# Patient Record
Sex: MALE | Race: WHITE | HISPANIC OR LATINO | Employment: FULL TIME
[De-identification: names, ages, dates, MRNs, and addresses within clinical notes are randomized per-mention and may not be internally consistent; named-entity substitution may affect disease eponyms.]

---

## 2024-09-11 ENCOUNTER — TRANSCRIBE ORDERS (OUTPATIENT)
Dept: CALL CENTER | Age: 39
End: 2024-09-11

## 2024-09-11 DIAGNOSIS — K70.9 ALCOHOLIC LIVER DISEASE (H): Primary | ICD-10-CM

## 2024-09-17 ENCOUNTER — TELEPHONE (OUTPATIENT)
Dept: GASTROENTEROLOGY | Facility: CLINIC | Age: 39
End: 2024-09-17
Payer: COMMERCIAL

## 2024-09-17 NOTE — TELEPHONE ENCOUNTER
"[facility] Call Center    Phone Message    May a detailed message be left on voicemail: No    Reason for Call: Other: Patient is currently scheduled on 11/27, as visit type New Liver . This is outside the expected timeline for this referral. Patient has been added to the waitlist.      Referral had a timeline listed of \"Urgent: 3-5 Days\" but no triage notes mention timeline ?     Action Taken: Message routed to:  Other: GI REFERRAL TRIAGE POOL     Travel Screening: Not Applicable   "

## 2024-09-20 ENCOUNTER — HOSPITAL ENCOUNTER (INPATIENT)
Facility: CLINIC | Age: 39
LOS: 2 days | Discharge: HOME | DRG: 433 | End: 2024-09-22
Attending: EMERGENCY MEDICINE | Admitting: STUDENT IN AN ORGANIZED HEALTH CARE EDUCATION/TRAINING PROGRAM
Payer: COMMERCIAL

## 2024-09-20 ENCOUNTER — APPOINTMENT (OUTPATIENT)
Dept: GENERAL RADIOLOGY | Facility: CLINIC | Age: 39
DRG: 433 | End: 2024-09-20
Attending: PHYSICIAN ASSISTANT
Payer: COMMERCIAL

## 2024-09-20 DIAGNOSIS — E80.6 BILIRUBINEMIA: ICD-10-CM

## 2024-09-20 DIAGNOSIS — K70.30 ALCOHOLIC CIRRHOSIS OF LIVER WITHOUT ASCITES (H): ICD-10-CM

## 2024-09-20 DIAGNOSIS — K70.30 ALCOHOLIC CIRRHOSIS OF LIVER WITHOUT ASCITES (H): Primary | ICD-10-CM

## 2024-09-20 LAB
ABO/RH(D): NORMAL
ALBUMIN SERPL BCG-MCNC: 2.9 G/DL (ref 3.5–5.2)
ALBUMIN UR-MCNC: NEGATIVE MG/DL
ALP SERPL-CCNC: 217 U/L (ref 40–150)
ALT SERPL W P-5'-P-CCNC: 68 U/L (ref 0–70)
AMMONIA PLAS-SCNC: 76 UMOL/L (ref 16–60)
ANION GAP SERPL CALCULATED.3IONS-SCNC: 11 MMOL/L (ref 7–15)
ANTIBODY SCREEN: NEGATIVE
APPEARANCE UR: CLEAR
APTT PPP: 38 SECONDS (ref 22–38)
AST SERPL W P-5'-P-CCNC: 126 U/L (ref 0–45)
ATRIAL RATE - MUSE: 84 BPM
BACTERIA #/AREA URNS HPF: ABNORMAL /HPF
BASOPHILS # BLD AUTO: 0.1 10E3/UL (ref 0–0.2)
BASOPHILS NFR BLD AUTO: 1 %
BILIRUB SERPL-MCNC: 30.6 MG/DL
BILIRUB UR QL STRIP: ABNORMAL
BUN SERPL-MCNC: 13 MG/DL (ref 6–20)
CALCIUM SERPL-MCNC: 8.3 MG/DL (ref 8.8–10.4)
CHLORIDE SERPL-SCNC: 101 MMOL/L (ref 98–107)
COLOR UR AUTO: ABNORMAL
CREAT SERPL-MCNC: 0.56 MG/DL (ref 0.67–1.17)
DIASTOLIC BLOOD PRESSURE - MUSE: NORMAL MMHG
EGFRCR SERPLBLD CKD-EPI 2021: >90 ML/MIN/1.73M2
EOSINOPHIL # BLD AUTO: 0 10E3/UL (ref 0–0.7)
EOSINOPHIL NFR BLD AUTO: 0 %
ERYTHROCYTE [DISTWIDTH] IN BLOOD BY AUTOMATED COUNT: 19.8 % (ref 10–15)
FLUAV RNA SPEC QL NAA+PROBE: NEGATIVE
FLUBV RNA RESP QL NAA+PROBE: NEGATIVE
GLUCOSE SERPL-MCNC: 110 MG/DL (ref 70–99)
GLUCOSE UR STRIP-MCNC: NEGATIVE MG/DL
HCO3 SERPL-SCNC: 24 MMOL/L (ref 22–29)
HCT VFR BLD AUTO: 40.2 % (ref 40–53)
HGB BLD-MCNC: 14.5 G/DL (ref 13.3–17.7)
HGB UR QL STRIP: NEGATIVE
HYALINE CASTS: 1 /LPF
IMM GRANULOCYTES # BLD: 0.5 10E3/UL
IMM GRANULOCYTES NFR BLD: 3 %
INR PPP: 2.43 (ref 0.85–1.15)
INTERPRETATION ECG - MUSE: NORMAL
KETONES UR STRIP-MCNC: NEGATIVE MG/DL
LACTATE SERPL-SCNC: 1.4 MMOL/L (ref 0.7–2)
LEUKOCYTE ESTERASE UR QL STRIP: NEGATIVE
LIPASE SERPL-CCNC: 58 U/L (ref 13–60)
LYMPHOCYTES # BLD AUTO: 0.9 10E3/UL (ref 0.8–5.3)
LYMPHOCYTES NFR BLD AUTO: 6 %
MCH RBC QN AUTO: 36.4 PG (ref 26.5–33)
MCHC RBC AUTO-ENTMCNC: 36.1 G/DL (ref 31.5–36.5)
MCV RBC AUTO: 101 FL (ref 78–100)
MONOCYTES # BLD AUTO: 1.2 10E3/UL (ref 0–1.3)
MONOCYTES NFR BLD AUTO: 7 %
MUCOUS THREADS #/AREA URNS LPF: PRESENT /LPF
NEUTROPHILS # BLD AUTO: 13.9 10E3/UL (ref 1.6–8.3)
NEUTROPHILS NFR BLD AUTO: 84 %
NITRATE UR QL: NEGATIVE
NRBC # BLD AUTO: 0 10E3/UL
NRBC BLD AUTO-RTO: 0 /100
NT-PROBNP SERPL-MCNC: 264 PG/ML (ref 0–450)
P AXIS - MUSE: 41 DEGREES
PH UR STRIP: 6.5 [PH] (ref 5–7)
PLATELET # BLD AUTO: 196 10E3/UL (ref 150–450)
POTASSIUM SERPL-SCNC: 4.2 MMOL/L (ref 3.4–5.3)
PR INTERVAL - MUSE: 142 MS
PROT SERPL-MCNC: 5.5 G/DL (ref 6.4–8.3)
QRS DURATION - MUSE: 84 MS
QT - MUSE: 384 MS
QTC - MUSE: 453 MS
R AXIS - MUSE: -7 DEGREES
RBC # BLD AUTO: 3.98 10E6/UL (ref 4.4–5.9)
RBC URINE: <1 /HPF
RSV RNA SPEC NAA+PROBE: NEGATIVE
SARS-COV-2 RNA RESP QL NAA+PROBE: NEGATIVE
SODIUM SERPL-SCNC: 136 MMOL/L (ref 135–145)
SP GR UR STRIP: 1.01 (ref 1–1.03)
SPECIMEN EXPIRATION DATE: NORMAL
SQUAMOUS EPITHELIAL: <1 /HPF
SYSTOLIC BLOOD PRESSURE - MUSE: NORMAL MMHG
T AXIS - MUSE: 4 DEGREES
TROPONIN T SERPL HS-MCNC: 8 NG/L
UROBILINOGEN UR STRIP-MCNC: NORMAL MG/DL
VENTRICULAR RATE- MUSE: 84 BPM
WBC # BLD AUTO: 16.6 10E3/UL (ref 4–11)
WBC URINE: 1 /HPF

## 2024-09-20 PROCEDURE — 80053 COMPREHEN METABOLIC PANEL: CPT | Performed by: PHYSICIAN ASSISTANT

## 2024-09-20 PROCEDURE — 85025 COMPLETE CBC W/AUTO DIFF WBC: CPT | Performed by: PHYSICIAN ASSISTANT

## 2024-09-20 PROCEDURE — 99255 IP/OBS CONSLTJ NEW/EST HI 80: CPT | Mod: GC | Performed by: INTERNAL MEDICINE

## 2024-09-20 PROCEDURE — 83605 ASSAY OF LACTIC ACID: CPT | Performed by: PHYSICIAN ASSISTANT

## 2024-09-20 PROCEDURE — 84484 ASSAY OF TROPONIN QUANT: CPT | Performed by: PHYSICIAN ASSISTANT

## 2024-09-20 PROCEDURE — 82140 ASSAY OF AMMONIA: CPT | Performed by: PHYSICIAN ASSISTANT

## 2024-09-20 PROCEDURE — 83690 ASSAY OF LIPASE: CPT | Performed by: PHYSICIAN ASSISTANT

## 2024-09-20 PROCEDURE — 99285 EMERGENCY DEPT VISIT HI MDM: CPT | Mod: 25 | Performed by: EMERGENCY MEDICINE

## 2024-09-20 PROCEDURE — 120N000002 HC R&B MED SURG/OB UMMC

## 2024-09-20 PROCEDURE — 93005 ELECTROCARDIOGRAM TRACING: CPT | Performed by: EMERGENCY MEDICINE

## 2024-09-20 PROCEDURE — 36415 COLL VENOUS BLD VENIPUNCTURE: CPT | Performed by: PHYSICIAN ASSISTANT

## 2024-09-20 PROCEDURE — 83735 ASSAY OF MAGNESIUM: CPT | Performed by: STUDENT IN AN ORGANIZED HEALTH CARE EDUCATION/TRAINING PROGRAM

## 2024-09-20 PROCEDURE — 81001 URINALYSIS AUTO W/SCOPE: CPT | Performed by: PHYSICIAN ASSISTANT

## 2024-09-20 PROCEDURE — 87637 SARSCOV2&INF A&B&RSV AMP PRB: CPT | Performed by: PHYSICIAN ASSISTANT

## 2024-09-20 PROCEDURE — 71046 X-RAY EXAM CHEST 2 VIEWS: CPT

## 2024-09-20 PROCEDURE — 85730 THROMBOPLASTIN TIME PARTIAL: CPT | Performed by: PHYSICIAN ASSISTANT

## 2024-09-20 PROCEDURE — 85610 PROTHROMBIN TIME: CPT | Performed by: PHYSICIAN ASSISTANT

## 2024-09-20 PROCEDURE — 82248 BILIRUBIN DIRECT: CPT | Performed by: STUDENT IN AN ORGANIZED HEALTH CARE EDUCATION/TRAINING PROGRAM

## 2024-09-20 PROCEDURE — 99285 EMERGENCY DEPT VISIT HI MDM: CPT | Mod: FS | Performed by: EMERGENCY MEDICINE

## 2024-09-20 PROCEDURE — 99223 1ST HOSP IP/OBS HIGH 75: CPT | Performed by: STUDENT IN AN ORGANIZED HEALTH CARE EDUCATION/TRAINING PROGRAM

## 2024-09-20 PROCEDURE — 93010 ELECTROCARDIOGRAM REPORT: CPT | Performed by: PHYSICIAN ASSISTANT

## 2024-09-20 PROCEDURE — 86900 BLOOD TYPING SEROLOGIC ABO: CPT | Performed by: PHYSICIAN ASSISTANT

## 2024-09-20 PROCEDURE — 84100 ASSAY OF PHOSPHORUS: CPT | Performed by: STUDENT IN AN ORGANIZED HEALTH CARE EDUCATION/TRAINING PROGRAM

## 2024-09-20 PROCEDURE — 87040 BLOOD CULTURE FOR BACTERIA: CPT | Performed by: PHYSICIAN ASSISTANT

## 2024-09-20 PROCEDURE — 83880 ASSAY OF NATRIURETIC PEPTIDE: CPT | Performed by: PHYSICIAN ASSISTANT

## 2024-09-20 RX ORDER — POLYETHYLENE GLYCOL 3350 17 G/17G
17 POWDER, FOR SOLUTION ORAL 2 TIMES DAILY PRN
Status: DISCONTINUED | OUTPATIENT
Start: 2024-09-20 | End: 2024-09-22 | Stop reason: HOSPADM

## 2024-09-20 RX ORDER — CEFTRIAXONE 1 G/1
1 INJECTION, POWDER, FOR SOLUTION INTRAMUSCULAR; INTRAVENOUS ONCE
Status: COMPLETED | OUTPATIENT
Start: 2024-09-20 | End: 2024-09-21

## 2024-09-20 RX ORDER — CALCIUM CARBONATE 500 MG/1
1000 TABLET, CHEWABLE ORAL 4 TIMES DAILY PRN
Status: DISCONTINUED | OUTPATIENT
Start: 2024-09-20 | End: 2024-09-22 | Stop reason: HOSPADM

## 2024-09-20 RX ORDER — AMOXICILLIN 250 MG
2 CAPSULE ORAL 2 TIMES DAILY PRN
Status: DISCONTINUED | OUTPATIENT
Start: 2024-09-20 | End: 2024-09-22 | Stop reason: HOSPADM

## 2024-09-20 RX ORDER — AMOXICILLIN 250 MG
1 CAPSULE ORAL 2 TIMES DAILY PRN
Status: DISCONTINUED | OUTPATIENT
Start: 2024-09-20 | End: 2024-09-22 | Stop reason: HOSPADM

## 2024-09-20 RX ORDER — LIDOCAINE 40 MG/G
CREAM TOPICAL
Status: DISCONTINUED | OUTPATIENT
Start: 2024-09-20 | End: 2024-09-22 | Stop reason: HOSPADM

## 2024-09-20 RX ORDER — ONDANSETRON 4 MG/1
4 TABLET, ORALLY DISINTEGRATING ORAL EVERY 6 HOURS PRN
Status: DISCONTINUED | OUTPATIENT
Start: 2024-09-20 | End: 2024-09-22 | Stop reason: HOSPADM

## 2024-09-20 RX ORDER — ACETAMINOPHEN 650 MG/1
650 SUPPOSITORY RECTAL EVERY 4 HOURS PRN
Status: DISCONTINUED | OUTPATIENT
Start: 2024-09-20 | End: 2024-09-22 | Stop reason: HOSPADM

## 2024-09-20 RX ORDER — ONDANSETRON 2 MG/ML
4 INJECTION INTRAMUSCULAR; INTRAVENOUS EVERY 6 HOURS PRN
Status: DISCONTINUED | OUTPATIENT
Start: 2024-09-20 | End: 2024-09-22 | Stop reason: HOSPADM

## 2024-09-20 RX ORDER — ENOXAPARIN SODIUM 100 MG/ML
40 INJECTION SUBCUTANEOUS EVERY 12 HOURS
Status: DISCONTINUED | OUTPATIENT
Start: 2024-09-21 | End: 2024-09-21

## 2024-09-20 RX ORDER — ACETAMINOPHEN 325 MG/1
650 TABLET ORAL EVERY 4 HOURS PRN
Status: DISCONTINUED | OUTPATIENT
Start: 2024-09-20 | End: 2024-09-22 | Stop reason: HOSPADM

## 2024-09-20 ASSESSMENT — ACTIVITIES OF DAILY LIVING (ADL)
ADLS_ACUITY_SCORE: 35

## 2024-09-20 ASSESSMENT — COLUMBIA-SUICIDE SEVERITY RATING SCALE - C-SSRS
1. IN THE PAST MONTH, HAVE YOU WISHED YOU WERE DEAD OR WISHED YOU COULD GO TO SLEEP AND NOT WAKE UP?: NO
2. HAVE YOU ACTUALLY HAD ANY THOUGHTS OF KILLING YOURSELF IN THE PAST MONTH?: NO
6. HAVE YOU EVER DONE ANYTHING, STARTED TO DO ANYTHING, OR PREPARED TO DO ANYTHING TO END YOUR LIFE?: NO

## 2024-09-20 NOTE — ED TRIAGE NOTES
"  L lower leg has pitting edema. PMD sent pt to ED \" my liver is failing fast, my labs are getting worse\" + SOB when walking up/down stairs   Triage Assessment (Adult)       Row Name 09/20/24 9308          Triage Assessment    Airway WDL WDL        Respiratory WDL    Respiratory WDL expansion/retractions     Expansion/Accessory Muscles/Retractions no use of accessory muscles;no retractions;expansion symmetric        Skin Circulation/Temperature WDL    Skin Circulation/Temperature WDL X        Cardiac WDL    Cardiac WDL WDL        Peripheral/Neurovascular WDL    Peripheral Neurovascular WDL WDL        Cognitive/Neuro/Behavioral WDL    Cognitive/Neuro/Behavioral WDL WDL                     "

## 2024-09-21 ENCOUNTER — APPOINTMENT (OUTPATIENT)
Dept: ULTRASOUND IMAGING | Facility: CLINIC | Age: 39
DRG: 433 | End: 2024-09-21
Attending: STUDENT IN AN ORGANIZED HEALTH CARE EDUCATION/TRAINING PROGRAM
Payer: COMMERCIAL

## 2024-09-21 LAB
ANION GAP SERPL CALCULATED.3IONS-SCNC: 8 MMOL/L (ref 7–15)
BILIRUB DIRECT SERPL-MCNC: 18.54 MG/DL (ref 0–0.3)
BILIRUB SERPL-MCNC: 29.8 MG/DL
BUN SERPL-MCNC: 13.8 MG/DL (ref 6–20)
C DIFF TOX B STL QL: NEGATIVE
CALCIUM SERPL-MCNC: 8.1 MG/DL (ref 8.8–10.4)
CHLORIDE SERPL-SCNC: 104 MMOL/L (ref 98–107)
CREAT SERPL-MCNC: 0.66 MG/DL (ref 0.67–1.17)
EGFRCR SERPLBLD CKD-EPI 2021: >90 ML/MIN/1.73M2
ERYTHROCYTE [DISTWIDTH] IN BLOOD BY AUTOMATED COUNT: 19.9 % (ref 10–15)
GLUCOSE SERPL-MCNC: 77 MG/DL (ref 70–99)
HCO3 SERPL-SCNC: 24 MMOL/L (ref 22–29)
HCT VFR BLD AUTO: 39.4 % (ref 40–53)
HGB BLD-MCNC: 14.1 G/DL (ref 13.3–17.7)
HOLD SPECIMEN: NORMAL
MAGNESIUM SERPL-MCNC: 2 MG/DL (ref 1.7–2.3)
MAGNESIUM SERPL-MCNC: 2.1 MG/DL (ref 1.7–2.3)
MCH RBC QN AUTO: 36.1 PG (ref 26.5–33)
MCHC RBC AUTO-ENTMCNC: 35.8 G/DL (ref 31.5–36.5)
MCV RBC AUTO: 101 FL (ref 78–100)
PHOSPHATE SERPL-MCNC: 2.8 MG/DL (ref 2.5–4.5)
PLATELET # BLD AUTO: 189 10E3/UL (ref 150–450)
POTASSIUM SERPL-SCNC: 3.9 MMOL/L (ref 3.4–5.3)
RBC # BLD AUTO: 3.91 10E6/UL (ref 4.4–5.9)
SODIUM SERPL-SCNC: 136 MMOL/L (ref 135–145)
WBC # BLD AUTO: 17.4 10E3/UL (ref 4–11)

## 2024-09-21 PROCEDURE — 250N000011 HC RX IP 250 OP 636: Performed by: PHYSICIAN ASSISTANT

## 2024-09-21 PROCEDURE — 999N000147 HC STATISTIC PT IP EVAL DEFER: Performed by: PHYSICAL THERAPIST

## 2024-09-21 PROCEDURE — 99233 SBSQ HOSP IP/OBS HIGH 50: CPT | Performed by: INTERNAL MEDICINE

## 2024-09-21 PROCEDURE — 76700 US EXAM ABDOM COMPLETE: CPT

## 2024-09-21 PROCEDURE — 258N000003 HC RX IP 258 OP 636: Performed by: STUDENT IN AN ORGANIZED HEALTH CARE EDUCATION/TRAINING PROGRAM

## 2024-09-21 PROCEDURE — 80048 BASIC METABOLIC PNL TOTAL CA: CPT | Performed by: STUDENT IN AN ORGANIZED HEALTH CARE EDUCATION/TRAINING PROGRAM

## 2024-09-21 PROCEDURE — 87493 C DIFF AMPLIFIED PROBE: CPT | Performed by: STUDENT IN AN ORGANIZED HEALTH CARE EDUCATION/TRAINING PROGRAM

## 2024-09-21 PROCEDURE — 76700 US EXAM ABDOM COMPLETE: CPT | Mod: 26 | Performed by: RADIOLOGY

## 2024-09-21 PROCEDURE — 36415 COLL VENOUS BLD VENIPUNCTURE: CPT | Performed by: STUDENT IN AN ORGANIZED HEALTH CARE EDUCATION/TRAINING PROGRAM

## 2024-09-21 PROCEDURE — 85027 COMPLETE CBC AUTOMATED: CPT | Performed by: STUDENT IN AN ORGANIZED HEALTH CARE EDUCATION/TRAINING PROGRAM

## 2024-09-21 PROCEDURE — 999N000111 HC STATISTIC OT IP EVAL DEFER: Performed by: OCCUPATIONAL THERAPIST

## 2024-09-21 PROCEDURE — 120N000002 HC R&B MED SURG/OB UMMC

## 2024-09-21 PROCEDURE — 250N000011 HC RX IP 250 OP 636: Performed by: STUDENT IN AN ORGANIZED HEALTH CARE EDUCATION/TRAINING PROGRAM

## 2024-09-21 PROCEDURE — 36415 COLL VENOUS BLD VENIPUNCTURE: CPT | Performed by: INTERNAL MEDICINE

## 2024-09-21 PROCEDURE — 83735 ASSAY OF MAGNESIUM: CPT | Performed by: INTERNAL MEDICINE

## 2024-09-21 PROCEDURE — 250N000013 HC RX MED GY IP 250 OP 250 PS 637: Performed by: STUDENT IN AN ORGANIZED HEALTH CARE EDUCATION/TRAINING PROGRAM

## 2024-09-21 RX ORDER — HALOPERIDOL 5 MG/ML
2.5-5 INJECTION INTRAMUSCULAR EVERY 6 HOURS PRN
Status: DISCONTINUED | OUTPATIENT
Start: 2024-09-21 | End: 2024-09-22 | Stop reason: HOSPADM

## 2024-09-21 RX ORDER — OLANZAPINE 5 MG/1
5-10 TABLET, ORALLY DISINTEGRATING ORAL EVERY 6 HOURS PRN
Status: DISCONTINUED | OUTPATIENT
Start: 2024-09-21 | End: 2024-09-22 | Stop reason: HOSPADM

## 2024-09-21 RX ORDER — FUROSEMIDE 10 MG/ML
20 INJECTION INTRAMUSCULAR; INTRAVENOUS ONCE
Status: COMPLETED | OUTPATIENT
Start: 2024-09-21 | End: 2024-09-21

## 2024-09-21 RX ORDER — PREDNISOLONE 5 MG/1
5 TABLET ORAL SEE ADMIN INSTRUCTIONS
Status: ON HOLD | COMMUNITY

## 2024-09-21 RX ORDER — FAMOTIDINE 20 MG/1
20 TABLET ORAL DAILY
Status: ON HOLD | COMMUNITY

## 2024-09-21 RX ORDER — CLONIDINE HYDROCHLORIDE 0.1 MG/1
0.1 TABLET ORAL EVERY 8 HOURS
Status: DISCONTINUED | OUTPATIENT
Start: 2024-09-21 | End: 2024-09-22 | Stop reason: HOSPADM

## 2024-09-21 RX ORDER — LORAZEPAM 1 MG/1
1-2 TABLET ORAL EVERY 30 MIN PRN
Status: DISCONTINUED | OUTPATIENT
Start: 2024-09-21 | End: 2024-09-22 | Stop reason: HOSPADM

## 2024-09-21 RX ORDER — FLUMAZENIL 0.1 MG/ML
0.2 INJECTION, SOLUTION INTRAVENOUS
Status: DISCONTINUED | OUTPATIENT
Start: 2024-09-21 | End: 2024-09-22 | Stop reason: HOSPADM

## 2024-09-21 RX ORDER — SODIUM CHLORIDE 9 MG/ML
INJECTION, SOLUTION INTRAVENOUS
Status: COMPLETED
Start: 2024-09-21 | End: 2024-09-21

## 2024-09-21 RX ORDER — FOLIC ACID 1 MG/1
1 TABLET ORAL DAILY
Status: DISCONTINUED | OUTPATIENT
Start: 2024-09-21 | End: 2024-09-22 | Stop reason: HOSPADM

## 2024-09-21 RX ORDER — MULTIPLE VITAMINS W/ MINERALS TAB 9MG-400MCG
1 TAB ORAL DAILY
Status: DISCONTINUED | OUTPATIENT
Start: 2024-09-21 | End: 2024-09-22 | Stop reason: HOSPADM

## 2024-09-21 RX ORDER — LORAZEPAM 2 MG/ML
1-2 INJECTION INTRAMUSCULAR EVERY 30 MIN PRN
Status: DISCONTINUED | OUTPATIENT
Start: 2024-09-21 | End: 2024-09-22 | Stop reason: HOSPADM

## 2024-09-21 RX ORDER — SIMETHICONE 125 MG
125 TABLET,CHEWABLE ORAL 2 TIMES DAILY
Status: ON HOLD | COMMUNITY

## 2024-09-21 RX ADMIN — CLONIDINE HYDROCHLORIDE 0.1 MG: 0.1 TABLET ORAL at 06:43

## 2024-09-21 RX ADMIN — FUROSEMIDE 20 MG: 10 INJECTION, SOLUTION INTRAMUSCULAR; INTRAVENOUS at 06:45

## 2024-09-21 RX ADMIN — THIAMINE HCL TAB 100 MG 100 MG: 100 TAB at 09:03

## 2024-09-21 RX ADMIN — FOLIC ACID 1 MG: 1 TABLET ORAL at 09:03

## 2024-09-21 RX ADMIN — Medication 1 TABLET: at 09:03

## 2024-09-21 RX ADMIN — ACETAMINOPHEN 650 MG: 325 TABLET ORAL at 11:42

## 2024-09-21 RX ADMIN — CEFTRIAXONE SODIUM 1 G: 1 INJECTION, POWDER, FOR SOLUTION INTRAMUSCULAR; INTRAVENOUS at 00:13

## 2024-09-21 RX ADMIN — CLONIDINE HYDROCHLORIDE 0.1 MG: 0.1 TABLET ORAL at 14:16

## 2024-09-21 RX ADMIN — SODIUM CHLORIDE 1000 ML: 9 INJECTION, SOLUTION INTRAVENOUS at 00:17

## 2024-09-21 RX ADMIN — CLONIDINE HYDROCHLORIDE 0.1 MG: 0.1 TABLET ORAL at 21:18

## 2024-09-21 ASSESSMENT — ACTIVITIES OF DAILY LIVING (ADL)
ADLS_ACUITY_SCORE: 35
ADLS_ACUITY_SCORE: 18
ADLS_ACUITY_SCORE: 35
ADLS_ACUITY_SCORE: 18
ADLS_ACUITY_SCORE: 35
ADLS_ACUITY_SCORE: 18
ADLS_ACUITY_SCORE: 18
ADLS_ACUITY_SCORE: 35
ADLS_ACUITY_SCORE: 18
ADLS_ACUITY_SCORE: 18
ADLS_ACUITY_SCORE: 35
ADLS_ACUITY_SCORE: 18
ADLS_ACUITY_SCORE: 35
ADLS_ACUITY_SCORE: 18
ADLS_ACUITY_SCORE: 18

## 2024-09-21 NOTE — PHARMACY-ADMISSION MEDICATION HISTORY
Pharmacist Admission Medication History    Admission medication history is complete. The information provided in this note is only as accurate as the sources available at the time of the update.    Information Source(s): Patient via phone    Pertinent Information:   Patient is friendly and knowledgeable regarding their PTA medication regimen; compliance of PTA medication is endorsed during the interview for mediation history.      Changes made to PTA medication list:  Added: all 3 medications  Deleted: None  Changed: None    Allergies reviewed with patient and updates made in EHR: yes    Medication History Completed By: Cj Wright [facility] 9/21/2024 6:41 PM    PTA Med List   Medication Sig Last Dose    famotidine (PEPCID) 20 MG tablet Take 20 mg by mouth daily.     prednisoLONE 5 MG tablet Take 5 mg by mouth See Admin Instructions. Last dose of 8 tablets on 9/20   TAKE 8 TABLETS BY MOUTH ONCE DAILY FOR 28 DAYS, THEN 6 TABLETS DAILY FOR 7 DAYS, THEN 4 TABLETS DAILY FOR 7 DAYS, THEN 2 TABLETS DAILY FOR 7     simethicone (MYLICON) 125 MG chewable tablet Take 125 mg by mouth 2 times daily.

## 2024-09-21 NOTE — PLAN OF CARE
"Goal Outcome Evaluation.vs  /50   Pulse 85   Temp 97.9  F (36.6  C) (Oral)   Resp 16   Ht 1.651 m (5' 5\")   Wt 55.3 kg (122 lb)   SpO2 98%   BMI 20.30 kg/m            Plan of Care Reviewed With: patient    Overall Patient Progress: no changeOverall Patient Progress: no change  Pt alert and oriented x4, able to make needs known, pt is pleasant and cooperative. On room air, CIWA score 2, skin jaundiced, sclera yellow, med for headache x1, [facility] was able to reach staff to help pt with this. Stool formed, dent to lab, 1 more stool spec needed, appetite good, BLE edema noted, enc pt to elevate feet. Voiding qs. No magnesium replacement needed today,  Continue plan of care disposition TBD         "

## 2024-09-21 NOTE — PROGRESS NOTES
[facility]    Medicine Progress Note - Hospitalist Service, GOLD TEAM 16    Date of Admission:  9/20/2024    Assessment & Plan      Stewart Hunt is a 38 year old male admitted on 9/20/2024. He presents to the ER with a recent diagnosis of cirrhosis. Patient underwent CT abd pelvis, MRI abd pelvis in early September which confirmed diagnosis of cirrhosis. Patient presenting to the ER with progressively worsening anasarca, hyperbilirubinemia.    # Decompensated alcoholic cirrhosis  # Hyperbilirubinemia most likely due to Alcoholic cirrhosis  # Jaundice  - MELD-Na: 29. MELD3.0: 32. 63.5% estimated 90 day survival  - Last MRI Abdpomen/pelvis (9/15)  > Mild nodularity of the liver representing cirrhosis.   > Small nodular areas of fatty infiltration in the liver.   > No suspicious enhancing liver lesions.   - Ascites: Recent CT abdomen pelvis showing mild abdominal ascites.  Abdominal exam today showing generalized abdominal distention with pitting edema up to the navel, however showed no fluid wave.  - Varices: No history of varices or hemorrhoids on last EGD and colonoscopy.  - Will obtain abdomen ultrasound to evaluate volume of ascites.  If ascites is noted on ultrasound,  - Daily daily MELD labs  - Will obtain total, direct bilirubin (hyperbilirubinemia likely due to alcoholic cirrhoris, however, will need to rule out hemolysis as etiology for his hyperbilirubinemia  - Patient was given a dose of ceftriaxone in the ER. Given that he is having no abd pain, no ttp, no peritoneal signs and remains afebrile, will hold off on continuing ceftriaxone for empirical sbp treatment until Abd US confirms ascites    # Hepatic encephalopathy  Patient endorsing difficulty sleeping. and mild symptoms of forgetfulness and daytime drowsiness which may be reflective of hepatic encephalopathy.  Ammonia 76.  - Goal to have 2-3 stools per day.  At this time is having 6-7 watery stools  per day.  - Once acute diarrhea improves, would consider starting lactulose to meet daily goal stools.    # Anasarca (BLE pittting edema, pitting edema at abd below level of navel)  # Hypoalbuminemia  Cause of anasarca is likely due to hypoalbuminemia from liver disease. UA done while in the ER which showed no protein. No risk factors for protein losing enteropathy. It is also possible that malnutrition is confounding  factor   - High protein diet, Low Na diet  - Begin lasix 20mg IVP BID (If patient noted to have ascites on abd US above, will switch to Lasix/spironolactone). For now will continue to diurese with Lasix alone.  - Daily standing weights  - Strict intake/output  - TTE to rule out heart failure    # Acute diarrhea  - Diarrhea ongoing for the last week while on prednisone. Patient has high risk for Cdiff. In addition, patient also noted to have blood in the stool.  - GI PCR ordered, pending  - C. diff ordered, pending    # Hypertension  Etiology: Likely is primary hypertension. Per chart review, appears that patient has ranged between 150/70s- 160/80s. Patient has not been on antihypertensives at home prior to admission  - Lasix 20mg IVP BID (can be transitioned to Lasix po BID at discharge)  - After patient noted to be tolerating lasix well, will add on spironolactone 50 mg daily-- eventually to titrate up to Spironolactone 100 mg.    # Leukocytosis  - Patient noted to have leukocytosis with neutrophil predominance. Possible etiology of his leukocytosis includes steroid induced, bacterial gastroenteritis  - Trend daily  - Monitor fever curve  - Given 1 dose of ceftriaxone at the ER. Will hold off on empirical sbp treatment until confirmed to have ascites.    # Alcohol use disorder  - Last alchol drink was 2 weeks ago. Patient has a long history of alcohol use for the last several years. Last 2 years has been drinking 2 pints of bodka daily for at least 2 years.  - Since patient's ;last drink was 2 weeks  ago, unlikely to need benzos for withdrawal. Will keep on [facility] protocol for now  - Chemical dependency consult, appreciate recs  - Thiamine, folate B12, Multivits  - Ativan as part of ciwa (would not anticipate that this would be needed)          Diet: Combination Diet Regular Diet; 2 gm NA Diet; Low Saturated Fat Na <2400mg Diet, No Caffeine Diet    DVT Prophylaxis: Pneumatic Compression Devices  Gomes Catheter: Not present  Lines: None     Cardiac Monitoring: None  Code Status: Full Code      Clinically Significant Risk Factors Present on Admission              # Hypoalbuminemia: Lowest albumin = 2.9 g/dL at 9/20/2024  6:47 PM, will monitor as appropriate  # Coagulation Defect: INR = 2.43 (Ref range: 0.85 - 1.15) and/or PTT = 38 Seconds (Ref range: 22 - 38 Seconds), will monitor for bleeding                       Disposition Plan     Medically Ready for Discharge: Anticipated in 2-4 Days             Momo Navarrete DO, PRERNA  Hospitalist Service, GOLD TEAM [address] [place] [address]  Securely message with [employer] (more info)  Text page via [facility] Paging/Directory   See signed in provider for up to date coverage information  ______________________________________________________________________    Interval History   Patient is in strong spirits today.  Patient reports urinating frequently.  Patient reports frequent bowel movements.  Patient's mentation appears improved.    Physical Exam   Vital Signs: Temp: 97.9  F (36.6  C) Temp src: Oral BP: 110/50 Pulse: 85   Resp: 16 SpO2: 98 % O2 Device: None (Room air)    Weight: 122 lbs 0 oz    GENERAL: Alert and oriented x 3; no acute distress; well-nourished.  HEENT: Normocephalic; atraumatic; PERRLA; MMM.  CV: RRR; normal S1, S2; no rubs, murmurs, or gallops.  RESP: Lung fields clear to aucultation B/L; no wheezing or crepitations.  GI: Abdomen is soft, nontender, nondistended; no organomegaly; normal bowel sounds.  : Deferred genital  examination.   MSK: No clubbing, cyanosis, or edema.  DERM: Skin is intact; no rash, lesions, or skin breakdown.  NEURO: No focal deficits appreciated; strength & sensorium are grossly intact.  PSYCH: No active hallucinations; affect, insight appear within normal limits.    Medical Decision Making       55 MINUTES SPENT BY ME on the date of service doing chart review, history, exam, documentation & further activities per the note.      Data     I have personally reviewed the following data over the past 24 hrs:    17.4 (H)  \   14.1   / 189     136 104 13.8 /  77   3.9 24 0.66 (L) \     ALT: 68 AST: 126 (H) AP: 217 (H) TBILI: 30.6 (HH); 29.8 (HH)   ALB: 2.9 (L) TOT PROTEIN: 5.5 (L) LIPASE: 58     Trop: 8 BNP: 264     Procal: N/A CRP: N/A Lactic Acid: 1.4       INR:  2.43 (H) PTT:  38   D-dimer:  N/A Fibrinogen:  N/A       Imaging results reviewed over the past 24 hrs:   Recent Results (from the past 24 hour(s))   Chest XR,  PA & LAT    Narrative    EXAM: XR CHEST 2 VIEWS  LOCATION: [facility]  DATE: 9/20/2024    INDICATION: Dyspnea on exertion  COMPARISON: None.      Impression    IMPRESSION: Normal size of cardiomediastinal silhouette. Low lung volumes with elevation of the right hemidiaphragm and associated compressive atelectasis. No definite consolidation, pleural effusion or pneumothorax. No acute bony abnormality.   US Abdomen Complete    Narrative    EXAMINATION: US ABDOMEN COMPLETE  9/21/2024 8:31 AM      CLINICAL HISTORY: eval for ascitic fluid volume (for patracentesis),  eval for biliary obstruction    COMPARISON: None        FINDINGS:  Liver demonstrates increased echogenicity and coarsened echotexture  and measures 20.6 cm in length. There is no intrahepatic or  extrahepatic biliary ductal dilatation. The common bile duct measures  4 mm. The main portal vein is patent with flow towards the liver and  measures 1.0 cm in diameter.    The gallbladder wall  measures 4 mm in thickness. No stones or  pericholecystic fluid. There is a pedunculated 4 mm gallbladder polyp.  The spleen measures maximally 12.6 cm and is normal in appearance. The  visualized portions of the pancreas are normal in echogenicity.    The visualized upper abdominal aorta and inferior vena cava are  normal.      The kidneys are normal in position and echogenicity. The right kidney  measures 11.4 cm and the left kidney measures 14.2 cm. There is no  significant urinary tract dilation. The urinary bladder is moderately  distended and normal in morphology. The bladder wall is normal.    Trace right upper quadrant free fluid.      Impression    IMPRESSION:     1. Hepatosplenomegaly and hepatic cirrhosis  2. Trace right upper quadrant free fluid.  3. No evidence of biliary obstruction.

## 2024-09-21 NOTE — H&P
[facility]    History and Physical - Hospitalist Service, GOLD TEAM        Date of Admission:  9/20/2024    Assessment & Plan      Stewart Hunt is a 38 year old male admitted on 9/20/2024. He presents to the ER with a recent diagnosis of cirrhosis. Patient underwent CT abd pelvis, MRI abd pelvis in early September which confirmed diagnosis of cirrhosis. Patient presenting to the ER with progressively worsening anasarca, hyperbilirubinemia.    #Decompensated alcoholic cirrhosis  #Hyperbilirubinemia most likely due to Alcoholic cirrhosis  #Jaundice  --- MELD-Na: 29. MELD3.0: 32. 63.5% estimated 90 day survival  Last MRI Abdpomen/pelvis (9/15)  1. Mild nodularity of the liver representing cirrhosis.   2. Small nodular areas of fatty infiltration in the liver.   3. No suspicious enhancing liver lesions.   --- Ascites: Recent CT abdomen pelvis showing mild abdominal ascites.  Abdominal exam today showing generalized abdominal distention with pitting edema up to the navel, however showed no fluid wave.  --- varices: No history of varices or hemorrhoids on last EGD and colonoscopy.  --- Will obtain abdomen ultrasound to evaluate volume of ascites.  If ascites is noted on ultrasound,  --- Daily daily MELD labs  --- Will obtain total, direct bilirubin (hyperbilirubinemia likely due to alcoholic cirrhoris, however, will need to rule out hemolysis as etiology for his hyperbilirubinemia  --- Patient was given a dose of ceftriaxone in the ER. Given that he is having no abd pain, no ttp, no peritoneal signs and remains afebrile, will hold off on continuing ceftriaxone for empirical sbp treatment until Abd US confirms ascites    #Hepatic encephalopathy  Patient endorsing difficulty sleeping. and mild symptoms of forgetfulness and daytime drowsiness which may be reflective of hepatic encephalopathy.  Ammonia 76.  --- Goal to have 2-3 stools per day.  At this time is having 6-7  watery stools per day.  --- Once acute diarrhea improves, would consider starting lactulose to meet daily goal stools.    #Anasarca (BLE pittting edema, pitting edema at abd below level of navel)  #Hypoalbuminemia  Cause of anasarca is likely due to hypoalbuminemia from liver disease. UA done while in the ER which showed no protein. No risk factors for protein losing enteropathy. It is also possible that malnutrition is confounding  factor   --- High protein diet, Low Na diet  --- begin lasix 20mg IVP BID (If patient noted to have ascites on abd US above, will switch to Lasix/spironolactone). For now will continue to diurese with Lasix alone.  --- daily standing weights  --- strict intake/output  --- TTE to rule out heart failure    #Acute Diarrhea:  --- Diarrhea ongoing for the last week while on prednisone. Patient has high risk for Cdiff. In addition, patient also noted to have blood in the stool.  --- GI PCR ordered, pending  --- Cdiff ordered, pending    #Hypertension  Etiology: Likely is primary hypertension. Per chart review, appears that patient has ranged between 150/70s- 160/80s. Patient has not been on antihypertensives at home prior to admission  ---  Lasix 20mg IVP BID (can be transitioned to Lasix po BID at discharge)  --- After patient noted to be tolerating lasix well, will add on spironolactone 50 mg daily-- eventually to titrate up to Spironolactone 100 mg.    #Leukocytosis:  --- Patient noted to have leukocytosis with neutrophil predominance. Possible etiology of his leukocytosis includes steroid induced, bacterial gastroenteritis  --- trend daily  --- monitor fever curve  --- given 1 dose of ceftriaxone at the ER. Will hold off on empirical sbp treatment until confirmed to have ascites.    #Alcohol Use Disorder  --- Last alchol drink was 2 weeks ago. Patient has a long history of alcohol use for the last several years. Last 2 years has been drinking 2 pints of bodka daily for at least 2  years.  --- Since patient's ;last drink was 2 weeks ago, unlikely to need benzos for withdrawal. Will keep on CIWA protocol for now  --- Chemical dependency consult, appreciate recs  --- Thiamine, folate B12, Multivits  --- Ativan as part of ciwa (would not anticipate that this would be needed)         Diet: Combination Diet Regular Diet; 2 gm NA Diet; Low Saturated Fat Na <2400mg Diet, No Caffeine DietLow sodium. High protein  DVT Prophylaxis: Pneumatic Compression Devices. Patient having blood in the stool, so will hold off on anticoag  Gomes Catheter: Not present  Lines: None     Cardiac Monitoring: None  Code Status: Full Codefull code    Clinically Significant Risk Factors Present on Admission              # Hypoalbuminemia: Lowest albumin = 2.9 g/dL at 9/20/2024  6:47 PM, will monitor as appropriate    # Coagulation Defect: INR = 2.43 (Ref range: 0.85 - 1.15) and/or PTT = 38 Seconds (Ref range: 22 - 38 Seconds), will monitor for bleeding                       Disposition Plan     Medically Ready for Discharge: Anticipated in 5+ Days           DEE DUFF MD  Hospitalist Service, [facility]  Securely message with [email or web address] (more info)  Text page via [facility] Paging/Directory   See signed in provider for up to date coverage information    ______________________________________________________________________    Chief Complaint   Generalized swelling    History is obtained from the patient    History of Present Illness   Stewart Hunt is a 38-year-old male with past medical history of alcohol use disorder, recently diagnosed with alcoholic hepatitis and cirrhosis.  Patient states his illness and began to manifest 6 weeks ago when he noticed that he was having shortness of breath while walking.  Stated that he was having difficulty with walking due to dyspnea on exertion after walking for more than 2 blocks.  Also had productive cough when he laid  flat.  During that time, patient also noticed swelling in bilateral lower extremities along with pitting edema, and pitting edema involving the abdomen and pelvis.  Patient initially presented to his PCP at that time.  Initial bedtime showed elevated alkaline phosphatase at 260, , ALT 61, bilirubin 7.5, and as a result was referred to GI/hepatology.  Patient went workup for her hepatitis including alpha-fetoprotein, ceruloplasmin, AMA muscle antibodies, mitochondrial antibodies, hepatitis B and C all of which were negative.  Markers of time were slightly increased with CRP of 8.647.  Lipase was also elevated at 350.  During that visit, patient had no signs or symptoms of acute pancreatitis.  proBNP was mildly elevated at 157.  At the end of August, he underwent CT abdomen pelvis which showed evidence of liver cirrhosis, steatosis, regenerative nodules and findings consistent with portal hypertension.  MRI confirmed the findings of cirrhosis.  Following up with [facility], patient underwent EGD and colonoscopy which showed no significant findings (no polyps/masses/ulcerations/erosions/varices).  Patient was started on steroids with prednisone taper about 1 week ago by his GI doctor however was then told to stop taking it when his bilirubin levels were elevated from previous level of 7 to 29.  Worsening liver function, patient was recommended to come into the ER.    Recent Workup-Liver:    CT Abd pelvis w/ contrast (9/7/2024)  1. Similar-appearing hepatomegaly with heterogeneous hepatic enhancement likely  representing a combination of hepatic steatosis, fibrosis, and regenerative  nodules seen predominantly in the left hepatic lobe. Recommend further  assessment with outpatient abdominal MRI with liver protocol.   2. Sequela of portal hypertension to include extensive portosystemic collateral  vessels and trace abdominopelvic ascites.   3. No evidence of bowel obstruction. No obvious bowel  inflammation; however,  mesenteric edema and trace ascites limits evaluation.    Last MRI Abdpomen/pelvis (9/15)  1. Mild nodularity of the liver representing cirrhosis.   2. Small nodular areas of fatty infiltration in the liver.   3. No suspicious enhancing liver lesions.     Per discussion with patient, patient confirms that he has been having 6 weeks of lower extremity swelling, dyspnea on exertion, coughing when he laid flat.  He also has been endorsing diarrhea for the last couple weeks while he has been on prednisone.  States he has 6-7 watery stools a night.  Patient does not intermittent episodes of of small blood on the toilet paper after having a bowel movement.  Denies any black tarry stools.  Denies any melena no pain with bowel meds.  Patient otherwise denies any significant abdominal pain, nausea, vomiting.  He does endorse intermittent chest pain.  The chest pain is sharp in nature substernal region.  Not associated eating or exertion.  Not sure what triggers it.  Not associated with cough, shortness of breath, lightheadedness, dizziness.  In addition to above, patient also endorses jaundice for the last 6 to 7 weeks.  States it has been about the same over the last 6 to 7 weeks.  In addition, he also endorses difficulty with falling asleep and sleeping through the night.  States he wakes up multiple times through the night.  Denies any tremors, difficulty walking, difficulty coordination.  No other focal sensory deficits.    ER course:  Arrival to the, patient was noted to be hypertensive at 145/72, with normal heart rate at 80, normothermic at 98.1 Fahrenheit, normal respiratory rate, satting well on room air. CXR showed Normal size of cardiomediastinal silhouette. Low lung volumes with elevation of the right hemidiaphragm and associated compressive atelectasis. No definite consolidation, pleural effusion or pneumothorax. No acute bony abnormality.  Initial lab showing elevated ammonia at 76,  bilirubin of 29.8 (previous bilirubin was 7), AST up from 126 (last AST 1 month ago was 248), ALT 68 (last ALT was 61), alk phos of 217, negative proBNP, normal lactic acid, negative troponins, normal lipase.  Complete blood count showing elevated white blood cell count at 16.6, with neutrophil predominance.  EKG was also done in ER, which showed changes.  UA showed no infections.  Blood cultures were obtained in the ER.  GI was consulted while patient was in the ER, and evaluated the patient.  Patient admitted to [facility] for further workup.  Per GIs recommendations, patient will be safe admission at [facility] for now.  If clinically worsens, would need to have urgent transfer the spine.    ER workup:  - Ammonia:76  - elevated INR: 2.43  - alk phos: 217. Bilirubin 30.6. ast: 126. Alt: 68  - probnp negative, lactic acid wnl, tropns negative. Lipase normal  - wbc: 16.6. elevated an      Past Medical History    History reviewed. No pertinent past medical history.    Past Surgical History   History reviewed. No pertinent surgical history.    Prior to Admission Medications   None        Review of Systems    The 10 point Review of Systems is negative other than noted in the HPI or here.    Social History   I have reviewed this patient's social history and updated it with pertinent information if needed.  Social History     Substance Use Topics    Alcohol use: Not Currently     Comment: 2 pints of vodka per day for 2 years. Last drink early sept 2024    Drug use: Never         Family History   I have reviewed this patient's family history and updated it with pertinent information if needed.  Family History   Problem Relation Age of Onset    Colon Cancer Mother     Diabetes Father     Diabetes Maternal Grandmother     Pancreatic Cancer Sister     Colon Cancer Cousin          Allergies   No Known Allergies     Physical Exam   Vital Signs: Temp: 98.1  F (36.7  C) Temp src: Oral BP: 133/68 Pulse: 76   Resp: 16 SpO2: 99 % O2  Device: None (Room air)    Weight: 122 lbs 0 oz    General Appearance: Awake, alert, interactive. Oriented x3  Respiratory: CTAB. No wheezing. No rales. No rhonchi  Cardiovascular: Normal S1, S2. No murmurs. No JVD  GI: Abd distended. Pitting edema involving subcutaneous tissue underneath navel. Abd soft non tender. No rebound. No fluid wave.  Skin: jaundice involving skin, oral mucosa, scleral icterus  Mskl: No joint swelling, redness, pain  Neuro: No asterixis, no resting tremor, mild dysdiadokinesia. Normal finger to nose testing. Normal heel to shin test. Sensory and motor intact in all extremities. CN2-12.    Medical Decision Making       80 MINUTES SPENT BY ME on the date of service doing chart review, history, exam, documentation & further activities per the note.      Data     I have personally reviewed the following data over the past 24 hrs:    16.6 (H)  \   14.5   / 196     136 101 13.0 /  110 (H)   4.2 24 0.56 (L) \     ALT: 68 AST: 126 (H) AP: 217 (H) TBILI: 30.6 (HH); 29.8 (HH)   ALB: 2.9 (L) TOT PROTEIN: 5.5 (L) LIPASE: 58     Trop: 8 BNP: 264     Procal: N/A CRP: N/A Lactic Acid: 1.4       INR:  2.43 (H) PTT:  38   D-dimer:  N/A Fibrinogen:  N/A       Imaging results reviewed over the past 24 hrs:   Recent Results (from the past 24 hour(s))   Chest XR,  PA & LAT    Narrative    EXAM: XR CHEST 2 VIEWS  LOCATION: [facility]  DATE: 9/20/2024    INDICATION: Dyspnea on exertion  COMPARISON: None.      Impression    IMPRESSION: Normal size of cardiomediastinal silhouette. Low lung volumes with elevation of the right hemidiaphragm and associated compressive atelectasis. No definite consolidation, pleural effusion or pneumothorax. No acute bony abnormality.

## 2024-09-21 NOTE — PLAN OF CARE
Physical Therapy: Orders received. Chart reviewed and discussed with care team.? Physical Therapy not indicated due to pt is independent with all mobility.  Per nursing pt has been able to ambulate independently in room.  Pt reported not having any PT needs at this time.  Pt reported he has been able to ambulate without difficulty and does not have any concerns about mobilizing.  Pt does have LE edema that goes into his abdomen.  At this time his edema is not impacting his mobility. Pt reported having compression stockings that he uses at home. Pt does not want any compression at this time due to risk of pushing more fluid into his thighs and abdomen.  At this time the patient edema will be managed conservatively.  Pt educated if his edema starts impacting his mobility or has any skin changes we can address it while he is inpatient? Defer discharge recommendations to medicin.? Will complete orders.

## 2024-09-21 NOTE — PLAN OF CARE
Occupational Therapy: Orders received. Chart reviewed and discussed with care team.? Occupational Therapy not indicated due to patient moving well and no concerns with ADL noted at this time.? Defer discharge recommendations to medical team.? Will complete orders.

## 2024-09-21 NOTE — CONSULTS
[facility] Gastroenterology Consultation    Stewart Hunt MRN# [id number]   Age: 38 year old YOB: 1985     Date of Admission:  9/20/2024    Reason for consult: Acute liver injury                           Assessment and Plan:   Assessment:   38 year old male with PMHx significant for alcohol related liver disease who was referred to the ED due to abnormal LFTs. Patient reports being diagnosed with this condition about 6-7 weeks ago when he presented to PCP with URT symptoms and was found to have abnormal LFTs for which he was referred to GI/Hepatology. This was around early August where he was found to have elevated LFTs with AST:ALT in a typical 2:1 ratio and bilirubin of 7. He also underwent a CT scan of the abdomen which showed evidence of liver cirrhosis, steatosis, regenerative nodules and findings consistent with portal hypertension. Subsequent MRI confirmed the findings of liver cirrhosis. Patient continues to follow up with GI and had an EGD and colonoscopy which were unremarkable. His follow up labs during that time showed continued elevations of bilirubin and INR. He reports by started on a prednisone taper about one week ago by his GI doctor but was told to stop it today when his repeat labs showed bilirubin of 29 and instructed to go to the Ed due to concerns for acute liver failure.     In the ED patient vitals were stable. Aox3. He does have abdominal distension with striae. Spider nevi in the chest. No asterixis. 2+ B/L LE edema. He has an elevated total bili of 30.6, INR 2.43, , ALT 69. WBC 16 K, Hemoglobin and platelets WNL. Albumin 2.9.     MELD 3.0: 31 at 9/21/2024  6:15 AM  MELD-Na: 29 at 9/21/2024  6:15 AM  Calculated from:  Serum Creatinine: 0.66 mg/dL (Using min of 1 mg/dL) at 9/21/2024  6:15 AM  Serum Sodium: 136 mmol/L at 9/21/2024  6:15 AM  Total Bilirubin: 30.6 mg/dL at 9/20/2024  6:47 PM  Serum Albumin: 2.9 g/dL at 9/20/2024  6:47 PM  INR(ratio):  2.43 at 9/20/2024  6:47 PM  Age at listing (hypothetical): 38 years  Sex: Male at 9/21/2024  6:15 AM      #Decompensated alcohol related liver cirrhosis   Ascites: Abdominal distension on exam. Recommend U/S abdomen.   Varices: Recent EGD with no varices.   Hepatic Encephalopathy: Probably has mild hepatic encephalopathy with symptoms of forgetfulness and daytime sleepiness.     #Hyperbilirubinemia  Likely related to liver disease but would rule out hemolysis     #Leukocytosis   Patient afebrile. U/S to rule out ascites/SBP. Of note, patient has been on Prednisone.           Plan:   - U/S abdomen to assess for ascites. If present, recommend paracentesis to r/o SBP as well as calculate SAAG.   - Ensure 2-3 Bms per day  - Obtain direct bilirubin levels to r/o hemolysis  - Trend daily MELD labs     Patient seen and examined by myself. Patient discussed with GI staff, Dr. Dodd.     Yina Richter MD   PGY4 - GI Fellow             Chief Complaint:     38 year old male with PMHx significant for alcohol related liver disease who was referred to the ED due to abnormal LFTs. Patient reports being diagnosed with this condition about 6-7 weeks ago when he presented to PCP with URT symptoms and was found to have abnormal LFTs for which he was referred to GI/Hepatology. This was around early August where he was found to have elevated LFTs with AST:ALT in a typical 2:1 ratio and bilirubin of 7. He also underwent a CT scan of the abdomen which showed evidence of liver cirrhosis, steatosis, regenerative nodules and findings consistent with portal hypertension. Subsequent MRI confirmed the findings of liver cirrhosis. Patient continues to follow up with GI and had an EGD and colonoscopy which were unremarkable. His follow up labs during that time showed continued elevations of bilirubin and INR. He reports by started on a prednisone taper about one week ago by his GI doctor but was told to stop it today when his repeat labs showed  "bilirubin of 29 and instructed to go to the Ed due to concerns for acute liver failure.     Patient was seen and examined at bedside. He was HD stable and saturating well on RA. He was Aox3. Patient reports nausea/vomiting now resolved since starting prednisone and his apatite is better. He denies abdominal pain but admits to abdominal distension with new striae and spider nevi in the chest. His significant other noticed that the patient has been more forgetful, \"slow\" and increased daytime sleepiness.     Patient mentions he has been drinking 2 pints of alcohol daily for 2 years. He reports his last drink was one week ago.              Past Medical History:     Past Medical History:   Diagnosis Date    Acute alcoholic hepatitis (H28) 09/2024    Alcohol use disorder, severe, in early remission (H)     Alcoholic cirrhosis (H)     Morbid obesity (H)            Past Surgical History:     Past Surgical History:   Procedure Laterality Date    GASTRECTOMY LAPAROSCOPIC SLEEVE             Social History:   I have reviewed this patient's social history          Family History:   I have reviewed this patient's family history and updated it with pertinent information if needed.  Family History   Problem Relation Age of Onset    Colon Cancer Mother     Diabetes Father     Pancreatic Cancer Sister     Diabetes Maternal Grandmother     Colon Cancer Cousin     Liver Disease No family hx of           Medications:     Current Facility-Administered Medications   Medication Dose Route Frequency Provider Last Rate Last Admin    acetaminophen (TYLENOL) tablet 650 mg  650 mg Oral Q4H PRN Leroy Campa MD   650 mg at 09/21/24 1142    Or    acetaminophen (TYLENOL) Suppository 650 mg  650 mg Rectal Q4H PRN Leroy Campa MD        calcium carbonate (TUMS) chewable tablet 1,000 mg  1,000 mg Oral 4x Daily PRN Leroy Campa MD        cloNIDine (CATAPRES) tablet 0.1 mg  0.1 mg Oral Q8H Leroy Campa MD   0.1 " mg at 09/21/24 1416    flumazenil (ROMAZICON) injection 0.2 mg  0.2 mg Intravenous q1 min prn Leroy Campa MD        folic acid (FOLVITE) tablet 1 mg  1 mg Oral Daily Leroy Campa MD   1 mg at 09/21/24 0903    OLANZapine zydis (zyPREXA) ODT tab 5-10 mg  5-10 mg Oral Q6H PRN Leroy Campa MD        Or    haloperidol lactate (HALDOL) injection 2.5-5 mg  2.5-5 mg Intravenous Q6H PRN Leroy Campa MD        lidocaine (LMX4) cream   Topical Q1H PRN Leroy Campa MD        lidocaine 1 % 0.1-1 mL  0.1-1 mL Other Q1H PRN Leroy Campa MD        LORazepam (ATIVAN) tablet 1-2 mg  1-2 mg Oral Q30 Min PRN Leroy Campa MD        Or    LORazepam (ATIVAN) injection 1-2 mg  1-2 mg Intravenous Q30 Min PRN Leroy Campa MD        melatonin tablet 5 mg  5 mg Oral QPM PRN Leroy Campa MD        multivitamin w/minerals (THERA-VIT-M) tablet 1 tablet  1 tablet Oral Daily Leroy Campa MD   1 tablet at 09/21/24 0903    ondansetron (ZOFRAN ODT) ODT tab 4 mg  4 mg Oral Q6H PRN Leroy Campa MD        Or    ondansetron (ZOFRAN) injection 4 mg  4 mg Intravenous Q6H PRN Leroy Campa MD        polyethylene glycol (MIRALAX) Packet 17 g  17 g Oral BID PRN Leroy Campa MD        senna-docusate (SENOKOT-S/PERICOLACE) 8.6-50 MG per tablet 1 tablet  1 tablet Oral BID PRN Leroy Campa MD        Or    senna-docusate (SENOKOT-S/PERICOLACE) 8.6-50 MG per tablet 2 tablet  2 tablet Oral BID PRN Leroy Campa MD        sodium chloride (PF) 0.9% PF flush 3 mL  3 mL Intracatheter Q8H Leroy Campa MD   3 mL at 09/21/24 0904    sodium chloride (PF) 0.9% PF flush 3 mL  3 mL Intracatheter q1 min prn Leroy Campa MD        thiamine (B-1) tablet 100 mg  100 mg Oral Daily Leroy Campa MD   100 mg at 09/21/24 0903             Review of Systems:   The Review of Systems is negative other than noted in  the [facility] [place]          Physical Exam:   Vitals were reviewed  Constitutional:   No distress. Has jaundice      Lungs:   No increased work of breathing, good air exchange, clear to auscultation bilaterally, no crackles or wheezing     Cardiovascular:   RRR     Abdomen:   Abdominal distension. No tenderness. Abdominal striae.      Neurologic:   Aox3. No obvious FND.      Skin:   Jaudince. B/L LE edema             Data:     Lab Results   Component Value Date    WBC 17.4 (H) 09/21/2024    HGB 14.1 09/21/2024    HCT 39.4 (L) 09/21/2024     09/21/2024     09/21/2024    POTASSIUM 3.9 09/21/2024    CHLORIDE 104 09/21/2024    CO2 24 09/21/2024    BUN 13.8 09/21/2024    CR 0.66 (L) 09/21/2024    GLC 77 09/21/2024    NTBNPI 264 09/20/2024     (H) 09/20/2024    ALT 68 09/20/2024    ALKPHOS 217 (H) 09/20/2024    BILITOTAL 30.6 (HH) 09/20/2024    BILITOTAL 29.8 (HH) 09/20/2024    IMANI 76 (H) 09/20/2024    INR 2.43 (H) 09/20/2024       All imaging studies reviewed by me

## 2024-09-21 NOTE — PLAN OF CARE
"Goal Outcome Evaluation:  Blood pressure 133/68, pulse 76, temperature 98.1  F (36.7  C), temperature source Oral, resp. rate 16, height 1.651 m (5' 5\"), weight 55.3 kg (122 lb), SpO2 99%.  8MS Admission Note    Reason for admission: Bilirubinemia  Primary team notified of pt arrival.  Admitted from:  ED  Via: wheel chair  Accompanied by: transportation  Belongings: Placed in closet; valuables sent home with family (wife)  Admission Required Doc Completed: Yes  Teaching: Orientation to unit and call light- call light within reach, use of console, meal times, when to call for the RN, and enforced importance of safety.  IV Access: PIV   Telemetry: No  Ht./Wt.: Completed  Code Status verified on armband: Yes  2 RN Skin Assessment Completed with: Kelby KEBEDE, RN  Suction/Ambu bag/Flowmeter at bedside: Yes    Pt status:  End of shift Summary: See flowsheet for VS and detail assessments.    Changes this Shift:     Pulmonary: RA LS clear bilateral. No SOB noted.    Output: Bowel and bladder continent. Pt has diarrhea     Activity: Ind in room and to the bathroom    Skin: Jaundice, edema +3 LLE and +2 RLE    Pain: denied    Neuro/CMS: A&OX4 No numbness and tingling    Dressing: no dressing    IV/Drains: PIV patent and WNL         Temp:  [98.1  F (36.7  C)] 98.1  F (36.7  C)  Pulse:  [76-80] 76  Resp:  [16] 16  BP: (115-145)/(62-77) 133/68  SpO2:  [97 %-99 %] 99 %       Plan of Care Reviewed With: patient                 "

## 2024-09-21 NOTE — ED PROVIDER NOTES
"ED Provider Note  [facility]      History     Chief Complaint   Patient presents with    Leg Swelling     L lower leg has pitting edema. PMD sent pt to ED  \" my liver is failing fast, my labs are getting worse\" + SOB when walking up/down stairs    Shortness of Breath     HPI  Stewart Hunt is a 38 year old male with complex past medical history occluding known history of alcoholic cirrhosis present with concerns for increasing jaundice, generalized weakness chills and dyspnea on exertion.  He is here with his significant other.  He notes over the last 2 weeks he has gradually developed more pronounced symptoms listed above.  He denies any measured fevers at home he denies anyThe current chest pain states had some intermittent chest pain over the last few weeks.  He has also had some productive cough as well over the last 6 weeks.  No hemoptysis.  He denies any abdominal pain states he has had some intermittent vomiting and prior blood per rectum.  No urinary symptoms.  He is 2 weeks sober.  He does not have a hepatologist.  He recently was on prednisone which she recently was discontinued from from his primary care team.  He is also taking PPI as directed.    Past Medical History  No past medical history on file.  No past surgical history on file.  No current outpatient medications on file.    No Known Allergies  Family History  No family history on file.  Social History       A medically appropriate review of systems was performed with pertinent positives and negatives noted in the HPI, and all other systems negative.    Physical Exam   BP: (!) 145/77  Pulse: 80  Temp: 98.1  F (36.7  C)  Resp: 16  Height: 165.1 cm (5' 5\")  Weight: 123.4 kg (272 lb)  SpO2: 99 %  Physical Exam      GENERAL APPEARANCE: The patient is well developed, well appearing, and in no acute distress.  HEAD:  Normocephalic and atraumatic.   EENT: Voice normal.  Scleral icterus present.  NECK: Trachea is midline.No " lymphadenopathy or tenderness.  LUNGS: Breath sounds are equal and clear bilaterally. No wheezes, rhonchi, or rales.  HEART: Regular rate and normal rhythm.    ABDOMEN: Soft, flat, and benign. No mass, tenderness, guarding, or rebound.  EXTREMITIES: Patient has 2+ pitting edema involving both his lower extremities to the proximal thighs.  There is pitting edema as well noted in the lower abdomen.  NEUROLOGIC: No focal sensory or motor deficits are noted.  PSYCHIATRIC: The patient is awake, alert.  Appropriate mood and affect.  SKIN: Warm, dry, and well perfused. Good turgor.  Patient notably jaundiced.      ED Course, Procedures, & Data     ED Course as of 09/20/24 2252   Fri Sep 20, 2024   2144 GI paged     EKG 9/20/2024:  Sinus rhythm, ventricular rate 84 QTc 453.  Interpreted by myself, the rhythm is regular.  There is a P wave of every QRS complex.  I do not see any visible ST elevation or depression to stress ischemia.  No prior EKGs for comparison.     Results for orders placed or performed during the hospital encounter of 09/20/24   Chest XR,  PA & LAT     Status: None    Narrative    EXAM: XR CHEST 2 VIEWS  LOCATION: [facility]  DATE: 9/20/2024    INDICATION: Dyspnea on exertion  COMPARISON: None.      Impression    IMPRESSION: Normal size of cardiomediastinal silhouette. Low lung volumes with elevation of the right hemidiaphragm and associated compressive atelectasis. No definite consolidation, pleural effusion or pneumothorax. No acute bony abnormality.   Comprehensive metabolic panel     Status: Abnormal   Result Value Ref Range    Sodium 136 135 - 145 mmol/L    Potassium 4.2 3.4 - 5.3 mmol/L    Carbon Dioxide (CO2) 24 22 - 29 mmol/L    Anion Gap 11 7 - 15 mmol/L    Urea Nitrogen 13.0 6.0 - 20.0 mg/dL    Creatinine 0.56 (L) 0.67 - 1.17 mg/dL    GFR Estimate >90 >60 mL/min/1.73m2    Calcium 8.3 (L) 8.8 - 10.4 mg/dL    Chloride 101 98 - 107 mmol/L    Glucose 110  (H) 70 - 99 mg/dL    Alkaline Phosphatase 217 (H) 40 - 150 U/L     (H) 0 - 45 U/L    ALT 68 0 - 70 U/L    Protein Total 5.5 (L) 6.4 - 8.3 g/dL    Albumin 2.9 (L) 3.5 - 5.2 g/dL    Bilirubin Total 30.6 (HH) <=1.2 mg/dL   Lipase     Status: Normal   Result Value Ref Range    Lipase 58 13 - 60 U/L   INR     Status: Abnormal   Result Value Ref Range    INR 2.43 (H) 0.85 - 1.15   Partial thromboplastin time     Status: Normal   Result Value Ref Range    aPTT 38 22 - 38 Seconds   Troponin T, High Sensitivity     Status: Normal   Result Value Ref Range    Troponin T, High Sensitivity 8 <=22 ng/L   Nt probnp inpatient     Status: Normal   Result Value Ref Range    N terminal Pro BNP Inpatient 264 0 - 450 pg/mL   Ammonia     Status: Abnormal   Result Value Ref Range    Ammonia 76 (H) 16 - 60 umol/L   CBC with platelets and differential     Status: Abnormal   Result Value Ref Range    WBC Count 16.6 (H) 4.0 - 11.0 10e3/uL    RBC Count 3.98 (L) 4.40 - 5.90 10e6/uL    Hemoglobin 14.5 13.3 - 17.7 g/dL    Hematocrit 40.2 40.0 - 53.0 %     (H) 78 - 100 fL    MCH 36.4 (H) 26.5 - 33.0 pg    MCHC 36.1 31.5 - 36.5 g/dL    RDW 19.8 (H) 10.0 - 15.0 %    Platelet Count 196 150 - 450 10e3/uL    % Neutrophils 84 %    % Lymphocytes 6 %    % Monocytes 7 %    % Eosinophils 0 %    % Basophils 1 %    % Immature Granulocytes 3 %    NRBCs per 100 WBC 0 <1 /100    Absolute Neutrophils 13.9 (H) 1.6 - 8.3 10e3/uL    Absolute Lymphocytes 0.9 0.8 - 5.3 10e3/uL    Absolute Monocytes 1.2 0.0 - 1.3 10e3/uL    Absolute Eosinophils 0.0 0.0 - 0.7 10e3/uL    Absolute Basophils 0.1 0.0 - 0.2 10e3/uL    Absolute Immature Granulocytes 0.5 (H) <=0.4 10e3/uL    Absolute NRBCs 0.0 10e3/uL   Lactic acid whole blood     Status: Normal   Result Value Ref Range    Lactic Acid 1.4 0.7 - 2.0 mmol/L   UA with Microscopic reflex to Culture     Status: Abnormal    Specimen: Urine, NOS   Result Value Ref Range    Color Urine Dark Yellow (A) Colorless, Straw,  Light Yellow, Yellow    Appearance Urine Clear Clear    Glucose Urine Negative Negative mg/dL    Bilirubin Urine Large (A) Negative    Ketones Urine Negative Negative mg/dL    Specific Gravity Urine 1.015 1.003 - 1.035    Blood Urine Negative Negative    pH Urine 6.5 5.0 - 7.0    Protein Albumin Urine Negative Negative mg/dL    Urobilinogen Urine Normal Normal, 2.0 mg/dL    Nitrite Urine Negative Negative    Leukocyte Esterase Urine Negative Negative    Bacteria Urine Few (A) None Seen /HPF    Mucus Urine Present (A) None Seen /LPF    RBC Urine <1 <=2 /HPF    WBC Urine 1 <=5 /HPF    Squamous Epithelials Urine <1 <=1 /HPF    Hyaline Casts Urine 1 <=2 /LPF    Narrative    Urine Culture not indicated   Symptomatic Influenza A/B, RSV, & SARS-CoV2 PCR (COVID-19) Nose     Status: Normal    Specimen: Nose; Swab   Result Value Ref Range    Influenza A PCR Negative Negative    Influenza B PCR Negative Negative    RSV PCR Negative Negative    SARS CoV2 PCR Negative Negative    Narrative    Testing was performed using the Xpert Xpress CoV2/Flu/RSV Assay on the Cepheid GeneXpert Instrument. This test should be ordered for the detection of SARS-CoV-2, influenza, and RSV viruses in individuals who meet clinical and/or epidemiological criteria. Test performance is unknown in asymptomatic patients. This test is for in vitro diagnostic use under the FDA EUA for laboratories certified under CLIA to perform high or moderate complexity testing. This test has not been FDA cleared or approved. A negative result does not rule out the presence of PCR inhibitors in the specimen or target RNA in concentration below the limit of detection for the assay. If only one viral target is positive but coinfection with multiple targets is suspected, the sample should be re-tested with another FDA cleared, approved, or authorized test, if coinfection would change clinical management. This test was validated by the [facility]. These  laboratories are certified under the Clinical Laboratory Improvement Amendments of 1988 (CLIA-88) as qualified to perform high complexity laboratory testing.   Extra Tube     Status: None ()    Narrative    The following orders were created for panel order Extra Tube.  Procedure                               Abnormality         Status                     ---------                               -----------         ------                     Extra Purple Top Tube[[id number]]                                                         Please view results for these tests on the individual orders.   EKG 12-lead, tracing only     Status: None   Result Value Ref Range    Systolic Blood Pressure  mmHg    Diastolic Blood Pressure  mmHg    Ventricular Rate 84 BPM    Atrial Rate 84 BPM    VA Interval 142 ms    QRS Duration 84 ms     ms    QTc 453 ms    P Axis 41 degrees    R AXIS -7 degrees    T Axis 4 degrees    Interpretation ECG       Sinus rhythm  Minimal voltage criteria for LVH, may be normal variant  Borderline ECG  Unconfirmed report - interpretation of this ECG is computer generated - see medical record for final interpretation  Confirmed by - EMERGENCY ROOM, PHYSICIAN ([id number]),  MARIJA DENIS ([id number]) on 9/20/2024 9:35:41 PM     Adult Type and Screen     Status: None   Result Value Ref Range    ABO/RH(D) O POS     Antibody Screen Negative Negative    SPECIMEN EXPIRATION DATE [id number]    CBC with platelets differential     Status: Abnormal    Narrative    The following orders were created for panel order CBC with platelets differential.  Procedure                               Abnormality         Status                     ---------                               -----------         ------                     CBC with platelets and d...[[id number]]  Abnormal            Final result                 Please view results for these tests on the individual orders.   ABO/Rh type and screen     Status: None     Narrative    The following orders were created for panel order ABO/Rh type and screen.  Procedure                               Abnormality         Status                     ---------                               -----------         ------                     Adult Type and Screen[[id number]]                            Final result                 Please view results for these tests on the individual orders.     Medications   cefTRIAXone (ROCEPHIN) 1 g vial to attach to  mL bag for ADULTS or NS 50 mL bag for PEDS (has no administration in time range)     Labs Ordered and Resulted from Time of ED Arrival to Time of ED Departure   COMPREHENSIVE METABOLIC PANEL - Abnormal       Result Value    Sodium 136      Potassium 4.2      Carbon Dioxide (CO2) 24      Anion Gap 11      Urea Nitrogen 13.0      Creatinine 0.56 (*)     GFR Estimate >90      Calcium 8.3 (*)     Chloride 101      Glucose 110 (*)     Alkaline Phosphatase 217 (*)      (*)     ALT 68      Protein Total 5.5 (*)     Albumin 2.9 (*)     Bilirubin Total 30.6 (*)    INR - Abnormal    INR 2.43 (*)    AMMONIA - Abnormal    Ammonia 76 (*)    CBC WITH PLATELETS AND DIFFERENTIAL - Abnormal    WBC Count 16.6 (*)     RBC Count 3.98 (*)     Hemoglobin 14.5      Hematocrit 40.2       (*)     MCH 36.4 (*)     MCHC 36.1      RDW 19.8 (*)     Platelet Count 196      % Neutrophils 84      % Lymphocytes 6      % Monocytes 7      % Eosinophils 0      % Basophils 1      % Immature Granulocytes 3      NRBCs per 100 WBC 0      Absolute Neutrophils 13.9 (*)     Absolute Lymphocytes 0.9      Absolute Monocytes 1.2      Absolute Eosinophils 0.0      Absolute Basophils 0.1      Absolute Immature Granulocytes 0.5 (*)     Absolute NRBCs 0.0     ROUTINE UA WITH MICROSCOPIC REFLEX TO CULTURE - Abnormal    Color Urine Dark Yellow (*)     Appearance Urine Clear      Glucose Urine Negative      Bilirubin Urine Large (*)     Ketones Urine Negative      Specific Gravity  Urine 1.015      Blood Urine Negative      pH Urine 6.5      Protein Albumin Urine Negative      Urobilinogen Urine Normal      Nitrite Urine Negative      Leukocyte Esterase Urine Negative      Bacteria Urine Few (*)     Mucus Urine Present (*)     RBC Urine <1      WBC Urine 1      Squamous Epithelials Urine <1      Hyaline Casts Urine 1     LIPASE - Normal    Lipase 58     PARTIAL THROMBOPLASTIN TIME - Normal    aPTT 38     TROPONIN T, HIGH SENSITIVITY - Normal    Troponin T, High Sensitivity 8     NT PROBNP INPATIENT - Normal    N terminal Pro BNP Inpatient 264     LACTIC ACID WHOLE BLOOD - Normal    Lactic Acid 1.4     INFLUENZA A/B, RSV, & SARS-COV2 PCR - Normal    Influenza A PCR Negative      Influenza B PCR Negative      RSV PCR Negative      SARS CoV2 PCR Negative     TYPE AND SCREEN, ADULT    ABO/RH(D) O POS      Antibody Screen Negative      SPECIMEN EXPIRATION DATE [id number]     BLOOD CULTURE   ABO/RH TYPE AND SCREEN     Chest XR,  PA & LAT   Final Result   IMPRESSION: Normal size of cardiomediastinal silhouette. Low lung volumes with elevation of the right hemidiaphragm and associated compressive atelectasis. No definite consolidation, pleural effusion or pneumothorax. No acute bony abnormality.             Critical care was not performed.     Medical Decision Making  The patient's presentation was of high complexity (a chronic illness severe exacerbation, progression, or side effect of treatment).    The patient's evaluation involved:  ordering and/or review of 3+ test(s) in this encounter (see separate area of note for details)  discussion of management or test interpretation with another health professional (see separate area of note for details)    The patient's management necessitated moderate risk (limitations due to social determinants of health (substance use)) and high risk (a decision regarding hospitalization).    Assessment & Plan    This is a 30-year-old male with history of alcoholic  cirrhosis presenting with concerns for increasing jaundice scleral icterus generalized weakness dyspnea on exertion over the last several weeks.  Presentation to the department initial blood pressure 145/77.  He is without hypoxia or tachycardia.  On exam he has scleral icterus and jaundice present.  He has pitting lower extremity edema noted to both lower extremities.  He is not altered to suggest obvious encephalopathy.  Broad workup initially including screening EKG chest x-ray, troponin BNP developed possibility of heart failure or ACS.  Initiated liver testing including CMP type and screen and coags.  UA also initiated with recent leukocytosis from outside labs.    Initial EKG without signs for ischemia or other arrhythmias.  CBC with leukocytosis 16.6, hemoglobin 14.5, chemistry remarkable for elevated bilirubin 30.6, elevated  alkaline phosphatase 217.  Creatinine reand remainder of electrolytes reassuring.  BNP troponin lipase normal suggest against CHF ACS and pancreatitis respectively.  UA negative.  Respiratory swab was initiated and is negative.  Blood culture initiated pending.  Chest x-ray without consolidation.  Case discussed with GI, [facility] patient send awaiting callback on final recommendations.  They do recommend patient be transferred to the [facility] once bed is available further management.    Case was discussed with the hospital medicine team does recommend empiric ceftriaxone for SBP, this was initiated.  Patient admitted to medicine service for further evaluation and management of progressing cirrhosis.    Patient seen and discussed with attending physician , who agrees with my plan of care.      I have reviewed the nursing notes. I have reviewed the findings, diagnosis, plan and need for follow up with the patient.    New Prescriptions    No medications on file       Final diagnoses:   Alcoholic cirrhosis of liver without ascites (H)   Bilirubinemia       Lupe Cali  [facility] EMERGENCY DEPARTMENT  9/20/2024     Lupe Cali PA-C  09/20/24 [id number]

## 2024-09-21 NOTE — PROGRESS NOTES
CLINICAL NUTRITION SERVICES - ASSESSMENT NOTE     Nutrition Prescription    RECOMMENDATIONS FOR MDs/PROVIDERS TO ORDER:  None at this time    Malnutrition Status:    Patient does not meet two of the established criteria necessary for diagnosing malnutrition    Recommendations already ordered by Registered Dietitian (RD):  -Cirrhosis Nutrition Therapy handout and education    Future/Additional Recommendations:  RD to sign off at this time, but will remain available by consult if new nutrition problem arises.       REASON FOR ASSESSMENT  Stewart Hunt is a/an 38 year old male assessed by the dietitian for Provider Order - malnutrition, optimize cirrhosis diet during hospitalization and plan for home     CLINICAL HISTORY  PMH significant for AUD, liver cirrhosis and portal HTN. Admitted from ED 9/20/24 due to concern for progressively worsening anasarca and hyperbilirubinemia.     NUTRITION HISTORY  RD visited with Stewart at bedside who denies previous nutrition education for management of cirrhosis. Writer provided pt with handout on Cirrhosis Nutrition Therapy and estimated kcal and protein needs. Writer provided recommended modifications including increased protein needs, frequent meals, and adequate hydration. Pt reports hydrating well at home including water, Gatorade and electrolyte packets. Writer encouraged pt to limit intake of Gatorade and electrolytes d/t high sodium content and prioritize intake of plain water instead. Pt expressed understanding of recommendations and has no requests or concerns at this time.    GI: diarrhea improving per pt    CURRENT NUTRITION ORDERS  Diet: 2 g Sodium and Low Saturated Fat/2400 mg Sodium, No Caffeine  Supplement: none  Allergies: NKFA  Intake/Tolerance: ate 100% breakfast this morning    LABS   09/20/24 18:47 09/21/24 06:15   Creatinine 0.56 (L) 0.66 (L)   Albumin 2.9 (L)    Alkaline Phosphatase 217 (H)     (H)    Ammonia 76 (H)    Bilirubin Total 29.8  "(HH)  30.6 (HH)      MEDICATIONS  Folic acid, MVI-M, thiamine    ANTHROPOMETRICS  Height: 165.1 cm (5' 5\")  Most Recent Weight: 123.4 kg (272 lb)  IBW: 61.8 kg   BMI: Obesity Grade III BMI >40  Weight History:   Wt Readings from Last 15 Encounters:   09/20/24 123.4 kg (272 lb)      09/16/24 122.7 kg (270 lb 8 oz)      09/07/24 124.4 kg (274 lb 3.2 oz)      08/21/24 126.2 kg (278 lb 2 oz)      2.2% weight loss in 1 month, limited wt hx available    Dosing Weight: 77 kg (adjusted)    ASSESSED NUTRITION NEEDS  Estimated Energy Needs: 1925 - 2310 kcals/day (25 - 30 kcals/kg)  Justification: Maintenance  Estimated Protein Needs: 92 - 115 grams protein/day (1.2 - 1.5 grams of pro/kg)  Justification: Increased needs  Estimated Fluid Needs: 1 mL/kcal  Justification: Maintenance or Per Provider    PHYSICAL FINDINGS  See malnutrition section below.  Nabil: 23    MALNUTRITION  % Intake: No decreased intake noted  % Weight Loss: Weight loss does not meet criteria  Subcutaneous Fat Loss: None observed  Muscle Loss: None observed  Fluid Accumulation/Edema: Moderate  Malnutrition Diagnosis: Patient does not meet two of the established criteria necessary for diagnosing malnutrition    NUTRITION DIAGNOSIS  No nutrition diagnosis at this time     INTERVENTIONS  Implementation  Nutrition Education: RD role in care   Nutrition education for nutrition relationship to health/disease     Goals  Patient to consume % of nutritionally adequate meal trays TID, or the equivalent with supplements/snacks.     Monitoring/Evaluation  RD to sign off at this time, but will remain available by consult if new nutrition problem arises.      Anu Byrd, MPH, RDN, LD  6 & 8 Med/Surg RD  Mon-Fri Vocera contact: By name, or \"6 [OR] 8 Med Surg Clinical Dietitian\"  Weekend RD Vocera: \"Weekend Holiday Clinical Dietitian\"   "

## 2024-09-22 ENCOUNTER — APPOINTMENT (OUTPATIENT)
Dept: CARDIOLOGY | Facility: CLINIC | Age: 39
DRG: 433 | End: 2024-09-22
Attending: STUDENT IN AN ORGANIZED HEALTH CARE EDUCATION/TRAINING PROGRAM
Payer: COMMERCIAL

## 2024-09-22 VITALS
OXYGEN SATURATION: 96 % | RESPIRATION RATE: 20 BRPM | DIASTOLIC BLOOD PRESSURE: 50 MMHG | WEIGHT: 123.8 LBS | HEART RATE: 89 BPM | SYSTOLIC BLOOD PRESSURE: 106 MMHG | TEMPERATURE: 98.7 F | BODY MASS INDEX: 20.62 KG/M2 | HEIGHT: 65 IN

## 2024-09-22 LAB
ALBUMIN SERPL BCG-MCNC: 2.5 G/DL (ref 3.5–5.2)
ALP SERPL-CCNC: 186 U/L (ref 40–150)
ALT SERPL W P-5'-P-CCNC: 82 U/L (ref 0–70)
ANION GAP SERPL CALCULATED.3IONS-SCNC: 13 MMOL/L (ref 7–15)
AST SERPL W P-5'-P-CCNC: 226 U/L (ref 0–45)
BILIRUB DIRECT SERPL-MCNC: 18.04 MG/DL (ref 0–0.3)
BILIRUB SERPL-MCNC: 26.6 MG/DL
BILIRUB SERPL-MCNC: 27.5 MG/DL
BUN SERPL-MCNC: 15.8 MG/DL (ref 6–20)
CALCIUM SERPL-MCNC: 7.9 MG/DL (ref 8.8–10.4)
CHLORIDE SERPL-SCNC: 96 MMOL/L (ref 98–107)
CREAT SERPL-MCNC: 0.99 MG/DL (ref 0.67–1.17)
EGFRCR SERPLBLD CKD-EPI 2021: >90 ML/MIN/1.73M2
GLUCOSE SERPL-MCNC: 65 MG/DL (ref 70–99)
HCO3 SERPL-SCNC: 22 MMOL/L (ref 22–29)
HOLD SPECIMEN: NORMAL
INR PPP: 2.52 (ref 0.85–1.15)
LVEF ECHO: NORMAL
MAGNESIUM SERPL-MCNC: 1.9 MG/DL (ref 1.7–2.3)
POTASSIUM SERPL-SCNC: 3.7 MMOL/L (ref 3.4–5.3)
POTASSIUM SERPL-SCNC: 3.7 MMOL/L (ref 3.4–5.3)
PROT SERPL-MCNC: 5 G/DL (ref 6.4–8.3)
SODIUM SERPL-SCNC: 131 MMOL/L (ref 135–145)
SODIUM SERPL-SCNC: 131 MMOL/L (ref 135–145)

## 2024-09-22 PROCEDURE — 250N000013 HC RX MED GY IP 250 OP 250 PS 637: Performed by: STUDENT IN AN ORGANIZED HEALTH CARE EDUCATION/TRAINING PROGRAM

## 2024-09-22 PROCEDURE — 36415 COLL VENOUS BLD VENIPUNCTURE: CPT | Performed by: INTERNAL MEDICINE

## 2024-09-22 PROCEDURE — 250N000013 HC RX MED GY IP 250 OP 250 PS 637: Performed by: INTERNAL MEDICINE

## 2024-09-22 PROCEDURE — C8929 TTE W OR WO FOL WCON,DOPPLER: HCPCS

## 2024-09-22 PROCEDURE — 99239 HOSP IP/OBS DSCHRG MGMT >30: CPT | Performed by: INTERNAL MEDICINE

## 2024-09-22 PROCEDURE — 84295 ASSAY OF SERUM SODIUM: CPT | Performed by: INTERNAL MEDICINE

## 2024-09-22 PROCEDURE — 255N000002 HC RX 255 OP 636: Performed by: INTERNAL MEDICINE

## 2024-09-22 PROCEDURE — 82247 BILIRUBIN TOTAL: CPT | Performed by: INTERNAL MEDICINE

## 2024-09-22 PROCEDURE — 93306 TTE W/DOPPLER COMPLETE: CPT | Mod: 26 | Performed by: INTERNAL MEDICINE

## 2024-09-22 PROCEDURE — 85610 PROTHROMBIN TIME: CPT | Performed by: INTERNAL MEDICINE

## 2024-09-22 PROCEDURE — 83735 ASSAY OF MAGNESIUM: CPT | Performed by: INTERNAL MEDICINE

## 2024-09-22 PROCEDURE — 999N000208 ECHOCARDIOGRAM COMPLETE

## 2024-09-22 PROCEDURE — 84132 ASSAY OF SERUM POTASSIUM: CPT | Performed by: INTERNAL MEDICINE

## 2024-09-22 PROCEDURE — 82040 ASSAY OF SERUM ALBUMIN: CPT | Performed by: INTERNAL MEDICINE

## 2024-09-22 RX ORDER — LANOLIN ALCOHOL/MO/W.PET/CERES
100 CREAM (GRAM) TOPICAL DAILY
Qty: 30 TABLET | Refills: 0 | Status: ON HOLD | OUTPATIENT
Start: 2024-09-23 | End: 2024-10-23

## 2024-09-22 RX ORDER — MULTIPLE VITAMINS W/ MINERALS TAB 9MG-400MCG
1 TAB ORAL DAILY
Qty: 30 TABLET | Refills: 0 | Status: ON HOLD | OUTPATIENT
Start: 2024-09-23 | End: 2024-10-23

## 2024-09-22 RX ORDER — FUROSEMIDE 20 MG
20 TABLET ORAL DAILY
Status: DISCONTINUED | OUTPATIENT
Start: 2024-09-22 | End: 2024-09-22 | Stop reason: HOSPADM

## 2024-09-22 RX ORDER — SPIRONOLACTONE 25 MG/1
50 TABLET ORAL DAILY
Status: DISCONTINUED | OUTPATIENT
Start: 2024-09-22 | End: 2024-09-22 | Stop reason: HOSPADM

## 2024-09-22 RX ORDER — SPIRONOLACTONE 50 MG/1
50 TABLET, FILM COATED ORAL DAILY
Qty: 30 TABLET | Refills: 0 | Status: ON HOLD | OUTPATIENT
Start: 2024-09-23 | End: 2024-10-23

## 2024-09-22 RX ORDER — FUROSEMIDE 20 MG
20 TABLET ORAL DAILY
Qty: 30 TABLET | Refills: 0 | Status: ON HOLD | OUTPATIENT
Start: 2024-09-23 | End: 2024-10-23

## 2024-09-22 RX ORDER — FOLIC ACID 1 MG/1
1 TABLET ORAL DAILY
Qty: 30 TABLET | Refills: 0 | Status: ON HOLD | OUTPATIENT
Start: 2024-09-23 | End: 2024-10-23

## 2024-09-22 RX ADMIN — CLONIDINE HYDROCHLORIDE 0.1 MG: 0.1 TABLET ORAL at 13:31

## 2024-09-22 RX ADMIN — SPIRONOLACTONE 50 MG: 25 TABLET ORAL at 09:52

## 2024-09-22 RX ADMIN — FOLIC ACID 1 MG: 1 TABLET ORAL at 08:32

## 2024-09-22 RX ADMIN — THIAMINE HCL TAB 100 MG 100 MG: 100 TAB at 08:32

## 2024-09-22 RX ADMIN — Medication 1 TABLET: at 08:32

## 2024-09-22 RX ADMIN — HUMAN ALBUMIN MICROSPHERES AND PERFLUTREN 6 ML: 10; .22 INJECTION, SOLUTION INTRAVENOUS at 11:55

## 2024-09-22 RX ADMIN — FUROSEMIDE 20 MG: 20 TABLET ORAL at 09:52

## 2024-09-22 ASSESSMENT — ACTIVITIES OF DAILY LIVING (ADL)
ADLS_ACUITY_SCORE: 18

## 2024-09-22 NOTE — PLAN OF CARE
"Goal Outcome Evaluation:  /50   Pulse 89   Temp 98.7  F (37.1  C) (Oral)   Resp 20   Ht 1.651 m (5' 5\")   Wt 56.2 kg (123 lb 12.8 oz)   SpO2 96%   BMI 20.60 kg/m        Plan of Care Reviewed With: patient, spouse, child    Overall Patient Progress: improvingOverall Patient Progress: improving    Outcome Evaluation: pt a&o x4, able to make needs known, on room air, for discharge today   Pt. discharged at 1530 to home, and left with personal belongings. Pt. received complete discharge paperwork. Pt. was given times of last dose for all discharge medications in writing on discharge medication sheets. Discharge teaching included steroid and vitamin medication, pain management, activity restrictions,and signs and symptoms of infection. Pt. to follow up with PCP in 7 days. Pt. had no further questions at the time of discharge and no unmet needs were identified.        "

## 2024-09-22 NOTE — DISCHARGE SUMMARY
[facility]  Hospitalist Discharge Summary      Date of Admission:  9/20/2024  Date of Discharge:  9/22/2024  Discharging Provider: Momo Navarrete DO  Discharge Service: Hospitalist Service, GOLD TEAM 16    Discharge Diagnoses   Decompensated alcoholic cirrhosis  Hyperbilirubinemia most likely due to alcoholic cirrhosis  Jaundice  Hepatic encephalopathy  Anasarca  Hypoalbuminemia  Acute diarrhea  Hypertension  Leukocytosis  Alcohol use disorder    Clinically Significant Risk Factors          Follow-ups Needed After Discharge   Follow-up Appointments     Adult [facility] Follow-up and recommended labs and tests      Please follow-up with primary care provider at patient's earliest   convenience.  Please follow-up with hepatology soon as possible.    Appointments on [place] and/or [facility] (with [facility] or [facility]   provider or service). Call [phone] if you haven't heard regarding   these appointments within 7 days of discharge.            Unresulted Labs Ordered in the Past 30 Days of this Admission       Date and Time Order Name Status Description    9/20/2024  7:08 PM Blood Culture Arm, Left Preliminary           Discharge Disposition   Discharged to home  Condition at discharge: Stable    Hospital Course   Stewart Hunt is a 38 year old male admitted on 9/20/2024. He presents to the ER with a recent diagnosis of cirrhosis. Patient underwent CT abd pelvis, MRI abd pelvis in early September which confirmed diagnosis of cirrhosis. Patient presenting to the ER with progressively worsening anasarca, hyperbilirubinemia.    # Decompensated alcoholic cirrhosis  # Hyperbilirubinemia most likely due to Alcoholic cirrhosis  # Jaundice  - MELD-Na: 29. MELD3.0: 32. 63.5% estimated 90 day survival  - Last MRI Abdpomen/pelvis (9/15)  > Mild nodularity of the liver representing cirrhosis.   > Small nodular areas of fatty infiltration in the liver.   > No suspicious enhancing  liver lesions.   - Ascites: Recent CT abdomen pelvis showing mild abdominal ascites.  Abdominal exam today showing generalized abdominal distention with pitting edema up to the navel, however showed no fluid wave.  - Varices: No history of varices or hemorrhoids on last EGD and colonoscopy.  - Will obtain abdomen ultrasound to evaluate volume of ascites.  If ascites is noted on ultrasound,  - Daily daily MELD labs  - Will obtain total, direct bilirubin (hyperbilirubinemia likely due to alcoholic cirrhoris, however, will need to rule out hemolysis as etiology for his hyperbilirubinemia  - Patient was given a dose of ceftriaxone in the ER. Given that he is having no abd pain, no ttp, no peritoneal signs and remains afebrile, will hold off on continuing ceftriaxone for empirical sbp treatment until Abd US confirms ascites    # Hepatic encephalopathy  Patient endorsing difficulty sleeping. and mild symptoms of forgetfulness and daytime drowsiness which may be reflective of hepatic encephalopathy.  Ammonia 76.  - Goal to have 2-3 stools per day.  At this time is having 6-7 watery stools per day.  - Once acute diarrhea improves, would consider starting lactulose to meet daily goal stools.    # Anasarca (BLE pittting edema, pitting edema at abd below level of navel)  # Hypoalbuminemia  Cause of anasarca is likely due to hypoalbuminemia from liver disease. UA done while in the ER which showed no protein. No risk factors for protein losing enteropathy. It is also possible that malnutrition is confounding  factor   - High protein diet, Low Na diet  - Begin lasix 20mg IVP BID (If patient noted to have ascites on abd US above, will switch to Lasix/spironolactone). For now will continue to diurese with Lasix alone.  - Daily standing weights  - Strict intake/output  - TTE to rule out heart failure    # Acute diarrhea  - Diarrhea ongoing for the last week while on prednisone. Patient has high risk for Cdiff. In addition, patient  also noted to have blood in the stool.  - GI PCR ordered, pending  - C. diff ordered, pending    # Hypertension  Etiology: Likely is primary hypertension. Per chart review, appears that patient has ranged between 150/70s- 160/80s. Patient has not been on antihypertensives at home prior to admission  - Lasix 20mg IVP BID (can be transitioned to Lasix po BID at discharge)  - After patient noted to be tolerating lasix well, will add on spironolactone 50 mg daily-- eventually to titrate up to Spironolactone 100 mg.    # Leukocytosis  - Patient noted to have leukocytosis with neutrophil predominance. Possible etiology of his leukocytosis includes steroid induced, bacterial gastroenteritis  - Trend daily  - Monitor fever curve  - Given 1 dose of ceftriaxone at the ER. Will hold off on empirical sbp treatment until confirmed to have ascites.    # Alcohol use disorder  - Last alchol drink was 2 weeks ago. Patient has a long history of alcohol use for the last several years. Last 2 years has been drinking 2 pints of bodka daily for at least 2 years.  - Since patient's ;last drink was 2 weeks ago, unlikely to need benzos for withdrawal. Will keep on CIWA protocol for now  - Chemical dependency consult, appreciate recs  - Thiamine, folate B12, Multivits  - Ativan as part of ciwa (would not anticipate that this would be needed)    Consultations This Hospital Stay   GI HEPATOLOGY ADULT IP CONSULT  GI HEPATOLOGY ADULT IP CONSULT  NUTRITION SERVICES ADULT IP CONSULT  PHYSICAL THERAPY ADULT IP CONSULT  OCCUPATIONAL THERAPY ADULT IP CONSULT    Code Status   Full Code    Time Spent on this Encounter   I, Momo Navarrete DO, personally saw the patient today and spent greater than 30 minutes discharging this patient.       Momo Navarrete DO, S  [facility] [address]  [address] [place] [id number]  Phone: [phone]  Fax: [phone]  ______________________________________________________________________       Primary Care  Physician   Jay Mcmillan    Discharge Orders      Reason for your hospital stay    Patient was admitted to the hospital for alcohol related cirrhosis.     Activity    Your activity upon discharge: activity as tolerated     Adult [facility] Follow-up and recommended labs and tests    Please follow-up with primary care provider at patient's earliest convenience.  Please follow-up with hepatology soon as possible.    Appointments on [place] and/or [facility] (with [facility] or [facility] provider or service). Call [phone] if you haven't heard regarding these appointments within 7 days of discharge.     Diet    Follow this diet upon discharge: Current Diet:Orders Placed This Encounter      Combination Diet Regular Diet; 2 gm NA Diet; Low Saturated Fat Na <2400mg Diet, No Caffeine Diet       Significant Results and Procedures   Results for orders placed or performed during the hospital encounter of 09/20/24   Chest XR,  PA & LAT    Narrative    EXAM: XR CHEST 2 VIEWS  LOCATION: [facility]  DATE: 9/20/2024    INDICATION: Dyspnea on exertion  COMPARISON: None.      Impression    IMPRESSION: Normal size of cardiomediastinal silhouette. Low lung volumes with elevation of the right hemidiaphragm and associated compressive atelectasis. No definite consolidation, pleural effusion or pneumothorax. No acute bony abnormality.   US Abdomen Complete    Narrative    EXAMINATION: US ABDOMEN COMPLETE  9/21/2024 8:31 AM      CLINICAL HISTORY: eval for ascitic fluid volume (for paracentesis),  eval for biliary obstruction    COMPARISON: None        FINDINGS:  Liver demonstrates increased echogenicity and coarsened echotexture  and measures 20.6 cm in length. There is no intrahepatic or  extrahepatic biliary ductal dilatation. The common bile duct measures  4 mm. The main portal vein is patent with flow towards the liver and  measures 1.0 cm in diameter.    The gallbladder wall measures 4 mm  in thickness. No stones or  pericholecystic fluid. There is a pedunculated 4 mm gallbladder polyp.  The spleen measures maximally 12.6 cm and is normal in appearance. The  visualized portions of the pancreas are normal in echogenicity.    The visualized upper abdominal aorta and inferior vena cava are  normal.      The kidneys are normal in position and echogenicity. The right kidney  measures 11.4 cm and the left kidney measures 14.2 cm. There is no  significant urinary tract dilation. The urinary bladder is moderately  distended and normal in morphology. The bladder wall is normal.    Trace right upper quadrant free fluid.      Impression    IMPRESSION:     1. Hepatosplenomegaly and hepatic cirrhosis  2. Trace right upper quadrant free fluid.  3. No evidence of biliary obstruction.    I have personally reviewed the examination and initial interpretation  and I agree with the findings.    BISHNU COLLINS MD         SYSTEM ID:  [id number]   Echo Complete     Value    LVEF  55-60%    Narrative    [id number]  [id number]  [id number]  [id number]^OMEGA^DEE^S     [facility]  Echocardiography Laboratory  [address] [place] [id number]     Name: KARINA FARIAS  MRN: [id number]  : 1985  Study Date: 2024 10:06 AM  Age: 38 yrs  Gender: Male  Patient Location: [facility]  Reason For Study: Chest Pain  Ordering Physician: DEE DUFF  Performed By: Sherrell Amezquita RDCS     BSA: 1.6 m2  Height: 65 in  Weight: 123 lb  ______________________________________________________________________________  Procedure  Complete Portable Echo Adult. Contrast Optison. Optison (NDC #5657-4733-85)  given intravenously. Patient was given 6 ml mixture of 3 ml Optison and 6 ml  saline. 3 ml wasted.  ______________________________________________________________________________  Interpretation Summary  Global and regional left ventricular function is normal with an EF of 55-60%.  Right  ventricular function, chamber size, wall motion, and thickness are  normal.  Pulmonary artery systolic pressure is normal.  No significant valvular abnormalities present.  No pericardial effusion is present.  There is no prior study for direct comparison.  ______________________________________________________________________________  Left Ventricle  Global and regional left ventricular function is normal with an EF of 55-60%.  Left ventricular wall thickness is normal. Left ventricular size is normal.  Left ventricular diastolic function is normal. No regional wall motion  abnormalities are seen.     Right Ventricle  Right ventricular function, chamber size, wall motion, and thickness are  normal.     Atria  Both atria appear normal.     Mitral Valve  The mitral valve is normal.     Aortic Valve  The valve leaflets are not well visualized. On Doppler interrogation, there is  no significant stenosis or regurgitation.     Tricuspid Valve  The tricuspid valve is normal. Trace tricuspid insufficiency is present. The  right ventricular systolic pressure is approximated at 26.4 mmHg plus the  right atrial pressure. Pulmonary artery systolic pressure is normal.     Pulmonic Valve  The valve leaflets are not well visualized. On Doppler interrogation, there is  no significant stenosis or regurgitation.     Vessels  The aorta root is normal. The thoracic aorta is normal. The pulmonary artery  cannot be assessed. The inferior vena cava cannot be assessed.     Pericardium  No pericardial effusion is present.     Miscellaneous  No significant valvular abnormalities present.     Compared to Previous Study  There is no prior study for direct comparison.  ______________________________________________________________________________  MMode/2D Measurements & Calculations  IVSd: 0.90 cm  LVIDd: 4.3 cm  LVIDs: 3.0 cm  LVPWd: 0.88 cm  FS: 29.3 %  LV mass(C)d: 121.0 grams  LV mass(C)dI: 75.2 grams/m2  Ao root diam: 2.7 cm  asc Aorta  Diam: 2.7 cm  LVOT diam: 2.1 cm  LVOT area: 3.5 cm2  Ao root diam index Ht(cm/m): 1.6  Ao root diam index BSA (cm/m2): 1.7  Asc Ao diam index BSA (cm/m2): 1.7  Asc Ao diam index Ht(cm/m): 1.6     LA Volume (BP): 52.3 ml  LA Volume Index (BP): 32.5 ml/m2  RWT: 0.41     Doppler Measurements & Calculations  MV E max hugo: 119.0 cm/sec  MV A max hugo: 96.2 cm/sec  MV E/A: 1.2  MV dec time: 0.18 sec  TR max hugo: 257.0 cm/sec  TR max P.4 mmHg  E/E' av.9  Lateral E/e': 8.6     Medial E/e': 15.2  RV S Hugo: 16.1 cm/sec     ______________________________________________________________________________  Report approved by: Carlos Gonzalez 2024 12:17 PM             Discharge Medications   Current Discharge Medication List        START taking these medications    Details   folic acid (FOLVITE) 1 MG tablet Take 1 tablet (1 mg) by mouth daily.  Qty: 30 tablet, Refills: 0    Associated Diagnoses: Alcoholic cirrhosis of liver without ascites; Alcoholic cirrhosis of liver without ascites      furosemide (LASIX) 20 MG tablet Take 1 tablet (20 mg) by mouth daily.  Qty: 30 tablet, Refills: 0    Associated Diagnoses: Alcoholic cirrhosis of liver without ascites; Alcoholic cirrhosis of liver without ascites      lactulose (CEPHULAC) 20 GM packet Take 1 packet (20 g) by mouth 3 times daily.  Qty: 90 each, Refills: 0    Associated Diagnoses: Alcoholic cirrhosis of liver without ascites; Alcoholic cirrhosis of liver without ascites      multivitamin w/minerals (THERA-VIT-M) tablet Take 1 tablet by mouth daily.  Qty: 30 tablet, Refills: 0    Associated Diagnoses: Alcoholic cirrhosis of liver without ascites; Alcoholic cirrhosis of liver without ascites      spironolactone (ALDACTONE) 50 MG tablet Take 1 tablet (50 mg) by mouth daily.  Qty: 30 tablet, Refills: 0    Associated Diagnoses: Alcoholic cirrhosis of liver without ascites; Alcoholic cirrhosis of liver without ascites      thiamine (B-1) 100 MG tablet Take 1 tablet (100  mg) by mouth daily.  Qty: 30 tablet, Refills: 0    Associated Diagnoses: Alcoholic cirrhosis of liver without ascites; Alcoholic cirrhosis of liver without ascites           CONTINUE these medications which have NOT CHANGED    Details   famotidine (PEPCID) 20 MG tablet Take 20 mg by mouth daily.      prednisoLONE 5 MG tablet Take 5 mg by mouth See Admin Instructions. Last dose of 8 tablets on 9/20   TAKE 8 TABLETS BY MOUTH ONCE DAILY FOR 28 DAYS, THEN 6 TABLETS DAILY FOR 7 DAYS, THEN 4 TABLETS DAILY FOR 7 DAYS, THEN 2 TABLETS DAILY FOR 7      simethicone (MYLICON) 125 MG chewable tablet Take 125 mg by mouth 2 times daily.           Allergies   No Known Allergies

## 2024-09-22 NOTE — PLAN OF CARE
Problem: Adult Inpatient Plan of Care  Goal: Plan of Care Review  Description: The Plan of Care Review/Shift note should be completed every shift.  The Outcome Evaluation is a brief statement about your assessment that the patient is improving, declining, or no change.  This information will be displayed automatically on your shift  note.  Outcome: Progressing  Flowsheets (Taken 9/21/2024 2322)  Plan of Care Reviewed With: patient     Problem: Infection  Goal: Absence of Infection Signs and Symptoms  Outcome: Progressing  Intervention: Prevent or Manage Infection  Recent Flowsheet Documentation  Taken 9/21/2024 2111 by Jason Aguilera RN  Isolation Precautions: contact precautions initiated     Problem: Fall Injury Risk  Goal: Absence of Fall and Fall-Related Injury  Outcome: Progressing  Intervention: Identify and Manage Contributors  Recent Flowsheet Documentation  Taken 9/21/2024 2111 by Jason Aguilera RN  Medication Review/Management: medications reviewed  Intervention: Promote Injury-Free Environment  Recent Flowsheet Documentation  Taken 9/21/2024 2111 by Jason Aguilera RN  Safety Promotion/Fall Prevention: activity supervised     Problem: Alcohol Withdrawal  Goal: Alcohol Withdrawal Symptom Control  Outcome: Progressing   Goal Outcome Evaluation:      Plan of Care Reviewed With: patient  Pt AO x4, on RA an satting adequately. jimmie Ball to LE, R PIV patent and SL. No new events this shift. Continue to monitor patient and follow th POC.

## 2024-09-22 NOTE — PLAN OF CARE
[id number]    Goal Outcome Evaluation:      Plan of Care Reviewed With: patient    Overall Patient Progress: no changeOverall Patient Progress: no change    Pt is alert & oriented, able to make needs known  Continent of bowel & bladder, last BM 9/21/24  Low Sat Fat diet  Independent in the room  VSS, on RA. Denies pain or shortness of breath  Left PIV Saline locked  Call light within reach. Continue with POC

## 2024-09-23 ENCOUNTER — PATIENT OUTREACH (OUTPATIENT)
Dept: CARE COORDINATION | Facility: CLINIC | Age: 39
End: 2024-09-23
Payer: COMMERCIAL

## 2024-09-23 ENCOUNTER — TELEPHONE (OUTPATIENT)
Dept: GASTROENTEROLOGY | Facility: CLINIC | Age: 39
End: 2024-09-23
Payer: COMMERCIAL

## 2024-09-23 DIAGNOSIS — K70.30 ALCOHOLIC CIRRHOSIS OF LIVER WITHOUT ASCITES (H): Primary | ICD-10-CM

## 2024-09-23 NOTE — PROGRESS NOTES
Clinic Care Coordination Contact  Transitions of Care Outreach  No chief complaint on file.      Most Recent Admission Date: 9/20/2024   Most Recent Admission Diagnosis: Bilirubinemia - E80.6  Alcoholic cirrhosis of liver without ascites - K70.30     Most Recent Discharge Date: 9/22/2024   Most Recent Discharge Diagnosis: Alcoholic cirrhosis of liver without ascites - K70.30  Bilirubinemia - E80.6  Alcoholic cirrhosis of liver without ascites - K70.30     Transitions of Care Assessment    Discharge Assessment  How are you doing now that you are home?: Patient shares that he is doing well. Will talke with PCP tomorrow and has all other follow up appt's scheduled. No other needs/concerns at this time  How are your symptoms? (Red Flag symptoms escalate to triage hotline per guidelines): Improved  Do you know how to contact your clinic care team if you have future questions or changes to your health status? : Yes  Does the patient have their discharge instructions? : Yes  Does the patient have questions regarding their discharge instructions? : No  Were you started on any new medications or were there changes to any of your previous medications? : Yes  Does the patient have all of their medications?: Yes  Do you have questions regarding any of your medications? : No  Do you have all of your needed medical supplies or equipment (DME)?  (i.e. oxygen tank, CPAP, cane, etc.): Yes    Post-op ([facility] CTA Only)  If the patient had a surgery or procedure, do they have any questions for a nurse?: No           Follow up Plan     Discharge Follow-Up  Discharge follow up appointment scheduled in alignment with recommended follow up timeframe or Transitions of Risk Category? (Low = within 30 days; Moderate= within 14 days; High= within 7 days): Yes  Discharge Follow Up Appointment Date: 09/24/24  Discharge Follow Up Appointment Scheduled with?: Primary Care Provider    Future Appointments   Date Time Provider Department Center    10/21/2024  9:30 AM Dragan Muhammad MD [facility]       Outpatient Plan as outlined on AVS reviewed with patient.    For any urgent concerns, please contact our 24 hour nurse triage line: [phone] ([phone])       INDIANA Madsen

## 2024-09-24 DIAGNOSIS — R19.7 DIARRHEA: ICD-10-CM

## 2024-09-24 DIAGNOSIS — K70.30 ALCOHOLIC CIRRHOSIS OF LIVER WITHOUT ASCITES (H): Primary | ICD-10-CM

## 2024-09-24 NOTE — CONFIDENTIAL NOTE
DIAGNOSIS:  Hfu Alcoholic cirrhosis of liver without ascites    Appt Date:  10.21.2024     NOTES STATUS DETAILS   OFFICE NOTE from referring provider Internal 09.20.2024 Leroy Campa MD    DISCHARGE SUMMARY from hospital Internal    Care Everywhere 09.20.2024 Leroy Campa MD     09.03.2024  Karlie Thomas S, APRN, CNP      08.21.2024 Jay Mcmillan MD  [facility]    MEDICATION LIST Internal / CE    IMAGING     ULTRASOUND LIVER Internal 09.21.2024 US Abd Complete    CT OF ABDOMEN Care Everywhere  08.27.2024 CT Abd Pelvis   MRI OF LIVER Care Everywhere  09.13.2024 MR Abd    LABS     BASIC METABOLIC PANEL Internal 09.21.2024   COMPLETE METABOLIC PANEL Internal 09.21.20224   COMPLETE BLOOD COUNT (CBC) Internal 09.21.2024   INTERNATIONAL NORMALIZED RATIO (INR) Internal 09.22.2024    HEPATITIS C ANTIBODY Care Everywhere 08.21.2024   HEPATITIS B SURFACE ANTIGEN Care Everywhere 08.21.2024   HEPATITIS B SURFACE ANTIBODY Care Everywhere 08.21.2024   ....  Action 09.24.2024 RM   Action Taken Called [employer] for images called [phone] images received and uploaded to PACS.

## 2024-09-25 LAB — BACTERIA BLD CULT: NO GROWTH

## 2024-10-02 PROCEDURE — 99285 EMERGENCY DEPT VISIT HI MDM: CPT | Performed by: EMERGENCY MEDICINE

## 2024-10-02 PROCEDURE — 96374 THER/PROPH/DIAG INJ IV PUSH: CPT | Performed by: EMERGENCY MEDICINE

## 2024-10-02 PROCEDURE — 99285 EMERGENCY DEPT VISIT HI MDM: CPT | Mod: 25 | Performed by: EMERGENCY MEDICINE

## 2024-10-03 ENCOUNTER — HOSPITAL ENCOUNTER (INPATIENT)
Facility: CLINIC | Age: 39
LOS: 11 days | Discharge: HOME | DRG: 432 | End: 2024-10-18
Attending: EMERGENCY MEDICINE | Admitting: INTERNAL MEDICINE
Payer: COMMERCIAL

## 2024-10-03 ENCOUNTER — APPOINTMENT (OUTPATIENT)
Dept: GENERAL RADIOLOGY | Facility: CLINIC | Age: 39
DRG: 432 | End: 2024-10-03
Attending: EMERGENCY MEDICINE
Payer: COMMERCIAL

## 2024-10-03 DIAGNOSIS — K70.30 ALCOHOLIC CIRRHOSIS OF LIVER WITHOUT ASCITES (H): ICD-10-CM

## 2024-10-03 DIAGNOSIS — R53.83 OTHER FATIGUE: ICD-10-CM

## 2024-10-03 DIAGNOSIS — M79.89 LEG SWELLING: ICD-10-CM

## 2024-10-03 DIAGNOSIS — R78.81 BACTEREMIA: Primary | ICD-10-CM

## 2024-10-03 LAB
ALBUMIN SERPL BCG-MCNC: 2.7 G/DL (ref 3.5–5.2)
ALBUMIN UR-MCNC: NEGATIVE MG/DL
ALP SERPL-CCNC: 232 U/L (ref 40–150)
ALT SERPL W P-5'-P-CCNC: 161 U/L (ref 0–70)
AMMONIA PLAS-SCNC: 37 UMOL/L (ref 16–60)
ANION GAP SERPL CALCULATED.3IONS-SCNC: 8 MMOL/L (ref 7–15)
APPEARANCE UR: ABNORMAL
AST SERPL W P-5'-P-CCNC: 215 U/L (ref 0–45)
BACTERIA #/AREA URNS HPF: ABNORMAL /HPF
BASOPHILS # BLD AUTO: 0 10E3/UL (ref 0–0.2)
BASOPHILS NFR BLD AUTO: 0 %
BILIRUB SERPL-MCNC: 27.5 MG/DL
BILIRUB UR QL STRIP: ABNORMAL
BUN SERPL-MCNC: 18.6 MG/DL (ref 6–20)
CALCIUM SERPL-MCNC: 8.1 MG/DL (ref 8.8–10.4)
CAOX CRY #/AREA URNS HPF: ABNORMAL /HPF
CHLORIDE SERPL-SCNC: 99 MMOL/L (ref 98–107)
COLOR UR AUTO: ABNORMAL
CREAT SERPL-MCNC: 0.94 MG/DL (ref 0.67–1.17)
EGFRCR SERPLBLD CKD-EPI 2021: >90 ML/MIN/1.73M2
EOSINOPHIL # BLD AUTO: 0.2 10E3/UL (ref 0–0.7)
EOSINOPHIL NFR BLD AUTO: 1 %
ERYTHROCYTE [DISTWIDTH] IN BLOOD BY AUTOMATED COUNT: 18.6 % (ref 10–15)
FIBRINOGEN PPP-MCNC: 354 MG/DL (ref 170–510)
FLUAV RNA SPEC QL NAA+PROBE: NEGATIVE
FLUBV RNA RESP QL NAA+PROBE: NEGATIVE
GLUCOSE SERPL-MCNC: 80 MG/DL (ref 70–99)
GLUCOSE UR STRIP-MCNC: NEGATIVE MG/DL
HCO3 SERPL-SCNC: 24 MMOL/L (ref 22–29)
HCT VFR BLD AUTO: 40.1 % (ref 40–53)
HGB BLD-MCNC: 13.9 G/DL (ref 13.3–17.7)
HGB UR QL STRIP: NEGATIVE
HYALINE CASTS: 1 /LPF
IMM GRANULOCYTES # BLD: 0.3 10E3/UL
IMM GRANULOCYTES NFR BLD: 2 %
INR PPP: 2.79 (ref 0.85–1.15)
KETONES UR STRIP-MCNC: NEGATIVE MG/DL
LACTATE SERPL-SCNC: 1.5 MMOL/L (ref 0.7–2)
LEUKOCYTE ESTERASE UR QL STRIP: NEGATIVE
LIPASE SERPL-CCNC: 54 U/L (ref 13–60)
LYMPHOCYTES # BLD AUTO: 2.1 10E3/UL (ref 0.8–5.3)
LYMPHOCYTES NFR BLD AUTO: 14 %
MCH RBC QN AUTO: 36 PG (ref 26.5–33)
MCHC RBC AUTO-ENTMCNC: 34.7 G/DL (ref 31.5–36.5)
MCV RBC AUTO: 104 FL (ref 78–100)
MONOCYTES # BLD AUTO: 0.9 10E3/UL (ref 0–1.3)
MONOCYTES NFR BLD AUTO: 6 %
MUCOUS THREADS #/AREA URNS LPF: PRESENT /LPF
NEUTROPHILS # BLD AUTO: 11.9 10E3/UL (ref 1.6–8.3)
NEUTROPHILS NFR BLD AUTO: 77 %
NITRATE UR QL: NEGATIVE
NRBC # BLD AUTO: 0 10E3/UL
NRBC BLD AUTO-RTO: 0 /100
PH UR STRIP: 6.5 [PH] (ref 5–7)
PLATELET # BLD AUTO: 78 10E3/UL (ref 150–450)
POTASSIUM SERPL-SCNC: 4 MMOL/L (ref 3.4–5.3)
PROCALCITONIN SERPL IA-MCNC: 1.23 NG/ML
PROT SERPL-MCNC: ABNORMAL G/DL
RBC # BLD AUTO: 3.86 10E6/UL (ref 4.4–5.9)
RBC URINE: <1 /HPF
RSV RNA SPEC NAA+PROBE: NEGATIVE
SARS-COV-2 RNA RESP QL NAA+PROBE: NEGATIVE
SODIUM SERPL-SCNC: 131 MMOL/L (ref 135–145)
SP GR UR STRIP: 1.02 (ref 1–1.03)
SQUAMOUS EPITHELIAL: <1 /HPF
UROBILINOGEN UR STRIP-MCNC: NORMAL MG/DL
WBC # BLD AUTO: 15.4 10E3/UL (ref 4–11)
WBC URINE: 1 /HPF

## 2024-10-03 PROCEDURE — 85025 COMPLETE CBC W/AUTO DIFF WBC: CPT | Performed by: EMERGENCY MEDICINE

## 2024-10-03 PROCEDURE — 250N000013 HC RX MED GY IP 250 OP 250 PS 637

## 2024-10-03 PROCEDURE — 85610 PROTHROMBIN TIME: CPT | Performed by: EMERGENCY MEDICINE

## 2024-10-03 PROCEDURE — 87637 SARSCOV2&INF A&B&RSV AMP PRB: CPT

## 2024-10-03 PROCEDURE — 71046 X-RAY EXAM CHEST 2 VIEWS: CPT | Mod: 26 | Performed by: RADIOLOGY

## 2024-10-03 PROCEDURE — 80048 BASIC METABOLIC PNL TOTAL CA: CPT | Performed by: EMERGENCY MEDICINE

## 2024-10-03 PROCEDURE — 87040 BLOOD CULTURE FOR BACTERIA: CPT | Performed by: EMERGENCY MEDICINE

## 2024-10-03 PROCEDURE — 36415 COLL VENOUS BLD VENIPUNCTURE: CPT | Performed by: EMERGENCY MEDICINE

## 2024-10-03 PROCEDURE — 83690 ASSAY OF LIPASE: CPT | Performed by: EMERGENCY MEDICINE

## 2024-10-03 PROCEDURE — 82140 ASSAY OF AMMONIA: CPT | Performed by: EMERGENCY MEDICINE

## 2024-10-03 PROCEDURE — G0378 HOSPITAL OBSERVATION PER HR: HCPCS

## 2024-10-03 PROCEDURE — 71046 X-RAY EXAM CHEST 2 VIEWS: CPT

## 2024-10-03 PROCEDURE — 99223 1ST HOSP IP/OBS HIGH 75: CPT | Performed by: INTERNAL MEDICINE

## 2024-10-03 PROCEDURE — 36415 COLL VENOUS BLD VENIPUNCTURE: CPT | Performed by: STUDENT IN AN ORGANIZED HEALTH CARE EDUCATION/TRAINING PROGRAM

## 2024-10-03 PROCEDURE — 250N000013 HC RX MED GY IP 250 OP 250 PS 637: Performed by: INTERNAL MEDICINE

## 2024-10-03 PROCEDURE — 84145 PROCALCITONIN (PCT): CPT | Performed by: EMERGENCY MEDICINE

## 2024-10-03 PROCEDURE — 81001 URINALYSIS AUTO W/SCOPE: CPT | Performed by: EMERGENCY MEDICINE

## 2024-10-03 PROCEDURE — 83605 ASSAY OF LACTIC ACID: CPT | Performed by: EMERGENCY MEDICINE

## 2024-10-03 PROCEDURE — 85384 FIBRINOGEN ACTIVITY: CPT | Performed by: STUDENT IN AN ORGANIZED HEALTH CARE EDUCATION/TRAINING PROGRAM

## 2024-10-03 PROCEDURE — 250N000011 HC RX IP 250 OP 636: Performed by: EMERGENCY MEDICINE

## 2024-10-03 PROCEDURE — 99255 IP/OBS CONSLTJ NEW/EST HI 80: CPT | Performed by: NURSE PRACTITIONER

## 2024-10-03 RX ORDER — SPIRONOLACTONE 50 MG/1
50 TABLET, FILM COATED ORAL DAILY
Status: DISCONTINUED | OUTPATIENT
Start: 2024-10-03 | End: 2024-10-03

## 2024-10-03 RX ORDER — FOLIC ACID 1 MG/1
1 TABLET ORAL DAILY
Status: DISCONTINUED | OUTPATIENT
Start: 2024-10-03 | End: 2024-10-18 | Stop reason: HOSPADM

## 2024-10-03 RX ORDER — ONDANSETRON 2 MG/ML
4 INJECTION INTRAMUSCULAR; INTRAVENOUS EVERY 6 HOURS PRN
Status: DISCONTINUED | OUTPATIENT
Start: 2024-10-03 | End: 2024-10-18 | Stop reason: HOSPADM

## 2024-10-03 RX ORDER — ALBUTEROL SULFATE 90 UG/1
2-3 INHALANT RESPIRATORY (INHALATION) EVERY 6 HOURS PRN
COMMUNITY

## 2024-10-03 RX ORDER — SIMETHICONE 80 MG
80 TABLET,CHEWABLE ORAL 2 TIMES DAILY
Status: DISCONTINUED | OUTPATIENT
Start: 2024-10-03 | End: 2024-10-18 | Stop reason: HOSPADM

## 2024-10-03 RX ORDER — FUROSEMIDE 20 MG/1
20 TABLET ORAL DAILY
Status: DISCONTINUED | OUTPATIENT
Start: 2024-10-03 | End: 2024-10-03

## 2024-10-03 RX ORDER — PREDNISOLONE 5 MG/1
10 TABLET ORAL DAILY
Status: DISCONTINUED | OUTPATIENT
Start: 2024-10-03 | End: 2024-10-03

## 2024-10-03 RX ORDER — AMOXICILLIN 250 MG
2 CAPSULE ORAL 2 TIMES DAILY PRN
Status: DISCONTINUED | OUTPATIENT
Start: 2024-10-03 | End: 2024-10-18 | Stop reason: HOSPADM

## 2024-10-03 RX ORDER — ACETAMINOPHEN 325 MG/1
650 TABLET ORAL
Status: COMPLETED | OUTPATIENT
Start: 2024-10-03 | End: 2024-10-07

## 2024-10-03 RX ORDER — FUROSEMIDE 20 MG/1
20 TABLET ORAL DAILY
Status: DISCONTINUED | OUTPATIENT
Start: 2024-10-04 | End: 2024-10-03

## 2024-10-03 RX ORDER — ONDANSETRON 4 MG/1
4 TABLET, ORALLY DISINTEGRATING ORAL EVERY 6 HOURS PRN
Status: DISCONTINUED | OUTPATIENT
Start: 2024-10-03 | End: 2024-10-18 | Stop reason: HOSPADM

## 2024-10-03 RX ORDER — SPIRONOLACTONE 100 MG/1
100 TABLET, FILM COATED ORAL DAILY
Status: DISCONTINUED | OUTPATIENT
Start: 2024-10-04 | End: 2024-10-11

## 2024-10-03 RX ORDER — FUROSEMIDE 10 MG/ML
40 INJECTION INTRAMUSCULAR; INTRAVENOUS ONCE
Status: COMPLETED | OUTPATIENT
Start: 2024-10-03 | End: 2024-10-03

## 2024-10-03 RX ORDER — MULTIPLE VITAMINS W/ MINERALS TAB 9MG-400MCG
1 TAB ORAL DAILY
Status: DISCONTINUED | OUTPATIENT
Start: 2024-10-03 | End: 2024-10-18 | Stop reason: HOSPADM

## 2024-10-03 RX ORDER — FUROSEMIDE 40 MG/1
40 TABLET ORAL DAILY
Status: DISCONTINUED | OUTPATIENT
Start: 2024-10-03 | End: 2024-10-11

## 2024-10-03 RX ORDER — AMOXICILLIN 250 MG
1 CAPSULE ORAL 2 TIMES DAILY PRN
Status: DISCONTINUED | OUTPATIENT
Start: 2024-10-03 | End: 2024-10-18 | Stop reason: HOSPADM

## 2024-10-03 RX ORDER — FUROSEMIDE 40 MG/1
40 TABLET ORAL DAILY
Status: DISCONTINUED | OUTPATIENT
Start: 2024-10-04 | End: 2024-10-03

## 2024-10-03 RX ORDER — FAMOTIDINE 20 MG/1
20 TABLET, FILM COATED ORAL DAILY
Status: DISCONTINUED | OUTPATIENT
Start: 2024-10-03 | End: 2024-10-06

## 2024-10-03 RX ADMIN — LACTULOSE 20 G: 20 POWDER, FOR SOLUTION ORAL at 14:35

## 2024-10-03 RX ADMIN — SIMETHICONE 80 MG: 80 TABLET, CHEWABLE ORAL at 21:51

## 2024-10-03 RX ADMIN — FAMOTIDINE 20 MG: 20 TABLET ORAL at 09:39

## 2024-10-03 RX ADMIN — SPIRONOLACTONE 50 MG: 50 TABLET ORAL at 09:40

## 2024-10-03 RX ADMIN — THIAMINE HCL TAB 100 MG 100 MG: 100 TAB at 09:39

## 2024-10-03 RX ADMIN — FUROSEMIDE 40 MG: 10 INJECTION, SOLUTION INTRAVENOUS at 01:14

## 2024-10-03 RX ADMIN — PREDNISOLONE 10 MG: 5 TABLET ORAL at 09:41

## 2024-10-03 RX ADMIN — Medication 1 LOZENGE: at 18:02

## 2024-10-03 RX ADMIN — FOLIC ACID 1 MG: 1 TABLET ORAL at 09:39

## 2024-10-03 RX ADMIN — LACTULOSE 20 G: 20 POWDER, FOR SOLUTION ORAL at 23:08

## 2024-10-03 RX ADMIN — LACTULOSE 20 G: 20 POWDER, FOR SOLUTION ORAL at 09:41

## 2024-10-03 RX ADMIN — Medication 1 TABLET: at 09:39

## 2024-10-03 RX ADMIN — SIMETHICONE 80 MG: 80 TABLET, CHEWABLE ORAL at 09:40

## 2024-10-03 RX ADMIN — FUROSEMIDE 40 MG: 40 TABLET ORAL at 14:34

## 2024-10-03 ASSESSMENT — ACTIVITIES OF DAILY LIVING (ADL)
ADLS_ACUITY_SCORE: 37
ADLS_ACUITY_SCORE: 37
ADLS_ACUITY_SCORE: 23
ADLS_ACUITY_SCORE: 23
ADLS_ACUITY_SCORE: 37
ADLS_ACUITY_SCORE: 23
ADLS_ACUITY_SCORE: 37
ADLS_ACUITY_SCORE: 35
ADLS_ACUITY_SCORE: 37
ADLS_ACUITY_SCORE: 37
ADLS_ACUITY_SCORE: 23
ADLS_ACUITY_SCORE: 37
ADLS_ACUITY_SCORE: 33
ADLS_ACUITY_SCORE: 35
ADLS_ACUITY_SCORE: 37
ADLS_ACUITY_SCORE: 23
ADLS_ACUITY_SCORE: 23
ADLS_ACUITY_SCORE: 37

## 2024-10-03 ASSESSMENT — COLUMBIA-SUICIDE SEVERITY RATING SCALE - C-SSRS
1. IN THE PAST MONTH, HAVE YOU WISHED YOU WERE DEAD OR WISHED YOU COULD GO TO SLEEP AND NOT WAKE UP?: NO
6. HAVE YOU EVER DONE ANYTHING, STARTED TO DO ANYTHING, OR PREPARED TO DO ANYTHING TO END YOUR LIFE?: NO
2. HAVE YOU ACTUALLY HAD ANY THOUGHTS OF KILLING YOURSELF IN THE PAST MONTH?: NO

## 2024-10-03 NOTE — ED PROVIDER NOTES
[place] EMERGENCY DEPARTMENT ([facility])    10/03/24       History     Chief Complaint   Patient presents with    Leg Swelling     HPI  Stewart Hunt is a 39 year old male who with PMH of decompensated alcoholic cirrhosis, hyperbilirubinemia most likely due to alcoholic cirrhosis, hepatic encephalopathy, anasarca, hypoalbuminemia, hypertension, leukocytosis and alcohol use disorder. Patient presented to the ED due to leg swelling.  He states that the swelling has been worsening since he was discharged from the hospital.  He was discharged on Lasix and spironolactone.  The extremity swelling has made it difficult for him to ambulate at home.  Has been feeling generally weak, fatigued, and has felt at risk for falls.  Family also states that he has been intermittently confused as well and has had difficulty caring for himself.  He states that he has been sober from alcohol for the past several weeks (will be a month on 10/7/2024) and has been going to AA meetings.  He is unsure of his follow-up plan regarding when he is supposed to see hepatology, liver transplant, GI, etc.  Chest pain or shortness of breath.           Past Medical History  Past Medical History:   Diagnosis Date    Acute alcoholic hepatitis (H) 09/2024    Alcohol use disorder, severe, in early remission (H)     Alcoholic cirrhosis (H)     Morbid obesity (H)      Past Surgical History:   Procedure Laterality Date    GASTRECTOMY LAPAROSCOPIC SLEEVE       famotidine (PEPCID) 20 MG tablet  folic acid (FOLVITE) 1 MG tablet  furosemide (LASIX) 20 MG tablet  lactulose (CEPHULAC) 20 GM packet  multivitamin w/minerals (THERA-VIT-M) tablet  prednisoLONE 5 MG tablet  simethicone (MYLICON) 125 MG chewable tablet  spironolactone (ALDACTONE) 50 MG tablet  thiamine (B-1) 100 MG tablet      No Known Allergies  Family History  Family History   Problem Relation Age of Onset    Colon Cancer Mother     Diabetes Father     Pancreatic Cancer Sister      "Diabetes Maternal Grandmother     Colon Cancer Cousin     Liver Disease No family hx of      Social History   Social History     Substance Use Topics    Alcohol use: Not Currently     Comment: 2 pints of vodka per day for 2 years. Last drink early sept 2024    Drug use: Never      Past medical history, past surgical history, medications, allergies, family history, and social history were reviewed with the patient. No additional pertinent items.   A complete review of systems was performed with pertinent positives and negatives noted in the HPI, and all other systems negative.    Physical Exam   BP: 133/74  Pulse: 80  Temp: 98.4  F (36.9  C)  Resp: 17  Height: 167.6 cm (5' 6\")  Weight: 114.3 kg (252 lb)  SpO2: 99 %  Physical Exam  Vitals and nursing note reviewed.   Constitutional:       General: He is in acute distress.      Appearance: Normal appearance. He is ill-appearing.   HENT:      Head: Normocephalic.      Nose: Nose normal.   Eyes:      Pupils: Pupils are equal, round, and reactive to light.   Cardiovascular:      Rate and Rhythm: Normal rate and regular rhythm.   Pulmonary:      Effort: Pulmonary effort is normal.   Abdominal:      General: There is no distension.   Musculoskeletal:         General: No deformity. Normal range of motion.      Cervical back: Normal range of motion.      Right lower leg: Edema present.      Left lower leg: Edema present.   Skin:     General: Skin is warm.      Coloration: Skin is jaundiced.   Neurological:      Mental Status: He is alert and oriented to person, place, and time.   Psychiatric:         Mood and Affect: Mood normal.           ED Course, Procedures, & Data         Results for orders placed or performed during the hospital encounter of 10/03/24   XR Chest 2 Views     Status: None    Narrative    EXAM: XR CHEST 2 VIEWS  LOCATION: [facility]  DATE: 10/3/2024    INDICATION: infectious w u  COMPARISON: 9/20/2024.      " Impression    IMPRESSION: Low lung volumes and elevation of right hemidiaphragm. Mild right basilar atelectasis. Lungs otherwise clear. Normal heart size and pulmonary vascularity. No pleural fluid or pneumothorax.   Lactic acid whole blood with 1x repeat in 2 hr when >2     Status: Normal   Result Value Ref Range    Lactic Acid, Initial 1.5 0.7 - 2.0 mmol/L   Procalcitonin     Status: Abnormal   Result Value Ref Range    Procalcitonin 1.23 (H) <0.50 ng/mL   Ammonia     Status: Normal   Result Value Ref Range    Ammonia 37 16 - 60 umol/L   INR     Status: Abnormal   Result Value Ref Range    INR 2.79 (H) 0.85 - 1.15   UA with Microscopic reflex to Culture     Status: Abnormal    Specimen: Urine, Clean Catch   Result Value Ref Range    Color Urine Dark Yellow (A) Colorless, Straw, Light Yellow, Yellow    Appearance Urine Slightly Cloudy (A) Clear    Glucose Urine Negative Negative mg/dL    Bilirubin Urine Large (A) Negative    Ketones Urine Negative Negative mg/dL    Specific Gravity Urine 1.016 1.003 - 1.035    Blood Urine Negative Negative    pH Urine 6.5 5.0 - 7.0    Protein Albumin Urine Negative Negative mg/dL    Urobilinogen Urine Normal Normal, 2.0 mg/dL    Nitrite Urine Negative Negative    Leukocyte Esterase Urine Negative Negative    Bacteria Urine Few (A) None Seen /HPF    Mucus Urine Present (A) None Seen /LPF    Calcium Oxalate Crystals Urine Few (A) None Seen /HPF    RBC Urine <1 <=2 /HPF    WBC Urine 1 <=5 /HPF    Squamous Epithelials Urine <1 <=1 /HPF    Hyaline Casts Urine 1 <=2 /LPF    Narrative    Urine Culture not indicated   CBC with platelets and differential     Status: Abnormal   Result Value Ref Range    WBC Count 15.4 (H) 4.0 - 11.0 10e3/uL    RBC Count 3.86 (L) 4.40 - 5.90 10e6/uL    Hemoglobin 13.9 13.3 - 17.7 g/dL    Hematocrit 40.1 40.0 - 53.0 %     (H) 78 - 100 fL    MCH 36.0 (H) 26.5 - 33.0 pg    MCHC 34.7 31.5 - 36.5 g/dL    RDW 18.6 (H) 10.0 - 15.0 %    Platelet Count 78 (L)  150 - 450 10e3/uL    % Neutrophils 77 %    % Lymphocytes 14 %    % Monocytes 6 %    % Eosinophils 1 %    % Basophils 0 %    % Immature Granulocytes 2 %    NRBCs per 100 WBC 0 <1 /100    Absolute Neutrophils 11.9 (H) 1.6 - 8.3 10e3/uL    Absolute Lymphocytes 2.1 0.8 - 5.3 10e3/uL    Absolute Monocytes 0.9 0.0 - 1.3 10e3/uL    Absolute Eosinophils 0.2 0.0 - 0.7 10e3/uL    Absolute Basophils 0.0 0.0 - 0.2 10e3/uL    Absolute Immature Granulocytes 0.3 <=0.4 10e3/uL    Absolute NRBCs 0.0 10e3/uL   Lipase     Status: Normal   Result Value Ref Range    Lipase 54 13 - 60 U/L   CBC with platelets differential     Status: Abnormal    Narrative    The following orders were created for panel order CBC with platelets differential.  Procedure                               Abnormality         Status                     ---------                               -----------         ------                     CBC with platelets and d...[[id number]]  Abnormal            Final result                 Please view results for these tests on the individual orders.     Medications   furosemide (LASIX) injection 40 mg (40 mg Intravenous $Given 10/3/24 0114)     Labs Ordered and Resulted from Time of ED Arrival to Time of ED Departure   PROCALCITONIN - Abnormal       Result Value    Procalcitonin 1.23 (*)    INR - Abnormal    INR 2.79 (*)    ROUTINE UA WITH MICROSCOPIC REFLEX TO CULTURE - Abnormal    Color Urine Dark Yellow (*)     Appearance Urine Slightly Cloudy (*)     Glucose Urine Negative      Bilirubin Urine Large (*)     Ketones Urine Negative      Specific Gravity Urine 1.016      Blood Urine Negative      pH Urine 6.5      Protein Albumin Urine Negative      Urobilinogen Urine Normal      Nitrite Urine Negative      Leukocyte Esterase Urine Negative      Bacteria Urine Few (*)     Mucus Urine Present (*)     Calcium Oxalate Crystals Urine Few (*)     RBC Urine <1      WBC Urine 1      Squamous Epithelials Urine <1      Hyaline Casts  Urine 1     CBC WITH PLATELETS AND DIFFERENTIAL - Abnormal    WBC Count 15.4 (*)     RBC Count 3.86 (*)     Hemoglobin 13.9      Hematocrit 40.1       (*)     MCH 36.0 (*)     MCHC 34.7      RDW 18.6 (*)     Platelet Count 78 (*)     % Neutrophils 77      % Lymphocytes 14      % Monocytes 6      % Eosinophils 1      % Basophils 0      % Immature Granulocytes 2      NRBCs per 100 WBC 0      Absolute Neutrophils 11.9 (*)     Absolute Lymphocytes 2.1      Absolute Monocytes 0.9      Absolute Eosinophils 0.2      Absolute Basophils 0.0      Absolute Immature Granulocytes 0.3      Absolute NRBCs 0.0     LACTIC ACID WHOLE BLOOD WITH 1X REPEAT IN 2 HR WHEN >2 - Normal    Lactic Acid, Initial 1.5     AMMONIA - Normal    Ammonia 37     LIPASE - Normal    Lipase 54     COMPREHENSIVE METABOLIC PANEL   NT PROBNP INPATIENT   BLOOD CULTURE     XR Chest 2 Views   Final Result   IMPRESSION: Low lung volumes and elevation of right hemidiaphragm. Mild right basilar atelectasis. Lungs otherwise clear. Normal heart size and pulmonary vascularity. No pleural fluid or pneumothorax.             Critical care was not performed.     Medical Decision Making  The patient's presentation was of high complexity (a chronic illness severe exacerbation, progression, or side effect of treatment).    The patient's evaluation involved:  review of 1 test result(s) ordered prior to this encounter (see separate area of note for details)  ordering and/or review of 3+ test(s) in this encounter (see separate area of note for details)    The patient's management necessitated high risk (a decision regarding hospitalization).    Assessment & Plan    Patient presents to the ED for evaluation of worsening lower extremity edema since discharge from hospital.  Recently diagnosed with decompensated alcoholic cirrhosis has been sober for the past month.  Exploring liver transplant options.  Discharge from hospital, has been having difficulty ambulating due to  the lower extremity edema.  Has been generally weak and has had falls.  Patient is family concern for safety at home.  Also notes that has been intermittently confused.    On arrival to the ED, patient is somnolent but arousable.  He is oriented x 3 and has a nonfocal neuroexam.  No suspicion for ischemic/hemorrhagic CVA.  No significant ascites/weight on exam.  He does have severe edema/anasarca extending up into his lower abdomen.  He is significantly jaundiced.    Reviewed his metabolic panel in care everywhere from 10/1/2024 which was consistent with his baseline from 9/27/2024.  Metabolic panel from today is still pending due to technical difficulties with the lab.  CBC shows a white blood cell count of 15.4, of note patient is on prednisone.  Platelets are down at 78.  Lactic is normal at 1.5.  No lipase elevation to suggest pancreatitis.  Procalcitonin is elevated at 1.23.  Etiology is unclear.  Did expand infectious workup to include chest x-ray which is unremarkable.  Blood cultures pending.  Urinalysis without evidence of infection.    Was given 40 mg of IV Lasix.  Due to his significant anasarca and difficulty completing ADLs at home, plan for admission to the hospital for diuresis, PT OT, and hepatology/liver transplant consult.  Signout given to hospitalist.        I have reviewed the nursing notes. I have reviewed the findings, diagnosis, plan and need for follow up with the patient.    New Prescriptions    No medications on file       Final diagnoses:   Leg swelling   Other fatigue       Forest Leung DO  [facility] EMERGENCY DEPARTMENT  10/2/2024     Forest Leung DO  10/03/24 0721

## 2024-10-03 NOTE — LETTER
Transition Communication Hand-off for Care Transitions to Next Level of Care Provider    Name: Stewart Hunt  : 1985  MRN #: [id number]  Primary Care Provider: Jay Mcmillan     Primary Clinic: [address] [address]     Reason for Hospitalization:  Leg swelling [M79.89]  Other fatigue [R53.83]  Admit Date/Time: 10/3/2024 12:08 AM  Discharge Date:  10/18/2024  Payor Source: Payor: MEDICA / Plan: MEDICA ELECT / Product Type: Indemnity /            Reason for Communication Hand-off Referral: Other continuity of care    Discharge Plan: home with home infusion services, IV antibiotics       Concern for non-adherence with plan of care:  No  Discharge Needs Assessment:  Needs      Flowsheet Row Most Recent Value   Equipment Currently Used at Home none       Follow-up specialty is recommended: Yes    Follow-up plan:    Future Appointments   Date Time Provider Department Center   10/21/2024  9:30 AM Dragan Muhammad MD [facility]       Any outstanding tests or procedures:        Referrals       Future Labs/Procedures    Lymphedema Therapy  Referral     Process Instructions:    For physician consults, use the Vascular Medicine Referral [9050.206].      Comments:    Please be aware that coverage of these services is subject to the terms and limitations of your health insurance plan.  Call member services at your health plan with any benefit or coverage questions.  [facility] will call you to coordinate your care as prescribed by your provider. If you don't hear from a representative within 2 business days, please call [phone].    Home Infusion Referral     OPAT enrollment and ID Clinic Referral     Comments:    You are being prescribed a strong antibiotic treatment for an infection. This treatment requires close monitoring, and you have been recommended to follow up with a specialist in the Infectious Diseases Clinic within 2 weeks of your hospital  discharge.   Our team of Infectious Diseases specialists looks forward to seeing you in clinic. A representative will call you within 3 business days to help schedule your appointment. If you do not receive a call to schedule, or if you have any questions about or problems with your clinical care, please contact us by phone at [phone].                Key Recommendations:  Patient discharging on course of IV antibiotics.  [facility] providing IV antibiotic, infusion supplies and home RN services.  Please refer to attached AVS for additional information.     Cheryle Diaz, RN    AVS/Discharge Summary is the source of truth; this is a helpful guide for improved communication of patient story

## 2024-10-03 NOTE — PHARMACY-ADMISSION MEDICATION HISTORY
Pharmacist Admission Medication History    Admission medication history is complete. The information provided in this note is only as accurate as the sources available at the time of the update.    Information Source(s): Patient and CareEverywhere/SureScripts via in-person    Pertinent Information: Patient reports he has 2 days left of prednisolone taper - 10 mg daily for 10/3 and 10/4.    Changes made to PTA medication list:  Added: albuterol  Deleted: None  Changed: None    Allergies reviewed with patient and updates made in EHR: yes    Medication History Completed By: Lupe Schwab RPH 10/3/2024 9:03 AM    PTA Med List   Medication Sig Note Last Dose    famotidine (PEPCID) 20 MG tablet Take 20 mg by mouth daily.  10/2/2024 at am    folic acid (FOLVITE) 1 MG tablet Take 1 tablet (1 mg) by mouth daily.  10/2/2024 at am    furosemide (LASIX) 20 MG tablet Take 1 tablet (20 mg) by mouth daily.  10/2/2024 at am    lactulose (CEPHULAC) 20 GM packet Take 1 packet (20 g) by mouth 3 times daily.  10/2/2024 at 1800    multivitamin w/minerals (THERA-VIT-M) tablet Take 1 tablet by mouth daily.  10/2/2024 at am    prednisoLONE 5 MG tablet Take 5 mg by mouth See Admin Instructions. Last dose of 8 tablets on 9/20   TAKE 8 TABLETS BY MOUTH ONCE DAILY FOR 28 DAYS, THEN 6 TABLETS DAILY FOR 7 DAYS, THEN 4 TABLETS DAILY FOR 7 DAYS, THEN 2 TABLETS DAILY FOR 7 10/3/2024: Has 2 days left - 10 mg daily for 10/3 and 10/4 10/2/2024 at am    simethicone (MYLICON) 125 MG chewable tablet Take 125 mg by mouth 2 times daily.  10/2/2024    spironolactone (ALDACTONE) 50 MG tablet Take 1 tablet (50 mg) by mouth daily.  10/2/2024 at am    thiamine (B-1) 100 MG tablet Take 1 tablet (100 mg) by mouth daily.  10/2/2024 at am

## 2024-10-03 NOTE — ED TRIAGE NOTES
Pt came in the ED for evaluation of bilateral swelling in both feet/legs. Swelling is worse today per report from pt. Pt reports that the swelling is causing him some pain. Pt has a hx of liver cirrhosis d/t alcohol.

## 2024-10-03 NOTE — PLAN OF CARE
"PRIMARY DIAGNOSIS: \"GENERIC\" NURSING  OUTPATIENT/OBSERVATION GOALS TO BE MET BEFORE DISCHARGE:  ADLs back to baseline: Yes    Activity and level of assistance: Ambulating independently.    Pain status: Pain free.    Return to near baseline physical activity: No     -diagnostic tests and consults completed and resulted. No  -vital signs normal or at patient baseline. Yes  -tolerating oral intake to maintain hydration. Yes  Edema lower extremities, receiving lasix    Please review provider order for any additional goals.   Nurse to notify provider when observation goals have been met and patient is ready for discharge.Goal Outcome Evaluation:                        "

## 2024-10-03 NOTE — PROGRESS NOTES
"PRIMARY DIAGNOSIS: \"GENERIC\" NURSING  OUTPATIENT/OBSERVATION GOALS TO BE MET BEFORE DISCHARGE:  ADLs back to baseline: Yes    Activity and level of assistance: Ambulating independently.    Pain status: Pain free.    Return to near baseline physical activity: No     Discharge Planner Nurse   Safe discharge environment identified: No  Barriers to discharge: Yes       Entered by: Madai Matta RN 10/03/2024 5:33 PM     -diagnostic tests and consults completed and resulted: No  -vital signs normal or at patient baseline: Yes  -tolerating oral intake to maintain hydration: Yes  BLE edema, receiving lasix and spironolactone  Please review provider order for any additional goals.   Nurse to notify provider when observation goals have been met and patient is ready for discharge.    Assumed care of patient: [id number]    NEURO: WDL  RESPIRATORY: WDL   CARDIAC: WDL  GI/: WDL  DIET: 2G Na diet  PAIN/NAUSEA: Denies   INCISION/DRAINS: N/A  IV ACCESS: L PIV SL  ACTIVITY: Independent  LAB: Flu/Covid labs--negative  PLAN: Morning labs after increased diuretics dose    Madai Matta RN on 10/3/2024 at 5:38 PM    "

## 2024-10-03 NOTE — PROGRESS NOTES
"D: Leg swelling  Other fatigue    I: Monitored vitals and assessed pt status.   Changed: Unchanged  Running: None  PRN:None  Neuro: A&Ox4.   Cardiac: SR, Afebrile, VSS.   Respiratory: Lungs sound are clear, dyspnea on exertion present, denies cough, and Sat>95% on RA  GI/: Active bowel sound, passing flatus, had medium BM this shift, and Voiding spontaneously.   Diet/appetite: Tolerating regular diet. Denies nausea.  Activity: Up independently in the room    Pain: Denies   Skin: No new deficits noted.  Lines: Piv x 1, SL\"D  DNo intake/output data recorded.    Temp:  [98.4  F (36.9  C)] 98.4  F (36.9  C)  Pulse:  [79-80] 79  Resp:  [17-18] 18  BP: (116-133)/(61-74) 116/61  SpO2:  [99 %-100 %] 100 %      P: Continue to monitor Pt status and report changes to treatment team.        "

## 2024-10-03 NOTE — PROGRESS NOTES
Brief Medicine Note    Reviewed H&P, discussed with hepatology, saw pt. Pt feeling well, already feeling improvement in edema, no localizing infectious symptoms aside from a sore throat that has gone on for 1-2 months. Bedside POCUS without ascites so SBP highly unlikely. Pt without asterixis on exam. Discussed plan w/ patient for increasing diuretics, monitoring lytes and scr and potential discharge home tomorrow and he is agreeable. He is working on his intake with [facility], has been sober since 9/7.     - Increased lasix to 40 mg daily and spironolactone to 100 mg daily   - Stopped prednisone   - Blood cx with NGTD   - Fluvid  - Added prns for sore throat   - Low threshold to start abx with any fever, new sxs   - Added lytes to check with tomorrow morning labs after increasing diuretics      Dominique Vera PA-C

## 2024-10-03 NOTE — CONSULTS
Hepatology Consultation    Stewart Hunt   MRN# [id number]     Age: 39 year old YOB: 1985       Referring provider: Deb Erickson  Attending Hepatologist: Dr. Bella Jean   Consult requested for: alcohol liver disease       Assessment and Recommendation:   Assessment:   Mr. Hunt is a 39-year-old with a history of alcohol use disorder causing alcohol associated cirrhosis complicated by nonresponder to steroids, hx sleeve gastrectomy, anasarca and HE who was admitted with lower extremity edema.       Recommendations:   # Anasarca  - has abdominal and lower extremity edema  - advised using compression stockings  - he has been adhering to low sodium diet  - increase lasix to 40 mg daily and spironolactone 100 mg daily. Creatinine wnl.     # Alcohol related hepatitis  # Alcohol use disorder  # Alcohol associated cirrhosis  - MELD 32, currently on steroid taper. Recommend stopping as nonresponder  - only trace ascites around liver.   - US 9/21 without evidence of HCC, hepatomegaly.   - No EV on recent EGD  - Has been taking protein supplements at home, continue BID.   - slight downtrending t. Bili. since 9/20 (30->27)  - advised engaging with treatment program.     # Hepatic encephalopathy  - titrate lactulose to achieve 3-5 BM's daily. Has been taking 3x daily.   - may need rifaximin in the future, controlled at this time.   - advised to try melatonin for night/day reversal, may improve with stopping prednisone. No benadryl due to risk of confusion.     # Gallbladder polyp  - continue to monitor a 4 mm gallbladder polyp seen on US.     Plan of care discussed with Dr. Bella Jean. The above note reflects our joint plan.     Follow up plan: Dr. Dodd 10/21    Thank you for the opportunity to be involved in Stewart Hunt care. Please call with any questions or concerns.     Julia Maloney, APRN, CNP  Inpatient Hepatology [facility]               History of Present Illness:   Stewart Hunt  is a 39 year old male with a history of alcohol use disorder and alcohol associated cirrhosis, last use ~September 13, 2024. His liver disease has been complicated by nonresponder to steroids, history of sleeve gastrectomy, anasarca who was admitted with lower extremity edema. He notes having night/day reversal limiting his attention during the day. His lower extremity edema has increased and had an episode of vomiting. He denies hematemesis.     He has been having an increase in stool output, predominately watery diarrhea after taking lactulose three times daily. He denies overt change in mentation other than continued sleepiness. He has had blood on the tissue, no melena or hematochezia. He has been trying to elevated his legs when able. He has compression stockings at home and this moved fluid into his lower abdominal tissue. He has not had a paracentesis.     He has had a low appetite but able to take in protein supplement. Other foods are primarily fruit.     He reports not reaching out to a treatment program in the past since he understood that the MELD score meant he only had 90 days to live rather than a mortality risk score so he wanted to spend time with his family.              Past Medical History:     Past Medical History:   Diagnosis Date    Acute alcoholic hepatitis (H) 09/2024    Alcohol use disorder, severe, in early remission (H)     Alcoholic cirrhosis (H)     Morbid obesity (H)               Past Surgical History:     Past Surgical History:   Procedure Laterality Date    GASTRECTOMY LAPAROSCOPIC SLEEVE                Social History:     Social History     Tobacco Use    Smoking status: Not on file    Smokeless tobacco: Not on file   Substance Use Topics    Alcohol use: Not Currently     Comment: 2 pints of vodka per day for 2 years. Last drink early sept 2024             Family History:   The   I have reviewed this patient's family history and updated it with pertinent information if  needed.  Family History   Problem Relation Age of Onset    Colon Cancer Mother     Diabetes Father     Pancreatic Cancer Sister     Diabetes Maternal Grandmother     Colon Cancer Cousin     Liver Disease No family hx of                   Immunizations:     There is no immunization history on file for this patient.         Allergies:   No Known Allergies          Medications:     Current Facility-Administered Medications   Medication Dose Route Frequency Provider Last Rate Last Admin    famotidine (PEPCID) tablet 20 mg  20 mg Oral Daily Deb Erickson MD        folic acid (FOLVITE) tablet 1 mg  1 mg Oral Daily Deb Erickson MD        [START ON 10/4/2024] furosemide (LASIX) tablet 20 mg  20 mg Oral Daily Deb Erickson MD        lactulose (CEPHULAC) Packet 20 g  20 g Oral TID Deb Erickson MD        multivitamin w/minerals (THERA-VIT-M) tablet 1 tablet  1 tablet Oral Daily Deb Erickson MD        ondansetron (ZOFRAN ODT) ODT tab 4 mg  4 mg Oral Q6H PRN Deb Erickson MD        Or    ondansetron (ZOFRAN) injection 4 mg  4 mg Intravenous Q6H PRN Deb Erickson MD        prednisoLONE tablet 10 mg  10 mg Oral Daily Deb Erickson MD        senna-docusate (SENOKOT-S/PERICOLACE) 8.6-50 MG per tablet 1 tablet  1 tablet Oral BID PRN Deb Erickson MD        Or    senna-docusate (SENOKOT-S/PERICOLACE) 8.6-50 MG per tablet 2 tablet  2 tablet Oral BID PRN Deb Erickson MD        simethicone (MYLICON) chewable tablet 80 mg  80 mg Oral BID Deb Erickson MD        spironolactone (ALDACTONE) tablet 50 mg  50 mg Oral Daily Deb Erickson MD        thiamine (B-1) tablet 100 mg  100 mg Oral Daily Deb Erickson MD         Current Outpatient Medications   Medication Sig Dispense Refill    famotidine (PEPCID) 20 MG tablet Take 20 mg by mouth daily.      folic acid (FOLVITE) 1 MG tablet Take 1 tablet (1  "mg) by mouth daily. 30 tablet 0    furosemide (LASIX) 20 MG tablet Take 1 tablet (20 mg) by mouth daily. 30 tablet 0    lactulose (CEPHULAC) 20 GM packet Take 1 packet (20 g) by mouth 3 times daily. 90 each 0    multivitamin w/minerals (THERA-VIT-M) tablet Take 1 tablet by mouth daily. 30 tablet 0    prednisoLONE 5 MG tablet Take 5 mg by mouth See Admin Instructions. Last dose of 8 tablets on 9/20   TAKE 8 TABLETS BY MOUTH ONCE DAILY FOR 28 DAYS, THEN 6 TABLETS DAILY FOR 7 DAYS, THEN 4 TABLETS DAILY FOR 7 DAYS, THEN 2 TABLETS DAILY FOR 7      simethicone (MYLICON) 125 MG chewable tablet Take 125 mg by mouth 2 times daily.      spironolactone (ALDACTONE) 50 MG tablet Take 1 tablet (50 mg) by mouth daily. 30 tablet 0    thiamine (B-1) 100 MG tablet Take 1 tablet (100 mg) by mouth daily. 30 tablet 0              Review of Systems:    ROS: 10 point ROS neg other than the symptoms noted above in the HPI.          Physical Exam:   Blood pressure 133/62, pulse 86, temperature 97.7  F (36.5  C), temperature source Oral, resp. rate 18, height 1.676 m (5' 6\"), weight 114.3 kg (252 lb), SpO2 100%. Body mass index is 40.67 kg/m .    General: In no acute distress, no facial muscle wasting  Neuro: AOx3, No asterixis  HEENT: PERRL  moderate  scleral icterus, Nooral lesions  CV:  Skin warm and dry  Lungs:  Respirations even and nonlabored on room air  Abd: obese, nontender, mildly distended  Extrem:  +2 lower  peripehral edema  Skin:  moderate jaundice  Psych: pleasant         Data:     Lab Results   Component Value Date    WBC 15.4 10/03/2024     Lab Results   Component Value Date    RBC 3.86 10/03/2024     Lab Results   Component Value Date    HGB 13.9 10/03/2024     Lab Results   Component Value Date    HCT 40.1 10/03/2024     No components found for: \"MCT\"  Lab Results   Component Value Date     10/03/2024     Lab Results   Component Value Date    MCH 36.0 10/03/2024     Lab Results   Component Value Date    MCHC 34.7 " 10/03/2024     Lab Results   Component Value Date    RDW 18.6 10/03/2024     Lab Results   Component Value Date    PLT 78 10/03/2024       Last Basic Metabolic Panel:  Lab Results   Component Value Date     10/03/2024      Lab Results   Component Value Date    POTASSIUM 4.0 10/03/2024     Lab Results   Component Value Date    CHLORIDE 99 10/03/2024    CHLORIDE 102 09/27/2024     Lab Results   Component Value Date    LELAND 8.1 10/03/2024     Lab Results   Component Value Date    CO2 24 10/03/2024     Lab Results   Component Value Date    BUN 18.6 10/03/2024     Lab Results   Component Value Date    CR 0.94 10/03/2024     Lab Results   Component Value Date    GLC 80 10/03/2024       Liver Function Studies -   Recent Labs   Lab Test 10/03/24  0450 09/22/24  0643   PROTTOTAL  --  5.0*   ALBUMIN 2.7* 2.5*   BILITOTAL 27.5* 26.6*  27.5*   ALKPHOS 232* 186*   * 226*   * 82*       Lab Results   Component Value Date    INR 2.79 10/03/2024       MELD 3.0: 32 at 10/3/2024  4:50 AM  MELD-Na: 32 at 10/3/2024  4:50 AM  Calculated from:  Serum Creatinine: 0.94 mg/dL (Using min of 1 mg/dL) at 10/3/2024  4:50 AM  Serum Sodium: 131 mmol/L at 10/3/2024  4:50 AM  Total Bilirubin: 27.5 mg/dL at 10/3/2024  4:50 AM  Serum Albumin: 2.7 g/dL at 10/3/2024  4:50 AM  INR(ratio): 2.79 at 10/3/2024 12:58 AM  Age at listing (hypothetical): 39 years  Sex: Male at 10/3/2024  4:50 AM             Previous Endoscopy:     No results found for this or any previous visit.  No results found for this or any previous visit.  No results found for this or any previous visit.      IMAGING:      Results for orders placed during the hospital encounter of 09/20/24    US Abdomen Complete    Narrative  EXAMINATION: US ABDOMEN COMPLETE  9/21/2024 8:31 AM    CLINICAL HISTORY: eval for ascitic fluid volume (for paracentesis),  eval for biliary obstruction    COMPARISON: None    FINDINGS:  Liver demonstrates increased echogenicity and coarsened  echotexture  and measures 20.6 cm in length. There is no intrahepatic or  extrahepatic biliary ductal dilatation. The common bile duct measures  4 mm. The main portal vein is patent with flow towards the liver and  measures 1.0 cm in diameter.    The gallbladder wall measures 4 mm in thickness. No stones or  pericholecystic fluid. There is a pedunculated 4 mm gallbladder polyp.  The spleen measures maximally 12.6 cm and is normal in appearance. The  visualized portions of the pancreas are normal in echogenicity.    The visualized upper abdominal aorta and inferior vena cava are  normal.    The kidneys are normal in position and echogenicity. The right kidney  measures 11.4 cm and the left kidney measures 14.2 cm. There is no  significant urinary tract dilation. The urinary bladder is moderately  distended and normal in morphology. The bladder wall is normal.    Trace right upper quadrant free fluid.    Impression  IMPRESSION:    1. Hepatosplenomegaly and hepatic cirrhosis  2. Trace right upper quadrant free fluid.  3. No evidence of biliary obstruction.    I have personally reviewed the examination and initial interpretation  and I agree with the findings.    BISHNU COLLINS MD      SYSTEM ID:  [id number]    XR Chest 2 Views    Result Date: 10/3/2024  EXAM: XR CHEST 2 VIEWS LOCATION: [facility] DATE: 10/3/2024 INDICATION: infectious w u COMPARISON: 9/20/2024.     IMPRESSION: Low lung volumes and elevation of right hemidiaphragm. Mild right basilar atelectasis. Lungs otherwise clear. Normal heart size and pulmonary vascularity. No pleural fluid or pneumothorax.

## 2024-10-03 NOTE — ED TRIAGE NOTES
Triage Assessment (Adult)       Row Name 10/03/24 0005          Triage Assessment    Airway WDL WDL        Respiratory WDL    Respiratory WDL WDL        Skin Circulation/Temperature WDL    Skin Circulation/Temperature WDL WDL        Cardiac WDL    Cardiac WDL WDL        Peripheral/Neurovascular WDL    Peripheral Neurovascular WDL WDL        Cognitive/Neuro/Behavioral WDL    Cognitive/Neuro/Behavioral WDL WDL

## 2024-10-03 NOTE — H&P
[facility]    History and Physical - Hospitalist Service       Date of Admission:  10/3/2024    Assessment & Plan      Stewart Hunt is a 39 year old male admitted on 10/3/2024. He has hx of alcohol cirrhosis and admitted 9/20-22 for worsening anasarca and hyperbilirubinemia. Since his discharge, he has been compliant with all of the medications, but has ongoing LE swelling and increased fatigue presented to ED for work up. Overall, ED work up are unchanged except CMP is still pending, but they have a lot of questions and concerns and admitted to observation.    Lower extremity edema  Anasarca  He has been on lasix 20mg daily and aldactone 50mg daily since last admission 10 days ago, and has been having ++ urination without improvement in edema. He has multiple BM from lactulose (9x a day). Albumin 2-3. most likely due to third spacing.  - continue lasix 20 and aldactone 50mg for now especially Cre still pending: could consider increasing diuretics, however, he has multiple BM and poor po intake concerning for intravascular volume depletion    Decreased po intake  Vomiting  Frequent BM >5 in the setting of lactulose use  Nothing tastes good, and had one non bloody emesis yesterday. Having about 9 BM a day. States his appetite was better when he was on higher dose prednisolone (currently on 1-mg).  - monitor po intake  - prn zofran  - hold lactulose if having more than 4 BM  - check cortisol level  - continue pepcid    Fatigue  Hx of hepatic encephalopathy  No asterexis on exam, NH3 30s. Fell asleep at desk while sitting and working on computer. Not currently worried about HE, however, possible dehydration/intravascular volume depletion contributing to fatigue.  - continue lactulose TID but hold for BM>4    Decompensated alcoholic cirrhosis  CMP is still pending. Most recent MELD 33 on 9/27. He and his wife have multiple questions as they feel high 3 month mortality  "rate but 'needs to wait for 90 days' seemed contradicting and confusing to them. Currently on prednisolone taper, and has 2 more days of 10mg before stopping  - Hepatology consulted  - complete prednisolone taper (2 more days of 10mg ordered)    Hx of alcohol use disorder, sober since 9/8/2024  Attending AA and has been sober for about 30 days. Was recommended inpatient rehab, but he feels that since he has such high MELD, he does not want to be away from his family for 30 days.  - continue to attend AA  - thiamine and folic acid    Leukocytosis  No dysuria, no fever or cough. Has throat pain but viral panels repetiteively negative. Possibly due to prednisolone that he is taking. CXR and UA unremarkable.  - consider paracentesis  - hold off initiating empiric antibiotic    Depressed mood/Grief  Both the patient and wife started tearing up talking about poor prognosis and not knowing what to expect including what they can do while 'waiting 90 days'  - consider health psychology consult        Diet:  Na 2g  DVT Prophylaxis: Pneumatic Compression Devices  Gomes Catheter: Not present  Lines: None     Cardiac Monitoring: None  Code Status:  full    Clinically Significant Risk Factors Present on Admission               # Coagulation Defect: INR = 2.79 (Ref range: 0.85 - 1.15) and/or PTT = 38 Seconds (Ref range: 22 - 38 Seconds), will monitor for bleeding  # Thrombocytopenia: Lowest platelets = 78 in last 2 days, will monitor for bleeding           # Severe Obesity: Estimated body mass index is 40.67 kg/m  as calculated from the following:    Height as of this encounter: 1.676 m (5' 6\").    Weight as of this encounter: 114.3 kg (252 lb).              Disposition Plan     Medically Ready for Discharge: Anticipated Tomorrow           Deb Erickson MD  Hospitalist Service  [facility]  Securely message with [employer] (more info)  Text page via [facility] Paging/Directory "     ______________________________________________________________________    Chief Complaint   Leg swelling    History is obtained from the patient and his wife    History of Present Illness   Stewart XAVIER Hunt is a 39 year old male who was diagnosed witih ETOH cirrhosis. He was admitted 9/20-22 and diuretics were initiated. He has been compliant with medications and other instructions, attending AA meetings and working. However, he has been feeling worse: leg swelling is not improving, he has intermittent abdominal pain, frequent diarrhea with incontinence, ++ urination. He has not been having good appetite, and yesterday vomitted once. Non bloody. He feels he is getting weaker and more fatigued. He was working on a computer and then noted that he had fallen asleep.   He denied new SOB, dysuria, cough, fever/chills.  Since they are worried with multiple questions, presented to ED to get checked out.      Past Medical History    Past Medical History:   Diagnosis Date    Acute alcoholic hepatitis (H) 09/2024    Alcohol use disorder, severe, in early remission (H)     Alcoholic cirrhosis (H)     Morbid obesity (H)        Past Surgical History   Past Surgical History:   Procedure Laterality Date    GASTRECTOMY LAPAROSCOPIC SLEEVE         Prior to Admission Medications   Prior to Admission Medications   Prescriptions Last Dose Informant Patient Reported? Taking?   famotidine (PEPCID) 20 MG tablet   Yes No   Sig: Take 20 mg by mouth daily.   folic acid (FOLVITE) 1 MG tablet   No No   Sig: Take 1 tablet (1 mg) by mouth daily.   furosemide (LASIX) 20 MG tablet   No No   Sig: Take 1 tablet (20 mg) by mouth daily.   lactulose (CEPHULAC) 20 GM packet   No No   Sig: Take 1 packet (20 g) by mouth 3 times daily.   multivitamin w/minerals (THERA-VIT-M) tablet   No No   Sig: Take 1 tablet by mouth daily.   prednisoLONE 5 MG tablet   Yes No   Sig: Take 5 mg by mouth See Admin Instructions. Last dose of 8 tablets on 9/20   TAKE 8  TABLETS BY MOUTH ONCE DAILY FOR 28 DAYS, THEN 6 TABLETS DAILY FOR 7 DAYS, THEN 4 TABLETS DAILY FOR 7 DAYS, THEN 2 TABLETS DAILY FOR 7   simethicone (MYLICON) 125 MG chewable tablet   Yes No   Sig: Take 125 mg by mouth 2 times daily.   spironolactone (ALDACTONE) 50 MG tablet   No No   Sig: Take 1 tablet (50 mg) by mouth daily.   thiamine (B-1) 100 MG tablet   No No   Sig: Take 1 tablet (100 mg) by mouth daily.      Facility-Administered Medications: None           Physical Exam   Vital Signs: Temp: 98.4  F (36.9  C) Temp src: Oral BP: 116/61 Pulse: 79   Resp: 18 SpO2: 100 % O2 Device: None (Room air)    Weight: 252 lbs 0 oz    General Appearance: Severely jaundiced, awake, alert and oriented but forgetful  Respiratory: unlabored, clear bilaterally  Cardiovascular: RRR  GI: no significant fluid wave, soft non tender  Skin: jaundice, 3+ pitting edema in LEs  Other: No asterexis     Medical Decision Making       75 MINUTES SPENT BY ME on the date of service doing chart review, history, exam, documentation & further activities per the note.      Data     I have personally reviewed the following data over the past 24 hrs:    15.4 (H)  \   13.9   / 78 (L)     N/A N/A N/A /  N/A   N/A N/A N/A \     ALT: N/A AST: N/A AP: N/A TBILI: N/A   ALB: N/A TOT PROTEIN: N/A LIPASE: 54     Procal: 1.23 (H) CRP: N/A Lactic Acid: 1.5       INR:  2.79 (H) PTT:  N/A   D-dimer:  N/A Fibrinogen:  N/A       Imaging results reviewed over the past 24 hrs:   Recent Results (from the past 24 hour(s))   XR Chest 2 Views    Narrative    EXAM: XR CHEST 2 VIEWS  LOCATION: [facility]  DATE: 10/3/2024    INDICATION: infectious w u  COMPARISON: 9/20/2024.      Impression    IMPRESSION: Low lung volumes and elevation of right hemidiaphragm. Mild right basilar atelectasis. Lungs otherwise clear. Normal heart size and pulmonary vascularity. No pleural fluid or pneumothorax.

## 2024-10-04 LAB
ALBUMIN SERPL BCG-MCNC: 2.6 G/DL (ref 3.5–5.2)
ALP SERPL-CCNC: 224 U/L (ref 40–150)
ALT SERPL W P-5'-P-CCNC: 144 U/L (ref 0–70)
ANION GAP SERPL CALCULATED.3IONS-SCNC: 8 MMOL/L (ref 7–15)
AST SERPL W P-5'-P-CCNC: 176 U/L (ref 0–45)
BILIRUB SERPL-MCNC: 25.2 MG/DL
BUN SERPL-MCNC: 16.9 MG/DL (ref 6–20)
CALCIUM SERPL-MCNC: 8 MG/DL (ref 8.8–10.4)
CHLORIDE SERPL-SCNC: 100 MMOL/L (ref 98–107)
CREAT SERPL-MCNC: 0.78 MG/DL (ref 0.67–1.17)
EGFRCR SERPLBLD CKD-EPI 2021: >90 ML/MIN/1.73M2
ERYTHROCYTE [DISTWIDTH] IN BLOOD BY AUTOMATED COUNT: 18.4 % (ref 10–15)
GLUCOSE SERPL-MCNC: 70 MG/DL (ref 70–99)
HCO3 SERPL-SCNC: 23 MMOL/L (ref 22–29)
HCT VFR BLD AUTO: 37.5 % (ref 40–53)
HGB BLD-MCNC: 13.2 G/DL (ref 13.3–17.7)
INR PPP: 3.23 (ref 0.85–1.15)
MAGNESIUM SERPL-MCNC: 1.7 MG/DL (ref 1.7–2.3)
MCH RBC QN AUTO: 35.4 PG (ref 26.5–33)
MCHC RBC AUTO-ENTMCNC: 35.2 G/DL (ref 31.5–36.5)
MCV RBC AUTO: 101 FL (ref 78–100)
PHOSPHATE SERPL-MCNC: 3.1 MG/DL (ref 2.5–4.5)
PLATELET # BLD AUTO: 72 10E3/UL (ref 150–450)
POTASSIUM SERPL-SCNC: 3.7 MMOL/L (ref 3.4–5.3)
PROCALCITONIN SERPL IA-MCNC: 1.42 NG/ML
PROT SERPL-MCNC: ABNORMAL G/DL
RBC # BLD AUTO: 3.73 10E6/UL (ref 4.4–5.9)
SODIUM SERPL-SCNC: 131 MMOL/L (ref 135–145)
WBC # BLD AUTO: 15.6 10E3/UL (ref 4–11)

## 2024-10-04 PROCEDURE — 84075 ASSAY ALKALINE PHOSPHATASE: CPT | Performed by: INTERNAL MEDICINE

## 2024-10-04 PROCEDURE — 80069 RENAL FUNCTION PANEL: CPT

## 2024-10-04 PROCEDURE — G0378 HOSPITAL OBSERVATION PER HR: HCPCS

## 2024-10-04 PROCEDURE — 84145 PROCALCITONIN (PCT): CPT

## 2024-10-04 PROCEDURE — 85027 COMPLETE CBC AUTOMATED: CPT | Performed by: INTERNAL MEDICINE

## 2024-10-04 PROCEDURE — 99232 SBSQ HOSP IP/OBS MODERATE 35: CPT

## 2024-10-04 PROCEDURE — 99233 SBSQ HOSP IP/OBS HIGH 50: CPT | Performed by: NURSE PRACTITIONER

## 2024-10-04 PROCEDURE — 99207 PR APP CREDIT; MD BILLING SHARED VISIT: CPT | Performed by: STUDENT IN AN ORGANIZED HEALTH CARE EDUCATION/TRAINING PROGRAM

## 2024-10-04 PROCEDURE — 85610 PROTHROMBIN TIME: CPT | Performed by: INTERNAL MEDICINE

## 2024-10-04 PROCEDURE — 36415 COLL VENOUS BLD VENIPUNCTURE: CPT | Performed by: INTERNAL MEDICINE

## 2024-10-04 PROCEDURE — 250N000013 HC RX MED GY IP 250 OP 250 PS 637: Performed by: INTERNAL MEDICINE

## 2024-10-04 PROCEDURE — 83735 ASSAY OF MAGNESIUM: CPT

## 2024-10-04 PROCEDURE — 250N000013 HC RX MED GY IP 250 OP 250 PS 637

## 2024-10-04 RX ORDER — ALBUTEROL SULFATE 90 UG/1
2-3 INHALANT RESPIRATORY (INHALATION) EVERY 6 HOURS PRN
Status: DISCONTINUED | OUTPATIENT
Start: 2024-10-04 | End: 2024-10-18 | Stop reason: HOSPADM

## 2024-10-04 RX ADMIN — PHENOL 1 ML: 1.5 LIQUID ORAL at 16:39

## 2024-10-04 RX ADMIN — FUROSEMIDE 40 MG: 40 TABLET ORAL at 08:46

## 2024-10-04 RX ADMIN — SIMETHICONE 80 MG: 80 TABLET, CHEWABLE ORAL at 08:47

## 2024-10-04 RX ADMIN — FAMOTIDINE 20 MG: 20 TABLET ORAL at 09:14

## 2024-10-04 RX ADMIN — LACTULOSE 20 G: 20 POWDER, FOR SOLUTION ORAL at 10:45

## 2024-10-04 RX ADMIN — Medication 1 TABLET: at 08:47

## 2024-10-04 RX ADMIN — FOLIC ACID 1 MG: 1 TABLET ORAL at 08:47

## 2024-10-04 RX ADMIN — THIAMINE HCL TAB 100 MG 100 MG: 100 TAB at 08:46

## 2024-10-04 RX ADMIN — SIMETHICONE 80 MG: 80 TABLET, CHEWABLE ORAL at 20:30

## 2024-10-04 RX ADMIN — SPIRONOLACTONE 100 MG: 25 TABLET, FILM COATED ORAL at 08:46

## 2024-10-04 ASSESSMENT — ACTIVITIES OF DAILY LIVING (ADL)
ADLS_ACUITY_SCORE: 23

## 2024-10-04 NOTE — PLAN OF CARE
"Status 4084-5611:    /56 (BP Location: Left arm)   Pulse 87   Temp 98.3  F (36.8  C) (Oral)   Resp 16   Ht 1.676 m (5' 6\")   Wt 116.3 kg (256 lb 6.3 oz)   SpO2 98%   BMI 41.38 kg/m      Neuro: A&O x4.  Respiratory: Lung sounds clear throughout. Sats >90% on RA.  Cardiac: SR on tele. HR 80's.  GI/: Voiding spontaneously. Frequent small, loose stools. On scheduled lactulose.  Skin: Jaundiced. No new deficits.  VS: VSS, afebrile.  LDA: PIV x1, SL.  Pain: Denies.  Mobility: Up ad oscar.    Plan: Continue to monitor I&O. Medically ready for discharge tomorrow pending AM labs. Continue with plan of care, report changes to Med Obs.  "

## 2024-10-04 NOTE — PROGRESS NOTES
"   PRIMARY DIAGNOSIS: \"GENERIC\" NURSING  OUTPATIENT/OBSERVATION GOALS TO BE MET BEFORE DISCHARGE:  ADLs back to baseline: Yes     Activity and level of assistance: Ambulating independently.     Pain status: Pain free.     Return to near baseline physical activity: Yes             Discharge Planner Nurse  Safe discharge environment identified: Yes  Barriers to discharge: Yes: I&O tracking, AM labs       Entered by: Elizabeth River RN        -diagnostic tests and consults completed and resulted: In progress  -vital signs normal or at patient baseline: Met  -tolerating oral intake to maintain hydration: Met     Nurse to notify provider when observation goals have been met and patient is ready for discharge.         "

## 2024-10-04 NOTE — PROGRESS NOTES
[facility]    Medicine Progress Note - Hospitalist Service    Date of Admission:  10/3/2024    Assessment & Plan     Stewart Hunt is a 39 year old male admitted on 10/3/2024. He has hx of alcohol cirrhosis and admitted 9/20-22 for worsening anasarca and hyperbilirubinemia. Since his discharge, he has been compliant with all of the medications, but has ongoing LE swelling and increased fatigue presented to ED for work up. Overall, ED work up are unchanged except CMP is still pending, but they have a lot of questions and concerns and admitted to observation.     Lower extremity edema  Anasarca  Abdominal edema  Home reg: lasix 20mg daily and aldactone 50mg daily since last admission 10 days ago, and has been having ++ urination without improvement in edema. On lactulose and had been having multiple bowel movements daily along with poor appetite. Albumin 2-3. most likely due to third spacing. Cr wnl 0.94-->0.78.   - increased lasix to 40 mg daily and spironolactone to 100 mg daily 10/3  - compression stockings   - low sodium diet  - strict I+Os, daily weights   - BMP AM    Decompensated alcoholic cirrhosis  Alcohol related hepatitis   CMP is still pending. Most recent MELD 33 on 9/27. He and his wife have multiple questions as they feel high 3 month mortality rate but 'needs to wait for 90 days' seemed contradicting and confusing to them. Prednisolone taper discontinued. US 9/21 without evidence of HCC, hepatomegaly. No EV on recent EGD. Slight downtrending t. Bili.   - Hepatology following, appreciate recs  - continue bid protein supplements     Fatigue, improving   Insomnia, improving   Hx of hepatic encephalopathy  No asterexis on exam, NH3 30s. Fell asleep at desk while sitting and working on computer. Not currently worried about HE, however, possible dehydration/intravascular volume depletion contributing to fatigue.  - continue lactulose TID, titrate to achieve  3-5 bowel movements daily  - may need rifaximin in the future (per GI)  - consider adding melatonin for insomnia     Leukocytosis  Elevated procalcitonin   Sore throat  No dysuria, no fever or cough. Has throat pain but viral panels repetiteively negative. Possibly due to prednisolone that he was taking. CXR and UA unremarkable. PCL increased 1.23-->1.42. WBC 15.4-->15.6, also elevated 9/20-9/21. Has been on steroids, discontinued yesterday. States he woke up with chills and needed extra blanket. Afebrile.   - hold off initiating empiric antibiotic  - lozenges and chloraseptic spray prn  - trend cbc   - Continue to monitor      Decreased po intake  Vomiting  Frequent BM >5 in the setting of lactulose use  Nothing tastes good, and had one non bloody emesis yesterday. Having about 9 BM a day. States his appetite was better when he was on higher dose prednisolone (currently on 1-mg).  - monitor po intake  - prn zofran  - titrate lactulose as above   - continue pepcid    Gallbladder polyp  - continue to monitor 4 mm polyp seen on US      Hx of alcohol use disorder, sober since 9/8/2024  Attending AA and has been sober for about 30 days. Was recommended inpatient rehab, but he feels that since he has such high MELD, he does not want to be away from his family for 30 days.  - continue to attend AA  - thiamine and folic acid     Depressed mood/Grief  Both the patient and wife started tearing up talking about poor prognosis and not knowing what to expect including what they can do while 'waiting 90 days'  - consider health psychology consult       Observation Goals: -diagnostic tests and consults completed and resulted, -vital signs normal or at patient baseline, -tolerating oral intake to maintain hydration, Nurse to notify provider when observation goals have been met and patient is ready for discharge.  Diet: 2 Gram Sodium Diet    DVT Prophylaxis: Ambulate every shift  Gomes Catheter: Not present  Lines: None     Cardiac  "Monitoring: None  Code Status: Full Code      Clinically Significant Risk Factors Present on Admission         # Hyponatremia: Lowest Na = 131 mmol/L in last 2 days, will monitor as appropriate      # Hypoalbuminemia: Lowest albumin = 2.6 g/dL at 10/4/2024  5:32 AM, will monitor as appropriate  # Coagulation Defect: INR = 3.23 (Ref range: 0.85 - 1.15) and/or PTT = 38 Seconds (Ref range: 22 - 38 Seconds), will monitor for bleeding  # Thrombocytopenia: Lowest platelets = 72 in last 2 days, will monitor for bleeding           # Severe Obesity: Estimated body mass index is 40.99 kg/m  as calculated from the following:    Height as of this encounter: 1.676 m (5' 6\").    Weight as of this encounter: 115.2 kg (253 lb 15.5 oz).              Disposition Plan     Medically Ready for Discharge: Anticipated Tomorrow           The patient's care was discussed with the Attending Physician, Dr. Butler .    GRISELDA Saleh [facility]  Hospitalist Service  [facility]  Securely message with Bab[employer] (more info)  Text page via [facility] Paging/Directory   ______________________________________________________________________    Interval History     Pt states he slept well last night. Woke up with energy, got up to the bathroom, had breakfast and felt depleted so took a nap. He woke up with chills and needed extra blankets, no fever. Continues to have a sore throat, denies other ill symptoms. He feels the swelling in his abdomen has gone down but it feels \"lumpy\" Significant edema in b/l LE from the calf down to the feet. Discussed strict monitoring of intake and output and provided patient with a urinal. He is having a bowel movement every time he goes to the bathroom. He agrees to not take the lactulose this afternoon and monitor. Discussed with GI, likely he will discharge on increased doses of diuretics. Will monitor I and Os closely and check labs in the morning     Physical Exam   Vital " Signs: Temp: 97.9  F (36.6  C) Temp src: Oral BP: 127/60 Pulse: 100   Resp: 16 SpO2: 100 % O2 Device: None (Room air)    Weight: 253 lbs 15.52 oz    General Appearance: Comfortable, nontoxic appearing male seen laying in bed.  Eyes: PERRLA.  No conjunctival icterus.  HEENT: Atraumatic.  Respiratory: Breathing comfortably on room air.  Lung sounds clear to auscultation throughout.  No wheezes, rhonchi, rales.  Cardiovascular: RRR.  No murmurs, rubs, gallops.  GI: Bowel sounds present throughout.  Abdomen soft, nontender. No evidence of ascites at this time.   Lymph/Hematologic: No bruising on exposed skin.  Skin: No lesions or rashes noted on exposed skin. + 3 pitting edema ankles and feet bilateral  Musculoskeletal: Moving all extremities spontaneously.  Neurologic: Cranial nerves II through XII grossly intact.  Psychiatric: Mood appropriate.    Medical Decision Making       45 MINUTES SPENT BY ME on the date of service doing chart review, history, exam, documentation & further activities per the note.      Data     I have personally reviewed the following data over the past 24 hrs:    15.6 (H)  \   13.2 (L)   / 72 (L)     131 (L) 100 16.9 /  70   3.7 23 0.78 \     ALT: 144 (H) AST: 176 (H) AP: 224 (H) TBILI: 25.2 (HH)   ALB: 2.6 (L) TOT PROTEIN: N/A LIPASE: N/A     Procal: 1.42 (H) CRP: N/A Lactic Acid: N/A       INR:  3.23 (H) PTT:  N/A   D-dimer:  N/A Fibrinogen:  354       Imaging results reviewed over the past 24 hrs:   Recent Results (from the past 24 hour(s))   POC US Guide for Paracentesis    Impression    US Indication: abdominal distension    Limited abdominal ultrasound was performed to evaluate for ascites and need for paracentesis.     Views/Acquisition: hepatorenal/RUQ, right lower quadrant, parasplenic/LUQ, and left lower quadrant    Findings/Interpretation: No significant ascites    Denys Carreon MD

## 2024-10-04 NOTE — PLAN OF CARE
Goal Outcome Evaluation:     Plan of Care Reviewed With: patient    Overall Patient Progress: improvingOverall Patient Progress: improving     PRIMARY DIAGNOSIS: LIVER CIRRHOSIS  OUTPATIENT/OBSERVATION GOALS TO BE MET BEFORE DISCHARGE:  1. Pain Status: Improved with use of alternative comfort measures i.e.: positioning    2. Return to near baseline physical activity: No    3. Cleared for discharge by consultants (if involved): No  -diagnostic tests and consults completed and resulted::MET  -vital signs normal or at patient baseline :MET  -tolerating oral intake to maintain hydration :MET    Discharge Planner Nurse   Safe discharge environment identified: No  Barriers to discharge: Yes       Entered by: Denis Echeverria RN 10/04/2024 5:06 AM  Vitals are Temp: 98  F (36.7  C) Temp src: Oral BP: 120/59 Pulse: 75   Resp: 14 SpO2: 100 %.  Patient is Alert and Oriented x4. Pt independent with no assistive devices .  Pt is a 2 gram sodium diet. complain of 3/10 pain.  Non-pharmacological interventions offered are patient was repositioned.  Patient is Saline locked.  Please review provider order for any additional goals.   Nurse to notify provider when observation goals have been met and patient is ready for discharge.

## 2024-10-04 NOTE — PROGRESS NOTES
"PRIMARY DIAGNOSIS: \"GENERIC\" NURSING  OUTPATIENT/OBSERVATION GOALS TO BE MET BEFORE DISCHARGE:  ADLs back to baseline: Yes     Activity and level of assistance: Ambulating independently.     Pain status: Pain free.     Return to near baseline physical activity: Yes             Discharge Planner Nurse  Safe discharge environment identified: Yes  Barriers to discharge: Yes: I&O tracking, AM labs       Entered by: Elizabeth River RN        -diagnostic tests and consults completed and resulted: In progress  -vital signs normal or at patient baseline: Met  -tolerating oral intake to maintain hydration: Met     Nurse to notify provider when observation goals have been met and patient is ready for discharge.   "

## 2024-10-04 NOTE — PROGRESS NOTES
HEPATOLOGY PROGRESS NOTE  Patient:  Stewart Hunt, Date of birth 1985  Date of Visit:  10/04/2024  Referring Provider No ref. provider found         IMPRESSION:  Stewart Hunt is a 39 year old male with a history of alcohol use disorder causing alcohol associated cirrhosis complicated by nonresponder to steroids, hx sleeve gastrectomy, anasarca and HE who was admitted with lower extremity edema.       RECOMMENDATIONS:    Updates for 10/04/2024 :  - continue current diuretics    # Alcohol related hepatitis  # Alcohol use disorder  # Alcohol associated cirrhosis  - MELD 33, he was a nonresponder to steroids  - trace ascites seen.   - US without evidence of HCC, + hepatomegaly.   - No EV on recent EGD  - t. Bili continues to downtrend.   - he is reaching out to treatment program     # Anasarca  - increased lasix to 40 mg daily and spironolactone 100 mg daily. Only trace ascites seen on recent US. Creatinine wnl.   - has been on low sodium diet.   - compression stockings and elevate legs as able.     # Hepatic encephalopathy  - remains on lactulose and should titrate to achieve 3-5 BM's per day.   - not currently on rifaximin, mentation clear  - has night/day reversal, may improve with stopping steroids and adjusting lactulose so he is not up at night having BM's.     # Gallbadder polyp  - 4 mm gb polyp seen on US. Will continue to monitor.     Patient was discussed with attending physician, Dr. Bella Jean.  The above note reflects our joint plan.     Next follow up appointment: Dr. Dodd 10/21/24    Thank you for the opportunity to be involved with  Stewart Hunt care. Please call with any questions or concerns.    Julia Maloney, GRISELDA, CNP  Inpatient Hepatology [facility]          Subjective    States he slept well last night and had some energy this morning with episodes of fatigue. Now with plans to connect with [facility] program this afternoon.         Objective    VS: /62 (BP Location: Left arm)    "Pulse 87   Temp 98.1  F (36.7  C) (Oral)   Resp 18   Ht 1.676 m (5' 6\")   Wt 116.3 kg (256 lb 6.3 oz)   SpO2 98%   BMI 41.38 kg/m     Date 10/04/24 0700 - 10/05/24 0659   Shift 6424-9875 1957-0025 [id number] 24 Hour Total   INTAKE   P.O. 480   480   Shift Total(mL/kg) 480(4.13)   480(4.13)   OUTPUT   Urine 850   850   Shift Total(mL/kg) 850(7.31)   850(7.31)   Weight (kg) 116.3 116.3 116.3 116.3      General: In no acute distress, no facial muscle wasting  Neuro: AOx3, No asterixis  HEENT:   mild  scleral icterus, Nooral lesions  CV:  Skin warm and dry  Lungs:  Respirations even and nonlabored on room air  Abd:  Nondistended, nontender   Extrem:  +1 lower  peripheral edema   Skin:  mild  jaundice  Psych: pleasant    MEDICATIONS:  Current Facility-Administered Medications   Medication Dose Route Frequency Provider Last Rate Last Admin    acetaminophen (TYLENOL) tablet 650 mg  650 mg Oral Once PRN Rowena Espinoza PA-C        albuterol (PROVENTIL HFA/VENTOLIN HFA) inhaler  2-3 puff Inhalation Q6H PRN Jennifer Cartagena APRN CNP        benzocaine-menthol (CHLORASEPTIC MAX) 15-10 MG lozenge 1 lozenge  1 lozenge Buccal Q1H PRN Dominique Vera PA-C   1 lozenge at 10/03/24 1802    famotidine (PEPCID) tablet 20 mg  20 mg Oral Daily Deb Erickson MD   20 mg at 10/04/24 0914    folic acid (FOLVITE) tablet 1 mg  1 mg Oral Daily Deb Erickson MD   1 mg at 10/04/24 0847    furosemide (LASIX) tablet 40 mg  40 mg Oral Daily Dominique Vera PA-C   40 mg at 10/04/24 0846    lactulose (CEPHULAC) Packet 20 g  20 g Oral TID Deb Erickson MD   20 g at 10/04/24 1045    multivitamin w/minerals (THERA-VIT-M) tablet 1 tablet  1 tablet Oral Daily Deb Erickson MD   1 tablet at 10/04/24 0847    ondansetron (ZOFRAN ODT) ODT tab 4 mg  4 mg Oral Q6H PRN Deb Erickson MD        Or    ondansetron (ZOFRAN) injection 4 mg  4 mg Intravenous Q6H PRN Deb Erickson MD        phenol " (CHLORASEPTIC) 1.4 % spray 1 mL  1 spray Mouth/Throat Q1H PRN Dominique Vera PA-C        senna-docusate (SENOKOT-S/PERICOLACE) 8.6-50 MG per tablet 1 tablet  1 tablet Oral BID PRN Deb Erickson MD        Or    senna-docusate (SENOKOT-S/PERICOLACE) 8.6-50 MG per tablet 2 tablet  2 tablet Oral BID PRN Deb Erickson MD        simethicone (MYLICON) chewable tablet 80 mg  80 mg Oral BID Deb Erickson MD   80 mg at 10/04/24 0847    spironolactone (ALDACTONE) tablet 100 mg  100 mg Oral Daily Dominique Vera PA-C   100 mg at 10/04/24 0846    thiamine (B-1) tablet 100 mg  100 mg Oral Daily Deb Erickson MD   100 mg at 10/04/24 0846       REVIEW OF LABORATORY, PATHOLOGY AND IMAGING RESULTS:  BMP  Recent Labs   Lab 10/04/24  0532 10/03/24  0450   * 131*   POTASSIUM 3.7 4.0   CHLORIDE 100 99   LELAND 8.0* 8.1*   CO2 23 24   BUN 16.9 18.6   CR 0.78 0.94   GLC 70 80     CBC  Recent Labs   Lab 10/04/24  0532 10/03/24  0058   WBC 15.6* 15.4*   RBC 3.73* 3.86*   HGB 13.2* 13.9   HCT 37.5* 40.1   * 104*   MCH 35.4* 36.0*   MCHC 35.2 34.7   RDW 18.4* 18.6*   PLT 72* 78*     INR  Recent Labs   Lab 10/04/24  0532 10/03/24  0058   INR 3.23* 2.79*     LFTs  Recent Labs   Lab 10/04/24  0532 10/03/24  0450   ALKPHOS 224* 232*   * 215*   * 161*   BILITOTAL 25.2* 27.5*   ALBUMIN 2.6* 2.7*        MELD 3.0: 33 at 10/4/2024  5:32 AM  MELD-Na: 34 at 10/4/2024  5:32 AM  Calculated from:  Serum Creatinine: 0.78 mg/dL (Using min of 1 mg/dL) at 10/4/2024  5:32 AM  Serum Sodium: 131 mmol/L at 10/4/2024  5:32 AM  Total Bilirubin: 25.2 mg/dL at 10/4/2024  5:32 AM  Serum Albumin: 2.6 g/dL at 10/4/2024  5:32 AM  INR(ratio): 3.23 at 10/4/2024  5:32 AM  Age at listing (hypothetical): 39 years  Sex: Male at 10/4/2024  5:32 AM        Imaging Results:  None current

## 2024-10-04 NOTE — PROGRESS NOTES
"PRIMARY DIAGNOSIS: \"GENERIC\" NURSING  OUTPATIENT/OBSERVATION GOALS TO BE MET BEFORE DISCHARGE:  ADLs back to baseline: Yes    Activity and level of assistance: Ambulating independently.    Pain status: Pain free.    Return to near baseline physical activity: Yes     Discharge Planner Nurse   Safe discharge environment identified: Yes  Barriers to discharge: Yes: I&O tracking, AM labs       Entered by: Elizabeth River RN 10/04/2024 3:26 PM     -diagnostic tests and consults completed and resulted: In progress  -vital signs normal or at patient baseline: Met  -tolerating oral intake to maintain hydration: Met    Nurse to notify provider when observation goals have been met and patient is ready for discharge.   "

## 2024-10-04 NOTE — PLAN OF CARE
"Goal Outcome Evaluation:    Plan of Care Reviewed With: patient    Overall Patient Progress: improvingOverall Patient Progress: improving     PRIMARY DIAGNOSIS: LEGS SWELLING   OUTPATIENT/OBSERVATION GOALS TO BE MET BEFORE DISCHARGE:  ADLs back to baseline: No    Activity and level of assistance: Ambulating independently.    Pain status: Pain free.    Return to near baseline physical activity: No     Discharge Planner Nurse   Safe discharge environment identified: No  Barriers to discharge: Yes       Entered by: Denis Echeverria RN 10/04/2024 12:38 AM     Please   Problem: Adult Inpatient Plan of Care  Goal: Plan of Care Review  Description: The Plan of Care Review/Shift note should be completed every shift.  The Outcome Evaluation is a brief statement about your assessment that the patient is improving, declining, or no change.  This information will be displayed automatically on your shift  note.  Flowsheets (Taken 10/4/2024 0037)  Plan of Care Reviewed With: patient  Overall Patient Progress: improving  Goal: Patient-Specific Goal (Individualized)  Description: You can add care plan individualizations to a care plan. Examples of Individualization might be:  \"Parent requests to be called daily at 9am for status\", \"I have a hard time hearing out of my right ear\", or \"Do not touch me to wake me up as it startles  me\".  Outcome: Progressing  Goal: Absence of Hospital-Acquired Illness or Injury  Intervention: Identify and Manage Fall Risk  Recent Flowsheet Documentation  Taken 10/3/2024 2340 by Denis Echeverria RN  Safety Promotion/Fall Prevention:   clutter free environment maintained   nonskid shoes/slippers when out of bed   patient and family education  Intervention: Prevent Skin Injury  Recent Flowsheet Documentation  Taken 10/3/2024 2340 by Denis Echeverria RN  Body Position: position changed independently  Intervention: Prevent Infection  Recent Flowsheet Documentation  Taken 10/3/2024 2340 " by Denis Echeverria RN  Infection Prevention: single patient room provided   review provider order for any additional goals.   Nurse to notify provider when observation goals have been met and patient is ready for discharge.

## 2024-10-05 LAB
ALBUMIN SERPL BCG-MCNC: 2.7 G/DL (ref 3.5–5.2)
ALP SERPL-CCNC: 249 U/L (ref 40–150)
ALT SERPL W P-5'-P-CCNC: 161 U/L (ref 0–70)
ANION GAP SERPL CALCULATED.3IONS-SCNC: 9 MMOL/L (ref 7–15)
AST SERPL W P-5'-P-CCNC: 233 U/L (ref 0–45)
BILIRUB SERPL-MCNC: 27 MG/DL
BUN SERPL-MCNC: 17.7 MG/DL (ref 6–20)
CALCIUM SERPL-MCNC: 7.8 MG/DL (ref 8.8–10.4)
CHLORIDE SERPL-SCNC: 96 MMOL/L (ref 98–107)
CREAT SERPL-MCNC: 0.89 MG/DL (ref 0.67–1.17)
EGFRCR SERPLBLD CKD-EPI 2021: >90 ML/MIN/1.73M2
ERYTHROCYTE [DISTWIDTH] IN BLOOD BY AUTOMATED COUNT: 18.2 % (ref 10–15)
GLUCOSE SERPL-MCNC: 78 MG/DL (ref 70–99)
HCO3 SERPL-SCNC: 20 MMOL/L (ref 22–29)
HCT VFR BLD AUTO: 37.5 % (ref 40–53)
HGB BLD-MCNC: 13.4 G/DL (ref 13.3–17.7)
INR PPP: 2.93 (ref 0.85–1.15)
MCH RBC QN AUTO: 35.5 PG (ref 26.5–33)
MCHC RBC AUTO-ENTMCNC: 35.7 G/DL (ref 31.5–36.5)
MCV RBC AUTO: 100 FL (ref 78–100)
PLATELET # BLD AUTO: 74 10E3/UL (ref 150–450)
POTASSIUM SERPL-SCNC: 4.1 MMOL/L (ref 3.4–5.3)
PROCALCITONIN SERPL IA-MCNC: 1.77 NG/ML
PROT SERPL-MCNC: ABNORMAL G/DL
RBC # BLD AUTO: 3.77 10E6/UL (ref 4.4–5.9)
SODIUM SERPL-SCNC: 125 MMOL/L (ref 135–145)
WBC # BLD AUTO: 14.1 10E3/UL (ref 4–11)

## 2024-10-05 PROCEDURE — 80048 BASIC METABOLIC PNL TOTAL CA: CPT

## 2024-10-05 PROCEDURE — 85610 PROTHROMBIN TIME: CPT

## 2024-10-05 PROCEDURE — 99232 SBSQ HOSP IP/OBS MODERATE 35: CPT

## 2024-10-05 PROCEDURE — 99207 PR APP CREDIT; MD BILLING SHARED VISIT: CPT | Performed by: STUDENT IN AN ORGANIZED HEALTH CARE EDUCATION/TRAINING PROGRAM

## 2024-10-05 PROCEDURE — G0378 HOSPITAL OBSERVATION PER HR: HCPCS

## 2024-10-05 PROCEDURE — 250N000013 HC RX MED GY IP 250 OP 250 PS 637: Performed by: INTERNAL MEDICINE

## 2024-10-05 PROCEDURE — 250N000013 HC RX MED GY IP 250 OP 250 PS 637

## 2024-10-05 PROCEDURE — 84145 PROCALCITONIN (PCT): CPT

## 2024-10-05 PROCEDURE — 36415 COLL VENOUS BLD VENIPUNCTURE: CPT

## 2024-10-05 PROCEDURE — 85027 COMPLETE CBC AUTOMATED: CPT

## 2024-10-05 PROCEDURE — 120N000002 HC R&B MED SURG/OB UMMC

## 2024-10-05 RX ADMIN — Medication 1 TABLET: at 09:31

## 2024-10-05 RX ADMIN — SIMETHICONE 80 MG: 80 TABLET, CHEWABLE ORAL at 09:31

## 2024-10-05 RX ADMIN — Medication 1 LOZENGE: at 10:06

## 2024-10-05 RX ADMIN — SPIRONOLACTONE 100 MG: 25 TABLET, FILM COATED ORAL at 09:31

## 2024-10-05 RX ADMIN — PHENOL 1 ML: 1.5 LIQUID ORAL at 20:37

## 2024-10-05 RX ADMIN — LACTULOSE 20 G: 20 POWDER, FOR SOLUTION ORAL at 16:34

## 2024-10-05 RX ADMIN — PHENOL 1 ML: 1.5 LIQUID ORAL at 09:00

## 2024-10-05 RX ADMIN — THIAMINE HCL TAB 100 MG 100 MG: 100 TAB at 09:31

## 2024-10-05 RX ADMIN — FOLIC ACID 1 MG: 1 TABLET ORAL at 09:31

## 2024-10-05 RX ADMIN — PHENOL 1 ML: 1.5 LIQUID ORAL at 11:30

## 2024-10-05 RX ADMIN — FAMOTIDINE 20 MG: 20 TABLET ORAL at 09:31

## 2024-10-05 RX ADMIN — SIMETHICONE 80 MG: 80 TABLET, CHEWABLE ORAL at 20:34

## 2024-10-05 ASSESSMENT — ACTIVITIES OF DAILY LIVING (ADL)
ADLS_ACUITY_SCORE: 23

## 2024-10-05 NOTE — PLAN OF CARE
"PRIMARY DIAGNOSIS: GENERALIZED WEAKNESS    OUTPATIENT/OBSERVATION GOALS TO BE MET BEFORE DISCHARGE  1. Orthostatic performed: N/A    2. Tolerating PO medications: Yes    3. Return to near baseline physical activity: Yes    4. Cleared for discharge by consultants (if involved): No    Discharge Planner Nurse   Safe discharge environment identified: No  Barriers to discharge: No       Entered by: Miriam Rosario RN 10/05/2024 12:15 PM     Please review provider order for any additional goals.   Nurse to notify provider when observation goals have been met and patient is ready for discharge.   Goal Outcome Evaluation:  -diagnostic tests and consults completed and resulted - In process  -vital signs normal or at patient baseline - Met  /58 (BP Location: Right arm)   Pulse 93   Temp 99.1  F (37.3  C) (Oral)   Resp 16   Ht 1.676 m (5' 6\")   Wt 115.8 kg (255 lb 4.8 oz)   SpO2 100%   BMI 41.21 kg/m    -tolerating oral intake to maintain hydration - In process, encouraged to increase po fluids d/t dark cherry urine output.  Nurse to notify provider when observation goals have been met and patient is ready for discharge   "

## 2024-10-05 NOTE — PLAN OF CARE
"Goal Outcome Evaluation:  Plan of Care Reviewed With: patient  Overall Patient Progress: no change  Outcome Evaluation: Minimal improvement in BLE Edema, pt still feeling fatigued.    Shift: 0700 - 1930    Reason for admission: Bilateral Lower Extremity Edema, fatigue  Vitals: Afebrile, VSS, on RA  /58 (BP Location: Right arm)   Pulse 93   Temp 99.1  F (37.3  C) (Oral)   Resp 16   Ht 1.676 m (5' 6\")   Wt 115.8 kg (255 lb 4.8 oz)   SpO2 100%   BMI 41.21 kg/m    Activity: up ad oscar, low energy  Pain: denies, gas pain only - Simethicone given  Neuro: A&O x 4  Cardiac: HR RRR, no murmur noted  Respiratory: LS Clear, infrequent non-productive cough  GI/: Abdomen soft, non-tender, distended with gas pains, BM today x 2 (loose/soft, then hard/loose/watery)  Diet: 2 gram NA diet, advanced to Regular d/t labs  Lines: PIV Left AC - Drsg changed, - SL (flushed, cap changed)  Wounds/Incisions: na  Labs/imaging/Consults: Low Na+ 125, Cl 96; Elevated  (up from 224),  (up from 144); Critically elevated Bilirubin 27.0 (up from 25.2).   Discharge Plan: Admit to Inpatient for further monitoring. Patient has plans to go to [facility] on Monday 10/7/24.    Veronica Rosario RN, BSN, PHN  "

## 2024-10-05 NOTE — PLAN OF CARE
"PRIMARY DIAGNOSIS: GENERALIZED WEAKNESS    OUTPATIENT/OBSERVATION GOALS TO BE MET BEFORE DISCHARGE  1. Orthostatic performed: N/A    2. Tolerating PO medications: Yes    3. Return to near baseline physical activity: Yes    4. Cleared for discharge by consultants (if involved): No    Discharge Planner Nurse   Safe discharge environment identified: No  Barriers to discharge: No       Entered by: Miriam Rosario RN 10/05/2024 12:13 PM     Please review provider order for any additional goals.   Nurse to notify provider when observation goals have been met and patient is ready for discharge.   Goal Outcome Evaluation:  -diagnostic tests and consults completed and resulted - In process  -vital signs normal or at patient baseline - Met  /58 (BP Location: Right arm)   Pulse 93   Temp 99.1  F (37.3  C) (Oral)   Resp 16   Ht 1.676 m (5' 6\")   Wt 115.8 kg (255 lb 4.8 oz)   SpO2 100%   BMI 41.21 kg/m    -tolerating oral intake to maintain hydration - In process, encouraged to increase po fluids d/t dark cherry urine output.  Nurse to notify provider when observation goals have been met and patient is ready for discharge   "

## 2024-10-05 NOTE — PLAN OF CARE
PRIMARY DIAGNOSIS: GENERALIZED WEAKNESS    OUTPATIENT/OBSERVATION GOALS TO BE MET BEFORE DISCHARGE  1. Orthostatic performed: No    2. Tolerating PO medications: Yes    3. Return to near baseline physical activity: Yes    4. Cleared for discharge by consultants (if involved): No    Discharge Planner Nurse   Safe discharge environment identified: No  Barriers to discharge: No       Entered by: Delmy Hines RN 10/04/2024 10:44 PM     Please review provider order for any additional goals.   Nurse to notify provider when observation goals have been met and patient is ready for discharge.         -diagnostic tests and consults completed and resulted: In Progress  -vital signs normal or at patient baseline: Met   -tolerating oral intake to maintain hydration: Met

## 2024-10-05 NOTE — PLAN OF CARE
Goal Outcome Evaluation:  PRIMARY DIAGNOSIS: GENERALIZED WEAKNESS     OUTPATIENT/OBSERVATION GOALS TO BE MET BEFORE DISCHARGE  1. Orthostatic performed: No     2. Tolerating PO medications: Yes     3. Return to near baseline physical activity: Yes     4. Cleared for discharge by consultants (if involved): No        Discharge Planner Nurse  Safe discharge environment identified: No  Barriers to discharge: No       Entered by: Delmy Hines RN 10/04/2024 10:44 PM        Please review provider order for any additional goals.   Nurse to notify provider when observation goals have been met and patient is ready for discharge.            -diagnostic tests and consults completed and resulted: In Progress  -vital signs normal or at patient baseline: Met   -tolerating oral intake to maintain hydration: Met

## 2024-10-05 NOTE — PROGRESS NOTES
[facility]    Medicine Progress Note - Hospitalist Service    Date of Admission:  10/3/2024    Assessment & Plan     Stewart Hunt is a 39 year old male admitted on 10/3/2024. He has hx of alcohol cirrhosis and admitted 9/20-22 for worsening anasarca and hyperbilirubinemia. Since his discharge, he has been compliant with all of the medications, but has ongoing LE swelling and increased fatigue presented to ED for work up. Overall, ED work up are unchanged except CMP is still pending, but they have a lot of questions and concerns and admitted to observation.     Lower extremity edema  Anasarca  Abdominal edema  Home reg: lasix 20mg daily and aldactone 50mg daily since last admission 10 days ago, and has been having ++ urination without improvement in edema. On lactulose and had been having multiple bowel movements daily along with poor appetite. Albumin 2-3. most likely due to third spacing. Cr wnl 0.94-->0.78-->0.89. Sodium decreased from 131 to 125 10/5 with increased diuretic dosing. Discussed with GI and are reviewing diuretic orders.   - increased lasix to 40 mg daily and spironolactone to 100 mg daily 10/3- lasix on hold starting 10/5 for low sodium, hold both 10/6 until bmp result reviewed  - compression stockings   - low sodium diet  - strict I+Os, daily weights   - BMP daily     Hyponatremia  Diuretics increased on admission as above for significant increase in LE edema. Na 131-->131-->125.   - Furosemide on hold for today  - bmp daily  - consider nephrology consult      Decompensated alcoholic cirrhosis  Alcohol related hepatitis   Follows with hepatology. Prednisolone taper discontinued. US 9/21 without evidence of HCC, hepatomegaly. No EV on recent EGD. T. Bili initially downtrending, slight increase 10/5. Updated hepatology.   - continue bid protein supplements   - continue diuresis as above    MELD 3.0: 33 at 10/5/2024  6:17 AM  MELD-Na: 35 at 10/5/2024   6:17 AM  Calculated from:  Serum Creatinine: 0.89 mg/dL (Using min of 1 mg/dL) at 10/5/2024  6:17 AM  Serum Sodium: 125 mmol/L at 10/5/2024  6:17 AM  Total Bilirubin: 27.0 mg/dL at 10/5/2024  6:17 AM  Serum Albumin: 2.7 g/dL at 10/5/2024  6:17 AM  INR(ratio): 2.93 at 10/5/2024  6:17 AM  Age at listing (hypothetical): 39 years  Sex: Male at 10/5/2024  6:17 AM    Fatigue, improving   Insomnia, improving   Hx of hepatic encephalopathy  No asterexis on exam, NH3 30s. Fell asleep at desk while sitting and working on computer. Not currently worried about HE, however, possible dehydration/intravascular volume depletion contributing to fatigue.  - continue lactulose TID, titrate to achieve 3-5 bowel movements daily  - may need rifaximin in the future (per GI)  - consider adding melatonin for insomnia      Leukocytosis  Elevated procalcitonin   Sore throat  No dysuria, no fever or cough. Has throat pain but viral panels repetiteively negative. Possibly due to prednisolone that he was taking. CXR and UA unremarkable. PCL increased 1.23-->1.42. WBC 15.4-->15.6, also elevated 9/20-9/21. Has been on steroids, discontinued yesterday. States he woke up with chills and needed extra blanket. Afebrile.   - hold off initiating empiric antibiotic  - lozenges and chloraseptic spray prn  - trend cbc   - Continue to monitor      Decreased po intake  Vomiting  Frequent BM >5 in the setting of lactulose use  Nothing tastes good, and had one non bloody emesis yesterday. Having about 9 BM a day. States his appetite was better when he was on higher dose prednisolone (currently on 1-mg).  - monitor po intake  - prn zofran  - titrate lactulose as above   - continue pepcid     Gallbladder polyp  - continue to monitor 4 mm polyp seen on US      Hx of alcohol use disorder, sober since 9/8/2024  Attending AA and has been sober for about 30 days. Was recommended inpatient rehab, but he feels that since he has such high MELD, he does not want to be  "away from his family for 30 days.  - continue to attend AA  - thiamine and folic acid     Depressed mood/Grief  Both the patient and wife started tearing up talking about poor prognosis and not knowing what to expect including what they can do while 'waiting 90 days'  - consider health psychology consult       Observation Goals: -diagnostic tests and consults completed and resulted, -vital signs normal or at patient baseline, -tolerating oral intake to maintain hydration, Nurse to notify provider when observation goals have been met and patient is ready for discharge.  Diet: 2 Gram Sodium Diet    DVT Prophylaxis: Ambulate every shift  Gomes Catheter: Not present  Lines: None     Cardiac Monitoring: None  Code Status: Full Code      Clinically Significant Risk Factors Present on Admission         # Hyponatremia: Lowest Na = 125 mmol/L in last 2 days, will monitor as appropriate      # Hypoalbuminemia: Lowest albumin = 2.6 g/dL at 10/4/2024  5:32 AM, will monitor as appropriate  # Coagulation Defect: INR = 2.93 (Ref range: 0.85 - 1.15) and/or PTT = 38 Seconds (Ref range: 22 - 38 Seconds), will monitor for bleeding  # Thrombocytopenia: Lowest platelets = 72 in last 2 days, will monitor for bleeding           # Severe Obesity: Estimated body mass index is 41.21 kg/m  as calculated from the following:    Height as of this encounter: 1.676 m (5' 6\").    Weight as of this encounter: 115.8 kg (255 lb 4.8 oz).              Disposition Plan     Medically Ready for Discharge: Anticipated in 2-4 Days           The patient's care was discussed with the Attending Physician, Dr. Carreon and GI Team.    GRISELDA Saleh [facility]  Hospitalist Service  [facility]  Securely message with [employer] (more info)  Text page via [facility] Paging/Directory   ______________________________________________________________________    Interval History     Discussed plan with patient and spouse Paula. They " both agree on plan to keep patient to monitor drop in sodium. He felt like his legs were less swollen when he woke up and was frustrated that after walking the swelling increased. We discussed dependant edema. He has been tracking his I+Os, this morning he woke up with urgency and forgot to grab the urinal. Wife tearful about overall grim outlook for patient disease process. They are hoping to discuss with GI soon, understands it may not happen this weekend.     Physical Exam   Vital Signs: Temp: 98.5  F (36.9  C) Temp src: Oral BP: 121/61 Pulse: 99   Resp: 16 SpO2: 99 % O2 Device: None (Room air)    Weight: 255 lbs 4.8 oz    General Appearance: Comfortable, nontoxic appearing male seen laying in bed.  Eyes: PERRLA.  No conjunctival icterus. Jaundiced sclera  HEENT: Atraumatic.  Respiratory: Breathing comfortably on room air.  Lung sounds clear to auscultation throughout.  No wheezes, rhonchi, rales.  Cardiovascular: RRR.  No murmurs, rubs, gallops.  GI: Bowel sounds present throughout.  Abdomen soft, nontender. No evidence of ascites at this time.   Lymph/Hematologic: No bruising on exposed skin. +3 pitting LE edema   Skin: No lesions or rashes noted on exposed skin. Jaundice   Musculoskeletal: Moving all extremities spontaneously.  Neurologic: Cranial nerves II through XII grossly intact.  Psychiatric: Mood appropriate.    Medical Decision Making       50 MINUTES SPENT BY ME on the date of service doing chart review, history, exam, documentation & further activities per the note.      Data     I have personally reviewed the following data over the past 24 hrs:    14.1 (H)  \   13.4   / 74 (L)     125 (L) 96 (L) 17.7 /  78   4.1 20 (L) 0.89 \     ALT: 161 (H) AST: 233 (H) AP: 249 (H) TBILI: 27.0 (HH)   ALB: 2.7 (L) TOT PROTEIN: N/A LIPASE: N/A     INR:  2.93 (H) PTT:  N/A   D-dimer:  N/A Fibrinogen:  N/A       Imaging results reviewed over the past 24 hrs:   No results found for this or any previous visit (from the  past 24 hour(s)).

## 2024-10-06 ENCOUNTER — HEALTH MAINTENANCE LETTER (OUTPATIENT)
Age: 39
End: 2024-10-06

## 2024-10-06 LAB
ALBUMIN SERPL BCG-MCNC: 2.5 G/DL (ref 3.5–5.2)
ALBUMIN UR-MCNC: 10 MG/DL
ALP SERPL-CCNC: 230 U/L (ref 40–150)
ALT SERPL W P-5'-P-CCNC: 143 U/L (ref 0–70)
ANION GAP SERPL CALCULATED.3IONS-SCNC: 10 MMOL/L (ref 7–15)
ANION GAP SERPL CALCULATED.3IONS-SCNC: 11 MMOL/L (ref 7–15)
APPEARANCE UR: ABNORMAL
AST SERPL W P-5'-P-CCNC: 203 U/L (ref 0–45)
BILIRUB SERPL-MCNC: 26.4 MG/DL
BILIRUB UR QL STRIP: ABNORMAL
BUN SERPL-MCNC: 15.4 MG/DL (ref 6–20)
BUN SERPL-MCNC: 15.7 MG/DL (ref 6–20)
CALCIUM SERPL-MCNC: 7.6 MG/DL (ref 8.8–10.4)
CALCIUM SERPL-MCNC: 7.7 MG/DL (ref 8.8–10.4)
CHLORIDE SERPL-SCNC: 94 MMOL/L (ref 98–107)
CHLORIDE SERPL-SCNC: 96 MMOL/L (ref 98–107)
COLOR UR AUTO: ABNORMAL
CREAT SERPL-MCNC: 0.84 MG/DL (ref 0.67–1.17)
CREAT SERPL-MCNC: 0.92 MG/DL (ref 0.67–1.17)
CYSTATIN C (ROCHE): 1.8 MG/L (ref 0.6–1)
EGFRCR SERPLBLD CKD-EPI 2021: >90 ML/MIN/1.73M2
EGFRCR SERPLBLD CKD-EPI 2021: >90 ML/MIN/1.73M2
ERYTHROCYTE [DISTWIDTH] IN BLOOD BY AUTOMATED COUNT: 17.9 % (ref 10–15)
GFR/BSA.PRED SERPLBLD CYS-BASED-ARV: 39 ML/MIN/1.73M2
GLUCOSE SERPL-MCNC: 108 MG/DL (ref 70–99)
GLUCOSE SERPL-MCNC: 79 MG/DL (ref 70–99)
GLUCOSE UR STRIP-MCNC: NEGATIVE MG/DL
HCO3 SERPL-SCNC: 19 MMOL/L (ref 22–29)
HCO3 SERPL-SCNC: 20 MMOL/L (ref 22–29)
HCT VFR BLD AUTO: 37.9 % (ref 40–53)
HGB BLD-MCNC: 13.7 G/DL (ref 13.3–17.7)
HGB UR QL STRIP: NEGATIVE
INR PPP: 3.08 (ref 0.85–1.15)
KETONES UR STRIP-MCNC: NEGATIVE MG/DL
LACTATE SERPL-SCNC: 1.4 MMOL/L (ref 0.7–2)
LACTATE SERPL-SCNC: 1.5 MMOL/L (ref 0.7–2)
LEUKOCYTE ESTERASE UR QL STRIP: NEGATIVE
MCH RBC QN AUTO: 36.1 PG (ref 26.5–33)
MCHC RBC AUTO-ENTMCNC: 36.1 G/DL (ref 31.5–36.5)
MCV RBC AUTO: 100 FL (ref 78–100)
MUCOUS THREADS #/AREA URNS LPF: PRESENT /LPF
NITRATE UR QL: NEGATIVE
OSMOLALITY SERPL: 264 MMOL/KG (ref 275–295)
OSMOLALITY UR: 305 MMOL/KG (ref 100–1200)
PH UR STRIP: 6.5 [PH] (ref 5–7)
PLATELET # BLD AUTO: 76 10E3/UL (ref 150–450)
POTASSIUM SERPL-SCNC: 3.9 MMOL/L (ref 3.4–5.3)
POTASSIUM SERPL-SCNC: 4.5 MMOL/L (ref 3.4–5.3)
PROT SERPL-MCNC: ABNORMAL G/DL
RBC # BLD AUTO: 3.8 10E6/UL (ref 4.4–5.9)
RBC URINE: 1 /HPF
SODIUM SERPL-SCNC: 125 MMOL/L (ref 135–145)
SODIUM SERPL-SCNC: 125 MMOL/L (ref 135–145)
SODIUM UR-SCNC: <20 MMOL/L
SP GR UR STRIP: 1.01 (ref 1–1.03)
UROBILINOGEN UR STRIP-MCNC: NORMAL MG/DL
WBC # BLD AUTO: 12.9 10E3/UL (ref 4–11)
WBC URINE: 3 /HPF

## 2024-10-06 PROCEDURE — 81001 URINALYSIS AUTO W/SCOPE: CPT

## 2024-10-06 PROCEDURE — 83935 ASSAY OF URINE OSMOLALITY: CPT

## 2024-10-06 PROCEDURE — 99207 PR APP CREDIT; MD BILLING SHARED VISIT: CPT | Performed by: STUDENT IN AN ORGANIZED HEALTH CARE EDUCATION/TRAINING PROGRAM

## 2024-10-06 PROCEDURE — 80048 BASIC METABOLIC PNL TOTAL CA: CPT | Performed by: STUDENT IN AN ORGANIZED HEALTH CARE EDUCATION/TRAINING PROGRAM

## 2024-10-06 PROCEDURE — P9047 ALBUMIN (HUMAN), 25%, 50ML: HCPCS | Mod: JZ

## 2024-10-06 PROCEDURE — 250N000013 HC RX MED GY IP 250 OP 250 PS 637

## 2024-10-06 PROCEDURE — 36415 COLL VENOUS BLD VENIPUNCTURE: CPT | Performed by: NURSE PRACTITIONER

## 2024-10-06 PROCEDURE — 36415 COLL VENOUS BLD VENIPUNCTURE: CPT

## 2024-10-06 PROCEDURE — 83605 ASSAY OF LACTIC ACID: CPT | Performed by: STUDENT IN AN ORGANIZED HEALTH CARE EDUCATION/TRAINING PROGRAM

## 2024-10-06 PROCEDURE — 36415 COLL VENOUS BLD VENIPUNCTURE: CPT | Performed by: STUDENT IN AN ORGANIZED HEALTH CARE EDUCATION/TRAINING PROGRAM

## 2024-10-06 PROCEDURE — 82610 CYSTATIN C: CPT | Performed by: STUDENT IN AN ORGANIZED HEALTH CARE EDUCATION/TRAINING PROGRAM

## 2024-10-06 PROCEDURE — 250N000013 HC RX MED GY IP 250 OP 250 PS 637: Performed by: INTERNAL MEDICINE

## 2024-10-06 PROCEDURE — 250N000011 HC RX IP 250 OP 636: Mod: JZ

## 2024-10-06 PROCEDURE — 83605 ASSAY OF LACTIC ACID: CPT | Performed by: NURSE PRACTITIONER

## 2024-10-06 PROCEDURE — 84300 ASSAY OF URINE SODIUM: CPT

## 2024-10-06 PROCEDURE — G0378 HOSPITAL OBSERVATION PER HR: HCPCS

## 2024-10-06 PROCEDURE — 120N000002 HC R&B MED SURG/OB UMMC

## 2024-10-06 PROCEDURE — 85014 HEMATOCRIT: CPT

## 2024-10-06 PROCEDURE — 85610 PROTHROMBIN TIME: CPT

## 2024-10-06 PROCEDURE — 80048 BASIC METABOLIC PNL TOTAL CA: CPT

## 2024-10-06 PROCEDURE — 83930 ASSAY OF BLOOD OSMOLALITY: CPT

## 2024-10-06 PROCEDURE — 99223 1ST HOSP IP/OBS HIGH 75: CPT | Mod: GC | Performed by: INTERNAL MEDICINE

## 2024-10-06 PROCEDURE — 99233 SBSQ HOSP IP/OBS HIGH 50: CPT

## 2024-10-06 RX ORDER — PANTOPRAZOLE SODIUM 40 MG/1
40 TABLET, DELAYED RELEASE ORAL
Status: DISCONTINUED | OUTPATIENT
Start: 2024-10-06 | End: 2024-10-08

## 2024-10-06 RX ORDER — MAGNESIUM HYDROXIDE/ALUMINUM HYDROXICE/SIMETHICONE 120; 1200; 1200 MG/30ML; MG/30ML; MG/30ML
30 SUSPENSION ORAL ONCE
Status: COMPLETED | OUTPATIENT
Start: 2024-10-06 | End: 2024-10-06

## 2024-10-06 RX ORDER — ALBUMIN (HUMAN) 12.5 G/50ML
100 SOLUTION INTRAVENOUS DAILY
Status: COMPLETED | OUTPATIENT
Start: 2024-10-06 | End: 2024-10-08

## 2024-10-06 RX ADMIN — ALUMINUM HYDROXIDE, MAGNESIUM HYDROXIDE, DIMETHICONE 30 ML: 200; 200; 20 LIQUID ORAL at 12:55

## 2024-10-06 RX ADMIN — LACTULOSE 20 G: 20 POWDER, FOR SOLUTION ORAL at 15:07

## 2024-10-06 RX ADMIN — ALBUMIN HUMAN 100 G: 0.25 SOLUTION INTRAVENOUS at 12:47

## 2024-10-06 RX ADMIN — Medication 1 TABLET: at 09:03

## 2024-10-06 RX ADMIN — SIMETHICONE 80 MG: 80 TABLET, CHEWABLE ORAL at 20:15

## 2024-10-06 RX ADMIN — LACTULOSE 20 G: 20 POWDER, FOR SOLUTION ORAL at 09:02

## 2024-10-06 RX ADMIN — SIMETHICONE 80 MG: 80 TABLET, CHEWABLE ORAL at 09:02

## 2024-10-06 RX ADMIN — FAMOTIDINE 20 MG: 20 TABLET ORAL at 09:02

## 2024-10-06 RX ADMIN — THIAMINE HCL TAB 100 MG 100 MG: 100 TAB at 09:02

## 2024-10-06 RX ADMIN — PANTOPRAZOLE SODIUM 40 MG: 40 TABLET, DELAYED RELEASE ORAL at 12:55

## 2024-10-06 RX ADMIN — FOLIC ACID 1 MG: 1 TABLET ORAL at 09:03

## 2024-10-06 ASSESSMENT — ACTIVITIES OF DAILY LIVING (ADL)
ADLS_ACUITY_SCORE: 23

## 2024-10-06 NOTE — PLAN OF CARE
Goal Outcome Evaluation:                          Shift:0700-1930            Pain: Denies pain at this time  Neuro: A x O 4.  Cardiac: WDL. Denies chest pain.   Respiratory: WDL.O2 sat > 95% on RA.Denies SOB.   Diet:  Denies N/V. 2 gram sodium diet.   /GI: Voids spontaneously. I & O's recorded.Pt had BMx3 this shift.   LDA's: LPIV SL  Skin: +2  BLE edema .  Activity: up ad oscar  Tests/Procedures: N/A  Pertinent Labs/Lab Collection: UA collected. Albumin infused. And tolerated

## 2024-10-06 NOTE — PROGRESS NOTES
[facility]    Medicine Progress Note - Hospitalist Service    Date of Admission:  10/3/2024    Assessment & Plan     Stewart Hunt is a 39 year old male admitted on 10/3/2024. He has hx of alcohol cirrhosis and admitted 9/20-22 for worsening anasarca and hyperbilirubinemia.      Lower extremity edema  Anasarca  Abdominal edema  Home reg: lasix 20mg daily and aldactone 50mg daily since last admission 10 days ago, and has been having ++ urination without improvement in edema. On lactulose and had been having multiple bowel movements daily along with poor appetite. Albumin 2-3. most likely due to third spacing. Cr wnl 0.94-->0.78-->0.89. Sodium decreased from 131 to 125 10/5 with increased diuretic dosing. Discussed with GI and nephrology.   - increased lasix to 40 mg daily and spironolactone to 100 mg daily 10/3- lasix on hold starting 10/5 for low sodium, hold both 10/6 until nephrology recs  - low sodium diet  - strict I+Os, daily weights   - BMP daily      Hyponatremia 2/2 etoh cirrhosis  Diuretics increased on admission as above for significant increase in LE edema. Na 131-->131-->125-->125. Cystatin C 1.8, GFR calculated with Cystain C 39. Blood osmolality 264. Sodium urine <20, urine osmolality 305.  - Furosemide and spironolactone on hold for now  - bmp daily  - start albumin challenge, ordered 100 g daily for 3 days, check for hepatorenal syndrome   - fluid restriction 1 liter  - consult placed to nephrology     Decompensated alcoholic cirrhosis  Alcohol related hepatitis   Follows with hepatology. Prednisolone taper discontinued. US 9/21 without evidence of HCC, hepatomegaly. No EV on recent EGD. T. Bili initially downtrending, slight increase 10/5. Updated hepatology.   - continue bid protein supplements   - continue diuresis as above   MELD 3.0: 33 at 10/6/2024  6:09 AM  MELD-Na: 35 at 10/6/2024  6:09 AM  Calculated from:  Serum Creatinine: 0.92 mg/dL (Using  "min of 1 mg/dL) at 10/6/2024  6:09 AM  Serum Sodium: 125 mmol/L at 10/6/2024  6:09 AM  Total Bilirubin: 26.4 mg/dL at 10/6/2024  6:09 AM  Serum Albumin: 2.5 g/dL at 10/6/2024  6:09 AM  INR(ratio): 3.08 at 10/6/2024  6:09 AM  Age at listing (hypothetical): 39 years  Sex: Male at 10/6/2024  6:09 AM    GERD  L sided chest pain  ED visit with same complaints on 7/26/24. ACS and PE workup normal. Started on pepcid and simethicone. Has c/o L sided chest pain for the last 2 mornings. Pain is reproducible with palpation. Has also had a sore throat for \"months\".   - Discontinue pepcid  - Start protonix daily  - continue simethicone  - maalox x 1 dose now     Fatigue, improving   Insomnia, improving   Hx of hepatic encephalopathy  No asterexis on exam, NH3 30s. Fell asleep at desk while sitting and working on computer. Not currently worried about HE, however, possible dehydration/intravascular volume depletion contributing to fatigue.  - continue lactulose TID, titrate to achieve 3-5 bowel movements daily  - may need rifaximin in the future (per GI)  - consider adding melatonin for insomnia      Leukocytosis, improving   Elevated procalcitonin   Sore throat, ongoing   No dysuria, no fever or cough. Has throat pain but viral panels repetiteively negative. Possibly due to prednisolone that he was taking. CXR and UA unremarkable. Remains afebrile. WBC trending down now that he is off steroids.   - hold off initiating empiric antibiotic  - lozenges and chloraseptic spray prn  - trend cbc   - Continue to monitor      Decreased po intake, improving   Vomiting, resolved   Frequent BM >5 in the setting of lactulose use  Nothing tastes good, and had one non bloody emesis yesterday. Having about 9 BM a day. States his appetite was better when he was on higher dose prednisolone (currently on 1-mg).  - monitor po intake  - prn zofran  - titrate lactulose as above      Gallbladder polyp  - continue to monitor 4 mm polyp seen on US      Hx " "of alcohol use disorder, sober since 9/8/2024  Attending AA and has been sober since 9/8/24. Was recommended inpatient rehab, but he feels that since he has such high MELD, he does not want to be away from his family for 30 days.  - continue to attend AA  - thiamine and folic acid     Depressed mood/Grief  Both the patient and wife started tearing up talking about poor prognosis and not knowing what to expect including what they can do while 'waiting 90 days'  - consider health psychology consult          Diet: 2 Gram Sodium Diet    DVT Prophylaxis: Ambulate every shift  Gomes Catheter: Not present  Lines: None     Cardiac Monitoring: None  Code Status: Full Code      Clinically Significant Risk Factors Present on Admission         # Hyponatremia: Lowest Na = 125 mmol/L in last 2 days, will monitor as appropriate      # Hypoalbuminemia: Lowest albumin = 2.5 g/dL at 10/6/2024  6:09 AM, will monitor as appropriate  # Coagulation Defect: INR = 3.08 (Ref range: 0.85 - 1.15) and/or PTT = 38 Seconds (Ref range: 22 - 38 Seconds), will monitor for bleeding  # Thrombocytopenia: Lowest platelets = 74 in last 2 days, will monitor for bleeding           # Severe Obesity: Estimated body mass index is 41.3 kg/m  as calculated from the following:    Height as of this encounter: 1.676 m (5' 6\").    Weight as of this encounter: 116.1 kg (255 lb 14.4 oz).              Disposition Plan     Medically Ready for Discharge: Anticipated in 2-4 Days           The patient's care was discussed with the Attending Physician, Dr. Carreon and Nephrology and Hepatology Consultant(s).    GRISELDA Saleh [facility]  Hospitalist Service  [facility]  Securely message with [employer] (more info)  Text page via [facility] Paging/Directory   ______________________________________________________________________    Interval History     Pt agrees with plan for nephrology consult for assistance with sodium since no " improvement. Slept well last night. Woke up again with L sided chest pain. Adjusted GERD medication treatment. Continues to have a good appetite despite being off steroids.     Physical Exam   Vital Signs: Temp: 98.5  F (36.9  C) Temp src: Oral BP: 118/64 Pulse: 99   Resp: 16 SpO2: 99 % O2 Device: None (Room air)    Weight: 255 lbs 14.4 oz    General Appearance: Comfortable, nontoxic appearing male seen sitting in chair .  Eyes: PERRLA.  No conjunctival icterus. Yellow sclera   HEENT: Atraumatic.  Respiratory: Breathing comfortably on room air.  Lung sounds clear to auscultation throughout.  No wheezes, rhonchi, rales.  Cardiovascular: RRR.  No murmurs, rubs, gallops.  GI: Bowel sounds present throughout.  Abdomen soft, nontender. No evidence of ascites at this time.   Lymph/Hematologic: No bruising on exposed skin. + 3 pitting edema b/l LE   Skin: No lesions or rashes noted on exposed skin. Jaundice   Musculoskeletal: Moving all extremities spontaneously.  Neurologic: Cranial nerves II through XII grossly intact.  Psychiatric: Mood appropriate.    Medical Decision Making       50 MINUTES SPENT BY ME on the date of service doing chart review, history, exam, documentation & further activities per the note.      Data     I have personally reviewed the following data over the past 24 hrs:    12.9 (H)  \   13.7   / 76 (L)     125 (L) 94 (L) 15.7 /  79   4.5 20 (L) 0.92 \     ALT: 143 (H) AST: 203 (H) AP: 230 (H) TBILI: 26.4 (HH)   ALB: 2.5 (L) TOT PROTEIN: N/A LIPASE: N/A     Procal: N/A CRP: N/A Lactic Acid: 1.5       INR:  3.08 (H) PTT:  N/A   D-dimer:  N/A Fibrinogen:  N/A       Imaging results reviewed over the past 24 hrs:   No results found for this or any previous visit (from the past 24 hour(s)).

## 2024-10-06 NOTE — PLAN OF CARE
"Goal Outcome Evaluation:/61 (BP Location: Right arm)   Pulse 95   Temp 98.6  F (37  C) (Oral)   Resp 16   Ht 1.676 m (5' 6\")   Wt 116.1 kg (255 lb 14.4 oz)   SpO2 99%   BMI 41.30 kg/m           Shift:[id number]         Pain: Denies pain at this time  Neuro: A x O 4.  Cardiac: WDL. Denies chest pain.   Respiratory: WDL.O2 sat > 95% on RA.Denies SOB.   Diet:  Denies N/V. 2 gram sodium diet.   /GI: Voids spontaneously. I & O's recorded.Pt had BMx1 this shift.   LDA's: LPIV SL  Skin: +2  BLE edema .  Activity: up ad oscar  Tests/Procedures: N/A  Pertinent Labs/Lab Collection: Triggered sepsis. Lactic acid 1.5.                     "

## 2024-10-06 NOTE — PLAN OF CARE
"Shift: 3213-1777  VS: Blood pressure 129/65, pulse 96, temperature 100.1  F (37.8  C), temperature source Oral, resp. rate 16, height 1.676 m (5' 6\"), weight 115.8 kg (255 lb 4.8 oz), SpO2 100%.  Pain: Denies pain at this time  Neuro: A x O 4. PERRLA. Calls appropriately and makes needs known  Cardiac: WDL. No cardiac related chest pain   Respiratory: WDL. Denies SOB. O2 sat > 94% on RA  GI/Diet/Appetite:  Denies N/V. 2 gram sodium diet. BM once on shift.   : voids spontaneously. Dark cherry color  LDA's: LPIV SL  Skin: edema +2  BLE. Jaundice generalized   Activity: up ad oscar  Tests/Procedures: n/A  Pertinent Labs/Lab Collection: lab draws     Plan: Goal Outcome Evaluation: No significant changes this shift, continue POC.                         "

## 2024-10-06 NOTE — PLAN OF CARE
Goal Outcome Evaluation:         Family at bedside. Provider in the room with the POC. Will continue to follow the POC.

## 2024-10-06 NOTE — CONSULTS
Nephrology Initial Consult  October 6, 2024      Stewart Hunt MRN:[id number] YOB: 1985  Date of Admission:10/3/2024  Primary care provider: Jay Mcmillan  Requesting physician: Denys Carreon MD    ASSESSMENT AND RECOMMENDATIONS:   Stewart Hunt is a 39 year old male admitted on 10/3/2024. He has hx of alcohol cirrhosis and admitted 9/20-22 for worsening anasarca and hyperbilirubinemia. Since his discharge, he has been compliant with all of the medications, but has ongoing LE swelling and increased fatigue presented to ED for work up. Overall, ED work up are unchanged except CMP is still pending, but they have a lot of questions and concerns and admitted to observation.     We are consulted for hypoNa     #hypoNa 2/2 etoh cirrhosis   #ANNA mostly pre renal with possible HRS physiology (cystain c 1.8, GFR 39%)  #Decompensated alcoholic cirrhosis   Baseline Na is 136. Was on home lasix 20 and spironolactone 50 was noticing worsening edema  When he was admitted here lasix was doubled to 40 and next day Na dropped to 125. Margaret<20 and Uosm 305  Etiology is likely 2/2 poor effective arterial flow causing increased ADH release in cirrhosis. UO also dropped today with the beginning of hypoNa   He also had high stool output at home (4-5 times loose stool at home on lactulose)   -would give 100g albumin qday for 3 days   -hold lasix and spironolactone for now   -control ADH triggers of pain, stess and nausea   -fluid restriction up to 1 liter/day      Recommendations were communicated to primary team via NOTE    Seen and discussed with Dr. Yamileth Hernandez MD  Division of Renal Disease and Hypertension  [facility]  myairmail  Vocera Web Console        REASON FOR CONSULT: HypoNa    HISTORY OF PRESENT ILLNESS:  Admitting provider and nursing notes reviewed  Stewart Hunt is a 39 year old male who was diagnosed witih ETOH cirrhosis. He was admitted 9/20-22 and diuretics were initiated. He  has been compliant with medications and other instructions, attending AA meetings and working. However, he has been feeling worse: leg swelling is not improving, he has intermittent abdominal pain, frequent diarrhea with incontinence, ++ urination. He has not been having good appetite, and yesterday vomitted once. Non bloody. He feels he is getting weaker and more fatigued. He was working on a computer and then noted that he had fallen asleep.   He denied new SOB, dysuria, cough, fever/chills.  Since they are worried with multiple questions, presented to ED to get checked out.       PAST MEDICAL HISTORY:  Reviewed with patient on 10/06/2024     Past Medical History:   Diagnosis Date    Acute alcoholic hepatitis (H) 09/2024    Alcohol use disorder, severe, in early remission (H)     Alcoholic cirrhosis (H)     Morbid obesity (H)        Past Surgical History:   Procedure Laterality Date    GASTRECTOMY LAPAROSCOPIC SLEEVE          MEDICATIONS:  PTA Meds  Prior to Admission medications    Medication Sig Last Dose Taking? Auth Provider Long Term End Date   albuterol (PROAIR HFA/PROVENTIL HFA/VENTOLIN HFA) 108 (90 Base) MCG/ACT inhaler Inhale 2-3 puffs into the lungs every 6 hours as needed for shortness of breath, wheezing or cough. More than a month Yes Unknown, Entered By History Yes    famotidine (PEPCID) 20 MG tablet Take 20 mg by mouth daily. 10/2/2024 at am Yes Unknown, Entered By History     folic acid (FOLVITE) 1 MG tablet Take 1 tablet (1 mg) by mouth daily. 10/2/2024 at am Yes Momo Navarrete DO  10/23/24   furosemide (LASIX) 20 MG tablet Take 1 tablet (20 mg) by mouth daily. 10/2/2024 at am Yes Momo Navarrete, DO Yes 10/23/24   lactulose (CEPHULAC) 20 GM packet Take 1 packet (20 g) by mouth 3 times daily. 10/2/2024 at 1800 Yes Momo Navarrete DO     multivitamin w/minerals (THERA-VIT-M) tablet Take 1 tablet by mouth daily. 10/2/2024 at am Yes Momo Navarrete DO  10/23/24   prednisoLONE 5 MG tablet Take 5 mg by mouth See  Admin Instructions. Last dose of 8 tablets on 9/20   TAKE 8 TABLETS BY MOUTH ONCE DAILY FOR 28 DAYS, THEN 6 TABLETS DAILY FOR 7 DAYS, THEN 4 TABLETS DAILY FOR 7 DAYS, THEN 2 TABLETS DAILY FOR 7 10/2/2024 at am Yes Unknown, Entered By History     simethicone (MYLICON) 125 MG chewable tablet Take 125 mg by mouth 2 times daily. 10/2/2024 Yes Unknown, Entered By History     spironolactone (ALDACTONE) 50 MG tablet Take 1 tablet (50 mg) by mouth daily. 10/2/2024 at am Yes Momo Navarrete,  Yes 10/23/24   thiamine (B-1) 100 MG tablet Take 1 tablet (100 mg) by mouth daily. 10/2/2024 at am Yes Momo Navarrete DO  10/23/24      Current Meds  Current Facility-Administered Medications   Medication Dose Route Frequency Provider Last Rate Last Admin    albumin human 25 % injection 100 g  100 g Intravenous Daily Jennifer Cartagena APRN CNP   100 g at 10/06/24 1247    folic acid (FOLVITE) tablet 1 mg  1 mg Oral Daily Deb Erickson MD   1 mg at 10/06/24 0903    [Held by provider] furosemide (LASIX) tablet 40 mg  40 mg Oral Daily Dominique Vera PA-C   40 mg at 10/04/24 0846    lactulose (CEPHULAC) Packet 20 g  20 g Oral TID Deb Erickson MD   20 g at 10/06/24 0902    multivitamin w/minerals (THERA-VIT-M) tablet 1 tablet  1 tablet Oral Daily Deb Erickson MD   1 tablet at 10/06/24 0903    pantoprazole (PROTONIX) EC tablet 40 mg  40 mg Oral QAM AC Jennifer Cartagena APRN CNP   40 mg at 10/06/24 1255    simethicone (MYLICON) chewable tablet 80 mg  80 mg Oral BID Deb Erickson MD   80 mg at 10/06/24 0902    [Held by provider] spironolactone (ALDACTONE) tablet 100 mg  100 mg Oral Daily Dominique Vera PA-C   100 mg at 10/05/24 0931    thiamine (B-1) tablet 100 mg  100 mg Oral Daily Deb Erickson MD   100 mg at 10/06/24 0902     Infusion Meds  Current Facility-Administered Medications   Medication Dose Route Frequency Provider Last Rate Last Admin       ALLERGIES:    No Known Allergies    REVIEW  OF SYSTEMS:  A comprehensive of systems was negative except as noted above.    SOCIAL HISTORY:   Social History     Socioeconomic History    Marital status: Single     Spouse name: Not on file    Number of children: Not on file    Years of education: Not on file    Highest education level: Not on file   Occupational History    Not on file   Tobacco Use    Smoking status: Not on file    Smokeless tobacco: Not on file   Substance and Sexual Activity    Alcohol use: Not Currently     Comment: 2 pints of vodka per day for 2 years. Last drink early sept 2024    Drug use: Never    Sexual activity: Not on file   Other Topics Concern    Not on file   Social History Narrative    Not on file     Social Determinants of Health     Financial Resource Strain: Low Risk  (10/3/2024)    Financial Resource Strain     Within the past 12 months, have you or your family members you live with been unable to get utilities (heat, electricity) when it was really needed?: No   Food Insecurity: Low Risk  (10/3/2024)    Food Insecurity     Within the past 12 months, did you worry that your food would run out before you got money to buy more?: No     Within the past 12 months, did the food you bought just not last and you didn t have money to get more?: No   Transportation Needs: Low Risk  (10/3/2024)    Transportation Needs     Within the past 12 months, has lack of transportation kept you from medical appointments, getting your medicines, non-medical meetings or appointments, work, or from getting things that you need?: No   Physical Activity: Not on file   Stress: Not on file   Social Connections: Not on file   Interpersonal Safety: Low Risk  (10/5/2024)    Interpersonal Safety     Do you feel physically and emotionally safe where you currently live?: Yes     Within the past 12 months, have you been hit, slapped, kicked or otherwise physically hurt by someone?: No     Within the past 12 months, have you been humiliated or emotionally abused  "in other ways by your partner or ex-partner?: No   Recent Concern: Interpersonal Safety - High Risk (2024)    Interpersonal Safety     Do you feel physically and emotionally safe where you currently live?: No     Within the past 12 months, have you been hit, slapped, kicked or otherwise physically hurt by someone?: No     Within the past 12 months, have you been humiliated or emotionally abused in other ways by your partner or ex-partner?: No   Housing Stability: Low Risk  (10/3/2024)    Housing Stability     Do you have housing? : Yes     Are you worried about losing your housing?: No     Reviewed with patient    accompanies Stewart Hunt in hospital room    FAMILY MEDICAL HISTORY:   Family History   Problem Relation Age of Onset    Colon Cancer Mother     Diabetes Father     Pancreatic Cancer Sister     Diabetes Maternal Grandmother     Colon Cancer Cousin     Liver Disease No family hx of      Reviewed with patient     PHYSICAL EXAM:   Temp  Av.8  F (37.1  C)  Min: 97.7  F (36.5  C)  Max: 101.8  F (38.8  C)      Pulse  Av.6  Min: 75  Max: 100 Resp  Av.7  Min: 14  Max: 22  SpO2  Av %  Min: 97 %  Max: 100 %       /52 (BP Location: Right arm)   Pulse 90   Temp 99.2  F (37.3  C) (Oral)   Resp 18   Ht 1.676 m (5' 6\")   Wt 116.1 kg (255 lb 14.4 oz)   SpO2 99%   BMI 41.30 kg/m        Admit Weight: 114.3 kg (252 lb)     GENERAL APPEARANCE: no distress,  awake  EYES:  scleral icterus, pupils equal  Endo: no goiter, no moon facies  Lymphatics: no cervical or supraclavicular LAD  Pulmonary: lungs clear to auscultation with equal breath sounds bilaterally, no clubbing  CV: regular rhythm, normal rate, no rub   - JVD none   - Edema plus 2   GI: soft, nontender, normal bowel sounds  MS: no evidence of inflammation in joints, no muscle tenderness  : no johnson  SKIN: no rash, warm, dry, no cyanosis  NEURO: face symmetric, no asterixis     LABS:   CMP  Recent Labs   Lab 10/06/24  1336 " 10/06/24  0609 10/05/24  0617 10/04/24  0532 10/03/24  0450   * 125* 125* 131* 131*   POTASSIUM 3.9 4.5 4.1 3.7 4.0   CHLORIDE 96* 94* 96* 100 99   CO2 19* 20* 20* 23 24   ANIONGAP 10 11 9 8 8   * 79 78 70 80   BUN 15.4 15.7 17.7 16.9 18.6   CR 0.84 0.92 0.89 0.78 0.94   GFRESTIMATED >90 >90 >90 >90 >90   LELAND 7.6* 7.7* 7.8* 8.0* 8.1*   MAG  --   --   --  1.7  --    PHOS  --   --   --  3.1  --    ALBUMIN  --  2.5* 2.7* 2.6* 2.7*   BILITOTAL  --  26.4* 27.0* 25.2* 27.5*   ALKPHOS  --  230* 249* 224* 232*   AST  --  203* 233* 176* 215*   ALT  --  143* 161* 144* 161*     CBC  Recent Labs   Lab 10/06/24  0609 10/05/24  0617 10/04/24  0532 10/03/24  0058   HGB 13.7 13.4 13.2* 13.9   WBC 12.9* 14.1* 15.6* 15.4*   RBC 3.80* 3.77* 3.73* 3.86*   HCT 37.9* 37.5* 37.5* 40.1    100 101* 104*   MCH 36.1* 35.5* 35.4* 36.0*   MCHC 36.1 35.7 35.2 34.7   RDW 17.9* 18.2* 18.4* 18.6*   PLT 76* 74* 72* 78*     INR  Recent Labs   Lab 10/06/24  0609 10/05/24  0617 10/04/24  0532 10/03/24  0058   INR 3.08* 2.93* 3.23* 2.79*     ABGNo lab results found in last 7 days.   URINE STUDIES  Recent Labs   Lab Test 10/06/24  0911 10/03/24  0103 09/20/24  2058   COLOR Dark Yellow* Dark Yellow* Dark Yellow*   APPEARANCE Slightly Cloudy* Slightly Cloudy* Clear   URINEGLC Negative Negative Negative   URINEBILI Large* Large* Large*   URINEKETONE Negative Negative Negative   SG 1.011 1.016 1.015   UBLD Negative Negative Negative   URINEPH 6.5 6.5 6.5   PROTEIN 10* Negative Negative   NITRITE Negative Negative Negative   LEUKEST Negative Negative Negative   RBCU 1 <1 <1   WBCU 3 1 1     No lab results found.  PTH  No lab results found.  IRON STUDIES  No lab results found.    IMAGING:  All imaging studies reviewed by me.     Bam Hernandez MD

## 2024-10-07 ENCOUNTER — APPOINTMENT (OUTPATIENT)
Dept: GENERAL RADIOLOGY | Facility: CLINIC | Age: 39
DRG: 432 | End: 2024-10-07
Payer: COMMERCIAL

## 2024-10-07 LAB
ALBUMIN SERPL BCG-MCNC: 3 G/DL (ref 3.5–5.2)
ALP SERPL-CCNC: 172 U/L (ref 40–150)
ALT SERPL W P-5'-P-CCNC: 97 U/L (ref 0–70)
ANION GAP SERPL CALCULATED.3IONS-SCNC: 10 MMOL/L (ref 7–15)
AST SERPL W P-5'-P-CCNC: 134 U/L (ref 0–45)
BILIRUB SERPL-MCNC: 26.7 MG/DL
BUN SERPL-MCNC: 13.5 MG/DL (ref 6–20)
C PNEUM DNA SPEC QL NAA+PROBE: NOT DETECTED
CALCIUM SERPL-MCNC: 7.8 MG/DL (ref 8.8–10.4)
CHLORIDE SERPL-SCNC: 96 MMOL/L (ref 98–107)
CREAT SERPL-MCNC: 0.85 MG/DL (ref 0.67–1.17)
EGFRCR SERPLBLD CKD-EPI 2021: >90 ML/MIN/1.73M2
ERYTHROCYTE [DISTWIDTH] IN BLOOD BY AUTOMATED COUNT: 18 % (ref 10–15)
FLUAV H1 2009 PAND RNA SPEC QL NAA+PROBE: NOT DETECTED
FLUAV H1 RNA SPEC QL NAA+PROBE: NOT DETECTED
FLUAV H3 RNA SPEC QL NAA+PROBE: NOT DETECTED
FLUAV RNA SPEC QL NAA+PROBE: NEGATIVE
FLUAV RNA SPEC QL NAA+PROBE: NOT DETECTED
FLUBV RNA RESP QL NAA+PROBE: NEGATIVE
FLUBV RNA SPEC QL NAA+PROBE: NOT DETECTED
GLUCOSE SERPL-MCNC: 80 MG/DL (ref 70–99)
HADV DNA SPEC QL NAA+PROBE: NOT DETECTED
HCO3 SERPL-SCNC: 20 MMOL/L (ref 22–29)
HCOV PNL SPEC NAA+PROBE: NOT DETECTED
HCT VFR BLD AUTO: 34 % (ref 40–53)
HGB BLD-MCNC: 11.8 G/DL (ref 13.3–17.7)
HMPV RNA SPEC QL NAA+PROBE: NOT DETECTED
HPIV1 RNA SPEC QL NAA+PROBE: NOT DETECTED
HPIV2 RNA SPEC QL NAA+PROBE: NOT DETECTED
HPIV3 RNA SPEC QL NAA+PROBE: NOT DETECTED
HPIV4 RNA SPEC QL NAA+PROBE: NOT DETECTED
INR PPP: 3.77 (ref 0.85–1.15)
M PNEUMO DNA SPEC QL NAA+PROBE: NOT DETECTED
MCH RBC QN AUTO: 35.3 PG (ref 26.5–33)
MCHC RBC AUTO-ENTMCNC: 34.7 G/DL (ref 31.5–36.5)
MCV RBC AUTO: 102 FL (ref 78–100)
PLATELET # BLD AUTO: 66 10E3/UL (ref 150–450)
POTASSIUM SERPL-SCNC: 4 MMOL/L (ref 3.4–5.3)
PROT SERPL-MCNC: ABNORMAL G/DL
RBC # BLD AUTO: 3.34 10E6/UL (ref 4.4–5.9)
RSV RNA SPEC NAA+PROBE: NEGATIVE
RSV RNA SPEC QL NAA+PROBE: NOT DETECTED
RSV RNA SPEC QL NAA+PROBE: NOT DETECTED
RV+EV RNA SPEC QL NAA+PROBE: NOT DETECTED
SARS-COV-2 RNA RESP QL NAA+PROBE: NEGATIVE
SODIUM SERPL-SCNC: 126 MMOL/L (ref 135–145)
WBC # BLD AUTO: 10 10E3/UL (ref 4–11)

## 2024-10-07 PROCEDURE — 99233 SBSQ HOSP IP/OBS HIGH 50: CPT

## 2024-10-07 PROCEDURE — 250N000013 HC RX MED GY IP 250 OP 250 PS 637: Performed by: INTERNAL MEDICINE

## 2024-10-07 PROCEDURE — 99207 PR APP CREDIT; MD BILLING SHARED VISIT: CPT | Performed by: STUDENT IN AN ORGANIZED HEALTH CARE EDUCATION/TRAINING PROGRAM

## 2024-10-07 PROCEDURE — 120N000002 HC R&B MED SURG/OB UMMC

## 2024-10-07 PROCEDURE — 250N000013 HC RX MED GY IP 250 OP 250 PS 637

## 2024-10-07 PROCEDURE — 85610 PROTHROMBIN TIME: CPT

## 2024-10-07 PROCEDURE — 250N000013 HC RX MED GY IP 250 OP 250 PS 637: Performed by: PHYSICIAN ASSISTANT

## 2024-10-07 PROCEDURE — 85027 COMPLETE CBC AUTOMATED: CPT

## 2024-10-07 PROCEDURE — G0378 HOSPITAL OBSERVATION PER HR: HCPCS

## 2024-10-07 PROCEDURE — 250N000011 HC RX IP 250 OP 636: Mod: JZ

## 2024-10-07 PROCEDURE — 36415 COLL VENOUS BLD VENIPUNCTURE: CPT

## 2024-10-07 PROCEDURE — P9047 ALBUMIN (HUMAN), 25%, 50ML: HCPCS | Mod: JZ

## 2024-10-07 PROCEDURE — 71045 X-RAY EXAM CHEST 1 VIEW: CPT

## 2024-10-07 PROCEDURE — 76705 ECHO EXAM OF ABDOMEN: CPT

## 2024-10-07 PROCEDURE — 99233 SBSQ HOSP IP/OBS HIGH 50: CPT | Mod: GC | Performed by: INTERNAL MEDICINE

## 2024-10-07 PROCEDURE — 80048 BASIC METABOLIC PNL TOTAL CA: CPT

## 2024-10-07 PROCEDURE — 87486 CHLMYD PNEUM DNA AMP PROBE: CPT

## 2024-10-07 PROCEDURE — 87637 SARSCOV2&INF A&B&RSV AMP PRB: CPT

## 2024-10-07 PROCEDURE — 71045 X-RAY EXAM CHEST 1 VIEW: CPT | Mod: 26 | Performed by: RADIOLOGY

## 2024-10-07 RX ORDER — GUAIFENESIN 600 MG/1
600 TABLET, EXTENDED RELEASE ORAL 2 TIMES DAILY PRN
Status: DISCONTINUED | OUTPATIENT
Start: 2024-10-07 | End: 2024-10-09

## 2024-10-07 RX ADMIN — FOLIC ACID 1 MG: 1 TABLET ORAL at 11:53

## 2024-10-07 RX ADMIN — ACETAMINOPHEN 650 MG: 325 TABLET, FILM COATED ORAL at 22:49

## 2024-10-07 RX ADMIN — PANTOPRAZOLE SODIUM 40 MG: 40 TABLET, DELAYED RELEASE ORAL at 11:53

## 2024-10-07 RX ADMIN — ALBUMIN HUMAN 100 G: 0.25 SOLUTION INTRAVENOUS at 11:55

## 2024-10-07 RX ADMIN — Medication 1 TABLET: at 11:52

## 2024-10-07 RX ADMIN — SIMETHICONE 80 MG: 80 TABLET, CHEWABLE ORAL at 11:52

## 2024-10-07 RX ADMIN — SIMETHICONE 80 MG: 80 TABLET, CHEWABLE ORAL at 19:39

## 2024-10-07 RX ADMIN — THIAMINE HCL TAB 100 MG 100 MG: 100 TAB at 11:52

## 2024-10-07 ASSESSMENT — ACTIVITIES OF DAILY LIVING (ADL)
ADLS_ACUITY_SCORE: 23
ADLS_ACUITY_SCORE: 22
ADLS_ACUITY_SCORE: 23
ADLS_ACUITY_SCORE: 22
ADLS_ACUITY_SCORE: 23
ADLS_ACUITY_SCORE: 22
ADLS_ACUITY_SCORE: 23
ADLS_ACUITY_SCORE: 22
ADLS_ACUITY_SCORE: 23
ADLS_ACUITY_SCORE: 22
ADLS_ACUITY_SCORE: 23

## 2024-10-07 NOTE — PLAN OF CARE
"0920-0071    Reason for admission: EDEMA OF LEG   Vitals: Blood pressure 121/56, pulse 85, temperature 98.9  F (37.2  C), temperature source Oral, resp. rate 17, height 1.676 m (5' 6\"), weight 115.3 kg (254 lb 4.8 oz), SpO2 98%.   Activity: Ambulates independently to the rest room and around the room.  Pain: Denied being in any pain.  Neuro:Alert and oriented x 4  Cardiac: Within defined limit. No abnormal VSS noted  Respiratory: No shortness of breath or trouble breathing. Oxygen between high 90's to 100% on room air.  GI/: Nichelle/dark colored urine. Passing watery stool. Mentioned stool was a little formed at 4 p.m.  Lines: L PIV Saline locked after Albumin administration.  Labs/imaging/Consults: In-process  Plan of care: Fluid restriction (1000 Litre), On a calorie count. Continue with current plan of care.      "

## 2024-10-07 NOTE — CONSULTS
[facility] Consult Note  ([place]) OBS  Referral Source/Reason for Visit: Routine Consult per patient request  Summary and Recommendations -    Met with Stewart in OBS. Stewart is bravely facing the weight of advanced disease of his liver and how that affects him and his family.  Stewart's goal is to live as long as he can with the hope that he can make sure his family is taken care of when he dies.  Stewart's shivam and prayer are resources that give him strength.    PLAN: I oriented Stewart to [facility] and chaplains remain available upon request.    Natalie Cross  Chaplain Resident      [facility] is available 24/7 for emergent requests and consults, either by paging the on-call  or by entering an ASAP/STAT consult in [employer], which will also page the on-call .    Assessment    Saw pt Stewart Hunt in OBS     Patient/Family Understanding of Illness and Goals of Care   Stewart understands that he has advanced disease of his liver.  His goal is to live as long as possible and to make sure his spouse and children are taken care of.    Distress and Loss  Stewart is feeling the weight of worrying about his family's needs if he dies.  Stewart feels responsible for the damage to his liver.  He says the possibility of getting a liver transplant, and it going well, are 50/50. If he does live long enough to get a transplant, the costs of that surgery are extremely high and he doesn't know how he could pay for it.    Strengths, Coping, and Resources   Stewart uses humor and jokes to lighten the heaviness of all he is dealing with right now.  Stewart expressed a lot of love and care for his spouse and kids who are so important to him.    Meaning, Beliefs, and Spirituality  Stewart grew up Gnosticist and identifies as Yazdanism.   He believes that God is with him and that he can pray to him wherever and whenever he needs.  I offered to pray with Stewart and he welcomed that.

## 2024-10-07 NOTE — PLAN OF CARE
"Shift: 1900-0730      VS: /56 (BP Location: Right arm)   Pulse 99   Temp 98.8  F (37.1  C) (Oral)   Resp 16   Ht 1.676 m (5' 6\")   Wt 115.3 kg (254 lb 4.8 oz)   SpO2 98% RA   BMI 41.05 kg/m    Pain: Pt states 2/10 pain in BLE. Declined interventions  Neuro:WDL  Cardiac:WDL  Respiratory:WDL. On RA  Diet/Appetite 2g sodium diet. 1000ml fluid restriction  GI/: Voiding spontaneously. Frequent loose stools during shift   LDA's: L PIV saline locked  Skin:No new skin changes noted   Activity: Up ad oscar  Test/Procedures:AM labs. Pt triggered sepsis Lactic acid 1.4  "

## 2024-10-07 NOTE — PROGRESS NOTES
"CLINICAL NUTRITION SERVICES - ASSESSMENT NOTE     Nutrition Prescription    RECOMMENDATIONS FOR MDs/PROVIDERS TO ORDER:  If pt is unable to consistently consume ~65% estimated nutrition needs via PO (1250 kcal/day & 60 g pro/day), consider FT placement & initiation of nutrition support     Malnutrition Status:    Moderate malnutrition in the context of chronic illness/disease    Recommendations already ordered by Registered Dietitian (RD):  Ensure Max BID   Robert counts per provider (10/8 - 10/11)     Future/Additional Recommendations:  Monitor nutrition-related findings and follow pt per protocol  If patient requires FT placement for enteral nutrition support, see recs below:  -Send a nutrition consult for \"Registered Dietitian to Order TF per Medical Nutrition Therapy Guidelines\" if desire RD to order TFs.    Dosing Weight: 77 kg   Goal Regimen: TwoCal HN @ 45 mL/hr (1080 mL/day) to provide 2160 kcals, 91 g PRO, 756 mL H2O, 237 g CHO and 5 g Fiber daily.   Guidelines for open EN support system with cartons due to liters unavailable:  -Instructions for RN:   Please abide by 8-hour hang-time for food safety. Pour 360 mL of formula into TF pump bag per 8-hour batch. If less than full cartons used for batch pour, discard excess formula unless otherwise directed by RD.  -Number of cartons for NSA to send:  Please send 6 cartons daily - this accounts for discarded formula per batch.   Flushes: 30 mL Q4hrs for tube patency   Titration:  - Initiate at 10 mL/hr and advance by 10 mL Q8hrs as tolerated   - Do not advance TF rate unless Mg++ >1.5, K+ is >3, and phos >1.9  Additional Vitamin/minerals:  - Order multivitamin/minerals to help ensure micronutrient needs being met with suspected hypermetabolic demands and potential interruptions to TF infusions.      REASON FOR ASSESSMENT  Stewart Hunt is a/an 39 year old male assessed by the dietitian for Positive Admission Nutrition Risk Screen  - unsure wt loss    Patient " admitted for lower extremity edema, decreased PO intake, vomiting.     MEDICAL HISTORY  PMH of decompensated alcoholic cirrhosis (sober since 9/8/24), lower extremity edema, anasarca, hepatic encephalopathy, depression.       NUTRITION HISTORY  Decreased PO intake, vomiting, 'nothing tastes good'  Has had cirrhosis diet ed w/ [facility] WB RD 9/21    Met with pt this afternoon (pt's wife on the phone), about 4 months ago intake started to decline - pt was only able to take bites of food, frequent emesis after intake, early satiety. Intakes improved for a while after the start of steroid medications ~4 weeks ago, but again now is having early satiety, eating small amounts. Is drinking 1 Ensure/day PTA. Typical intake would be breakfast of cheerios, cottage cheese, fruit; lunch of ensure + fruit; dinner meat/rich/veg - eating ~50% of what he would have prior to appetite loss/early satiety. Snacks may be apple sauce or SF popsicles.     Food allergies: None noted     CURRENT NUTRITION ORDERS  Diet:  2 g Sodium and 1000 mL Fluid Restriction  Intake/Tolerance: 100% majority of documented meals, one documented meal at 50%   Ordering 3 meals/day, as ordered provides avg of 1688 kcal/day and 68 g pro/day     GI  PTA ~9 BM/day w/ lactulose use   PTA emesis w/ PO intake, no issues with emesis since admission.     MEDICATIONS  Folic acid (1 mg/d), lactulose TID, MVI-M, protonix, simethicone, thiamine 100 mg/day. Aldactone & lasix - now both being held.     LABS  Sodium 126 (L) & osmolality 264 (L), fluid & sodium restrictions in place   Electrolytes  Potassium (mmol/L)   Date Value   10/07/2024 4.0   10/06/2024 3.9   10/06/2024 4.5     Phosphorus (mg/dL)   Date Value   10/04/2024 3.1   09/20/2024 2.8    Blood Glucose  Glucose (mg/dL)   Date Value   10/07/2024 80   10/06/2024 108 (H)   10/06/2024 79   10/05/2024 78   10/04/2024 70    Inflammatory Markers  WBC Count (10e3/uL)   Date Value   10/07/2024 10.0   10/06/2024 12.9 (H)  "  10/05/2024 14.1 (H)     Albumin (g/dL)   Date Value   10/07/2024 3.0 (L)   10/06/2024 2.5 (L)   10/05/2024 2.7 (L)      Magnesium (mg/dL)   Date Value   10/04/2024 1.7   09/22/2024 1.9   09/21/2024 2.0     Sodium (mmol/L)   Date Value   10/07/2024 126 (L)   10/06/2024 125 (L)   10/06/2024 125 (L)    Renal  Urea Nitrogen (mg/dL)   Date Value   10/07/2024 13.5   10/06/2024 15.4   10/06/2024 15.7     Creatinine (mg/dL)   Date Value   10/07/2024 0.85   10/06/2024 0.84   10/06/2024 0.92     Additional  Ketones Urine (mg/dL)   Date Value   10/06/2024 Negative     Platelet Count (10e3/uL)   Date Value   10/07/2024 66 (L)     aPTT (Seconds)   Date Value   09/20/2024 38     INR (no units)   Date Value   10/07/2024 3.77 (H)     INR (External) (INR)   Date Value   09/27/2024 3.1 (H)        RENAL  ANNA; GFR w/ Cystatin C 39, possible hepatorenal syndrome      RESPIRATORY  Room air     ANTHROPOMETRICS  Height: 167.6 cm (5' 6\")  Most Recent Weight: 115.3 kg (254 lb 4.8 oz)    IBW: 64.5 kg   179%IBW   Body mass index is 41.05 kg/m . BMI Category: Obesity Grade III BMI >40    Weight History:   -7.3% x1 month, -9.2% x3 months,  -12.1% x 7 months   Wt Readings from Last 15 Encounters:   10/07/24 115.3 kg (254 lb 4.8 oz)   09/22/24 56.2 kg (123 lb 12.8 oz)     10/06/24 0150 116.1 kg (255 lb 14.4 oz)   10/05/24 0700 115.8 kg (255 lb 4.8 oz)   10/04/24 1100 116.3 kg (256 lb 6.3 oz)   10/03/24 1740 115.2 kg (253 lb 15.5 oz)   10/03/24 0003 114.3 kg (252 lb)     09/22/24 : 56.2 kg (123 lb 12.8 oz) - Echocardiography Laboratory     CE:   118.7 kg (261 lb 11.2 oz) 09/27/2024   122.7 kg (270 lb 8 oz) 09/16/2024   124.7 kg (275 lb) 09/10/2024   124.4 kg (274 lb 3.2 oz) 09/07/2024   127 kg (280 lb) 07/09/2024   289 lb 6 oz lbs 02/29/2024   289 lb 6 oz lbs 02/29/2024     ASSESSED NUTRITION NEEDS  Dosing Weight: 77 kg (Adjusted BW) based on lowest doc wt this adm (114.3 kg on 10/3) and IBW of 64.5 kg  Estimated Energy Needs: 1925 - 2310 " kcals/day (25 - 30 kcals/kg)  Justification: Maintenance  Estimated Protein Needs: 92 - 116 grams protein/day (1.2 - 1.5 grams of pro/kg)  Justification: Increased needs w/ decompensated cirrhosis   Estimated Fluid Needs: 1000 mL/day (Fluid restriction)   Justification: Fluid Restriction    PHYSICAL FINDINGS  See malnutrition section below.  Jaundice      SKIN  No pressure injuries documented at this time     MALNUTRITION  % Intake: < 75% for > 7 days (moderate)  % Weight Loss: > 5% in 1 month (severe malnutrition)  Subcutaneous Fat Loss: Upper arm:  mild   Muscle Loss: Temporal:  mild and Upper arm (bicep, tricep):  mild  Fluid Accumulation/Edema: Moderate  Malnutrition Diagnosis: Moderate malnutrition in the context of chronic illness/disease    NUTRITION DIAGNOSIS  Inadequate protein intake related to early satiety, reduced PO intakes as evidenced by increased protein needs, diet history, significant wt loss, tissue wasting       INTERVENTIONS  Implementation  Nutrition education for recommended modifications   Medical food supplement therapy     Goals  Patient to consume % of nutritionally adequate meal trays TID, or the equivalent with supplements/snacks.        Monitoring/Evaluation  Progress toward goals will be monitored and evaluated per protocol.    Juliette Castro, MPH, RDN, LD  6B + 1A Obs RD [place] [6B or 1A Obs Clinical Dietitian]   Weekend/Holiday: Vocera - Weekend Clinical Dietitian

## 2024-10-07 NOTE — PLAN OF CARE
"0700-1930    Reason for admission: leg swelling   Vitals:   /56 (BP Location: Right arm)   Pulse 99   Temp 98.8  F (37.1  C) (Oral)   Resp 16   Ht 1.676 m (5' 6\")   Wt 115.3 kg (254 lb 4.8 oz)   SpO2 98%   BMI 41.05 kg/m    Activity: independent  Pain: denies  Neuro: WDL. A&Ox4   Cardiac: WDL  Respiratory: WDL  GI/: dark, cherry urine. Loose/watery stools.   Diet: 2 gram sodium diet. Strict I/Os.   Lines: L PIV SL.  Labs/imaging/Consults: dietician,   Discharge Plan: continue POC.     Problem: Adult Inpatient Plan of Care  Goal: Absence of Hospital-Acquired Illness or Injury  Intervention: Prevent Infection  Recent Flowsheet Documentation  Taken 10/7/2024 0845 by Collette Carter, RN  Infection Prevention:   hand hygiene promoted   rest/sleep promoted   single patient room provided         "

## 2024-10-07 NOTE — PROGRESS NOTES
Nephrology Progress Note  10/07/2024         ASSESSMENT AND RECOMMENDATIONS:   Stewart Hunt is a 39 year old male with history of alcoholic EtOH cirrhosis (sober since 9/8/2024), and recent admission for anasarca for which he was started on diuretics. He is admitted with hyponatremia which acutely worsened after diuretics were increased, concerning for pre-renal etiology.     We are consulted for hyponatremia.     #Hyponatremia 2/2 etoh cirrhosis   #ANNA, suspect pre-renal etiology  #Decompensated alcoholic cirrhosis   Baseline Na is 136. Was on home lasix 20 and spironolactone 50 and noticed worsening edema.  When he was admitted, diuretics doubled to Lasix 40/aspen 100 and the next day Na dropped to 125.   Margaret<20 and Uosm 305, suggesting likely 2/2 poor effective arterial flow in setting of cirrhosis, recent diuretic increase, and likely third-spacing given low albumin   He also had high stool output at home (4-5 times loose stool at home on lactulose)   No ANNA or CKD history  -continue albumin trial day 2/3 of 100g albumin qday  -continue holding lasix and spironolactone for now   -control ADH triggers of pain, stess and nausea   -continue fluid restriction of 1 liter/day      Recommendations were communicated to primary team via [employer]. Spoke with patient and Paula (spouse).    Seen and discussed with Dr. Helene Parham MD   Division of Renal Disease and Hypertension  [facility]  [employer] Web Console      Interval History :   Nursing and provider notes from last 24 hours reviewed.  In the last 24 hours:  No acute events  BM x3 overnight, continue lactulose TID  Adhering to 1L fluid restriction  No fever, dyspnea, chest pain, vomiting  Did have epistaxis which was controlled with packing of nostril    Review of Systems:   I reviewed the following systems:  GI: Good appetite. No nausea or vomiting or diarrhea.   Neuro:  No confusion  Constitutional:  No fever or chills  CV: No dyspnea or  "edema.  No chest pain.    Physical Exam:   I/O last 3 completed shifts:  In: 1840 [P.O.:1840]  Out: 500 [Urine:500]   /56 (BP Location: Right arm)   Pulse 99   Temp 98.8  F (37.1  C) (Oral)   Resp 16   Ht 1.676 m (5' 6\")   Wt 115.3 kg (254 lb 4.8 oz)   SpO2 98%   BMI 41.05 kg/m       GENERAL APPEARANCE: no distress, sitting up in bed, awake  EYES:  scleral icterus, pupils equal  Endo: no goiter, no moon facies  Lymphatics: no cervical or supraclavicular LAD  Pulmonary: lungs clear to auscultation with equal breath sounds bilaterally, no clubbing  CV: regular rhythm, normal rate, no rub   - Edema 2+ bilaterally  GI: soft, nontender, distended, normal bowel sounds  : no johnson  SKIN: no rash, warm, dry, no cyanosis  NEURO: face symmetric, no asterixis     Labs:   All labs reviewed by me  Electrolytes/Renal -   Recent Labs   Lab Test 10/07/24  0523 10/06/24  1336 10/06/24  0609 10/05/24  0617 10/04/24  0532 09/27/24  0847 09/22/24  0643 09/21/24  0752 09/21/24  0615 09/20/24  1847   * 125* 125*   < > 131*   < > 131*  131*  --    < > 136   POTASSIUM 4.0 3.9 4.5   < > 3.7   < > 3.7  3.7  --    < > 4.2   CHLORIDE 96* 96* 94*   < > 100   < > 96*  --    < > 101   CO2 20* 19* 20*   < > 23   < > 22  --    < > 24   BUN 13.5 15.4 15.7   < > 16.9   < > 15.8  --    < > 13.0   CR 0.85 0.84 0.92   < > 0.78   < > 0.99  --    < > 0.56*   GLC 80 108* 79   < > 70   < > 65*  --    < > 110*   LELAND 7.8* 7.6* 7.7*   < > 8.0*   < > 7.9*  --    < > 8.3*   MAG  --   --   --   --  1.7  --  1.9 2.0  --  2.1   PHOS  --   --   --   --  3.1  --   --   --   --  2.8    < > = values in this interval not displayed.       CBC -   Recent Labs   Lab Test 10/07/24  0523 10/06/24  0609 10/05/24  0617   WBC 10.0 12.9* 14.1*   HGB 11.8* 13.7 13.4   PLT 66* 76* 74*       LFTs -   Recent Labs   Lab Test 10/07/24  0523 10/06/24  0609 10/05/24  0617 10/03/24  0450 09/22/24  0643 09/20/24  1847   ALKPHOS 172* 230* 249*   < > 186* 217* "   BILITOTAL 26.7* 26.4* 27.0*   < > 26.6*  27.5* 29.8*  30.6*   ALT 97* 143* 161*   < > 82* 68   * 203* 233*   < > 226* 126*   PROTTOTAL  --   --   --   --  5.0* 5.5*   ALBUMIN 3.0* 2.5* 2.7*   < > 2.5* 2.9*    < > = values in this interval not displayed.       Iron Panel - No lab results found.      Imaging:  All imaging studies reviewed by me.     Current Medications:  Current Facility-Administered Medications   Medication Dose Route Frequency Provider Last Rate Last Admin    albumin human 25 % injection 100 g  100 g Intravenous Daily Jennifer Cartagena APRN CNP   100 g at 10/07/24 1155    folic acid (FOLVITE) tablet 1 mg  1 mg Oral Daily Deb Erickson MD   1 mg at 10/07/24 1153    [Held by provider] furosemide (LASIX) tablet 40 mg  40 mg Oral Daily Dominique Vera PA-C   40 mg at 10/04/24 0846    lactulose (CEPHULAC) Packet 20 g  20 g Oral TID Deb Erickson MD   20 g at 10/06/24 1507    multivitamin w/minerals (THERA-VIT-M) tablet 1 tablet  1 tablet Oral Daily Deb Erickson MD   1 tablet at 10/07/24 1152    pantoprazole (PROTONIX) EC tablet 40 mg  40 mg Oral QAM AC Jennifer Cartagena APRN CNP   40 mg at 10/07/24 1153    simethicone (MYLICON) chewable tablet 80 mg  80 mg Oral BID Deb Erickson MD   80 mg at 10/07/24 1152    [Held by provider] spironolactone (ALDACTONE) tablet 100 mg  100 mg Oral Daily Dominique Vera PA-C   100 mg at 10/05/24 0931    thiamine (B-1) tablet 100 mg  100 mg Oral Daily Deb Erickson MD   100 mg at 10/07/24 1152     Current Facility-Administered Medications   Medication Dose Route Frequency Provider Last Rate Last Admin     Stephanie Parham MD

## 2024-10-07 NOTE — PROGRESS NOTES
[facility]    Medicine Progress Note - Hospitalist Service    Date of Admission:  10/3/2024    Assessment & Plan    Stewart Hunt is a 39 year old male admitted on 10/3/2024. He has hx of alcohol cirrhosis and admitted 9/20-22 for worsening anasarca and hyperbilirubinemia.      Lower extremity edema  Anasarca  Hyponatremia 2/2 etoh cirrhosis  ANNA, prerenal with possible HRS physiology (cystatin c 1.8, GFR 39%)   Home reg: lasix 20mg daily and aldactone 50mg daily since last admission 10 days ago, and has been having ++ urination without improvement in edema. On lactulose and had been having multiple bowel movements daily along with poor appetite. Albumin 2-3. most likely due to third spacing. Cr wnl 0.94-->0.78-->0.89. Sodium decreased from 131 to 125 10/5 with increased diuretic dosing. BL Na ~136.  Cr at  0.85, but cystatin C at 1.8. GFR calculated with Cystain C 39. Blood osmolality 264. Sodium urine <20, urine osmolality 305.Suspected ANNA with possible HRS physiology per discussions with Nephrology  - Hepatology and Nephrology consults  - Daily CMP   - HOLD PTA lasix, spironolactone   -Increased lasix to 40 mg daily and spironolactone to 100 mg daily 10/3- lasix on hold starting 10/5 for low sodium, hold both 10/6 until nephrology recs  - 3 day Albumin challenge for hepatorenal syndrome   - 2 gm sodium diet, 1L FR  - strict I+Os, daily weights     Decompensated alcoholic cirrhosis  Alcoholic hepatitis   Hx of hepatic encephalopathy  Follows with OP hepatology. Prednisolone taper discontinued. US 9/21 without evidence of HCC, hepatomegaly. No EV on recent EGD. T. Bili initially downtrending, slight increase 10/5. Mentation currently clear.   - Hepatology consult  - Daily MELD labs  - HOLD PTA diuresis above  - Continue lactulose TID, titrate to achieve 3-5 bowel movements daily  - May need rifaximin in the future (per GI)  - Monitor for hepatic  "encephalopathy    MELD 3.0: 34 at 10/7/2024  5:23 AM  MELD-Na: 36 at 10/7/2024  5:23 AM  Calculated from:  Serum Creatinine: 0.85 mg/dL (Using min of 1 mg/dL) at 10/7/2024  5:23 AM  Serum Sodium: 126 mmol/L at 10/7/2024  5:23 AM  Total Bilirubin: 26.7 mg/dL at 10/7/2024  5:23 AM  Serum Albumin: 3.0 g/dL at 10/7/2024  5:23 AM  INR(ratio): 3.77 at 10/7/2024  5:23 AM  Age at listing (hypothetical): 39 years  Sex: Male at 10/7/2024  5:23 AM    GERD  Left sided chest pain, resolved  ED visit with same complaints on 7/26/24. ACS and PE workup normal. Started on pepcid and simethicone. Has c/o L sided chest pain for the last 2 mornings. Pain is reproducible with palpation. Has also had a sore throat for \"months\".   - Discontinue pepcid  - Start protonix daily  - Continue simethicone    Leukocytosis, resolved   Elevated procalcitonin   Sore throat, ongoing   Cough, hemoptysis?   Procal 1.77 on 10/5. WBC WNL on 10/7. No dysuria, no fever, [facility], new O2 needs on 10/7.  Has throat pain but viral panels repetiteively negative. Possibly due to prednisolone that he was taking. CXR and UA unremarkable. Remains afebrile. WBC trending down now that he is off steroids. Reports coughing up blood, but may be in setting of epistaxis as observed on 10/7.   - Trend CXR, Covid/respiratory panel  - Trend daily CBC   - Hold off initiating empiric antibiotic  - PRN  lozenges, chloraseptic spray, Robitussin   - Continue to monitor      Moderate malnutrition, in the context of chronic illness/disease   Vomiting, resolved   Diarrhea, in setting of lactulose  Nothing tastes good, and had one non bloody emesis several days ago. States appetite better when on  higher dose prednisolone (currently on 1-mg).  - RD consult  - Continue lactulose TID, titrate to achieve 3-5 bowel movements daily  - Discuss renally appropriate MVI in AM  - PRN zofran  - Start calorie counts for 3 days     Epistaxis, resolved  Noted on 10/7 exam. Has had on/off for several " days. Resolved with conservative treatment.  - Monitor coags and Hgb daily   - PRN Ocean spray  - Consider Afrin trial for 3 days if ongoing   - Monitor and notify provider if ongoing     Fatigue, improving   Insomnia, improving   Fell asleep at desk while sitting and working on computer. See work-up as above for cirrhosis. No s/s of hepatic encelopathy. Currently normal mentation  - Cirrhosis treatment as above     Gallbladder polyp  - Continue to monitor 4 mm polyp seen on US      Hx of alcohol use disorder, sober since 9/8/2024  Attending AA and has been sober since 9/8/24. Was recommended inpatient rehab, but he feels that since he has such high MELD, he does not want to be away from his family for 30 days.  - Thiamine, folic acid daily   - Continue to attend AA    Depressed mood/Grief  Both the patient and wife started tearing up talking about poor prognosis and not knowing what to expect including what they can do while 'waiting 90 days.'  - Spiritual Health consult per patient request    Hepatitis B status  Received hepatis B 1st dose on 9/10/24. Wife inquiring about second dose that should be scheduled for tomorrow. Per discussion with pharmacy, ok to push back several days and give prior to discharge.  - 2nd Hepatitis B vaccine prior to discharge per discussion with Pharmacy on 10/6.       Diet: 2 Gram Sodium Diet  Fluid restriction 1000 ML FLUID    DVT Prophylaxis: Anti-embolisim stockings (TEDs) and Ambulate every shift  Gomes Catheter: Not present  Lines: None     Cardiac Monitoring: None  Code Status: Full Code      Clinically Significant Risk Factors         # Hyponatremia: Lowest Na = 125 mmol/L in last 2 days, will monitor as appropriate      # Hypoalbuminemia: Lowest albumin = 2.5 g/dL at 10/6/2024  6:09 AM, will monitor as appropriate  # Coagulation Defect: INR = 3.77 (Ref range: 0.85 - 1.15) and/or PTT = 38 Seconds (Ref range: 22 - 38 Seconds), will monitor for bleeding  # Thrombocytopenia: Lowest  "platelets = 66 in last 2 days, will monitor for bleeding             # Severe Obesity: Estimated body mass index is 41.05 kg/m  as calculated from the following:    Height as of this encounter: 1.676 m (5' 6\").    Weight as of this encounter: 115.3 kg (254 lb 4.8 oz)., PRESENT ON ADMISSION            Disposition Plan     Medically Ready for Discharge: Anticipated in 2-4 Days      The patient's care was discussed with the Attending Physician, Dr. Walls, Bedside Nurse, Patient, and Patient's Family(wife-Paula via phone).    GRISELDA Lawson [facility]  Hospitalist Service  [facility]  Securely message with Vocera (more info)  Text page via [employer] Paging/Directory     This chart documentation was completed with Dragon voice-recognition software. Even though reviewed, this chart may still contain some grammatical, spelling, and word errors. Please contact the author for any questions or clarifications.     ______________________________________________________________________    Interval History   Nursing/SW/consult/interdisciplinary healthcare worker's notes reviewed.     I reviewed all medications, new labs and imaging results over the last 24 hours. Please see discussion of these results in the A/P.    No acute events overnight. HDS. Na remains at 126. LA 1.4. 3x BMs overnight. 1L FR. Bloody nose noted with packing in right nostril upon interview and exam. Noting ongoing sore throat and coughing over that past week that has not improved with sore throat spray. No fevers overnight. Notes he has coughed up some blood in evening, but usually clear sputum in AM. Denies any dyspnea upon exertion, chest pain, or shortness of breath     ROS: 12 point ROS negative other than the symptoms noted here or above in the assessment and plan.       Physical Exam   Vital Signs: Temp: 98.8  F (37.1  C) Temp src: Oral BP: 121/56 Pulse: 99   Resp: 16 SpO2: 98 % O2 Device: None (Room air)  "   Weight: 254 lbs 4.8 oz    Constitutional: awake, alert, cooperative, no apparent distress, and appears stated age  Eyes: Yellow sclera B/L. PERRLA.   ENT: Atrumatic. No swelling noted of uvula or tonsils. No exudate noted. Packing of left nose noted with dried blood. Some blood noted in back of throat that clears easily with coughing.   Hematologic / Lymphatic: no cervical lymphadenopathy and no supraclavicular lymphadenopathy  Respiratory: No increased work of breathing, good air exchange, clear to auscultation bilaterally, no crackles or wheezing  Cardiovascular: Normal apical impulse, regular rate and rhythm, normal S1 and S2, no S3 or S4, and no murmur noted  GI: No scars, normal bowel sounds, soft, non-distended, non-tender, no masses palpated  Skin: Jaundiced. No other lesions or rashes noted on exposed skin.   Musculoskeletal: There is no redness, warmth, or swelling of the joints.  Full range of motion noted.  Motor strength is 5 out of 5 all extremities bilaterally.  Tone is normal. +3 pitting edema B/L LE.   Neurologic: Awake, alert, oriented to name, place and time.  Cranial nerves II-XII are grossly intact.  Motor is 5 out of 5 bilaterally. No asterix noted   Neuropsychiatric: General: normal, calm, and normal eye contact  Level of consciousness: alert / normal  Affect: normal  Orientation: oriented to self, place, time and situation  Memory and insight: normal, memory for past and recent events intact, and thought process normal    Medical Decision Making       60 MINUTES SPENT BY ME on the date of service doing chart review, history, exam, documentation & further activities per the note.      Data     I have personally reviewed the following data over the past 24 hrs:    10.0  \   11.8 (L)   / 66 (L)     126 (L) 96 (L) 13.5 /  80   4.0 20 (L) 0.85 \     ALT: 97 (H) AST: 134 (H) AP: 172 (H) TBILI: 26.7 (HH)   ALB: 3.0 (L) TOT PROTEIN: N/A LIPASE: N/A     Procal: N/A CRP: N/A Lactic Acid: 1.4        INR:  3.77 (H) PTT:  N/A   D-dimer:  N/A Fibrinogen:  N/A       Imaging results reviewed over the past 24 hrs:   No results found for this or any previous visit (from the past 24 hour(s)).

## 2024-10-08 ENCOUNTER — ANCILLARY PROCEDURE (OUTPATIENT)
Dept: ULTRASOUND IMAGING | Facility: CLINIC | Age: 39
End: 2024-10-08
Attending: STUDENT IN AN ORGANIZED HEALTH CARE EDUCATION/TRAINING PROGRAM
Payer: COMMERCIAL

## 2024-10-08 LAB
ALBUMIN SERPL BCG-MCNC: 3.5 G/DL (ref 3.5–5.2)
ALP SERPL-CCNC: 154 U/L (ref 40–150)
ALT SERPL W P-5'-P-CCNC: 82 U/L (ref 0–70)
ANION GAP SERPL CALCULATED.3IONS-SCNC: 12 MMOL/L (ref 7–15)
AST SERPL W P-5'-P-CCNC: 118 U/L (ref 0–45)
BACTERIA BLD CULT: NO GROWTH
BASOPHILS # BLD AUTO: 0.1 10E3/UL (ref 0–0.2)
BASOPHILS NFR BLD AUTO: 1 %
BILIRUB SERPL-MCNC: 28.2 MG/DL
BUN SERPL-MCNC: 15.9 MG/DL (ref 6–20)
CALCIUM SERPL-MCNC: 8 MG/DL (ref 8.8–10.4)
CHLORIDE SERPL-SCNC: 100 MMOL/L (ref 98–107)
CREAT SERPL-MCNC: 0.83 MG/DL (ref 0.67–1.17)
EGFRCR SERPLBLD CKD-EPI 2021: >90 ML/MIN/1.73M2
ENTEROCOCCUS FAECALIS: NOT DETECTED
ENTEROCOCCUS FAECIUM: NOT DETECTED
EOSINOPHIL # BLD AUTO: 0.1 10E3/UL (ref 0–0.7)
EOSINOPHIL NFR BLD AUTO: 1 %
ERYTHROCYTE [DISTWIDTH] IN BLOOD BY AUTOMATED COUNT: 18 % (ref 10–15)
GLUCOSE SERPL-MCNC: 103 MG/DL (ref 70–99)
GROUP A STREP BY PCR: NOT DETECTED
HCO3 SERPL-SCNC: 19 MMOL/L (ref 22–29)
HCT VFR BLD AUTO: 32.3 % (ref 40–53)
HGB BLD-MCNC: 11 G/DL (ref 13.3–17.7)
HGB BLD-MCNC: 11.2 G/DL (ref 13.3–17.7)
IMM GRANULOCYTES # BLD: 0.1 10E3/UL
IMM GRANULOCYTES NFR BLD: 1 %
INR PPP: 3.48 (ref 0.85–1.15)
LACTATE SERPL-SCNC: 2 MMOL/L (ref 0.7–2)
LISTERIA SPECIES (DETECTED/NOT DETECTED): NOT DETECTED
LYMPHOCYTES # BLD AUTO: 1.3 10E3/UL (ref 0.8–5.3)
LYMPHOCYTES NFR BLD AUTO: 15 %
MCH RBC QN AUTO: 35.9 PG (ref 26.5–33)
MCHC RBC AUTO-ENTMCNC: 34.7 G/DL (ref 31.5–36.5)
MCV RBC AUTO: 104 FL (ref 78–100)
MONOCYTES # BLD AUTO: 0.6 10E3/UL (ref 0–1.3)
MONOCYTES NFR BLD AUTO: 7 %
MRSA DNA SPEC QL NAA+PROBE: NEGATIVE
NEUTROPHILS # BLD AUTO: 6.5 10E3/UL (ref 1.6–8.3)
NEUTROPHILS NFR BLD AUTO: 75 %
NRBC # BLD AUTO: 0 10E3/UL
NRBC BLD AUTO-RTO: 0 /100
PLATELET # BLD AUTO: 63 10E3/UL (ref 150–450)
POTASSIUM SERPL-SCNC: 4.4 MMOL/L (ref 3.4–5.3)
PROCALCITONIN SERPL IA-MCNC: 1.67 NG/ML
PROT SERPL-MCNC: ABNORMAL G/DL
RBC # BLD AUTO: 3.12 10E6/UL (ref 4.4–5.9)
SA TARGET DNA: POSITIVE
SODIUM SERPL-SCNC: 131 MMOL/L (ref 135–145)
STAPHYLOCOCCUS AUREUS: DETECTED
STAPHYLOCOCCUS EPIDERMIDIS: NOT DETECTED
STAPHYLOCOCCUS LUGDUNENSIS: NOT DETECTED
STREPTOCOCCUS AGALACTIAE: NOT DETECTED
STREPTOCOCCUS ANGINOSUS GROUP: NOT DETECTED
STREPTOCOCCUS PNEUMONIAE: NOT DETECTED
STREPTOCOCCUS PYOGENES: NOT DETECTED
STREPTOCOCCUS SPECIES: NOT DETECTED
WBC # BLD AUTO: 8.7 10E3/UL (ref 4–11)

## 2024-10-08 PROCEDURE — 85018 HEMOGLOBIN: CPT

## 2024-10-08 PROCEDURE — 87040 BLOOD CULTURE FOR BACTERIA: CPT

## 2024-10-08 PROCEDURE — 250N000011 HC RX IP 250 OP 636: Mod: JZ

## 2024-10-08 PROCEDURE — 93005 ELECTROCARDIOGRAM TRACING: CPT

## 2024-10-08 PROCEDURE — 87641 MR-STAPH DNA AMP PROBE: CPT

## 2024-10-08 PROCEDURE — 80048 BASIC METABOLIC PNL TOTAL CA: CPT

## 2024-10-08 PROCEDURE — 36415 COLL VENOUS BLD VENIPUNCTURE: CPT

## 2024-10-08 PROCEDURE — P9047 ALBUMIN (HUMAN), 25%, 50ML: HCPCS | Mod: JZ

## 2024-10-08 PROCEDURE — 250N000009 HC RX 250

## 2024-10-08 PROCEDURE — 99233 SBSQ HOSP IP/OBS HIGH 50: CPT | Mod: GC | Performed by: INTERNAL MEDICINE

## 2024-10-08 PROCEDURE — 87651 STREP A DNA AMP PROBE: CPT

## 2024-10-08 PROCEDURE — 84145 PROCALCITONIN (PCT): CPT

## 2024-10-08 PROCEDURE — 87149 DNA/RNA DIRECT PROBE: CPT

## 2024-10-08 PROCEDURE — 250N000011 HC RX IP 250 OP 636: Performed by: PHYSICIAN ASSISTANT

## 2024-10-08 PROCEDURE — 99233 SBSQ HOSP IP/OBS HIGH 50: CPT | Performed by: NURSE PRACTITIONER

## 2024-10-08 PROCEDURE — 87077 CULTURE AEROBIC IDENTIFY: CPT

## 2024-10-08 PROCEDURE — 84075 ASSAY ALKALINE PHOSPHATASE: CPT

## 2024-10-08 PROCEDURE — 250N000013 HC RX MED GY IP 250 OP 250 PS 637

## 2024-10-08 PROCEDURE — 120N000002 HC R&B MED SURG/OB UMMC

## 2024-10-08 PROCEDURE — 85025 COMPLETE CBC W/AUTO DIFF WBC: CPT

## 2024-10-08 PROCEDURE — 250N000011 HC RX IP 250 OP 636

## 2024-10-08 PROCEDURE — 83605 ASSAY OF LACTIC ACID: CPT | Performed by: STUDENT IN AN ORGANIZED HEALTH CARE EDUCATION/TRAINING PROGRAM

## 2024-10-08 PROCEDURE — 86481 TB AG RESPONSE T-CELL SUSP: CPT

## 2024-10-08 PROCEDURE — 87040 BLOOD CULTURE FOR BACTERIA: CPT | Performed by: PHYSICIAN ASSISTANT

## 2024-10-08 PROCEDURE — 250N000009 HC RX 250: Performed by: STUDENT IN AN ORGANIZED HEALTH CARE EDUCATION/TRAINING PROGRAM

## 2024-10-08 PROCEDURE — 36415 COLL VENOUS BLD VENIPUNCTURE: CPT | Performed by: STUDENT IN AN ORGANIZED HEALTH CARE EDUCATION/TRAINING PROGRAM

## 2024-10-08 PROCEDURE — 85610 PROTHROMBIN TIME: CPT

## 2024-10-08 PROCEDURE — 36415 COLL VENOUS BLD VENIPUNCTURE: CPT | Performed by: PHYSICIAN ASSISTANT

## 2024-10-08 PROCEDURE — 250N000011 HC RX IP 250 OP 636: Performed by: STUDENT IN AN ORGANIZED HEALTH CARE EDUCATION/TRAINING PROGRAM

## 2024-10-08 PROCEDURE — 81003 URINALYSIS AUTO W/O SCOPE: CPT | Performed by: PHYSICIAN ASSISTANT

## 2024-10-08 PROCEDURE — 250N000013 HC RX MED GY IP 250 OP 250 PS 637: Performed by: INTERNAL MEDICINE

## 2024-10-08 PROCEDURE — 99233 SBSQ HOSP IP/OBS HIGH 50: CPT | Mod: FS | Performed by: STUDENT IN AN ORGANIZED HEALTH CARE EDUCATION/TRAINING PROGRAM

## 2024-10-08 PROCEDURE — 93010 ELECTROCARDIOGRAM REPORT: CPT | Performed by: INTERNAL MEDICINE

## 2024-10-08 PROCEDURE — 250N000013 HC RX MED GY IP 250 OP 250 PS 637: Performed by: PHYSICIAN ASSISTANT

## 2024-10-08 RX ORDER — ACETAMINOPHEN 325 MG/1
325 TABLET ORAL ONCE
Status: COMPLETED | OUTPATIENT
Start: 2024-10-08 | End: 2024-10-08

## 2024-10-08 RX ORDER — PANTOPRAZOLE SODIUM 40 MG/1
40 TABLET, DELAYED RELEASE ORAL
Status: DISCONTINUED | OUTPATIENT
Start: 2024-10-09 | End: 2024-10-18 | Stop reason: HOSPADM

## 2024-10-08 RX ORDER — ACETAMINOPHEN 325 MG/1
325 TABLET ORAL EVERY 4 HOURS PRN
Status: DISCONTINUED | OUTPATIENT
Start: 2024-10-08 | End: 2024-10-18 | Stop reason: HOSPADM

## 2024-10-08 RX ORDER — LIDOCAINE 4 G/G
1 PATCH TOPICAL
Status: DISCONTINUED | OUTPATIENT
Start: 2024-10-08 | End: 2024-10-18 | Stop reason: HOSPADM

## 2024-10-08 RX ORDER — VANCOMYCIN HYDROCHLORIDE 1 G/200ML
2000 INJECTION, SOLUTION INTRAVENOUS ONCE
Status: COMPLETED | OUTPATIENT
Start: 2024-10-08 | End: 2024-10-08

## 2024-10-08 RX ORDER — PIPERACILLIN SODIUM, TAZOBACTAM SODIUM 4; .5 G/20ML; G/20ML
4.5 INJECTION, POWDER, LYOPHILIZED, FOR SOLUTION INTRAVENOUS EVERY 6 HOURS
Status: DISCONTINUED | OUTPATIENT
Start: 2024-10-08 | End: 2024-10-09

## 2024-10-08 RX ORDER — OXYMETAZOLINE HYDROCHLORIDE 0.05 G/100ML
2 SPRAY NASAL 2 TIMES DAILY PRN
Status: DISPENSED | OUTPATIENT
Start: 2024-10-08 | End: 2024-10-11

## 2024-10-08 RX ORDER — NITROGLYCERIN 4 MG/G
1 OINTMENT RECTAL EVERY 12 HOURS
Status: DISCONTINUED | OUTPATIENT
Start: 2024-10-08 | End: 2024-10-18 | Stop reason: HOSPADM

## 2024-10-08 RX ORDER — CEFTRIAXONE 2 G/1
2 INJECTION, POWDER, FOR SOLUTION INTRAMUSCULAR; INTRAVENOUS EVERY 24 HOURS
Status: DISCONTINUED | OUTPATIENT
Start: 2024-10-08 | End: 2024-10-09

## 2024-10-08 RX ORDER — VANCOMYCIN HYDROCHLORIDE 1 G/200ML
2000 INJECTION, SOLUTION INTRAVENOUS
Status: DISCONTINUED | OUTPATIENT
Start: 2024-10-09 | End: 2024-10-09

## 2024-10-08 RX ADMIN — PANTOPRAZOLE SODIUM 40 MG: 40 INJECTION, POWDER, FOR SOLUTION INTRAVENOUS at 04:35

## 2024-10-08 RX ADMIN — SIMETHICONE 80 MG: 80 TABLET, CHEWABLE ORAL at 20:52

## 2024-10-08 RX ADMIN — FOLIC ACID 1 MG: 1 TABLET ORAL at 08:04

## 2024-10-08 RX ADMIN — GUAIFENESIN 600 MG: 600 TABLET ORAL at 10:56

## 2024-10-08 RX ADMIN — ALBUMIN HUMAN 100 G: 0.25 SOLUTION INTRAVENOUS at 08:05

## 2024-10-08 RX ADMIN — SIMETHICONE 80 MG: 80 TABLET, CHEWABLE ORAL at 08:04

## 2024-10-08 RX ADMIN — GUAIFENESIN 600 MG: 600 TABLET ORAL at 22:17

## 2024-10-08 RX ADMIN — PIPERACILLIN AND TAZOBACTAM 4.5 G: 4; .5 INJECTION, POWDER, FOR SOLUTION INTRAVENOUS at 19:07

## 2024-10-08 RX ADMIN — THIAMINE HCL TAB 100 MG 100 MG: 100 TAB at 08:04

## 2024-10-08 RX ADMIN — ACETAMINOPHEN 325 MG: 325 TABLET, FILM COATED ORAL at 18:21

## 2024-10-08 RX ADMIN — VANCOMYCIN HYDROCHLORIDE 2000 MG: 1 INJECTION, SOLUTION INTRAVENOUS at 20:33

## 2024-10-08 RX ADMIN — Medication 1 TABLET: at 08:04

## 2024-10-08 RX ADMIN — CEFTRIAXONE SODIUM 2 G: 2 INJECTION, POWDER, FOR SOLUTION INTRAMUSCULAR; INTRAVENOUS at 01:37

## 2024-10-08 RX ADMIN — LACTULOSE 20 G: 20 POWDER, FOR SOLUTION ORAL at 08:22

## 2024-10-08 RX ADMIN — ACETAMINOPHEN 325 MG: 325 TABLET, FILM COATED ORAL at 18:41

## 2024-10-08 RX ADMIN — OXYMETAZOLINE HYDROCHLORIDE 2 SPRAY: 0.5 SPRAY NASAL at 15:08

## 2024-10-08 RX ADMIN — LIDOCAINE 1 PATCH: 4 PATCH TOPICAL at 20:34

## 2024-10-08 ASSESSMENT — ACTIVITIES OF DAILY LIVING (ADL)
ADLS_ACUITY_SCORE: 22

## 2024-10-08 NOTE — PROGRESS NOTES
HEPATOLOGY PROGRESS NOTE  Patient:  Stewart Hunt, Date of birth 1985  Date of Visit:  10/08/2024  Referring Provider No ref. provider found         IMPRESSION:  Stewart Hunt is a 39 year old male with a history of alcohol use disorder causing alcohol associated cirrhosis complicated by nonresponder to steroids, hx sleeve gastrectomy, anasarca and HE who was admitted with lower extremity edema. He had a drop in sodium level over the weekend which improved with albumin.       RECOMMENDATIONS:    Updates for 10/08/2024 :  - continue to trend hemoglobin and monitor for rectal bleeding    # Alcohol related hepatitis  # Alcohol use disorder  # Alcohol associated cirrhosis  - MELD 33, recent nonresponder to steroids.   - trace ascites seen, has not needed clau.   - US without evidence of HCC, has +hepatomegaly  - no EV on recent EGD.   - t. Bili has stabilized   - plan for OP treatment program    # Bloody expectorant  - does not correlate with hemoptysis. CXR wnl.   - agree with quantiferon.     # BRBPR  - having intermittent bloody output  - recent colon without hemorrhoids. Continue to monitor.     # Hepatic encephalopathy  - continue with lactulose with goal of 3-5 BM's per day.   - mentation clear.     Patient was discussed with attending physician, Dr. Bella Jean.  The above note reflects our joint plan.     Next follow up appointment: Dr. Dodd 10/21/24    Thank you for the opportunity to be involved with  Stewart Hunt care. Please call with any questions or concerns.    GRISELDA Leal, CNP  Inpatient Hepatology [facility]              Subjective    Reports overnight having coughing and bloody output. He denies emesis or hematemesis. No change in mentation. Had one episode of elevated temp to 100.7 overnight. Shared photo of brown stool with area of bright red area at the end. Denies dyspnea. Did have epistaxis x1.         Objective    VS: /45 (BP Location: Right arm)   Pulse 88   Temp  "98.8  F (37.1  C) (Oral)   Resp 16   Ht 1.676 m (5' 6\")   Wt 115.3 kg (254 lb 4.8 oz)   SpO2 98%   BMI 41.05 kg/m     Date 10/08/24 0700 - 10/09/24 0659   Shift 8738-5016 2941-6260 [id number] 24 Hour Total   INTAKE   Shift Total(mL/kg)       OUTPUT   Urine 300   300   Shift Total(mL/kg) 300(2.6)   300(2.6)   Weight (kg) 115.35 115.35 115.35 115.35      General: In no acute distress, no facial muscle wasting  Neuro: AOx3, No asterixis  HEENT:   moderate scleral icterus, Nooral lesions  CV: Skin warm and dry  Lungs:  Respirations even and nonlabored on room air  Abd:  Nondistended, nontender   Extrem:  +2 lower  peripheral edema   Skin:  mild  jaundice  Psych: pleasant    MEDICATIONS:  Current Facility-Administered Medications   Medication Dose Route Frequency Provider Last Rate Last Admin    acetaminophen (TYLENOL) tablet 325 mg  325 mg Oral Q4H PRN Yazmin Francis PA-C        albumin human 25 % injection 100 g  100 g Intravenous Daily Jennifer Cartagena APRN CNP   100 g at 10/07/24 1155    albuterol (PROVENTIL HFA/VENTOLIN HFA) inhaler  2-3 puff Inhalation Q6H PRN Jennifer Cartagena APRN CNP        benzocaine-menthol (CHLORASEPTIC MAX) 15-10 MG lozenge 1 lozenge  1 lozenge Buccal Q1H PRN Dominique Vera PA-C   1 lozenge at 10/05/24 1006    cefTRIAXone (ROCEPHIN) 2 g vial to attach to  ml bag for ADULTS or NS 50 ml bag for PEDS  2 g Intravenous Q24H Yazmin Francis PA-C   2 g at 10/08/24 0137    folic acid (FOLVITE) tablet 1 mg  1 mg Oral Daily Deb Erickson MD   1 mg at 10/07/24 1153    [Held by provider] furosemide (LASIX) tablet 40 mg  40 mg Oral Daily Dominique Vera PA-C   40 mg at 10/04/24 0846    guaiFENesin (MUCINEX) 12 hr tablet 600 mg  600 mg Oral BID PRN Rosa Valente APRN CNP        lactulose (CEPHULAC) Packet 20 g  20 g Oral TID Deb Erickson MD   20 g at 10/06/24 1507    multivitamin w/minerals (THERA-VIT-M) tablet 1 tablet  1 tablet Oral Daily Deb Erickson " MD Kirk   1 tablet at 10/07/24 1152    ondansetron (ZOFRAN ODT) ODT tab 4 mg  4 mg Oral Q6H PRN Deb Erickson MD        Or    ondansetron (ZOFRAN) injection 4 mg  4 mg Intravenous Q6H PRN Deb Erickson MD        pantoprazole (PROTONIX) IV push injection 40 mg  40 mg Intravenous BID Yazmin Francis PA-C        phenol (CHLORASEPTIC) 1.4 % spray 1 mL  1 spray Mouth/Throat Q1H PRN Dominique Vera PA-C   1 mL at 10/05/24 2037    senna-docusate (SENOKOT-S/PERICOLACE) 8.6-50 MG per tablet 1 tablet  1 tablet Oral BID PRN Deb Erickson MD        Or    senna-docusate (SENOKOT-S/PERICOLACE) 8.6-50 MG per tablet 2 tablet  2 tablet Oral BID PRN Deb Erickson MD        simethicone (MYLICON) chewable tablet 80 mg  80 mg Oral BID Deb Erickson MD   80 mg at 10/07/24 1939    sodium chloride (OCEAN) 0.65 % nasal spray 1 spray  1 spray Both Nostrils Q1H PRN Rosa Valente APRN CNP        [Held by provider] spironolactone (ALDACTONE) tablet 100 mg  100 mg Oral Daily Dominique Vera PA-C   100 mg at 10/05/24 0931    thiamine (B-1) tablet 100 mg  100 mg Oral Daily Deb Erickson MD   100 mg at 10/07/24 1152       REVIEW OF LABORATORY, PATHOLOGY AND IMAGING RESULTS:  BMP  Recent Labs   Lab 10/08/24  0108 10/07/24  0523 10/06/24  1336 10/06/24  0609   * 126* 125* 125*   POTASSIUM 4.4 4.0 3.9 4.5   CHLORIDE 100 96* 96* 94*   LELAND 8.0* 7.8* 7.6* 7.7*   CO2 19* 20* 19* 20*   BUN 15.9 13.5 15.4 15.7   CR 0.83 0.85 0.84 0.92   * 80 108* 79     CBC  Recent Labs   Lab 10/08/24  0108 10/07/24  0523 10/06/24  0609 10/05/24  0617   WBC 8.7 10.0 12.9* 14.1*   RBC 3.12* 3.34* 3.80* 3.77*   HGB 11.2* 11.8* 13.7 13.4   HCT 32.3* 34.0* 37.9* 37.5*   * 102* 100 100   MCH 35.9* 35.3* 36.1* 35.5*   MCHC 34.7 34.7 36.1 35.7   RDW 18.0* 18.0* 17.9* 18.2*   PLT 63* 66* 76* 74*     INR  Recent Labs   Lab 10/08/24  0108 10/07/24  0523 10/06/24  0609 10/05/24  0617    INR 3.48* 3.77* 3.08* 2.93*     LFTs  Recent Labs   Lab 10/08/24  0108 10/07/24  0523 10/06/24  0609 10/05/24  0617   ALKPHOS 154* 172* 230* 249*   * 134* 203* 233*   ALT 82* 97* 143* 161*   BILITOTAL 28.2* 26.7* 26.4* 27.0*   ALBUMIN 3.5 3.0* 2.5* 2.7*        MELD 3.0: 33 at 10/8/2024  1:08 AM  MELD-Na: 34 at 10/8/2024  1:08 AM  Calculated from:  Serum Creatinine: 0.83 mg/dL (Using min of 1 mg/dL) at 10/8/2024  1:08 AM  Serum Sodium: 131 mmol/L at 10/8/2024  1:08 AM  Total Bilirubin: 28.2 mg/dL at 10/8/2024  1:08 AM  Serum Albumin: 3.5 g/dL at 10/8/2024  1:08 AM  INR(ratio): 3.48 at 10/8/2024  1:08 AM  Age at listing (hypothetical): 39 years  Sex: Male at 10/8/2024  1:08 AM        Imaging Results:  CXR 10/7/24  mpression: Low lung volumes, right greater than left with elevation  of right diaphragm. Bibasilar atelectasis. Similar appearance to  previous exam and no acute airspace disease.

## 2024-10-08 NOTE — PROGRESS NOTES
Calorie Count  Intake recorded for: 10/7  Total Kcals: 0 Total Protein: 0g  Kcals from Hospital Food: 0   Protein: 0g  Kcals from Outside Food (average):0 Protein: 0g  # Meals Ordered from Kitchen: 3 meals   # Meals Recorded: no intake recorded.   # Supplements Recorded: no intake recorded.

## 2024-10-08 NOTE — PHARMACY-VANCOMYCIN DOSING SERVICE
"Pharmacy Vancomycin Initial Note  Date of Service 2024  Patient's  1985  39 year old, male,  kg    Indication: Sepsis, MSSA detected on bcx drawn 10/8am    Current estimated CrCl = Estimated Creatinine Clearance: 142.6 mL/min (based on SCr of 0.83 mg/dL).    Creatinine for last 3 days  10/6/2024:  6:09 AM Creatinine 0.92 mg/dL;  1:36 PM Creatinine 0.84 mg/dL  10/7/2024:  5:23 AM Creatinine 0.85 mg/dL  10/8/2024:  1:08 AM Creatinine 0.83 mg/dL    Recent Vancomycin Level(s) for last 3 days  No results found for requested labs within last 3 days.      Vancomycin IV Administrations (past 72 hours)        No vancomycin orders with administrations in past 72 hours.                    Nephrotoxins and other renal medications (From now, onward)      Start     Dose/Rate Route Frequency Ordered Stop    10/08/24 1900  piperacillin-tazobactam (ZOSYN) 4.5 g vial to attach to  mL bag        Note to Pharmacy: For [facility], [facility] and [facility]: For Zosyn-naive patients, use the \"Zosyn initial dose + extended infusion\" order panel.    4.5 g  over 30 Minutes Intravenous EVERY 6 HOURS 10/08/24 1831      10/03/24 1300  [Held by provider]  furosemide (LASIX) tablet 40 mg        (On hold since Sat 10/5/2024 at 0815 until manually unheld; held by Jennifer Cartagena APRN CNPHold Reason: Abnormal Electrolytes)   Note to Pharmacy: PTA Sig:Take 1 tablet (20 mg) by mouth daily.      40 mg Oral DAILY 10/03/24 1256              Contrast Orders - past 72 hours (72h ago, onward)      None            [employer] Prediction of Planned Initial Vancomycin Regimen          Plan:  Start vancomycin with loading dose 2000 mg IV x1, followed by maintenance dosing of vancomycin 2000 mg IV q18h, with first dose 12 hrs after loading dose - NOTE - will utilize commercial vancomycin 1000 mg/200 mL bags given national fluids shortage  Vancomycin monitoring method: AUC  Vancomycin therapeutic monitoring goal: 400-600 mg*h/L  Pharmacy will check " vancomycin levels as appropriate in 1-3 Days.    Serum creatinine levels will be ordered daily for the first week of therapy and at least twice weekly for subsequent weeks.      Magno Cali, TiD, BCPS

## 2024-10-08 NOTE — PROGRESS NOTES
Infectious Disease Curbside Note    I was messaged by Rosa Valente on 10/8/2024 at 3:32 PM to provide input for Stewart Hunt MRN [id number]. The patient is located at [facility].. The nature of this request for a curbside consultation does not permit me to perform a comprehensive review of health care records, patient/family interview, nor an examination of the patient. I obtained limited patient information from the provider on the phone call and computer access was not available at the time of the phone call.    Curbside q: GPCs in clusters in 1/2 bottles in pt, done for temp of 100.7 last night, normal WBC, no other ID sources. Currently w Ramirez from hepatorenal syndrome and hoping to avoid antibiotics. Provider q re whether ok to hold abx and watch    Based on only the information I was provided today, I make the following recommendations to the treating provider/team for their review and consideration:     Would wait for verigene ID. If CONS ok to watch off antibitoics. If Staph aureus, strep, enterococci etc would start Vanc. Would repeat blood cultures    These recommendations are not intended to take the place of the care team's clinical judgement, which should always be utilized to provide the most appropriate care to meet the unique needs of each patient. The recommendations offered were based on the limited scope of information provided as today's date. Should additional guidance be needed or required a formal consultation with infectious diseases is recommended.    *Primary team: If a patient is discharged on IV antibiotics it is not the responsibility of the ID curbside provider to monitor labs or sign for antibiotic orders unless it is otherwise written by the curbside provider. If further outpatient management is required then place an outpatient ID consult and call [phone] to facilitate making an ID appointment before discharge.      Annmarie Tellez  Staff Physician  Division of  Infectious Diseases

## 2024-10-08 NOTE — PLAN OF CARE
"[id number]      Reason for admission: leg swelling  Vitals:   /45 (BP Location: Right arm)   Pulse 88   Temp 99  F (37.2  C) (Oral)   Resp 16   Ht 1.676 m (5' 6\")   Wt 115.3 kg (254 lb 4.8 oz)   SpO2 98%   BMI 41.05 kg/m    Activity: independent  Pain: denies  Neuro: WDL  Cardiac: WDL  Respiratory: WDL x: cough, periodically blood in sputum  GI/: dark, cherry urine. Loose stools, periodically blood in stools.   Diet: combination diet high fiber diet; 2 gm NA diet. Calorie counts.   Lines: L PIV SL.  Labs/imaging/Consults: GI/hepatic consult 10/8  Discharge Plan: continue POC    Problem: Adult Inpatient Plan of Care  Goal: Absence of Hospital-Acquired Illness or Injury  Intervention: Prevent Infection  Recent Flowsheet Documentation  Taken 10/8/2024 0800 by Collette Carter, RN  Infection Prevention:   hand hygiene promoted   rest/sleep promoted   single patient room provided   equipment surfaces disinfected     "

## 2024-10-08 NOTE — PROGRESS NOTES
Overnight CC    Paged by RN that patient was febrile earlier (100.7 at 2356) was given tylenol.    Given underlying cirrhosis hence high risk status, assessed patient at bedside. He reports:  - Ongoing sore throat and cough. He occasionally coughs up blood. He did report sore throat previously and was told labs came back normal, hence not on treatment. He also takes cough syrup for cough but not helping much. No chest pain or SoB. Cough has some sputum production which is non purulent, non bloody (other than occasional blood that he thinks coming from his throat). Labs show negative viral resp panel    - Has ongoing abdominal pain, no new worsening. Gets diarrhea after lactulose, non bloody  - Labs so far with trending down WBC, negative UA  - Most recent abd Imaging is abd ultrasound which showed scant free fluid, hence he is assumed to be cirrhosis without ascites     Exam:   - appears tired. Not in acute distress. Chest without significant finding. Abd is soft and non tender. Throat without exudates, unable to see source of reported bleeding (no active bleeding)    Plan:  - Chart review shows he has not been febrile until now. Other vitals stable. Source of his fever is not clear. Had CXR yesterday that did not show significant acute findings. Ordered STAT CBC, Blood culture, procal and CMP. Also ordered rapd Strept Swab. To be on safer side, will start him on SBP treatment, ordered Ceftriaxone 2gm daily. Will monitor. Low threshold to broaden antibiotic to cover hospital acquired infections if fever persists or patient further decompensates. Also consider abdominal and chest imaging at that time. Consider re assessing for ascites   - Monitor possible hemoptysis and investigate as needed  - Use low dose tylenol for fever (ordered 325mg q4h), avoid if possible. Avoid NSAIDS      Yazmin Francis PA-C on 10/8/2024 at 1:12 AM    Later paged by RN that patient has passed, medium sized blood clots bright red in  color.   - current Hgb is 11.2. vs 11.8 yesterday, signifying likely not a significant bleeding. But also in a setting of few episodes of ?hematemesis ?hemoptysis .   - GI was consulted on 10/3 , reported no EV on recent EGD. At that time, the patient did not have these bleeding episodes.   - For now, will start him on IV pantoprazole BID.   - Given new development, Will re consult GI for more evaluation. Will defer octreotide or related management to GI.  - Continue Ceftriaxone.   Yazmin Francis PA-C on 10/8/2024 at 4:22 AM

## 2024-10-08 NOTE — PROGRESS NOTES
"Brief Medicine Cross Cover Note     Contacted By bedside RN with reports of Tmax 102.8, , BP stable. No complaints. Patient received 650 mg tylenol for fever. Placed patient on Vanc and zosyn tonight and obtained BCx1 and UA. Will continue to closely monitor. Narrow abx as able. ID has been consulted on admission.     /57 (BP Location: Right arm, Patient Position: Supine, Cuff Size: Adult Regular)   Pulse 99   Temp (!) 100.6  F (38.1  C) (Oral)   Resp 18   Ht 1.676 m (5' 6\")   Wt 115.3 kg (254 lb 4.8 oz)   SpO2 98%   BMI 41.05 kg/m      Rowena Espinoza PA-C  Internal Medicine Hospitalist Service  [facility]  Pager: [phone]      "

## 2024-10-08 NOTE — PROGRESS NOTES
19:00 - 07:30    NEURO: WDL; A/O x 4   RESPIRATORY: X; Coughing up blood occasionally, sore throat, denies SOB.   CARDIAC: WDL  GI/: X; Nichelle colored urine, loose stools, blood clots in stool, frequent bowel movements and outputs  DIET: <1000 ml fluid intake, 2gm sodium diet, calorie counting, strict I/O   PAIN/NAUSEA: Intermittent nausea, abdominal discomfort constant, and complains of mild pain  INCISION/DRAINS: NA  IV ACCESS: L PIV  ACTIVITY: Independent, history of weakness and falls   LAB: In progress blood culture   PLAN: Report any further bleeding to provider. GI Hepatology Consult today. Continue monitoring edema and sodium levels. Waiting on blood culture results, peaked mild fever overnight (was tx with tylenol).    Plan of Care Reviewed With: patient    Overall Patient Progress: no changeOverall Patient Progress: no change

## 2024-10-08 NOTE — PROGRESS NOTES
"  Nephrology Progress Note  10/08/2024         ASSESSMENT AND RECOMMENDATIONS:   Stewart Hunt is a 39 year old male with history of alcoholic EtOH cirrhosis (sober since 9/8/2024), and recent admission for anasarca for which he was started on diuretics. He is admitted with hyponatremia which acutely worsened after diuretics were increased, concerning for pre-renal etiology.     We are consulted for hyponatremia.     #Hyponatremia 2/2 etoh cirrhosis, improved  #ANNA, suspect pre-renal etiology  #Decompensated alcoholic cirrhosis   Baseline Na is 136. Was on home lasix 20 and spironolactone 50 and noticed worsening edema.  Na on admission 131, diuretics doubled to Lasix 40/aspen 100 and the next day Na dropped to 125.   Margaret<20 and Uosm 305, suggesting likely 2/2 poor effective arterial flow in setting of cirrhosis, recent diuretic increase (though unlikely to explain hypoNa entirely), and likely third-spacing given low albumin   He also had high stool output at home (4-5 times loose stool at home on lactulose)   No ANNA or CKD history  Good response to 3 day albumin trial, Na -> 131. May have lower baseline in setting of cirrhosis.  Defer to primary team re: workup of fever  Would hold off reinitiating diuretics during active infectious workup; but can likely resume PTA Lasix 20 mg /spironolactone 50 mg as infectious concerns resolve (next 1-2 days if remains afebrile).      -continue holding lasix and spironolactone for now   -control ADH triggers of pain, stess and nausea   -continue fluid restriction of 1 liter/day      Recommendations were communicated to primary team via [employer].     Seen and discussed with Dr. Helene Parham MD   Division of Renal Disease and Hypertension  [facility]  [email or web address] Web Console      Interval History :   Nursing and provider notes from last 24 hours reviewed.  In the last 24 hours:  Febrile to 100.7F overnight, felt \"clammy\" without rigors  Complaining of sore " "throat, nosebleed  CXR negative, BC drawn, viral panel negative, CBC without elevated WBC count  No other localizing symptoms and HDS throughout  Started on ceftriaxone in case of SBP; though no ascites on exam  Also had one episode of BRBPR with his BM  Normal BM consistency with cramping during BM and what appeared to be bright red blood clots on outside of BM, no abdominal pain outside BM  Hb stable -> 11.2  Made NPO  This AM feels well without complaints, dyspnea, chest pain, abdominal pain, vomiting, fever  BLE edema improved somewhat    Review of Systems:   I reviewed the following systems:  GI: Good appetite. No nausea or vomiting or diarrhea.   Neuro:  No confusion  Constitutional:  No fever or chills  CV: No dyspnea or edema.  No chest pain.    Physical Exam:   I/O last 3 completed shifts:  In: 480 [P.O.:480]  Out: 1670 [Urine:1670]   /56 (BP Location: Right arm, Patient Position: Supine, Cuff Size: Adult Regular)   Pulse 99   Temp 99  F (37.2  C) (Oral)   Resp 16   Ht 1.676 m (5' 6\")   Wt 115.3 kg (254 lb 4.8 oz)   SpO2 99%   BMI 41.05 kg/m       GENERAL APPEARANCE: no distress, sitting up in bed, awake and conversational  EYES:  scleral icterus, pupils equal  Endo: no goiter, no moon facies  Lymphatics: no cervical or supraclavicular LAD  Pulmonary: lungs clear to auscultation with equal breath sounds bilaterally, no clubbing  CV: regular rhythm, normal rate, no rub   - Edema 2+ bilaterally, appears similar to prior days  GI: soft, nontender, distended, normal bowel sounds  : no johnson  SKIN: no rash, warm, dry, no cyanosis  NEURO: face symmetric, no asterixis     Labs:   All labs reviewed by me  Electrolytes/Renal -   Recent Labs   Lab Test 10/08/24  0108 10/07/24  0523 10/06/24  1336 10/05/24  0617 10/04/24  0532 09/27/24  0847 09/22/24  0643 09/21/24  0752 09/21/24  0615 09/20/24  1847   * 126* 125*   < > 131*   < > 131*  131*  --    < > 136   POTASSIUM 4.4 4.0 3.9   < > 3.7   < > " 3.7  3.7  --    < > 4.2   CHLORIDE 100 96* 96*   < > 100   < > 96*  --    < > 101   CO2 19* 20* 19*   < > 23   < > 22  --    < > 24   BUN 15.9 13.5 15.4   < > 16.9   < > 15.8  --    < > 13.0   CR 0.83 0.85 0.84   < > 0.78   < > 0.99  --    < > 0.56*   * 80 108*   < > 70   < > 65*  --    < > 110*   LELAND 8.0* 7.8* 7.6*   < > 8.0*   < > 7.9*  --    < > 8.3*   MAG  --   --   --   --  1.7  --  1.9 2.0  --  2.1   PHOS  --   --   --   --  3.1  --   --   --   --  2.8    < > = values in this interval not displayed.       CBC -   Recent Labs   Lab Test 10/08/24  1234 10/08/24  0108 10/07/24  0523 10/06/24  0609   WBC  --  8.7 10.0 12.9*   HGB 11.0* 11.2* 11.8* 13.7   PLT  --  63* 66* 76*       LFTs -   Recent Labs   Lab Test 10/08/24  0108 10/07/24  0523 10/06/24  0609 10/03/24  0450 09/22/24  0643 09/20/24  1847   ALKPHOS 154* 172* 230*   < > 186* 217*   BILITOTAL 28.2* 26.7* 26.4*   < > 26.6*  27.5* 29.8*  30.6*   ALT 82* 97* 143*   < > 82* 68   * 134* 203*   < > 226* 126*   PROTTOTAL  --   --   --   --  5.0* 5.5*   ALBUMIN 3.5 3.0* 2.5*   < > 2.5* 2.9*    < > = values in this interval not displayed.       Iron Panel - No lab results found.      Imaging:  All imaging studies reviewed by me.     US Indication: fever   Limited abdominal ultrasound was performed to evaluate for ascites and need for paracentesis.   Views/Acquisition: hepatorenal/RUQ, right lower quadrant, parasplenic/LUQ, and left lower quadrant   Findings/Interpretation: No significant ascites     Current Medications:  Current Facility-Administered Medications   Medication Dose Route Frequency Provider Last Rate Last Admin    [Held by provider] cefTRIAXone (ROCEPHIN) 2 g vial to attach to  ml bag for ADULTS or NS 50 ml bag for PEDS  2 g Intravenous Q24H Yazmin Francis PA-C   2 g at 10/08/24 0137    folic acid (FOLVITE) tablet 1 mg  1 mg Oral Daily Deb Erickson MD   1 mg at 10/08/24 0804    [Held by provider] furosemide  (LASIX) tablet 40 mg  40 mg Oral Daily Dominique Vera PA-C   40 mg at 10/04/24 0846    lactulose (CEPHULAC) Packet 20 g  20 g Oral TID Deb Erickson MD   20 g at 10/08/24 0822    multivitamin w/minerals (THERA-VIT-M) tablet 1 tablet  1 tablet Oral Daily Deb Erickson MD   1 tablet at 10/08/24 0804    [START ON 10/9/2024] pantoprazole (PROTONIX) EC tablet 40 mg  40 mg Oral QAM AC Rosa Valente APRN CNP        simethicone (MYLICON) chewable tablet 80 mg  80 mg Oral BID Deb Erickson MD   80 mg at 10/08/24 0804    [Held by provider] spironolactone (ALDACTONE) tablet 100 mg  100 mg Oral Daily Dominique Vera PA-C   100 mg at 10/05/24 0931    thiamine (B-1) tablet 100 mg  100 mg Oral Daily Deb Erickson MD   100 mg at 10/08/24 0804     Current Facility-Administered Medications   Medication Dose Route Frequency Provider Last Rate Last Admin     Stephanie Parham MD

## 2024-10-08 NOTE — PROGRESS NOTES
[facility]    Medicine Progress Note - Hospitalist Service    Date of Admission:  10/3/2024    Assessment & Plan   Stewart Hunt is a 39 year old male admitted on 10/3/2024. He has hx of alcohol cirrhosis and admitted 9/20-22 for worsening anasarca and hyperbilirubinemia.      # Lower extremity edema, anasarca  # Hyponatremia 2/2 etoh cirrhosis, improving   # ANNA, prerenal with possible HRS physiology (cystatin c 1.8, GFR 39%), improving   Home reg: lasix 20mg daily and aldactone 50mg daily since last admission 10 days ago, and has been having ++ urination without improvement in edema. On lactulose and had been having multiple bowel movements daily along with poor appetite. Albumin 2-3. most likely due to third spacing. Cr wnl 0.94-->0.78-->0.89. Sodium decreased from 131 to 125 10/5 with increased diuretic dosing. BL Na ~136.  Cr at  0.85, but cystatin C at 1.8. GFR calculated with Cystain C 39. Blood osmolality 264. Sodium urine <20, urine osmolality 305.UO improving over the past few days. Suspected ANNA with possible HRS physiology per discussions with Nephrology  - Hepatology and Nephrology consults  - Daily CMP   - HOLD PTA lasix, spironolactone   -Increased lasix to 40 mg daily and spironolactone to 100 mg daily 10/3- lasix on hold starting 10/5 for low sodium, hold both 10/6 until nephrology recs  - 3 day Albumin challenge for hepatorenal syndrome (10/6-10/9)    - 2 gm sodium diet, 1L FR  - strict I+Os, daily weights     # Decompensated alcoholic cirrhosis  # Alcoholic hepatitis   # Hx of hepatic encephalopathy   # Hx of alcohol use disorder, sober since 9/8/2024  Follows with OP hepatology. Prednisolone taper discontinued. US 9/21 without evidence of HCC, hepatomegaly. No EV on recent EGD. T. Bili initially downtrending, slight increase 10/5. Mentation currently clear. Attending AA and has been sober since 9/8/24. Was recommended inpatient rehab, but he feels  that since he has such high MELD, he does not want to be away from his family for 30 days.  - Hepatology consult, appreciate recommendations   - Daily MELD labs  - HOLD PTA diuresis above  - Continue lactulose TID, titrate to achieve 3-5 bowel movements daily  - Thiamine, folic acid daily   - May need rifaximin in the future (per GI)  - Monitor for hepatic encephalopathy  - Continue to attend AA when discharged     MELD 3.0: 33 at 10/8/2024  1:08 AM  MELD-Na: 34 at 10/8/2024  1:08 AM  Calculated from:  Serum Creatinine: 0.83 mg/dL (Using min of 1 mg/dL) at 10/8/2024  1:08 AM  Serum Sodium: 131 mmol/L at 10/8/2024  1:08 AM  Total Bilirubin: 28.2 mg/dL at 10/8/2024  1:08 AM  Serum Albumin: 3.5 g/dL at 10/8/2024  1:08 AM  INR(ratio): 3.48 at 10/8/2024  1:08 AM  Age at listing (hypothetical): 39 years  Sex: Male at 10/8/2024  1:08 AM    # Cough, hemoptysis vs hematemesis vs blood from epistaxis?    # Moderate portal hypertensive gastropathy seen on EGD   # GERD  In addition to ongoing sore throat since ~1 week prior to admission, with grossly negative work-up, has had two episodes of sputum with blood on 10/7, 10/8. Broad ID work-up. Discussed at length with GI given cirrhosis. No varices seen on EGD ~1 month ago at [facility]. Could consider [place] tear with hypertensive gastropathy, coughing, coagulopathy, but no urgent need to scope for UGIB.  - GI consult appreciate recommendations   - No need for EGD on 10/8  - Trend daily CBC   - Continue protonix 40 mg daily, simethicone 80 mg BID   - Monitor     # Positive GPCs 1/2 bottles   # Intermittent fevers  # Leukocytosis, resolved   # Elevated procalcitonin   # Sore throat, ongoing, improving   Procal 1.77 on 10/5. WBC WNL on 10/7. No dysuria, no fever, PENN, new O2 needs on 10/7.  Has throat pain but viral panels repetiteively negative. Possibly due to prednisolone that he was taking. CXR and UA unremarkable. Remains afebrile. WBC trending down now that he is off  steroids. Elevated temperature to 100.7 overnight  10/7. Strep swab negative. Continues to have normal WBC, decreasing procal. Normal VSS otherwise. Started on Ceftrixone. No fluid per POCUS exam  on 10/8. Ceftriaxone stopped, but positive GPC 1/2 bottles. Overall, concern for renal dysfunction without clear source for intermittent temperature spikes despite broadly negative work-up. Suspect GPCs may be contaminination. Given wanting to preserve renal function (avoid CT CAP, unnecessary renal toxic meds including abx), curbsided ID.   - Place formal ID consult if ongoing fevers, concern for infection   - Trend CBC in AM, MRSA swab, repeat blood cultures    - Added quant gold given sputum with blood above, but VERY low concern that this is an ID process driving it and would need for transplant work-up in the future if ever listed.   - Monitor blood cultures per discussion with   -Would wait for verigene ID. If CONS ok to watch off antibitoics. If Staph aureus, strep, enterococci etc would start Vanc. Would repeat blood cultures   - Hold off initiating empiric antibiotic, if fevers above would start Vanc/Zosyn   - PRN  lozenges, chloraseptic spray, Robitussin   - Continue to monitor     # BRBPR concerning for anal fissures vs bleeding hemorrhoids   # Non-thrombosed external hemorrhoids and non-thrombosed internal hemorrhoids found on perianal  Exam seen on colonoscopy (9/10/24)  BRBPR on 10/7, 10/8 with none in stool. No melena seen. States some pressure before BMs happen, but no overt pain. Mild bleeding seen upon 2nd PE on 10/8 slightly inside rectum. Hgb stable with mild downtrend. Per chart review, hemorrhoids seen as above on imaging ~1 month ago. No need for scope per discussion with GI on 10/8.   - Trend Hgb on CBC daily  - Start rectal nitroglycerin 0.4% BID for 4 weeks  - Defer adding PO fiber given needing to have BMs due to cirrhosis   - Sitz baths 2-3 times daily  - Fiber/2 gm Na diet      # Epistaxis,  resolved and intermittent   Noted on 10/7 exam. Has had on/off for several days. Resolved with conservative treatment (pressure).   - If ongoing, please consult ENT given coags status, may need packing.   - Monitor coags and Hgb daily   - PRN Ocean spray, Afrin   - Monitor and notify provider if ongoing     # Normocytic anemia  11.2-->11 on 10/8. Suspect from bleeding sources as above in setting of coagulopathy/  - monitor on CBC  - Transfuse for Hgb less than 7    # Left sided chest pain, intermittent   ED visit with same complaints on 7/26/24. ACS and PE workup normal. Started on pepcid and simethicone. Has c/o L sided chest pain for the last 2 mornings. Pain is reproducible with palpation. Noted again on 10/8, EKG with no acute ST/T wave abnormalities or concerning T wave changes. Unable to get trop due to elevated bilirubin. Given pain is reproducible, likely MSK related, could consider from GERD as above, ID process (but less likley given negative work-up thus far).   - ID work-up as above  - GERD treatment as above  - Scheduled lidocaine patches  - PRN acetaminophen     # Moderate malnutrition, in the context of chronic illness/disease   # Vomiting, resolved   # Diarrhea, in setting of lactulose  Nothing tastes good, and had one non bloody emesis several days ago. States appetite better when on  higher dose prednisolone (currently on 1-mg).  - RD consult  - Continue lactulose TID, titrate to achieve 3-5 bowel movements daily  - Discuss renally appropriate MVI in AM  - PRN zofran  - Start calorie counts for 3 days     # Fatigue, improving   # Insomnia, improving   Fell asleep at desk while sitting and working on computer. See work-up as above for cirrhosis. No s/s of hepatic encelopathy. Currently normal mentation  - Cirrhosis treatment as above     # Gallbladder polyp  - Continue to monitor 4 mm polyp seen on US     # Depressed mood/Grief  Both the patient and wife started tearing up talking about poor  "prognosis and not knowing what to expect including what they can do while 'waiting 90 days.'  - Spiritual Health consult per patient request    # Hepatitis B status  Received hepatis B 1st dose on 9/10/24. Wife inquiring about second dose that should be scheduled for tomorrow. Per discussion with pharmacy, ok to push back several days and give prior to discharge.  - 2nd Hepatitis B vaccine prior to discharge per discussion with Pharmacy on 10/6.       Diet: 2 Gram Sodium Diet  Fluid restriction 1000 ML FLUID    DVT Prophylaxis: Anti-embolisim stockings (TEDs) and Ambulate every shift  Gomes Catheter: Not present  Lines: None     Cardiac Monitoring: None  Code Status: Full Code            Diet: 2 Gram Sodium Diet  Fluid restriction 1000 ML FLUID  Calorie Counts  Snacks/Supplements Adult: Ensure Max Protein (bariatric); Between Meals    DVT Prophylaxis: VTE Prophylaxis contraindicated due to possible active bleeding, ambulate   Gomes Catheter: Not present  Lines: None     Cardiac Monitoring: None  Code Status: Full Code      Clinically Significant Risk Factors         # Hyponatremia: Lowest Na = 125 mmol/L in last 2 days, will monitor as appropriate      # Hypoalbuminemia: Lowest albumin = 2.5 g/dL at 10/6/2024  6:09 AM, will monitor as appropriate  # Coagulation Defect: INR = 3.48 (Ref range: 0.85 - 1.15) and/or PTT = 38 Seconds (Ref range: 22 - 38 Seconds), will monitor for bleeding  # Thrombocytopenia: Lowest platelets = 63 in last 2 days, will monitor for bleeding             # Severe Obesity: Estimated body mass index is 41.05 kg/m  as calculated from the following:    Height as of this encounter: 1.676 m (5' 6\").    Weight as of this encounter: 115.3 kg (254 lb 4.8 oz)., PRESENT ON ADMISSION  # Moderate Malnutrition: based on nutrition assessment, PRESENT ON ADMISSION          Disposition Plan     Medically Ready for Discharge: Anticipated in 5+ Days      The patient's care was discussed with the Attending " Physician, Dr. Mcfarlane , Bedside Nurse, Patient, and GI Consultant(s).    GRISELDA Lawson [facility]  Hospitalist Service  [facility]  Securely message with [employer] (more info)  Text page via [facility] Paging/Directory     This chart documentation was completed with Dragon voice-recognition software. Even though reviewed, this chart may still contain some grammatical, spelling, and word errors. Please contact the author for any questions or clarifications.     ______________________________________________________________________    Interval History   Nursing/SW/consult/interdisciplinary healthcare worker's notes reviewed.     I reviewed all medications, new labs and imaging results over the last 24 hours. Please see discussion of these results in the A/P.    No acute events overnight. Fever to 100.7 overnight.Started on ceftrixone. Hgb down slightly to 11.2. 1 episode of melena.  Per my initial discussion with patients, he stated he had gone for a walk and felt a chill. He had no fevers, chills, sweats then. Has another coughing episode with blood last night, but no vomiting. Had two photos of sputum with blood and then stool with streaks of blood.Nursing noting some dried blood from nose during the day. Reassessed patient later due to BRBPR, but none in stool. Also with positive GPCs in clusters from blood cultures overnight.     ROS: 12 point ROS negative other than the symptoms noted here or above in the assessment and plan.       Physical Exam   Vital Signs: Temp: 98.8  F (37.1  C) Temp src: Oral BP: 102/45 Pulse: 88   Resp: 16 SpO2: 98 % O2 Device: None (Room air)    Weight: 254 lbs 4.8 oz    Constitutional: awake, alert, cooperative, no apparent distress, and appears stated age  Eyes: Yellow sclera B/L. PERRLA.   ENT: Atrumatic. No swelling noted of uvula or tonsils. No exudate noted. No dried blood seen in nares or   Hematologic / Lymphatic: no cervical lymphadenopathy  and no supraclavicular lymphadenopathy  Respiratory: No increased work of breathing, good air exchange, clear to auscultation bilaterally, no crackles or wheezing  Cardiovascular: Normal apical impulse, regular rate and rhythm, normal S1 and S2, no S3 or S4, and no murmur noted, +2 peripheral edema   GI: No scars, normal bowel sounds, soft, non-distended, non-tender, no masses palpated. Small bleeding fissure noted just inside rectum. No hemorrhoids noted.   Skin: Jaundiced. No other lesions or rashes noted on exposed skin.   Musculoskeletal: There is no redness, warmth, or swelling of the joints.  Full range of motion noted.  Motor strength is 5 out of 5 all extremities bilaterally.  Tone is normal. +3 pitting edema B/L LE.   Neurologic: Awake, alert, oriented to name, place and time.  Cranial nerves II-XII are grossly intact.  Motor is 5 out of 5 bilaterally. No asterix noted   Neuropsychiatric: General: normal, calm, and normal eye contact  Level of consciousness: alert / normal  Affect: normal  Orientation: oriented to self, place, time and situation  Memory and insight: normal, memory for past and recent events intact, and thought process normal           Medical Decision Making       75 MINUTES SPENT BY ME on the date of service doing chart review, history, exam, documentation & further activities per the note.      Data     I have personally reviewed the following data over the past 24 hrs:    8.7  \   11.0 (L)   / 63 (L)     131 (L) 100 15.9 /  103 (H)   4.4 19 (L) 0.83 \     ALT: 82 (H) AST: 118 (H) AP: 154 (H) TBILI: 28.2 (HH)   ALB: 3.5 TOT PROTEIN: N/A LIPASE: N/A     Procal: 1.67 (H) CRP: N/A Lactic Acid: N/A       INR:  3.48 (H) PTT:  N/A   D-dimer:  N/A Fibrinogen:  N/A       Imaging results reviewed over the past 24 hrs:   Recent Results (from the past 24 hour(s))   POC US Guide for Paracentesis    Impression    US Indication: fever    Limited abdominal ultrasound was performed to evaluate for  ascites and need for paracentesis.     Views/Acquisition: hepatorenal/RUQ, right lower quadrant, parasplenic/LUQ, and left lower quadrant    Findings/Interpretation: No significant ascites    Denys Carreon MD

## 2024-10-09 ENCOUNTER — APPOINTMENT (OUTPATIENT)
Dept: CARDIOLOGY | Facility: CLINIC | Age: 39
DRG: 432 | End: 2024-10-09
Payer: COMMERCIAL

## 2024-10-09 LAB
ALBUMIN SERPL BCG-MCNC: 3.8 G/DL (ref 3.5–5.2)
ALBUMIN UR-MCNC: 20 MG/DL
ALP SERPL-CCNC: 128 U/L (ref 40–150)
ALT SERPL W P-5'-P-CCNC: 77 U/L (ref 0–70)
ANION GAP SERPL CALCULATED.3IONS-SCNC: 13 MMOL/L (ref 7–15)
APPEARANCE UR: CLEAR
AST SERPL W P-5'-P-CCNC: 118 U/L (ref 0–45)
ATRIAL RATE - MUSE: 89 BPM
BILIRUB SERPL-MCNC: 30.4 MG/DL
BILIRUB UR QL STRIP: ABNORMAL
BUN SERPL-MCNC: 13.9 MG/DL (ref 6–20)
CALCIUM SERPL-MCNC: 8.4 MG/DL (ref 8.8–10.4)
CHLORIDE SERPL-SCNC: 102 MMOL/L (ref 98–107)
COLOR UR AUTO: ABNORMAL
CREAT SERPL-MCNC: 0.79 MG/DL (ref 0.67–1.17)
DIASTOLIC BLOOD PRESSURE - MUSE: NORMAL MMHG
EGFRCR SERPLBLD CKD-EPI 2021: >90 ML/MIN/1.73M2
ERYTHROCYTE [DISTWIDTH] IN BLOOD BY AUTOMATED COUNT: 17.9 % (ref 10–15)
GAMMA INTERFERON BACKGROUND BLD IA-ACNC: 0.27 IU/ML
GLUCOSE SERPL-MCNC: 92 MG/DL (ref 70–99)
GLUCOSE UR STRIP-MCNC: NEGATIVE MG/DL
HCO3 SERPL-SCNC: 18 MMOL/L (ref 22–29)
HCT VFR BLD AUTO: 34.3 % (ref 40–53)
HGB BLD-MCNC: 11.8 G/DL (ref 13.3–17.7)
HGB UR QL STRIP: NEGATIVE
INR PPP: 3.23 (ref 0.85–1.15)
INTERPRETATION ECG - MUSE: NORMAL
KETONES UR STRIP-MCNC: NEGATIVE MG/DL
LEUKOCYTE ESTERASE UR QL STRIP: NEGATIVE
LVEF ECHO: NORMAL
M TB IFN-G BLD-IMP: NEGATIVE
M TB IFN-G CD4+ BCKGRND COR BLD-ACNC: 9.73 IU/ML
MCH RBC QN AUTO: 35.8 PG (ref 26.5–33)
MCHC RBC AUTO-ENTMCNC: 34.4 G/DL (ref 31.5–36.5)
MCV RBC AUTO: 104 FL (ref 78–100)
MITOGEN IGNF BCKGRD COR BLD-ACNC: -0.01 IU/ML
MITOGEN IGNF BCKGRD COR BLD-ACNC: 0 IU/ML
NITRATE UR QL: NEGATIVE
P AXIS - MUSE: 23 DEGREES
PH UR STRIP: 6 [PH] (ref 5–7)
PLATELET # BLD AUTO: 74 10E3/UL (ref 150–450)
POTASSIUM SERPL-SCNC: 4.5 MMOL/L (ref 3.4–5.3)
PR INTERVAL - MUSE: 154 MS
PROT SERPL-MCNC: ABNORMAL G/DL
QRS DURATION - MUSE: 84 MS
QT - MUSE: 360 MS
QTC - MUSE: 438 MS
QUANTIFERON MITOGEN: 10 IU/ML
QUANTIFERON NIL TUBE: 0.27 IU/ML
QUANTIFERON TB1 TUBE: 0.27 IU/ML
QUANTIFERON TB2 TUBE: 0.26
R AXIS - MUSE: 0 DEGREES
RBC # BLD AUTO: 3.3 10E6/UL (ref 4.4–5.9)
RBC URINE: 1 /HPF
SODIUM SERPL-SCNC: 133 MMOL/L (ref 135–145)
SP GR UR STRIP: 1.02 (ref 1–1.03)
SYSTOLIC BLOOD PRESSURE - MUSE: NORMAL MMHG
T AXIS - MUSE: 11 DEGREES
UROBILINOGEN UR STRIP-MCNC: NORMAL MG/DL
VENTRICULAR RATE- MUSE: 89 BPM
WBC # BLD AUTO: 10.6 10E3/UL (ref 4–11)
WBC URINE: 4 /HPF

## 2024-10-09 PROCEDURE — 120N000002 HC R&B MED SURG/OB UMMC

## 2024-10-09 PROCEDURE — 250N000011 HC RX IP 250 OP 636: Performed by: PHYSICIAN ASSISTANT

## 2024-10-09 PROCEDURE — 93325 DOPPLER ECHO COLOR FLOW MAPG: CPT

## 2024-10-09 PROCEDURE — 999N000128 HC STATISTIC PERIPHERAL IV START W/O US GUIDANCE

## 2024-10-09 PROCEDURE — 250N000013 HC RX MED GY IP 250 OP 250 PS 637: Performed by: INTERNAL MEDICINE

## 2024-10-09 PROCEDURE — 250N000013 HC RX MED GY IP 250 OP 250 PS 637

## 2024-10-09 PROCEDURE — 99233 SBSQ HOSP IP/OBS HIGH 50: CPT | Mod: FS | Performed by: STUDENT IN AN ORGANIZED HEALTH CARE EDUCATION/TRAINING PROGRAM

## 2024-10-09 PROCEDURE — 99254 IP/OBS CNSLTJ NEW/EST MOD 60: CPT | Performed by: STUDENT IN AN ORGANIZED HEALTH CARE EDUCATION/TRAINING PROGRAM

## 2024-10-09 PROCEDURE — 80048 BASIC METABOLIC PNL TOTAL CA: CPT

## 2024-10-09 PROCEDURE — 93325 DOPPLER ECHO COLOR FLOW MAPG: CPT | Mod: 26 | Performed by: INTERNAL MEDICINE

## 2024-10-09 PROCEDURE — 93308 TTE F-UP OR LMTD: CPT | Mod: 26 | Performed by: INTERNAL MEDICINE

## 2024-10-09 PROCEDURE — 99207 PR APP CREDIT; MD BILLING SHARED VISIT: CPT | Mod: FS

## 2024-10-09 PROCEDURE — 36415 COLL VENOUS BLD VENIPUNCTURE: CPT

## 2024-10-09 PROCEDURE — 99233 SBSQ HOSP IP/OBS HIGH 50: CPT | Performed by: INTERNAL MEDICINE

## 2024-10-09 PROCEDURE — 250N000011 HC RX IP 250 OP 636

## 2024-10-09 PROCEDURE — 85027 COMPLETE CBC AUTOMATED: CPT

## 2024-10-09 PROCEDURE — 93321 DOPPLER ECHO F-UP/LMTD STD: CPT | Mod: 26 | Performed by: INTERNAL MEDICINE

## 2024-10-09 PROCEDURE — 250N000011 HC RX IP 250 OP 636: Performed by: STUDENT IN AN ORGANIZED HEALTH CARE EDUCATION/TRAINING PROGRAM

## 2024-10-09 PROCEDURE — 85610 PROTHROMBIN TIME: CPT

## 2024-10-09 RX ORDER — GUAIFENESIN 200 MG/10ML
200 LIQUID ORAL EVERY 4 HOURS PRN
Status: DISCONTINUED | OUTPATIENT
Start: 2024-10-09 | End: 2024-10-18 | Stop reason: HOSPADM

## 2024-10-09 RX ORDER — CEFAZOLIN SODIUM 2 G/100ML
2 INJECTION, SOLUTION INTRAVENOUS EVERY 8 HOURS
Status: DISCONTINUED | OUTPATIENT
Start: 2024-10-09 | End: 2024-10-13

## 2024-10-09 RX ORDER — MULTIVITAMIN,THERAPEUTIC
1 TABLET ORAL DAILY
Status: DISCONTINUED | OUTPATIENT
Start: 2024-10-09 | End: 2024-10-09

## 2024-10-09 RX ORDER — B COMPLEX, C NO.20/FOLIC ACID 1 MG
1 CAPSULE ORAL DAILY
Status: CANCELLED | OUTPATIENT
Start: 2024-10-09

## 2024-10-09 RX ADMIN — LIDOCAINE 1 PATCH: 4 PATCH TOPICAL at 20:19

## 2024-10-09 RX ADMIN — GUAIFENESIN 200 MG: 200 SOLUTION ORAL at 22:52

## 2024-10-09 RX ADMIN — PANTOPRAZOLE SODIUM 40 MG: 40 TABLET, DELAYED RELEASE ORAL at 06:47

## 2024-10-09 RX ADMIN — FOLIC ACID 1 MG: 1 TABLET ORAL at 09:45

## 2024-10-09 RX ADMIN — LACTULOSE 20 G: 20 POWDER, FOR SOLUTION ORAL at 10:36

## 2024-10-09 RX ADMIN — CEFAZOLIN SODIUM 2 G: 2 INJECTION, SOLUTION INTRAVENOUS at 15:50

## 2024-10-09 RX ADMIN — SIMETHICONE 80 MG: 80 TABLET, CHEWABLE ORAL at 20:19

## 2024-10-09 RX ADMIN — THIAMINE HCL TAB 100 MG 100 MG: 100 TAB at 09:45

## 2024-10-09 RX ADMIN — PIPERACILLIN AND TAZOBACTAM 4.5 G: 4; .5 INJECTION, POWDER, FOR SOLUTION INTRAVENOUS at 02:58

## 2024-10-09 RX ADMIN — SIMETHICONE 80 MG: 80 TABLET, CHEWABLE ORAL at 09:46

## 2024-10-09 RX ADMIN — Medication 1 TABLET: at 09:45

## 2024-10-09 RX ADMIN — Medication 1 LOZENGE: at 20:36

## 2024-10-09 RX ADMIN — PIPERACILLIN AND TAZOBACTAM 4.5 G: 4; .5 INJECTION, POWDER, FOR SOLUTION INTRAVENOUS at 06:40

## 2024-10-09 RX ADMIN — GUAIFENESIN 200 MG: 200 SOLUTION ORAL at 18:06

## 2024-10-09 RX ADMIN — ACETAMINOPHEN 325 MG: 325 TABLET, FILM COATED ORAL at 20:19

## 2024-10-09 RX ADMIN — VANCOMYCIN HYDROCHLORIDE 2000 MG: 1 INJECTION, SOLUTION INTRAVENOUS at 06:40

## 2024-10-09 ASSESSMENT — ACTIVITIES OF DAILY LIVING (ADL)
ADLS_ACUITY_SCORE: 22

## 2024-10-09 NOTE — PROGRESS NOTES
Calorie Count  Intake recorded for: 10/8  Total Kcals: 155 Total Protein: 7g  Kcals from Hospital Food: 155  Protein: 7g  Kcals from Outside Food (average):0 Protein: 0g  # Meals Ordered from Kitchen: 3 meals   # Meals Recorded: 1 meal - 100% honey nut cheerios w/ 4oz skim milk  # Supplements Recorded: 0

## 2024-10-09 NOTE — PROGRESS NOTES
Nephrology Progress Note  10/09/2024         ASSESSMENT AND RECOMMENDATIONS:   Stewart Hunt is a 39 year old male with history of alcoholic EtOH cirrhosis (sober since 9/8/2024), and recent admission for anasarca for which he was started on diuretics. He is admitted with hyponatremia which acutely worsened after diuretics were increased.     We are consulted for hyponatremia.     #Hyponatremia 2/2 etoh cirrhosis, improved  #ANNA, suspect pre-renal etiology  #Decompensated alcoholic cirrhosis   Baseline Na is 136. Was on home lasix 20 and spironolactone 50 and noticed worsening edema.  Na on admission 131, diuretics doubled to Lasix 40/aspen 100 and the next day Na dropped to 125.   Margaret<20 and Uosm 305, suggesting likely 2/2 poor effective arterial flow in setting of cirrhosis, recent diuretic increase (though unlikely to explain hypoNa entirely), and likely third-spacing given low albumin   He also had high stool output at home (4-5 times loose stool at home on lactulose)   No ANNA or CKD history  Good response to 3 day albumin trial, Na -> 131 c/w prerenal etiologies as above. May have lower baseline in setting of cirrhosis.  Now found with MSSA bacteremia; unclear source; started on Ancef per ID  Na near normalized. Can liberalize fluid restriction (protein shakes do not count towards FR)  Plan to re-initiate diuretics before discharge though not urgent especially given active sepsis  Okay to re-introduce diuretics after infection controlled; afebrile for some time; BC cleared; etc.   -defer sepsis workup and treatment to primary team; ID  -continue holding lasix and spironolactone for now   -control ADH triggers of pain, stess and nausea   -continue fluid restriction of 1 liter/day (protein shakes do not count towards FR)    Recommendations were communicated to primary team via [employer].     Seen and discussed with Dr. Helene Parham MD   Division of Renal Disease and  "Hypertension  Am[employer]  [facility]  Vocera Web Console      Interval History :   Nursing and provider notes from last 24 hours reviewed.  In past 24 hours:  Fevered overnight to 102. 8F (no rigors). LA 2. No leukocytosis.  Initial BCx growing staph aureus on verigene panel.   Placed on vanc/zosyn.   UA and MRSA negative.   UOutput improving to ~1,010 yesterday.   No further bleeding. Hb stable.   1 recorded BM last night, 1 this morning.  No confusion noted overnight.   This AM feels well without complaints, dyspnea, chest pain, abdominal pain, vomiting  BLE edema stable.    Review of Systems:   I reviewed the following systems:  GI: Good appetite. No nausea or vomiting or diarrhea.   Neuro:  No confusion  Constitutional:  No fever or chills  CV: No dyspnea or edema.  No chest pain.    Physical Exam:   I/O last 3 completed shifts:  In: 1050 [P.O.:1050]  Out: 1475 [Urine:1475]   /66 (BP Location: Left arm)   Pulse 98   Temp 99.8  F (37.7  C) (Oral)   Resp 18   Ht 1.676 m (5' 6\")   Wt 114.9 kg (253 lb 4.8 oz)   SpO2 96%   BMI 40.88 kg/m       GENERAL APPEARANCE: no distress, sitting up in bed, awake and conversational  EYES:  scleral icterus, pupils equal  Endo: no goiter, no moon facies  Lymphatics: no cervical or supraclavicular LAD  Pulmonary: lungs clear to auscultation with equal breath sounds bilaterally, no clubbing  CV: regular rhythm, normal rate, no rub   - Edema 2+ bilaterally, appears similar to prior days  GI: soft, nontender, distended, normal bowel sounds  : no johnson  SKIN: no rash, warm, dry, no cyanosis  NEURO: face symmetric, no asterixis     Labs:   All labs reviewed by me  Electrolytes/Renal -   Recent Labs   Lab Test 10/09/24  0553 10/08/24  0108 10/07/24  0523 10/05/24  0617 10/04/24  0532 09/27/24  0847 09/22/24  0643 09/21/24  0752 09/21/24  0615 09/20/24  1847   * 131* 126*   < > 131*   < > 131*  131*  --    < > 136   POTASSIUM 4.5 4.4 4.0   < > 3.7   < > 3.7  3.7  --    < > " 4.2   CHLORIDE 102 100 96*   < > 100   < > 96*  --    < > 101   CO2 18* 19* 20*   < > 23   < > 22  --    < > 24   BUN 13.9 15.9 13.5   < > 16.9   < > 15.8  --    < > 13.0   CR 0.79 0.83 0.85   < > 0.78   < > 0.99  --    < > 0.56*   GLC 92 103* 80   < > 70   < > 65*  --    < > 110*   LELAND 8.4* 8.0* 7.8*   < > 8.0*   < > 7.9*  --    < > 8.3*   MAG  --   --   --   --  1.7  --  1.9 2.0  --  2.1   PHOS  --   --   --   --  3.1  --   --   --   --  2.8    < > = values in this interval not displayed.       CBC -   Recent Labs   Lab Test 10/09/24  0553 10/08/24  1234 10/08/24  0108 10/07/24  0523   WBC 10.6  --  8.7 10.0   HGB 11.8* 11.0* 11.2* 11.8*   PLT 74*  --  63* 66*       LFTs -   Recent Labs   Lab Test 10/09/24  0553 10/08/24  0108 10/07/24  0523 10/03/24  0450 09/22/24  0643 09/20/24  1847   ALKPHOS 128 154* 172*   < > 186* 217*   BILITOTAL 30.4* 28.2* 26.7*   < > 26.6*  27.5* 29.8*  30.6*   ALT 77* 82* 97*   < > 82* 68   * 118* 134*   < > 226* 126*   PROTTOTAL  --   --   --   --  5.0* 5.5*   ALBUMIN 3.8 3.5 3.0*   < > 2.5* 2.9*    < > = values in this interval not displayed.       Iron Panel - No lab results found.      Imaging:  All imaging studies reviewed by me.     US Indication: fever   Limited abdominal ultrasound was performed to evaluate for ascites and need for paracentesis.   Views/Acquisition: hepatorenal/RUQ, right lower quadrant, parasplenic/LUQ, and left lower quadrant   Findings/Interpretation: No significant ascites     Current Medications:  Current Facility-Administered Medications   Medication Dose Route Frequency Provider Last Rate Last Admin    ceFAZolin (ANCEF) 2 g in 100 mL D5W intermittent infusion  2 g Intravenous Q8H Rosa Valente APRN CNP        folic acid (FOLVITE) tablet 1 mg  1 mg Oral Daily Deb Erickson MD   1 mg at 10/09/24 0945    [Held by provider] furosemide (LASIX) tablet 40 mg  40 mg Oral Daily Dominique Vera PA-C   40 mg at 10/04/24 0846    lactulose  (CEPHULAC) Packet 20 g  20 g Oral TID Deb Erickson MD   20 g at 10/09/24 1036    Lidocaine (LIDOCARE) 4 % Patch 1 patch  1 patch Transdermal Q24h Rosa Valente APRN CNP   1 patch at 10/08/24 2034    multivitamin w/minerals (THERA-VIT-M) tablet 1 tablet  1 tablet Oral Daily Deb Erickson MD   1 tablet at 10/09/24 0945    nitroGLYcerin (RECTIV) 0.4 % rectal ointment 1.5 mg  1 inch Rectal Q12H Rosa Valente APRN CNP        pantoprazole (PROTONIX) EC tablet 40 mg  40 mg Oral QAM AC Rosa Valente APRN CNP   40 mg at 10/09/24 0647    simethicone (MYLICON) chewable tablet 80 mg  80 mg Oral BID Deb Erickson MD   80 mg at 10/09/24 0946    [Held by provider] spironolactone (ALDACTONE) tablet 100 mg  100 mg Oral Daily Dominique Vera PA-C   100 mg at 10/05/24 0931    thiamine (B-1) tablet 100 mg  100 mg Oral Daily Deb Erickson MD   100 mg at 10/09/24 0945     Current Facility-Administered Medications   Medication Dose Route Frequency Provider Last Rate Last Admin     Stephanie Parham MD

## 2024-10-09 NOTE — PLAN OF CARE
Goal Outcome Evaluation:  SHIFT: [id number]   Plan of Care Reviewed With: patient    Overall Patient Progress: improvingOverall Patient Progress: improving      NEURO: WDL; Alert & /Oriented x 4   RESPIRATORY:  Coughing up blood occasionally, sore throat, denies SOB.   CARDIAC: WDL  GI/: X; tea color urine  DIET: 2gm sodium diet, calorie counting, strict I/O   PAIN/NAUSEA: denies pain  INCISION/DRAINS: SL / ABX  IV ACCESS: L PIV  ACTIVITY: Independent, history of weakness and falls   LAB: Blood culture  PLAN: No bleeding in the shift.Report any further bleeding to provide  GI Hepatology Consult today. Continue monitoring edema and sodium levels. Waiting on blood culture results, peaked mild fever overnight (was tx with tylenol).    Plan of Care Reviewed With: patient     Overall Patient Progress: no changeOverall Patient Progress: no change

## 2024-10-09 NOTE — PROGRESS NOTES
[facility]    Medicine Progress Note - Hospitalist Service    Date of Admission:  10/3/2024    Assessment & Plan   Stewart Hunt is a 39 year old male admitted on 10/3/2024. He has hx of alcohol cirrhosis and admitted 9/20-22 for worsening anasarca and hyperbilirubinemia.      # Lower extremity edema, anasarca  # Hyponatremia 2/2 etoh cirrhosis, improving   # ANNA, prerenal with possible HRS physiology (cystatin c 1.8, GFR 39%), improving   Home reg: lasix 20mg daily and aldactone 50mg daily since last admission 10 days ago, and has been having ++ urination without improvement in edema. On lactulose and had been having multiple bowel movements daily along with poor appetite. Albumin 2-3. most likely due to third spacing. Cr wnl 0.94-->0.78-->0.89. Sodium decreased from 131 to 125 10/5 with increased diuretic dosing. BL Na ~136.  Cr at  0.85, but cystatin C at 1.8. GFR calculated with Cystain C 39. Blood osmolality 264. Sodium urine <20, urine osmolality 305.UO improving over the past few days. Suspected ANNA with possible HRS physiology per discussions with Nephrology  - Hepatology and Nephrology consults  - Daily CMP   - HOLD PTA lasix, spironolactone   -Increased lasix to 40 mg daily and spironolactone to 100 mg daily 10/3- lasix on hold starting 10/5 for low sodium-> likely resume today per initial discussions, but will wait for formal recommendations.   - 3 day Albumin challenge for hepatorenal syndrome (10/6-10/9)    - 2 gm sodium diet, 1L FR  - strict I+Os, daily weights     # Decompensated alcoholic cirrhosis  # Alcoholic hepatitis   # Hx of hepatic encephalopathy   # Hx of alcohol use disorder, sober since 9/8/2024  Follows with OP hepatology. Prednisolone taper discontinued. US 9/21 without evidence of HCC, hepatomegaly. No EV on recent EGD. T. Bili initially downtrending, slight increase 10/5. Mentation currently clear. Attending AA and has been sober since  9/8/24. Was recommended inpatient rehab, but he feels that since he has such high MELD, he does not want to be away from his family for 30 days.  - Hepatology consult, appreciate recommendations   - Daily MELD labs  - Diuresis plan as above  - Continue lactulose TID, titrate to achieve 3-5 bowel movements daily  - Thiamine, folic acid daily   - May need rifaximin in the future (per GI)  - Monitor for hepatic encephalopathy  - Continue to attend AA when discharged     MELD 3.0: 32 at 10/9/2024  5:53 AM  MELD-Na: 33 at 10/9/2024  5:53 AM  Calculated from:  Serum Creatinine: 0.79 mg/dL (Using min of 1 mg/dL) at 10/9/2024  5:53 AM  Serum Sodium: 133 mmol/L at 10/9/2024  5:53 AM  Total Bilirubin: 30.4 mg/dL at 10/9/2024  5:53 AM  Serum Albumin: 3.8 g/dL (Using max of 3.5 g/dL) at 10/9/2024  5:53 AM  INR(ratio): 3.23 at 10/9/2024  5:53 AM  Age at listing (hypothetical): 39 years  Sex: Male at 10/9/2024  5:53 AM      # Cough, hemoptysis vs hematemesis vs blood from epistaxis, resolved   # Moderate portal hypertensive gastropathy seen on EGD   # GERD  In addition to ongoing sore throat since ~1 week prior to admission, with grossly negative work-up, has had two episodes of sputum with blood on 10/7, 10/8. Broad ID work-up. Discussed at length with GI given cirrhosis. No varices seen on EGD ~1 month ago at [facility]. Could consider [place] tear with hypertensive gastropathy, coughing, coagulopathy, but no urgent need to scope for UGIB.   - GI consult appreciate recommendations   - No need for EGD on 10/8  - Trend daily CBC   - Continue protonix 40 mg daily, simethicone 80 mg BID   - Monitor     # Staph aureus bacteremia, unclear source   # Elevated procalcitonin   # Sore throat, ongoing, improving   Procal 1.77 on 10/5. WBC WNL on 10/7. No dysuria, no fever, PENN, new O2 needs on 10/7.  Has throat pain but viral panels repetiteively negative. Possibly due to prednisolone that he was taking. CXR and UA unremarkable.  Remains afebrile. WBC trending down now that he is off steroids. Elevated temperature to 100.7 overnight  10/7. Strep swab negative. Continues to have normal WBC, decreasing procal. Normal VSS otherwise. Started on Ceftrixone. No fluid per POCUS exam  on 10/8. Ceftriaxone stopped, but positive GPC 1/2 bottles. Overall, concern for renal dysfunction without clear source for intermittent temperature spikes despite broadly negative work-up. Initally suspected GPCs may be contaminination and held abx after talking with ID given wanting to preserve renal function, but spiked Tmax of 102.8 on 10/8 prompting Zosyn/Vanc start. UA, MRSA negative, other BCx with NGTD. Narrowed to ancef per ID  - ID consult, appreciate recommendations  - TTE today, may need SALENA  - Trend BCx  - Daily BMP, CBC  - Ancef 2 gm q8hr  - Stop Vanc/zosyn  - PRN  lozenges, chloraseptic spray, Robitussin   - Continue to monitor     # BRBPR concerning for anal fissures vs bleeding hemorrhoids, resolved  # Non-thrombosed external hemorrhoids and non-thrombosed internal hemorrhoids found on perianal  Exam seen on colonoscopy (9/10/24)  BRBPR on 10/7, 10/8 with none in stool. No melena seen. States some pressure before BMs happen, but no overt pain. Mild bleeding seen upon 2nd PE on 10/8 slightly inside rectum. Hgb stable with mild downtrend. Per chart review, hemorrhoids seen as above on imaging ~1 month ago. No need for scope per discussion with GI on 10/8.   - Trend Hgb on CBC daily  - Start rectal nitroglycerin 0.4% BID for 4 weeks  - Defer adding PO fiber given needing to have BMs due to cirrhosis   - Sitz baths 2-3 times daily  - Fiber/2 gm Na diet      # Epistaxis, resolved and intermittent   Noted on 10/7 exam. Has had on/off for several days. Resolved with conservative treatment (pressure).   - If ongoing, please consult ENT given coags status, may need packing.   - Monitor coags and Hgb daily   - PRN Ocean spray, Afrin   - Monitor and notify  provider if ongoing     # Normocytic anemia  11.2-->11-->11.8  on 10/8. Suspect from bleeding sources as above in setting of coagulopathy.   - monitor on CBC  - Transfuse for Hgb less than 7    # Left sided chest pain, intermittent   ED visit with same complaints on 7/26/24. ACS and PE workup normal. Started on pepcid and simethicone. Has c/o L sided chest pain for the last 2 mornings. Pain is reproducible with palpation. Noted again on 10/8, EKG with no acute ST/T wave abnormalities or concerning T wave changes. Unable to get trop due to elevated bilirubin. Given pain is reproducible, likely MSK related, could consider from GERD as above, ID process (but less likley given negative work-up thus far).   - ID work-up as above  - GERD treatment as above  - Scheduled lidocaine patches  - PRN acetaminophen     # Moderate malnutrition, in the context of chronic illness/disease   # Vomiting, resolved   # Diarrhea, in setting of lactulose  Nothing tastes good, and had one non bloody emesis several days ago. States appetite better when on  higher dose prednisolone (currently on 1-mg).  - RD consult  - Continue lactulose TID, titrate to achieve 3-5 bowel movements daily  - Discuss renally appropriate MVI in AM  - PRN zofran  - Start calorie counts for 3 days     # Fatigue, improving   # Insomnia, improving   Fell asleep at desk while sitting and working on computer. See work-up as above for cirrhosis. No s/s of hepatic encelopathy. Currently normal mentation  - Cirrhosis treatment as above     # Gallbladder polyp  - Continue to monitor 4 mm polyp seen on US     # Depressed mood/Grief  Both the patient and wife started tearing up talking about poor prognosis and not knowing what to expect including what they can do while 'waiting 90 days.'  - Spiritual Health consult per patient request    # Hepatitis B status  Received hepatis B 1st dose on 9/10/24. Wife inquiring about second dose that should be scheduled for tomorrow. Per  "discussion with pharmacy, ok to push back several days and give prior to discharge.  - 2nd Hepatitis B vaccine prior to discharge per discussion with Pharmacy on 10/6.       Diet: 2 Gram Sodium Diet  Fluid restriction 1000 ML FLUID    DVT Prophylaxis: Anti-embolisim stockings (TEDs) and Ambulate every shift  Gomes Catheter: Not present  Lines: None     Cardiac Monitoring: None  Code Status: Full Code            Diet: Fluid restriction 1000 ML FLUID  Calorie Counts  Snacks/Supplements Adult: Ensure Max Protein (bariatric); Between Meals  Combination Diet High Fiber Diet; 2 gm NA Diet    DVT Prophylaxis: VTE Prophylaxis contraindicated due to ongoing bleeding   Gomes Catheter: Not present  Lines: None     Cardiac Monitoring: None  Code Status: Full Code      Clinically Significant Risk Factors         # Hyponatremia: Lowest Na = 131 mmol/L in last 2 days, will monitor as appropriate  # Hypocalcemia: Lowest Ca = 8 mg/dL in last 2 days, will monitor and replace as appropriate     # Hypoalbuminemia: Lowest albumin = 2.5 g/dL at 10/6/2024  6:09 AM, will monitor as appropriate  # Coagulation Defect: INR = 3.23 (Ref range: 0.85 - 1.15) and/or PTT = 38 Seconds (Ref range: 22 - 38 Seconds), will monitor for bleeding  # Thrombocytopenia: Lowest platelets = 63 in last 2 days, will monitor for bleeding             # Severe Obesity: Estimated body mass index is 40.88 kg/m  as calculated from the following:    Height as of this encounter: 1.676 m (5' 6\").    Weight as of this encounter: 114.9 kg (253 lb 4.8 oz)., PRESENT ON ADMISSION  # Moderate Malnutrition: based on nutrition assessment, PRESENT ON ADMISSION          Disposition Plan     Medically Ready for Discharge: Anticipated in 2-4 Days    The patient's care was discussed with the Attending Physician, Dr. Mcfarlane, Bedside Nurse, Patient, Patient's Family, and Nephrology and ID Consultant(s).    GRISELDA [facility]  Hospitalist Service  [facility]" [facility]  Securely message with Vocera (more info)  Text page via [facility] Paging/Directory     This chart documentation was completed with Dragon voice-recognition software. Even though reviewed, this chart may still contain some grammatical, spelling, and word errors. Please contact the author for any questions or clarifications.     ______________________________________________________________________    Interval History   Nursing/SW/consult/interdisciplinary healthcare worker's notes reviewed.     I reviewed all medications, new labs and imaging results over the last 24 hours. Please see discussion of these results in the A/P.    Fevered overnight to 102. 8, LA 2. Initial BCx growing staph aureus on verigene panel. Placed on vanc/zosyn. UA and MRSA negative. Used PRN Afrin x1. Output improving to ~1,010 yesterday, but tea colored urine. No further bleeding. 1 recorded BM last night, 1 this morning. No confusion noted overnight. Excited about seeing the sunrise this morning.     ROS: 12 point ROS negative other than the symptoms noted here or above in the assessment and plan.     Physical Exam   Vital Signs: Temp: 98.8  F (37.1  C) Temp src: Oral BP: 119/51 Pulse: 91   Resp: 18 SpO2: 100 % O2 Device: None (Room air)    Weight: 253 lbs 4.8 oz    Constitutional: awake, alert, cooperative, no apparent distress, and appears stated age  Eyes: Yellow sclera B/L. PERRLA.   ENT: Atrumatic. No swelling noted of uvula or tonsils. No exudate noted. No dried blood seen in nares or mouth.  Hematologic / Lymphatic: no cervical lymphadenopathy and no supraclavicular lymphadenopathy  Respiratory: No increased work of breathing, good air exchange, clear to auscultation bilaterally, no crackles or wheezing  Cardiovascular: Normal apical impulse, regular rate and rhythm, normal S1 and S2, no S3 or S4, and no murmur noted  GI: No scars, normal bowel sounds, soft, non-distended, non-tender, no masses palpated. Rectal  exam not performed  Skin: Jaundiced. No other lesions or rashes noted on exposed skin.   Musculoskeletal: There is no redness, warmth, or swelling of the joints.  Full range of motion noted.  Motor strength is 5 out of 5 all extremities bilaterally.  Tone is normal. +3 pitting edema B/L LE.   Neurologic: Awake, alert, oriented to name, place and time.  Cranial nerves II-XII are grossly intact.  Motor is 5 out of 5 bilaterally. No asterix noted   Neuropsychiatric:   General: normal, calm, and normal eye contact  Level of consciousness: alert / normal  Affect: normal  Orientation: oriented to self, place, time and situation  Memory and insight: normal, memory for past and recent events intact, and thought process normal    Medical Decision Making       60 MINUTES SPENT BY ME on the date of service doing chart review, history, exam, documentation & further activities per the note.      Data     I have personally reviewed the following data over the past 24 hrs:    10.6  \   11.8 (L)   / 74 (L)     133 (L) 102 13.9 /  92   4.5 18 (L) 0.79 \     ALT: 77 (H) AST: 118 (H) AP: 128 TBILI: 30.4 (HH)   ALB: 3.8 TOT PROTEIN: N/A LIPASE: N/A     Procal: N/A CRP: N/A Lactic Acid: 2.0       INR:  3.23 (H) PTT:  N/A   D-dimer:  N/A Fibrinogen:  N/A       Imaging results reviewed over the past 24 hrs:   No results found for this or any previous visit (from the past 24 hour(s)).

## 2024-10-09 NOTE — PLAN OF CARE
"Goal Outcome Evaluation:    /57 (BP Location: Right arm, Patient Position: Supine, Cuff Size: Adult Regular)   Pulse 99   Temp 100.2  F (37.9  C)   Resp 18   Ht 1.676 m (5' 6\")   Wt 115.3 kg (254 lb 4.8 oz)   SpO2 98%   BMI 41.05 kg/m       Problem: Adult Inpatient Plan of Care  Goal: Absence of Hospital-Acquired Illness or Injury  Intervention: Identify and Manage Fall Risk  Recent Flowsheet Documentation  Taken 10/8/2024 2003 by Denis Echeverria RN  Safety Promotion/Fall Prevention:   room organization consistent   room near nurse's station   nonskid shoes/slippers when out of bed   clutter free environment maintained  Intervention: Prevent Skin Injury  Recent Flowsheet Documentation  Taken 10/8/2024 2003 by Denis Echeverria RN  Body Position: position changed independently  Intervention: Prevent and Manage VTE (Venous Thromboembolism) Risk  Recent Flowsheet Documentation  Taken 10/8/2024 2003 by Denis Echeverria RN  VTE Prevention/Management: SCDs off (sequential compression devices)  Intervention: Prevent Infection  Recent Flowsheet Documentation  Taken 10/8/2024 2003 by Denis Echeverria RN  Infection Prevention:   hand hygiene promoted   rest/sleep promoted   single patient room provided   equipment surfaces disinfected     "

## 2024-10-09 NOTE — CONSULTS
[facility] ID SERVICE: NEW CONSULTATION  Patient:  Stewart Hunt, Date of birth 1985, Medical record number [id number]  Date of Admission: 10/3/2024  Date of Visit:  10/9/2024  Requesting Provider: Denys Carreon         Assessment and Recommendations:     ID Problem List:  MSSA bacteremia  Positive blood culture 10/8 (1/2 bottles)  Fever, Tmax 102.8F overnight 10/7  Leukocytosis  Cirrhosis 2/2 alcohol  Hx of heavy alcohol use, in abstinence since 9/8/2024  Hx of gastric bypass with sleeve (~12 years ago)    Discussion:  40 yo M with MSSA bacteremia, on blood cultures obtained after a febrile episode overnight 10/7. Was also febrile on admission. Then, reportedly concern of respiratory illness per discussion with primary team with negative workup. Unclear if has been bacteremic since then. Based on hx, no hardware in place, except staples from gastric sleeve surgery. No evidence of secondary infection per exam. Baseline lower back pain without worsening, more in lumbar paraspinal rather spinal. Suggest to obtain TTE to rule out endocarditis. If additional blood cultures become positive then would get SALENA. Likely source is skin, although no obvious skin abnormality. Anticipate 4 weeks course.     Recommendations:  Stop iv vancomycin, pip-tazo  Start iv cefazolin 2g q8h  TTE to rule out endocarditis  SALENA is a consideration, especially additional blood cultures are positive.  Follow up final blood culture report  Follow up repeat blood culture to ensure clearance of bacteremia  Anticipate 4 weeks course.   ID team will weigh in for timing of PICC placement, hold off for now.   Consider screening HIV, Hepatitis A, B, C     Recommendations discussed with primary team.  Updated the patient and family at bedside.     Thank you for this consult. ID team will continue to follow this patient. Please reach out if any questions or concerns.     Total time spent during this encounter (chart review, documentation,  MDM, coordination of care): 65 minutes     Shria Kolb MD  Infectious Diseases Staff Physician  Walker faustin   Date: 10/9/2024       History of Present Illness:     Stewart Hunt is a 40 yo F with PMHx of recently diagnosed alcoholic cirrhosis, who is admitted due to progressively worsening of lower extremities edema. Febrile episode on admission. Though reportedly c/o respiratory complaints, sore throat with negative work up. Another febrile episode overnight 10/7. Blood culture is resulted positive, identified MSSA on verigene. Started on empiric vancomycin, pip-tazo. No hx of fever, chills, sweating prior to admission. No hx of hardware. Does have gastric sleeve about 12 years ago.     SHx:  Heavy alcohol use since age 17, in abstinence since 9/8. No smoking, marijuana or illicit drug use. , lives with wife. Does have a pet - [employer] computer bulldog, healthy. Previously used to breed dogs, but none recently.          Review of Systems:     Complete 12 point review of systems negative except as noted in HPI.          Antimicrobial history:     Current:  Vancomycin, pipt-azo 10/8 - present    Prior:  Ceftriaxone x1 dose 10/8       Past Medical History:     Past Medical History:   Diagnosis Date    Acute alcoholic hepatitis (H) 09/2024    Alcohol use disorder, severe, in early remission (H)     Alcoholic cirrhosis (H)     Morbid obesity (H)          Allergies:    No Known Allergies       Family History:   Reviewed and noncontributory.   Family History   Problem Relation Age of Onset    Colon Cancer Mother     Diabetes Father     Pancreatic Cancer Sister     Diabetes Maternal Grandmother     Colon Cancer Cousin     Liver Disease No family hx of           Social History:     Social History     Socioeconomic History    Marital status: Single     Spouse name: Not on file    Number of children: Not on file    Years of education: Not on file    Highest education level: Not on file   Occupational History    Not on  file   Tobacco Use    Smoking status: Not on file    Smokeless tobacco: Not on file   Substance and Sexual Activity    Alcohol use: Not Currently     Comment: 2 pints of vodka per day for 2 years. Last drink early sept 2024    Drug use: Never    Sexual activity: Not on file   Other Topics Concern    Not on file   Social History Narrative    Not on file     Social Determinants of Health     Financial Resource Strain: Low Risk  (10/3/2024)    Financial Resource Strain     Within the past 12 months, have you or your family members you live with been unable to get utilities (heat, electricity) when it was really needed?: No   Food Insecurity: Low Risk  (10/3/2024)    Food Insecurity     Within the past 12 months, did you worry that your food would run out before you got money to buy more?: No     Within the past 12 months, did the food you bought just not last and you didn t have money to get more?: No   Transportation Needs: Low Risk  (10/3/2024)    Transportation Needs     Within the past 12 months, has lack of transportation kept you from medical appointments, getting your medicines, non-medical meetings or appointments, work, or from getting things that you need?: No   Physical Activity: Not on file   Stress: Not on file   Social Connections: Not on file   Interpersonal Safety: Low Risk  (10/5/2024)    Interpersonal Safety     Do you feel physically and emotionally safe where you currently live?: Yes     Within the past 12 months, have you been hit, slapped, kicked or otherwise physically hurt by someone?: No     Within the past 12 months, have you been humiliated or emotionally abused in other ways by your partner or ex-partner?: No   Recent Concern: Interpersonal Safety - High Risk (9/21/2024)    Interpersonal Safety     Do you feel physically and emotionally safe where you currently live?: No     Within the past 12 months, have you been hit, slapped, kicked or otherwise physically hurt by someone?: No     Within  "the past 12 months, have you been humiliated or emotionally abused in other ways by your partner or ex-partner?: No   Housing Stability: Low Risk  (10/3/2024)    Housing Stability     Do you have housing? : Yes     Are you worried about losing your housing?: No              Physical Examination:     Vital signs:  /62 (BP Location: Left arm)   Pulse 99   Temp 100.2  F (37.9  C) (Oral)   Resp 18   Ht 1.676 m (5' 6\")   Wt 114.9 kg (253 lb 4.8 oz)   SpO2 96%   BMI 40.88 kg/m      GENERAL: alert and no distress  EYES: Eyes grossly normal to inspection, PERRL and conjunctivae, normal, icteric sclerae  HENT: ear canals and TM's normal, nose and mouth without ulcers or lesions  NECK: no adenopathy, no asymmetry, masses, or scars  RESP: lungs clear to auscultation - no rales, rhonchi or wheezes  CV: regular rate and rhythm, normal S1 S2, no S3 or S4, no murmur, click or rub, 2+ peripheral edema  ABDOMEN: soft, nontender, no hepatosplenomegaly, no masses and bowel sounds normal  MS: no gross musculoskeletal defects noted, 2+ pitting edema  SKIN: +jaundice, no suspicious lesions or rashes  NEURO: Normal strength and tone, mentation intact and speech normal  PSYCH: mentation appears normal, affect normal/bright      Lines and devices:    PIV CDI, non-tender, no surrounding erythema.    Labs, Microbiology and Imaging studies reviewed.            Laboratory Data:       Microbiology:    Culture   Date Value Ref Range Status   10/08/2024 No growth after 12 hours  Preliminary   10/08/2024 No growth after 12 hours  Preliminary   10/08/2024 Positive on the 1st day of incubation (A)  Preliminary   10/08/2024 Staphylococcus aureus (AA)  Preliminary     Comment:     1 of 2 bottles   10/03/2024 No Growth  Final   09/20/2024 No Growth  Final     Urine Studies:    Recent Labs   Lab Test 10/08/24  2356 10/06/24  0911 10/03/24  0103 09/20/24  2058   LEUKEST Negative Negative Negative Negative   WBCU 4 3 1 1     Hematology " Studies:    Recent Labs   Lab Test 10/09/24  0553 10/08/24  1234 10/08/24  0108 10/07/24  0523 10/06/24  0609 10/05/24  0617 10/04/24  0532   WBC 10.6  --  8.7 10.0 12.9* 14.1* 15.6*   HGB 11.8* 11.0* 11.2* 11.8* 13.7 13.4 13.2*   *  --  104* 102* 100 100 101*   PLT 74*  --  63* 66* 76* 74* 72*      Metabolic Studies:   Recent Labs   Lab Test 10/09/24  0553 10/08/24  0108 10/07/24  0523 10/06/24  1336 10/06/24  0609   * 131* 126* 125* 125*   POTASSIUM 4.5 4.4 4.0 3.9 4.5   CHLORIDE 102 100 96* 96* 94*   CO2 18* 19* 20* 19* 20*   BUN 13.9 15.9 13.5 15.4 15.7   CR 0.79 0.83 0.85 0.84 0.92   GFRESTIMATED >90 >90 >90 >90 >90     Hepatic Studies:   Recent Labs   Lab Test 10/09/24  0553 10/08/24  0108 10/07/24  0523 10/06/24  0609 10/05/24  0617 10/04/24  0532   BILITOTAL 30.4* 28.2* 26.7* 26.4* 27.0* 25.2*   ALKPHOS 128 154* 172* 230* 249* 224*   ALBUMIN 3.8 3.5 3.0* 2.5* 2.7* 2.6*   * 118* 134* 203* 233* 176*   ALT 77* 82* 97* 143* 161* 144*              Last check of C difficile  C Difficile Toxin B by PCR   Date Value Ref Range Status   09/21/2024 Negative Negative Final     Comment:     A negative result does not exclude actual disease due to C. difficile and may be due to improper collection, handling and storage of the specimen or the number of organisms in the specimen is below the detection limit of the assay.            Imaging:     Recent Results (from the past 48 hour(s))   POC US Guide for Paracentesis    Impression    US Indication: fever    Limited abdominal ultrasound was performed to evaluate for ascites and need for paracentesis.     Views/Acquisition: hepatorenal/RUQ, right lower quadrant, parasplenic/LUQ, and left lower quadrant    Findings/Interpretation: No significant ascites    Denys Carreon MD    Echo Limited   Result Value    LVEF  60-65%    Narrative    [id number]  [id number]  [id number]  [id number]^BRANDON^BERENICE     [facility]  [place]  Echocardiography Laboratory  [address] [place] [id number]     Name: KARINA FARIAS  MRN: [id number]  : 1985  Study Date: 10/09/2024 09:45 AM  Age: 39 yrs  Gender: Male  Patient Location: [facility]  Reason For Study: Endocarditis  Ordering Physician: BERENICE TAYLOR  Performed By: Frederick Maloney     BSA: 2.2 m2  Height: 66 in  Weight: 253 lb  HR: 98  BP: 119/51 mmHg  ______________________________________________________________________________  Procedure  Limited Portable Echo Adult.  ______________________________________________________________________________  Interpretation Summary  No significant valvular abnormalities were noted. No vegetation or mass  identified.  ______________________________________________________________________________  Left Ventricle  Global and regional left ventricular function is normal with an EF of 60-65%.     Right Ventricle  Right ventricular function, chamber size, wall motion, and thickness are  normal.     Mitral Valve  The mitral valve is normal. Trace mitral insufficiency is present.     Aortic Valve  Aortic valve is normal in structure and function.     Tricuspid Valve  The tricuspid valve is normal. Trace tricuspid insufficiency is present.     Pulmonic Valve  The pulmonic valve is normal.     Vessels  IVC diameter and respiratory changes fall into an intermediate range  suggesting an RA pressure of 8 mmHg.     Pericardium  No pericardial effusion is present.     ______________________________________________________________________________  Report approved by: [facility] 10/09/2024 10:51 AM     ______________________________________________________________________________

## 2024-10-10 LAB
ALBUMIN SERPL BCG-MCNC: 3.5 G/DL (ref 3.5–5.2)
ALP SERPL-CCNC: 125 U/L (ref 40–150)
ALT SERPL W P-5'-P-CCNC: 65 U/L (ref 0–70)
ANION GAP SERPL CALCULATED.3IONS-SCNC: 10 MMOL/L (ref 7–15)
AST SERPL W P-5'-P-CCNC: 98 U/L (ref 0–45)
BACTERIA BLD CULT: ABNORMAL
BACTERIA BLD CULT: ABNORMAL
BILIRUB SERPL-MCNC: 28.7 MG/DL
BUN SERPL-MCNC: 13.3 MG/DL (ref 6–20)
CALCIUM SERPL-MCNC: 8.1 MG/DL (ref 8.8–10.4)
CHLORIDE SERPL-SCNC: 103 MMOL/L (ref 98–107)
CREAT SERPL-MCNC: 0.69 MG/DL (ref 0.67–1.17)
EGFRCR SERPLBLD CKD-EPI 2021: >90 ML/MIN/1.73M2
ERYTHROCYTE [DISTWIDTH] IN BLOOD BY AUTOMATED COUNT: 17.9 % (ref 10–15)
GLUCOSE SERPL-MCNC: 85 MG/DL (ref 70–99)
HCO3 SERPL-SCNC: 19 MMOL/L (ref 22–29)
HCT VFR BLD AUTO: 33.3 % (ref 40–53)
HGB BLD-MCNC: 11.4 G/DL (ref 13.3–17.7)
INR PPP: 2.78 (ref 0.85–1.15)
MCH RBC QN AUTO: 35.6 PG (ref 26.5–33)
MCHC RBC AUTO-ENTMCNC: 34.1 G/DL (ref 31.5–36.5)
MCV RBC AUTO: 104 FL (ref 78–100)
PLATELET # BLD AUTO: 75 10E3/UL (ref 150–450)
POTASSIUM SERPL-SCNC: 4.2 MMOL/L (ref 3.4–5.3)
PROT SERPL-MCNC: ABNORMAL G/DL
RBC # BLD AUTO: 3.2 10E6/UL (ref 4.4–5.9)
SODIUM SERPL-SCNC: 132 MMOL/L (ref 135–145)
WBC # BLD AUTO: 10.7 10E3/UL (ref 4–11)

## 2024-10-10 PROCEDURE — 120N000002 HC R&B MED SURG/OB UMMC

## 2024-10-10 PROCEDURE — 80048 BASIC METABOLIC PNL TOTAL CA: CPT

## 2024-10-10 PROCEDURE — 250N000013 HC RX MED GY IP 250 OP 250 PS 637: Performed by: INTERNAL MEDICINE

## 2024-10-10 PROCEDURE — 250N000013 HC RX MED GY IP 250 OP 250 PS 637

## 2024-10-10 PROCEDURE — 99232 SBSQ HOSP IP/OBS MODERATE 35: CPT | Performed by: STUDENT IN AN ORGANIZED HEALTH CARE EDUCATION/TRAINING PROGRAM

## 2024-10-10 PROCEDURE — 85027 COMPLETE CBC AUTOMATED: CPT

## 2024-10-10 PROCEDURE — 36415 COLL VENOUS BLD VENIPUNCTURE: CPT

## 2024-10-10 PROCEDURE — 36415 COLL VENOUS BLD VENIPUNCTURE: CPT | Performed by: STUDENT IN AN ORGANIZED HEALTH CARE EDUCATION/TRAINING PROGRAM

## 2024-10-10 PROCEDURE — 87040 BLOOD CULTURE FOR BACTERIA: CPT | Performed by: STUDENT IN AN ORGANIZED HEALTH CARE EDUCATION/TRAINING PROGRAM

## 2024-10-10 PROCEDURE — 99233 SBSQ HOSP IP/OBS HIGH 50: CPT | Performed by: NURSE PRACTITIONER

## 2024-10-10 PROCEDURE — 99232 SBSQ HOSP IP/OBS MODERATE 35: CPT | Performed by: INTERNAL MEDICINE

## 2024-10-10 PROCEDURE — 85610 PROTHROMBIN TIME: CPT

## 2024-10-10 PROCEDURE — 250N000011 HC RX IP 250 OP 636: Mod: JZ

## 2024-10-10 RX ADMIN — GUAIFENESIN 200 MG: 200 SOLUTION ORAL at 20:00

## 2024-10-10 RX ADMIN — CEFAZOLIN SODIUM 2 G: 2 INJECTION, SOLUTION INTRAVENOUS at 17:34

## 2024-10-10 RX ADMIN — CEFAZOLIN SODIUM 2 G: 2 INJECTION, SOLUTION INTRAVENOUS at 09:21

## 2024-10-10 RX ADMIN — ACETAMINOPHEN 325 MG: 325 TABLET, FILM COATED ORAL at 20:00

## 2024-10-10 RX ADMIN — FOLIC ACID 1 MG: 1 TABLET ORAL at 08:46

## 2024-10-10 RX ADMIN — THIAMINE HCL TAB 100 MG 100 MG: 100 TAB at 08:46

## 2024-10-10 RX ADMIN — PANTOPRAZOLE SODIUM 40 MG: 40 TABLET, DELAYED RELEASE ORAL at 08:35

## 2024-10-10 RX ADMIN — LIDOCAINE 1 PATCH: 4 PATCH TOPICAL at 19:59

## 2024-10-10 RX ADMIN — SIMETHICONE 80 MG: 80 TABLET, CHEWABLE ORAL at 08:45

## 2024-10-10 RX ADMIN — Medication 1 TABLET: at 08:45

## 2024-10-10 RX ADMIN — LACTULOSE 20 G: 20 POWDER, FOR SOLUTION ORAL at 14:25

## 2024-10-10 RX ADMIN — SIMETHICONE 80 MG: 80 TABLET, CHEWABLE ORAL at 20:00

## 2024-10-10 RX ADMIN — CEFAZOLIN SODIUM 2 G: 2 INJECTION, SOLUTION INTRAVENOUS at 01:48

## 2024-10-10 RX ADMIN — LACTULOSE 20 G: 20 POWDER, FOR SOLUTION ORAL at 08:45

## 2024-10-10 RX ADMIN — GUAIFENESIN 200 MG: 200 SOLUTION ORAL at 11:54

## 2024-10-10 ASSESSMENT — ACTIVITIES OF DAILY LIVING (ADL)
ADLS_ACUITY_SCORE: 22

## 2024-10-10 NOTE — PLAN OF CARE
"Goal Outcome Evaluation:      Plan of Care Reviewed With: patient    Overall Patient Progress: improvingOverall Patient Progress: improving    Outcome Evaluation: Patient alert and oriented, denies pain, denies tarry stools    Neuro: Patient alert and oriented x 4, neuros wnl.  Cardiac:WNL   Respiratory:Denies SOB  GI/:Hx liver cirrhosis, distended stomach, denies tarry stools. Voiding spontaneously, dark cherry urine noted. Received lactulose x 1, had bm several times. Refused sitz bath.  Diet/appetite:1500 fluid restriction, 2g Na diet.  Activity:Independent with ambulation  Pain:Denies  Skin:Jaundices  Lines:PIV  Receiving IV cefazolin, last temp 99.7.    /82 (BP Location: Left arm)   Pulse 96   Temp 99.7  F (37.6  C) (Axillary)   Resp 18   Ht 1.676 m (5' 6\")   Wt 114.9 kg (253 lb 4.8 oz)   SpO2 99%   BMI 40.88 kg/m     "

## 2024-10-10 NOTE — PROGRESS NOTES
"Nephrology  10/10/2024    Interval events: He remains on IV cefazolin for MSSA bacteremia.  Continues to receive lactulose and has bowel movements with lactulose.  No dyspnea, appetite stable, continues to have bilateral lower extremity edema.      Exam: /64 (BP Location: Right arm)   Pulse 102   Temp (!) 101  F (38.3  C) (Oral)   Resp 16   Ht 1.676 m (5' 6\")   Wt 114.9 kg (253 lb 6.4 oz)   SpO2 99%   BMI 40.90 kg/m    General Awake alert no distress  Scleral icterus  Pulmonary normal work of breathing  Cardiovascular 2+ lower extremity edema  Neuro face symmetric speech fluent    Data     I have personally reviewed the following data over the past 24 hrs:    10.7  \   11.4 (L)   / 75 (L)     132 (L) 103 13.3 /  85   4.2 19 (L) 0.69 \     ALT: 65 AST: 98 (H) AP: 125 TBILI: 28.7 (HH)   ALB: 3.5 TOT PROTEIN: N/A LIPASE: N/A     INR:  2.78 (H) PTT:  N/A   D-dimer:  N/A Fibrinogen:  N/A          Assessment and plan  #Hyponatremia stable  #Decompensated cirrhosis due to alcohol use  # MSSA bacteremia on cefazolin    Recommend restarting home diuretics with furosemide 20 mg daily and spironolactone 50 mg daily and monitor labs and response to diuretic.    The potential side effects of this medication have been discussed with the patient.  Call if any significant problems with these are experienced.   "

## 2024-10-10 NOTE — PROGRESS NOTES
HEPATOLOGY PROGRESS NOTE  Patient:  Stewart Hunt, Date of birth 1985  Date of Visit:  10/10/2024  Referring Provider No ref. provider found         IMPRESSION:  Stewart Hunt is a 39 year old male with a history of alcohol use disorder causing alcohol associated cirrhosis complicated by nonresponder to steroids, hx sleeve gastrectomy, anasarca and HE who was admitted with lower extremity edema. While inpatient, he became hyponatremic which improved with albumin and then was noted to have staph aureus bacteremia.     RECOMMENDATIONS:    Updates for 10/10/2024 :  - abx per ID.     #Alcohol related hepatitis  # Alcohol use disorder  # Alcohol associated cirrhosis  # Staph aeurus bacteremia  - MELD 31. Increase in t. Bili likely in setting of bacteremia. Was a nonresponder to steroids. These were stopped this admission.   - trace ascites seen, has not undergone clau.   - US without evidence of HCC, has evidence of splenorenal collaterals suggestive of cirrhosis  - has plans for OP eval and treatment.   - no further evidence of GIB.   - EGD 9/10 without EV  - on cefazolin for bacteremia. Follow up cultures negative.     # Hepatic encephalopathy  - likely more tenuous with splenorenal collaterals.   - continue lactulose with goal of 3-5 BM's per day.   - If mentation not controlled, add rifaximin. Will need PA.     Patient was discussed with attending physician, Dr. Leventhal.  The above note reflects our joint plan.     Next follow up appointment: Dr. Dodd 10/21    Will sign off.     Thank you for the opportunity to be involved with  Stewart Hunt care. Please call with any questions or concerns.    Julia Maloney, APRN, CNP  Inpatient Hepatology [facility]              Subjective    States feels improved today and has an increase in appetite. No longer has sore throat since starting IV abx. Continues to have lower extremity edema causing discomfort. No further bloody sputum or blood in stool.      "    Objective    VS: /77 (BP Location: Right arm, Patient Position: Semi-Drake's, Cuff Size: Adult Regular)   Pulse 97   Temp 99.2  F (37.3  C) (Oral)   Resp 18   Ht 1.676 m (5' 6\")   Wt 114.9 kg (253 lb 4.8 oz)   SpO2 98%   BMI 40.88 kg/m        General: In no acute distress, no facial muscle wasting  Neuro: AOx3, No asterixis  HEENT:   mild  scleral icterus, Nooral lesions  CV: Skin warm and dry  Lungs:  Respirations even and nonlabored on room air  Abd:  Nontender, nondistended   Extrem:  +2 lower  peripheral edema   Skin:  mild  jaundice  Psych: pleasant    MEDICATIONS:  Current Facility-Administered Medications   Medication Dose Route Frequency Provider Last Rate Last Admin    acetaminophen (TYLENOL) tablet 325 mg  325 mg Oral Q4H PRN Yazmin Francis PA-C   325 mg at 10/09/24 2019    albuterol (PROVENTIL HFA/VENTOLIN HFA) inhaler  2-3 puff Inhalation Q6H PRN Jennifer Cartagena APRN CNP        benzocaine-menthol (CHLORASEPTIC MAX) 15-10 MG lozenge 1 lozenge  1 lozenge Buccal Q1H PRN Dominique Vera PA-C   1 lozenge at 10/09/24 2036    ceFAZolin (ANCEF) 2 g in 100 mL D5W intermittent infusion  2 g Intravenous Q8H Rosa Valente APRN  mL/hr at 10/10/24 0148 2 g at 10/10/24 0148    folic acid (FOLVITE) tablet 1 mg  1 mg Oral Daily Deb Erickson MD   1 mg at 10/09/24 0945    [Held by provider] furosemide (LASIX) tablet 40 mg  40 mg Oral Daily Dominique Vera PA-C   40 mg at 10/04/24 0846    guaiFENesin (ROBITUSSIN) 20 mg/mL solution 200 mg  200 mg Oral Q4H PRN Rosa Valente APRN CNP   200 mg at 10/09/24 2252    lactulose (CEPHULAC) Packet 20 g  20 g Oral TID Deb Erickson MD   20 g at 10/09/24 1036    Lidocaine (LIDOCARE) 4 % Patch 1 patch  1 patch Transdermal Q24h Rosa Valente, GRISELDA CNP   1 patch at 10/09/24 2019    multivitamin w/minerals (THERA-VIT-M) tablet 1 tablet  1 tablet Oral Daily Deb Erickson MD   1 tablet at 10/09/24 0945    nitroGLYcerin " (RECTIV) 0.4 % rectal ointment 1.5 mg  1 inch Rectal Q12H Rosa Valente APRN CNP        ondansetron (ZOFRAN ODT) ODT tab 4 mg  4 mg Oral Q6H PRN Deb Erickson MD        Or    ondansetron (ZOFRAN) injection 4 mg  4 mg Intravenous Q6H PRN Deb Erickson MD        oxymetazoline (AFRIN) 0.05 % spray 2 spray  2 spray Both Nostrils BID PRN Rosa Valente APRN CNP   2 spray at 10/08/24 1508    pantoprazole (PROTONIX) EC tablet 40 mg  40 mg Oral QAM AC Rosa Valente APRN CNP   40 mg at 10/09/24 0647    phenol (CHLORASEPTIC) 1.4 % spray 1 mL  1 spray Mouth/Throat Q1H PRN Dominique Vera PA-C   1 mL at 10/05/24 2037    senna-docusate (SENOKOT-S/PERICOLACE) 8.6-50 MG per tablet 1 tablet  1 tablet Oral BID PRN Deb Erickson MD        Or    senna-docusate (SENOKOT-S/PERICOLACE) 8.6-50 MG per tablet 2 tablet  2 tablet Oral BID PRN Deb Erickson MD        simethicone (MYLICON) chewable tablet 80 mg  80 mg Oral BID Deb Erickson MD   80 mg at 10/09/24 2019    sodium chloride (OCEAN) 0.65 % nasal spray 1 spray  1 spray Both Nostrils Q1H PRN Rosa Valente APRN CNP        [Held by provider] spironolactone (ALDACTONE) tablet 100 mg  100 mg Oral Daily Dominique Vera PA-C   100 mg at 10/05/24 0931    thiamine (B-1) tablet 100 mg  100 mg Oral Daily Deb Erickson MD   100 mg at 10/09/24 0945       REVIEW OF LABORATORY, PATHOLOGY AND IMAGING RESULTS:  BMP  Recent Labs   Lab 10/10/24  0610 10/09/24  0553 10/08/24  0108 10/07/24  0523   * 133* 131* 126*   POTASSIUM 4.2 4.5 4.4 4.0   CHLORIDE 103 102 100 96*   LELAND 8.1* 8.4* 8.0* 7.8*   CO2 19* 18* 19* 20*   BUN 13.3 13.9 15.9 13.5   CR 0.69 0.79 0.83 0.85   GLC 85 92 103* 80     CBC  Recent Labs   Lab 10/10/24  0610 10/09/24  0553 10/08/24  1234 10/08/24  0108 10/07/24  0523   WBC 10.7 10.6  --  8.7 10.0   RBC 3.20* 3.30*  --  3.12* 3.34*   HGB 11.4* 11.8*   < > 11.2* 11.8*   HCT 33.3* 34.3*  --  32.3* 34.0*    * 104*  --  104* 102*   MCH 35.6* 35.8*  --  35.9* 35.3*   MCHC 34.1 34.4  --  34.7 34.7   RDW 17.9* 17.9*  --  18.0* 18.0*   PLT 75* 74*  --  63* 66*    < > = values in this interval not displayed.     INR  Recent Labs   Lab 10/10/24  0610 10/09/24  0553 10/08/24  0108 10/07/24  0523   INR 2.78* 3.23* 3.48* 3.77*     LFTs  Recent Labs   Lab 10/10/24  0610 10/09/24  0553 10/08/24  0108 10/07/24  0523   ALKPHOS 125 128 154* 172*   AST 98* 118* 118* 134*   ALT 65 77* 82* 97*   BILITOTAL 28.7* 30.4* 28.2* 26.7*   ALBUMIN 3.5 3.8 3.5 3.0*        MELD 3.0: 31 at 10/10/2024  6:10 AM  MELD-Na: 32 at 10/10/2024  6:10 AM  Calculated from:  Serum Creatinine: 0.69 mg/dL (Using min of 1 mg/dL) at 10/10/2024  6:10 AM  Serum Sodium: 132 mmol/L at 10/10/2024  6:10 AM  Total Bilirubin: 28.7 mg/dL at 10/10/2024  6:10 AM  Serum Albumin: 3.5 g/dL at 10/10/2024  6:10 AM  INR(ratio): 2.78 at 10/10/2024  6:10 AM  Age at listing (hypothetical): 39 years  Sex: Male at 10/10/2024  6:10 AM        Imaging Results:

## 2024-10-10 NOTE — PROGRESS NOTES
[facility]    Medicine Progress Note - Hospitalist Service, GOLD TEAM 7    Date of Admission:  10/3/2024    Assessment & Plan   Stewart Hunt is a 39 year old male admitted on 10/3/2024. He has hx of alcohol cirrhosis and admitted 9/20-22 for worsening anasarca and hyperbilirubinemia.      # Lower extremity edema, anasarca  # Hyponatremia 2/2 etoh cirrhosis, improving   # ANNA, prerenal with possible HRS physiology (cystatin c 1.8, GFR 39%), improving   Home reg: lasix 20mg daily and aldactone 50mg daily since last admission 10 days ago, and has been having ++ urination without improvement in edema. On lactulose and had been having multiple bowel movements daily along with poor appetite. Albumin 2-3. most likely due to third spacing. Cr wnl 0.94-->0.78-->0.89. Sodium decreased from 131 to 125 10/5 with increased diuretic dosing. BL Na ~136.  Cr at  0.85, but cystatin C at 1.8. GFR calculated with Cystain C 39. Blood osmolality 264. Sodium urine <20, urine osmolality 305.UO improving over the past few days. Suspected ANNA with possible HRS physiology per discussions with Nephrology  - Hepatology and Nephrology consults  - Daily CMP   - HOLD PTA lasix, spironolactone   -Increased lasix to 40 mg daily and spironolactone to 100 mg daily 10/3- lasix on hold starting 10/5 given current bacteremia. As long as he remains afebrile, vitally stable, and blood cultures remain negative, will plan to restart lasix and spironolactone.  - 3 day Albumin challenge for hepatorenal syndrome (10/6-10/9) -- responded well to albumin.  - 2 gm sodium diet, 1L FR  - strict I+Os, daily weights     # Decompensated alcoholic cirrhosis  # Alcoholic hepatitis   # Hx of hepatic encephalopathy   # Hx of alcohol use disorder, sober since 9/8/2024  Follows with OP hepatology. Prednisolone taper discontinued. US 9/21 without evidence of HCC, hepatomegaly. No EV on recent EGD. T. Bili initially  downtrending, slight increase 10/5. Mentation currently clear. Attending AA and has been sober since 9/8/24. Was recommended inpatient rehab, but he feels that since he has such high MELD, he does not want to be away from his family for 30 days.  - Hepatology consult, appreciate recommendations   - Daily MELD labs  - Diuresis plan as above  - Continue lactulose TID, titrate to achieve 3-5 bowel movements daily  - Thiamine, folic acid daily   - May need rifaximin in the future (per GI)  - Monitor for hepatic encephalopathy  - Continue to attend AA when discharged     MELD 3.0: 31 at 10/10/2024  6:10 AM  MELD-Na: 32 at 10/10/2024  6:10 AM  Calculated from:  Serum Creatinine: 0.69 mg/dL (Using min of 1 mg/dL) at 10/10/2024  6:10 AM  Serum Sodium: 132 mmol/L at 10/10/2024  6:10 AM  Total Bilirubin: 28.7 mg/dL at 10/10/2024  6:10 AM  Serum Albumin: 3.5 g/dL at 10/10/2024  6:10 AM  INR(ratio): 2.78 at 10/10/2024  6:10 AM  Age at listing (hypothetical): 39 years  Sex: Male at 10/10/2024  6:10 AM      # Cough, hemoptysis vs hematemesis vs blood from epistaxis, resolved   # Moderate portal hypertensive gastropathy seen on EGD   # GERD  In addition to ongoing sore throat since ~1 week prior to admission, with grossly negative work-up, has had two episodes of sputum with blood on 10/7, 10/8. Broad ID work-up. Discussed at length with GI given cirrhosis. No varices seen on EGD ~1 month ago at [facility]. Could consider [place] tear with hypertensive gastropathy, coughing, coagulopathy, but no urgent need to scope for UGIB.   - GI consult appreciate recommendations   - No need for EGD on 10/8  - Trend daily CBC   - Continue protonix 40 mg daily, simethicone 80 mg BID   - Monitor     # Staph aureus bacteremia, unclear source   # Elevated procalcitonin   # Sore throat, ongoing, improving   Procal 1.77 on 10/5. WBC WNL on 10/7. No dysuria, no fever, PENN, new O2 needs on 10/7.  Has throat pain but viral panels repetiteively  negative. Possibly due to prednisolone that he was taking. CXR and UA unremarkable. Remains afebrile. WBC trending down now that he is off steroids. Elevated temperature to 100.7 overnight  10/7. Strep swab negative. Continues to have normal WBC, decreasing procal. Normal VSS otherwise. Started on Ceftrixone. No fluid per POCUS exam  on 10/8. Ceftriaxone stopped, but positive GPC 1/2 bottles. Overall, concern for renal dysfunction without clear source for intermittent temperature spikes despite broadly negative work-up. Initally suspected GPCs may be contaminination and held abx after talking with ID given wanting to preserve renal function, but spiked Tmax of 102.8 on 10/8 prompting Zosyn/Vanc start. UA, MRSA negative, other BCx with NGTD. Narrowed to ancef per ID  - ID consult, appreciate recommendations  - TTE ON  10/9--No vegetations on SALENA  - Trend BCx  - Daily BMP, CBC  - Ancef 2 gm q8hr-- day 1 abx: 10/9 . Will need 4 weeks of abx. ID to assist in length of abx  - PRN  lozenges, chloraseptic spray, Robitussin   - Continue to monitor     # BRBPR concerning for anal fissures vs bleeding hemorrhoids, resolved  # Non-thrombosed external hemorrhoids and non-thrombosed internal hemorrhoids found on perianal  Exam seen on colonoscopy (9/10/24)  BRBPR on 10/7, 10/8 with none in stool. No melena seen. States some pressure before BMs happen, but no overt pain. Mild bleeding seen upon 2nd PE on 10/8 slightly inside rectum. Hgb stable with mild downtrend. Per chart review, hemorrhoids seen as above on imaging ~1 month ago. No need for scope per discussion with GI on 10/8.   - Trend Hgb on CBC daily  - Start rectal nitroglycerin 0.4% BID for 4 weeks  - Defer adding PO fiber given needing to have BMs due to cirrhosis   - Sitz baths 2-3 times daily  - Fiber/2 gm Na diet      # Epistaxis, resolved and intermittent   Noted on 10/7 exam. Has had on/off for several days. Resolved with conservative treatment (pressure).   - If  ongoing, please consult ENT given coags status, may need packing.   - Monitor coags and Hgb daily   - PRN Ocean spray, Afrin   - Monitor and notify provider if ongoing     # Normocytic anemia  11.2-->11-->11.8  on 10/8. Suspect from bleeding sources as above in setting of coagulopathy.   - monitor on CBC  - Transfuse for Hgb less than 7    # Left sided chest pain, intermittent   ED visit with same complaints on 7/26/24. ACS and PE workup normal. Started on pepcid and simethicone. Has c/o L sided chest pain for the last 2 mornings. Pain is reproducible with palpation. Noted again on 10/8, EKG with no acute ST/T wave abnormalities or concerning T wave changes. Unable to get trop due to elevated bilirubin. Given pain is reproducible, likely MSK related, could consider from GERD as above, ID process (but less likley given negative work-up thus far).   - ID work-up as above  - GERD treatment as above  - Scheduled lidocaine patches  - PRN acetaminophen     # Moderate malnutrition, in the context of chronic illness/disease   # Vomiting, resolved   # Diarrhea, in setting of lactulose  Nothing tastes good, and had one non bloody emesis several days ago. States appetite better when on  higher dose prednisolone (currently on 1-mg).  - RD consult  - Continue lactulose TID, titrate to achieve 3-5 bowel movements daily  - Discuss renally appropriate MVI in AM  - PRN zofran  - Start calorie counts for 3 days     # Fatigue, improving   # Insomnia, improving   Fell asleep at desk while sitting and working on computer. See work-up as above for cirrhosis. No s/s of hepatic encelopathy. Currently normal mentation  - Cirrhosis treatment as above     # Gallbladder polyp  - Continue to monitor 4 mm polyp seen on US     # Depressed mood/Grief  Both the patient and wife started tearing up talking about poor prognosis and not knowing what to expect including what they can do while 'waiting 90 days.'  - Spiritual Health consult per patient  "request    # Hepatitis B status  Received hepatis B 1st dose on 9/10/24. Wife inquiring about second dose that should be scheduled for tomorrow. Per discussion with pharmacy, ok to push back several days and give prior to discharge.  - 2nd Hepatitis B vaccine prior to discharge per discussion with Pharmacy on 10/6.       Diet: 2 Gram Sodium Diet  Fluid restriction 1000 ML FLUID    DVT Prophylaxis: Anti-embolisim stockings (TEDs) and Ambulate every shift  Gomes Catheter: Not present  Lines: None     Cardiac Monitoring: None  Code Status: Full Code            Diet: Calorie Counts  Snacks/Supplements Adult: Ensure Max Protein (bariatric); Between Meals  Combination Diet High Fiber Diet; 2 gm NA Diet  Fluid restriction 1500 ML FLUID    DVT Prophylaxis: VTE Prophylaxis contraindicated due to ongoing bleeding   Gomes Catheter: Not present  Lines: None     Cardiac Monitoring: None  Code Status: Full Code      Clinically Significant Risk Factors         # Hyponatremia: Lowest Na = 132 mmol/L in last 2 days, will monitor as appropriate   # Hypocalcemia: Lowest Ca = 8.1 mg/dL in last 2 days, will monitor and replace as appropriate     # Hypoalbuminemia: Lowest albumin = 2.5 g/dL at 10/6/2024  6:09 AM, will monitor as appropriate    # Coagulation Defect: INR = 2.78 (Ref range: 0.85 - 1.15) and/or PTT = 38 Seconds (Ref range: 22 - 38 Seconds), will monitor for bleeding  # Thrombocytopenia: Lowest platelets = 74 in last 2 days, will monitor for bleeding             # Severe Obesity: Estimated body mass index is 40.88 kg/m  as calculated from the following:    Height as of this encounter: 1.676 m (5' 6\").    Weight as of this encounter: 114.9 kg (253 lb 4.8 oz)., PRESENT ON ADMISSION  # Moderate Malnutrition: based on nutrition assessment, PRESENT ON ADMISSION          Disposition Plan     Medically Ready for Discharge: Anticipated in 2-4 Days    The patient's care was discussed with the Attending Physician, Dr. Mcfarlane, Bedside " Nurse, Patient, Patient's Family, and Nephrology and ID Consultant(s).    DEE DUFF MD  Hospitalist Service, GOLD TEAM [address] [place] [address]  Securely message with [email or web address] (more info)  Text page via [email or web address] Paging/Directory   See signed in provider for up to date coverage information    This chart documentation was completed with Dragon voice-recognition software. Even though reviewed, this chart may still contain some grammatical, spelling, and word errors. Please contact the author for any questions or clarifications.     ______________________________________________________________________    Interval History   Nursing/SW/consult/interdisciplinary healthcare worker's notes reviewed.     I reviewed all medications, new labs and imaging results over the last 24 hours. Please see discussion of these results in the A/P.     No acute issues overnight. Ocntinues to have BLE edema. No abd pain, nausea, emesis. Still having jaundice. No dysuria. No flank pain. No diarrhea. No chills. Fever curve improved.    ROS: 12 point ROS negative other than the symptoms noted here or above in the assessment and plan.     Physical Exam   Vital Signs: Temp: 99.2  F (37.3  C) Temp src: Oral BP: 125/77 Pulse: 97   Resp: 18 SpO2: 98 % O2 Device: None (Room air)    Weight: 253 lbs 4.8 oz    Constitutional: awake, alert, cooperative, no apparent distress, and appears stated age  Eyes: Yellow sclera B/L. PERRLA.   ENT: Atrumatic. No swelling noted of uvula or tonsils. No exudate noted. No dried blood seen in nares or mouth.  Hematologic / Lymphatic: no cervical lymphadenopathy and no supraclavicular lymphadenopathy  Respiratory: No increased work of breathing, good air exchange, clear to auscultation bilaterally, no crackles or wheezing  Cardiovascular: Normal apical impulse, regular rate and rhythm, normal S1 and S2, no S3 or S4, and no murmur noted  GI: No scars, normal bowel sounds, soft,  non-distended, non-tender, no masses palpated. Rectal exam not performed  Skin: Jaundiced. No other lesions or rashes noted on exposed skin.   Musculoskeletal: There is no redness, warmth, or swelling of the joints.  Full range of motion noted.  Motor strength is 5 out of 5 all extremities bilaterally.  Tone is normal. +3 pitting edema B/L LE.   Neurologic: Awake, alert, oriented to name, place and time.  Cranial nerves II-XII are grossly intact.  Motor is 5 out of 5 bilaterally. No asterix noted   Neuropsychiatric:   General: normal, calm, and normal eye contact  Level of consciousness: alert / normal  Affect: normal  Orientation: oriented to self, place, time and situation  Memory and insight: normal, memory for past and recent events intact, and thought process normal    Medical Decision Making       60 MINUTES SPENT BY ME on the date of service doing chart review, history, exam, documentation & further activities per the note.      Data     I have personally reviewed the following data over the past 24 hrs:    10.7  \   11.4 (L)   / 75 (L)     132 (L) 103 13.3 /  85   4.2 19 (L) 0.69 \     ALT: 65 AST: 98 (H) AP: 125 TBILI: 28.7 (HH)   ALB: 3.5 TOT PROTEIN: N/A LIPASE: N/A     INR:  2.78 (H) PTT:  N/A   D-dimer:  N/A Fibrinogen:  N/A       Imaging results reviewed over the past 24 hrs:   Recent Results (from the past 24 hour(s))   Echo Limited   Result Value    LVEF  60-65%    Narrative    [id number]  [id number]  [id number]  [id number]^BRANDON^BERENICE     [facility]  Echocardiography Laboratory  [address] [place] [id number]     Name: KARINA FARIAS  MRN: [id number]  : 1985  Study Date: 10/09/2024 09:45 AM  Age: 39 yrs  Gender: Male  Patient Location: [facility]  Reason For Study: Endocarditis  Ordering Physician: BERENICE TAYLOR  Performed By: Frederick Maloney     BSA: 2.2 m2  Height: 66 in  Weight: 253 lb  HR: 98  BP: 119/51  mmHg  ______________________________________________________________________________  Procedure  Limited Portable Echo Adult.  ______________________________________________________________________________  Interpretation Summary  No significant valvular abnormalities were noted. No vegetation or mass  identified.  ______________________________________________________________________________  Left Ventricle  Global and regional left ventricular function is normal with an EF of 60-65%.     Right Ventricle  Right ventricular function, chamber size, wall motion, and thickness are  normal.     Mitral Valve  The mitral valve is normal. Trace mitral insufficiency is present.     Aortic Valve  Aortic valve is normal in structure and function.     Tricuspid Valve  The tricuspid valve is normal. Trace tricuspid insufficiency is present.     Pulmonic Valve  The pulmonic valve is normal.     Vessels  IVC diameter and respiratory changes fall into an intermediate range  suggesting an RA pressure of 8 mmHg.     Pericardium  No pericardial effusion is present.     ______________________________________________________________________________  Report approved by: Carlos Gonzalez 10/09/2024 10:51 AM     ______________________________________________________________________________

## 2024-10-10 NOTE — PROGRESS NOTES
[facility] ID SERVICE: Progress Note  Patient:  Stewart Hunt, Date of birth 1985, Medical record number [id number]  Date of Admission: 10/3/2024  Date of Visit:  10/11/2024  Requesting Provider: Denys Carreon         Assessment and Recommendations:     ID Problem List:  MSSA bacteremia  Positive blood culture 10/8 (1/2 bottles)  Fever, Tmax 102.8F overnight 10/7  Leukocytosis  Cirrhosis 2/2 alcohol  Hx of heavy alcohol use, in abstinence since 9/8/2024  Hx of gastric bypass with sleeve (~12 years ago)    Discussion:  40 yo M with MSSA bacteremia, on blood cultures obtained after a febrile episode overnight 10/7. Was also febrile on admission. Then, reportedly concern of respiratory illness per discussion with primary team with negative workup. Unclear if has been bacteremic since then. Based on hx, no hardware in place, except staples from gastric sleeve surgery. No evidence of secondary infection per exam. Baseline lower back pain without worsening, more in lumbar paraspinal rather spinal. TTE negative for valvular disease. If additional blood cultures become positive then would get SALENA, until then hold off. Likely source is skin, although no obvious skin abnormality. Anticipate 4 weeks course.     Recommendations:  Continue iv cefazolin 2g q8h  Hold off SALENA unless additional blood cultures are positive.  Follow up final blood culture report  Follow up repeat blood culture to ensure clearance of bacteremia - NGTD  Anticipate 4 weeks course.   ID team will weigh in for timing of PICC placement, hold off for now.     Recommendations discussed with primary team.  Thank you for this consult. ID team will continue to follow this patient. Please reach out if any questions or concerns.     Renee Murdock, MS4  Geosho ramin   Date: 10/10/2024      ID Staff Assessment and Recommendations:  This patient was seen, examined and evaluated  by me. I personally reviewed the medical records, vital signs, medications,  labs and imaging studies. I agree with the documentation as above by the medical student and I have edited as needed.     Key findings:  38 yo M with recently diagnosed cirrhosis 2/2 alcohol use, in abstinence since 9/8/2024, who is admitted for management of progressively worsening b/l LE edema. However, also had febrile episode on 10/4 when also leukocytosis, no blood cultures then. Rather thought respiratory illness, negative work up. No antibiotics were received. Another febrile episode on 10/8, blood cultures grew MSSA (1/2 bottles), repeat blood cultures on evening of 10/8 onwards negative. TTE negative. Based on clinical presentation, chronology of bacteremia is unclear, whether could have bacteremic on previous febrile episode on 10/4. Recommend 4 weeks of iv cefazolin. Await repeat blood cultures, drawn on evening of 10/8, to place PICC line.     Total time spent during this encounter (Chart review, history, physical exam, documentation, MDM, coordination of care): 40 minutes    Shira Kolb MD  Infectious Diseases Staff Physician  Walker faustin   Date of service (when I saw the patient): 10/10/2024         History of Present Illness:     Stewart Hunt is a 38 yo F with PMHx of recently diagnosed alcoholic cirrhosis, who is admitted due to progressively worsening of lower extremities edema. Febrile episode on admission. Though reportedly c/o respiratory complaints, sore throat with negative work up. Another febrile episode overnight 10/7. Blood culture is resulted positive, identified MSSA on verigene. Started on empiric vancomycin, pip-tazo. No hx of fever, chills, sweating prior to admission. No hx of hardware. Does have gastric sleeve about 12 years ago.     SHx:  Heavy alcohol use since age 17, in abstinence since 9/8. No smoking, marijuana or illicit drug use. , lives with wife. Does have a pet - french bulldog, healthy. Previously used to breed dogs, but none recently.          Review of  Systems:     Complete 12 point review of systems negative except as noted in HPI.          Antimicrobial history:     Current:  Cefazolin 10/9 - **    Prior:  Vancomycin, pipt-azo 10/8 - 10/9  Ceftriaxone x1 dose 10/8       Past Medical History:     Past Medical History:   Diagnosis Date    Acute alcoholic hepatitis (H) 09/2024    Alcohol use disorder, severe, in early remission (H)     Alcoholic cirrhosis (H)     Morbid obesity (H)          Allergies:    No Known Allergies       Family History:   Reviewed and noncontributory.   Family History   Problem Relation Age of Onset    Colon Cancer Mother     Diabetes Father     Pancreatic Cancer Sister     Diabetes Maternal Grandmother     Colon Cancer Cousin     Liver Disease No family hx of           Social History:     Social History     Socioeconomic History    Marital status: Single     Spouse name: Not on file    Number of children: Not on file    Years of education: Not on file    Highest education level: Not on file   Occupational History    Not on file   Tobacco Use    Smoking status: Not on file    Smokeless tobacco: Not on file   Substance and Sexual Activity    Alcohol use: Not Currently     Comment: 2 pints of vodka per day for 2 years. Last drink early sept 2024    Drug use: Never    Sexual activity: Not on file   Other Topics Concern    Not on file   Social History Narrative    Not on file     Social Determinants of Health     Financial Resource Strain: Low Risk  (10/3/2024)    Financial Resource Strain     Within the past 12 months, have you or your family members you live with been unable to get utilities (heat, electricity) when it was really needed?: No   Food Insecurity: Low Risk  (10/3/2024)    Food Insecurity     Within the past 12 months, did you worry that your food would run out before you got money to buy more?: No     Within the past 12 months, did the food you bought just not last and you didn t have money to get more?: No   Transportation  "Needs: Low Risk  (10/3/2024)    Transportation Needs     Within the past 12 months, has lack of transportation kept you from medical appointments, getting your medicines, non-medical meetings or appointments, work, or from getting things that you need?: No   Physical Activity: Not on file   Stress: Not on file   Social Connections: Not on file   Interpersonal Safety: Low Risk  (10/5/2024)    Interpersonal Safety     Do you feel physically and emotionally safe where you currently live?: Yes     Within the past 12 months, have you been hit, slapped, kicked or otherwise physically hurt by someone?: No     Within the past 12 months, have you been humiliated or emotionally abused in other ways by your partner or ex-partner?: No   Recent Concern: Interpersonal Safety - High Risk (9/21/2024)    Interpersonal Safety     Do you feel physically and emotionally safe where you currently live?: No     Within the past 12 months, have you been hit, slapped, kicked or otherwise physically hurt by someone?: No     Within the past 12 months, have you been humiliated or emotionally abused in other ways by your partner or ex-partner?: No   Housing Stability: Low Risk  (10/3/2024)    Housing Stability     Do you have housing? : Yes     Are you worried about losing your housing?: No              Physical Examination:     Vital signs:  /62 (BP Location: Right arm)   Pulse 102   Temp 98.8  F (37.1  C) (Oral)   Resp 16   Ht 1.676 m (5' 6\")   Wt 114.9 kg (253 lb 6.4 oz)   SpO2 98%   BMI 40.90 kg/m      GENERAL: alert and no distress  EYES: Eyes grossly normal to inspection, PERRL and conjunctivae, normal, icteric sclerae  NECK: no adenopathy, no asymmetry, masses, or scars  RESP: lungs clear to auscultation - no rales, rhonchi or wheezes  CV: regular rate and rhythm, normal S1 S2  ABDOMEN: soft, nontender, no hepatosplenomegaly, no masses and bowel sounds normal  MS: no gross musculoskeletal defects noted, 2+ pitting " edema  SKIN: +jaundice, no suspicious lesions or rashes  NEURO: Normal strength and tone, mentation intact and speech normal  PSYCH: mentation appears normal, affect normal/bright      Lines and devices:    PIV CDI, non-tender, no surrounding erythema.    Labs, Microbiology and Imaging studies reviewed.            Laboratory Data:       Microbiology:    Culture   Date Value Ref Range Status   10/08/2024 No growth after 1 day  Preliminary   10/08/2024 No growth after 1 day  Preliminary   10/08/2024 Positive on the 1st day of incubation (A)  Final   10/08/2024 Staphylococcus aureus (AA)  Final     Comment:     1 of 2 bottles   10/03/2024 No Growth  Final   09/20/2024 No Growth  Final     Urine Studies:    Recent Labs   Lab Test 10/08/24  2356 10/06/24  0911 10/03/24  0103 09/20/24  2058   LEUKEST Negative Negative Negative Negative   WBCU 4 3 1 1     Hematology Studies:    Recent Labs   Lab Test 10/10/24  0610 10/09/24  0553 10/08/24  1234 10/08/24  0108 10/07/24  0523 10/06/24  0609 10/05/24  0617   WBC 10.7 10.6  --  8.7 10.0 12.9* 14.1*   HGB 11.4* 11.8* 11.0* 11.2* 11.8* 13.7 13.4   * 104*  --  104* 102* 100 100   PLT 75* 74*  --  63* 66* 76* 74*      Metabolic Studies:   Recent Labs   Lab Test 10/10/24  0610 10/09/24  0553 10/08/24  0108 10/07/24  0523 10/06/24  1336   * 133* 131* 126* 125*   POTASSIUM 4.2 4.5 4.4 4.0 3.9   CHLORIDE 103 102 100 96* 96*   CO2 19* 18* 19* 20* 19*   BUN 13.3 13.9 15.9 13.5 15.4   CR 0.69 0.79 0.83 0.85 0.84   GFRESTIMATED >90 >90 >90 >90 >90     Hepatic Studies:   Recent Labs   Lab Test 10/10/24  0610 10/09/24  0553 10/08/24  0108 10/07/24  0523 10/06/24  0609 10/05/24  0617   BILITOTAL 28.7* 30.4* 28.2* 26.7* 26.4* 27.0*   ALKPHOS 125 128 154* 172* 230* 249*   ALBUMIN 3.5 3.8 3.5 3.0* 2.5* 2.7*   AST 98* 118* 118* 134* 203* 233*   ALT 65 77* 82* 97* 143* 161*              Last check of C difficile  C Difficile Toxin B by PCR   Date Value Ref Range Status   09/21/2024  Negative Negative Final     Comment:     A negative result does not exclude actual disease due to C. difficile and may be due to improper collection, handling and storage of the specimen or the number of organisms in the specimen is below the detection limit of the assay.            Imaging:     Recent Results (from the past 48 hour(s))   Echo Limited   Result Value    LVEF  60-65%    Narrative    [id number]  [id number]  [id number]  [id number]^BRANDON^BERENICE     [facility]  Echocardiography Laboratory  [address] [place] [address]     Name: KARINA FARIAS  MRN: [id number]  : 1985  Study Date: 10/09/2024 09:45 AM  Age: 39 yrs  Gender: Male  Patient Location: [facility]  Reason For Study: Endocarditis  Ordering Physician: BERENICE TAYLOR  Performed By: Frederick Maloney     BSA: 2.2 m2  Height: 66 in  Weight: 253 lb  HR: 98  BP: 119/51 mmHg  ______________________________________________________________________________  Procedure  Limited Portable Echo Adult.  ______________________________________________________________________________  Interpretation Summary  No significant valvular abnormalities were noted. No vegetation or mass  identified.  ______________________________________________________________________________  Left Ventricle  Global and regional left ventricular function is normal with an EF of 60-65%.     Right Ventricle  Right ventricular function, chamber size, wall motion, and thickness are  normal.     Mitral Valve  The mitral valve is normal. Trace mitral insufficiency is present.     Aortic Valve  Aortic valve is normal in structure and function.     Tricuspid Valve  The tricuspid valve is normal. Trace tricuspid insufficiency is present.     Pulmonic Valve  The pulmonic valve is normal.     Vessels  IVC diameter and respiratory changes fall into an intermediate range  suggesting an RA pressure of 8 mmHg.     Pericardium  No pericardial effusion is  present.     ______________________________________________________________________________  Report approved by: K.P. Vasu, [facility] 10/09/2024 10:51 AM     ______________________________________________________________________________

## 2024-10-10 NOTE — PROGRESS NOTES
Calorie Count  Intake recorded for: 10/9  Total Kcals: 479 Total Protein: 20g  Kcals from Hospital Food: 479  Protein: 20g  Kcals from Outside Food (average):0 Protein: 0g  # Meals Ordered from Kitchen: 3 meals   # Meals Recorded: 1 meal - 100% deli turkey sandwich w/ lettuce, tomato, onion, corral & mustard, fruit cup, sherbet  # Supplements Recorded: 0

## 2024-10-10 NOTE — PLAN OF CARE
Goal Outcome Evaluation:       (Change note type to summary of care)  Reason for admission: BLE edema, fatigue  Admitted from: Obs   Report received from: Obs RN         2 RN skin assessment completed by: Kenzie BROWNE, RN, Brett OMALLEY, RN      - Findings (add LDA if needed): jaundice  Bed algorithm reevaluated:   Was airflow pump ordered?:  Suction set up in room?   Care plan (primary problem) and education initiated: yes  MDRO education done if applicable:  Pt informed about policy regarding no IV pumps off unit: yes  Flu shot ordered? (October-April only):   Detailed Belongings:

## 2024-10-10 NOTE — PLAN OF CARE
Goal Outcome Evaluation:           Overall Patient Progress: no changeOverall Patient Progress: no change    Outcome Evaluation: Pt is stable with no acute changes during my time of care. pt ambulated in the hallway which he tolerated well. pt was given PRN robotussin for cough at 12:00 . At 18:00 pt was complaining of chills, temp taken, no fever. A/Ox4. Up ad oscar. Pt had 1 BM during shift. Pt is laying in bed with siderails raised. call light within reach.

## 2024-10-10 NOTE — PLAN OF CARE
Goal Outcome Evaluation:      Plan of Care Reviewed With: patient    Overall Patient Progress: no changeOverall Patient Progress: no change    Outcome Evaluation: A&O, no skin issues other than jaundice, 4x BM today so held 2000 lactulose. C/o sore throat, Robitussin and throat lozenges given. Tylenol x1 for low-grade fever.

## 2024-10-10 NOTE — PLAN OF CARE
Goal Outcome Evaluation:      Plan of Care Reviewed With: patient    Overall Patient Progress: no change    Outcome Evaluation: Writer assumed care of pt at 2300. A/Ox4, RA, VSS. Pt very compliant with adhering to strict I/Os. Pt given cefazolin late due to not having medication so other doses were retimed to comply with 8hour seperation. No acute events occured this shift. Please continue with POC.

## 2024-10-11 ENCOUNTER — APPOINTMENT (OUTPATIENT)
Dept: OCCUPATIONAL THERAPY | Facility: CLINIC | Age: 39
DRG: 432 | End: 2024-10-11
Attending: STUDENT IN AN ORGANIZED HEALTH CARE EDUCATION/TRAINING PROGRAM
Payer: COMMERCIAL

## 2024-10-11 LAB
ALBUMIN SERPL BCG-MCNC: 3.6 G/DL (ref 3.5–5.2)
ALP SERPL-CCNC: 138 U/L (ref 40–150)
ALT SERPL W P-5'-P-CCNC: 57 U/L (ref 0–70)
ANION GAP SERPL CALCULATED.3IONS-SCNC: 10 MMOL/L (ref 7–15)
AST SERPL W P-5'-P-CCNC: 91 U/L (ref 0–45)
BILIRUB SERPL-MCNC: 30 MG/DL
BUN SERPL-MCNC: 15.7 MG/DL (ref 6–20)
CALCIUM SERPL-MCNC: 8.2 MG/DL (ref 8.8–10.4)
CHLORIDE SERPL-SCNC: 103 MMOL/L (ref 98–107)
CREAT SERPL-MCNC: 0.66 MG/DL (ref 0.67–1.17)
EGFRCR SERPLBLD CKD-EPI 2021: >90 ML/MIN/1.73M2
ERYTHROCYTE [DISTWIDTH] IN BLOOD BY AUTOMATED COUNT: 18.2 % (ref 10–15)
GLUCOSE SERPL-MCNC: 86 MG/DL (ref 70–99)
HCO3 SERPL-SCNC: 19 MMOL/L (ref 22–29)
HCT VFR BLD AUTO: 34.2 % (ref 40–53)
HGB BLD-MCNC: 11.7 G/DL (ref 13.3–17.7)
INR PPP: 2.94 (ref 0.85–1.15)
MCH RBC QN AUTO: 36 PG (ref 26.5–33)
MCHC RBC AUTO-ENTMCNC: 34.2 G/DL (ref 31.5–36.5)
MCV RBC AUTO: 105 FL (ref 78–100)
PLATELET # BLD AUTO: 83 10E3/UL (ref 150–450)
POTASSIUM SERPL-SCNC: 4.5 MMOL/L (ref 3.4–5.3)
PROT SERPL-MCNC: ABNORMAL G/DL
RBC # BLD AUTO: 3.25 10E6/UL (ref 4.4–5.9)
SODIUM SERPL-SCNC: 132 MMOL/L (ref 135–145)
WBC # BLD AUTO: 13 10E3/UL (ref 4–11)

## 2024-10-11 PROCEDURE — 36415 COLL VENOUS BLD VENIPUNCTURE: CPT

## 2024-10-11 PROCEDURE — 250N000013 HC RX MED GY IP 250 OP 250 PS 637: Performed by: INTERNAL MEDICINE

## 2024-10-11 PROCEDURE — 250N000011 HC RX IP 250 OP 636: Mod: JZ

## 2024-10-11 PROCEDURE — 97140 MANUAL THERAPY 1/> REGIONS: CPT | Mod: GO | Performed by: OCCUPATIONAL THERAPIST

## 2024-10-11 PROCEDURE — 250N000013 HC RX MED GY IP 250 OP 250 PS 637

## 2024-10-11 PROCEDURE — 120N000002 HC R&B MED SURG/OB UMMC

## 2024-10-11 PROCEDURE — 250N000013 HC RX MED GY IP 250 OP 250 PS 637: Performed by: STUDENT IN AN ORGANIZED HEALTH CARE EDUCATION/TRAINING PROGRAM

## 2024-10-11 PROCEDURE — 99232 SBSQ HOSP IP/OBS MODERATE 35: CPT | Performed by: STUDENT IN AN ORGANIZED HEALTH CARE EDUCATION/TRAINING PROGRAM

## 2024-10-11 PROCEDURE — 99232 SBSQ HOSP IP/OBS MODERATE 35: CPT | Mod: GC | Performed by: STUDENT IN AN ORGANIZED HEALTH CARE EDUCATION/TRAINING PROGRAM

## 2024-10-11 PROCEDURE — 85014 HEMATOCRIT: CPT

## 2024-10-11 PROCEDURE — 85610 PROTHROMBIN TIME: CPT

## 2024-10-11 PROCEDURE — 80048 BASIC METABOLIC PNL TOTAL CA: CPT

## 2024-10-11 PROCEDURE — 97165 OT EVAL LOW COMPLEX 30 MIN: CPT | Mod: GO | Performed by: OCCUPATIONAL THERAPIST

## 2024-10-11 RX ORDER — FUROSEMIDE 20 MG/1
20 TABLET ORAL DAILY
Status: DISCONTINUED | OUTPATIENT
Start: 2024-10-11 | End: 2024-10-18 | Stop reason: HOSPADM

## 2024-10-11 RX ORDER — SPIRONOLACTONE 50 MG/1
50 TABLET, FILM COATED ORAL DAILY
Status: DISCONTINUED | OUTPATIENT
Start: 2024-10-11 | End: 2024-10-18 | Stop reason: HOSPADM

## 2024-10-11 RX ADMIN — GUAIFENESIN 200 MG: 200 SOLUTION ORAL at 20:57

## 2024-10-11 RX ADMIN — Medication 1 TABLET: at 08:54

## 2024-10-11 RX ADMIN — LACTULOSE 20 G: 20 POWDER, FOR SOLUTION ORAL at 08:53

## 2024-10-11 RX ADMIN — ACETAMINOPHEN 325 MG: 325 TABLET, FILM COATED ORAL at 06:50

## 2024-10-11 RX ADMIN — PANTOPRAZOLE SODIUM 40 MG: 40 TABLET, DELAYED RELEASE ORAL at 08:54

## 2024-10-11 RX ADMIN — FOLIC ACID 1 MG: 1 TABLET ORAL at 08:54

## 2024-10-11 RX ADMIN — CEFAZOLIN SODIUM 2 G: 2 INJECTION, SOLUTION INTRAVENOUS at 01:24

## 2024-10-11 RX ADMIN — FUROSEMIDE 20 MG: 20 TABLET ORAL at 08:54

## 2024-10-11 RX ADMIN — CEFAZOLIN SODIUM 2 G: 2 INJECTION, SOLUTION INTRAVENOUS at 17:16

## 2024-10-11 RX ADMIN — SPIRONOLACTONE 50 MG: 50 TABLET ORAL at 08:54

## 2024-10-11 RX ADMIN — THIAMINE HCL TAB 100 MG 100 MG: 100 TAB at 08:54

## 2024-10-11 RX ADMIN — GUAIFENESIN 200 MG: 200 SOLUTION ORAL at 08:53

## 2024-10-11 RX ADMIN — CEFAZOLIN SODIUM 2 G: 2 INJECTION, SOLUTION INTRAVENOUS at 08:53

## 2024-10-11 RX ADMIN — GUAIFENESIN 200 MG: 200 SOLUTION ORAL at 14:46

## 2024-10-11 RX ADMIN — SIMETHICONE 80 MG: 80 TABLET, CHEWABLE ORAL at 08:53

## 2024-10-11 RX ADMIN — SIMETHICONE 80 MG: 80 TABLET, CHEWABLE ORAL at 20:52

## 2024-10-11 RX ADMIN — LIDOCAINE 1 PATCH: 4 PATCH TOPICAL at 20:51

## 2024-10-11 ASSESSMENT — ACTIVITIES OF DAILY LIVING (ADL)
ADLS_ACUITY_SCORE: 22
ADLS_ACUITY_SCORE: 22
ADLS_ACUITY_SCORE: 23
ADLS_ACUITY_SCORE: 23
ADLS_ACUITY_SCORE: 22
ADLS_ACUITY_SCORE: 23
ADLS_ACUITY_SCORE: 22
ADLS_ACUITY_SCORE: 23
ADLS_ACUITY_SCORE: 22
ADLS_ACUITY_SCORE: 23
ADLS_ACUITY_SCORE: 22
ADLS_ACUITY_SCORE: 23
ADLS_ACUITY_SCORE: 22
ADLS_ACUITY_SCORE: 22
ADLS_ACUITY_SCORE: 23

## 2024-10-11 NOTE — PROGRESS NOTES
Nephrology  10/11/2024    Chart reviewed, I did not visit with Stewart today  Diuretics being restarted today    Nephrology will sign off, call if worsening hyponatremia    Gabriela Narayan MD

## 2024-10-11 NOTE — PROGRESS NOTES
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 Was also febrile on admission. Then, reportedly concern of respiratory illness per discussion with primary team with negative workup. Unclear if has been bacteremic since then. Based on hx, no hardware in place, except staples from gastric sleeve surgery. No evidence of secondary infection per exam. Baseline lower back pain without worsening, more in lumbar paraspinal rather spinal. TTE negative for valvular disease. If additional blood cultures become positive then would get SALENA, until then hold off. Likely source is skin, although no obvious skin abnormality. Anticipate 4 weeks course for MSSA bacteriemia.    The patient fevered while on appropriate antibiotics for MSSA. With no clear source or positive repeat blood cultures, recommend broadening infectious workup to include respiratory virus panel. If primary team has concern for possible DVT, would recommend lower extremity US.      Recommendations:  Continue iv cefazolin 2g q8h  Recommend respiratory virus panel  Consider LE US if clinical suspicion for DVT  Hold off SALENA unless additional blood cultures are positive.  Follow up repeat blood culture to ensure clearance of bacteremia - NGTD  Anticipate 4 weeks course.   ID team will weigh in for timing of PICC placement, hold off for now.     Recommendations discussed with primary  team.  Thank you for this consult. ID team will continue to follow this patient. Please reach out if any questions or concerns.     Renee Murdock, MS4  [employer]   Date: 10/11/2024    ID Staff Assessment and Recommendations:  This patient was seen, examined and evaluated  by me. I personally reviewed the medical records, vital signs, medications, labs and imaging studies. I agree with the documentation as above by the medical student and I have edited as needed.      Key findings:  40 yo M with recently diagnosed cirrhosis 2/2 alcohol use, in abstinence since 9/8/2024, who is admitted for management of progressively worsening b/l LE edema. However, also had febrile episode on 10/4 when also leukocytosis, no blood cultures then. Rather thought respiratory illness, negative work up. No antibiotics were received. Another febrile episode on 10/8, blood cultures grew MSSA (1/2 bottles), repeat blood cultures on evening of 10/8 onwards negative. TTE negative. Based on clinical presentation, chronology of bacteremia is unclear, whether could have bacteremic on previous febrile episode on 10/4. Recommend 4 weeks of iv cefazolin.     Overnight febrile, Tmax 101F. Repeat blood cultures are obtained, so far negative. Please hold off PICC placement until blood cultures (collected on 10/10 overnight) negative for 72 hours, so likely Monday 10/14.     Consider additional work up for fever - complete respiratory viral panel, rule out DVT.     ID team will continue to follow.   For urgent questions over the weekend, please reach out to on-call ID provider per amcom.      Total time spent during this encounter (Chart review, history, physical exam, documentation, MDM, coordination of care): 40 minutes     Shira Kolb MD  Infectious Diseases Staff Physician  [facility]   Date of service (when I saw the patient): 10/11/2024       History of Present Illness:     Stewart Hunt is a 40 yo F with PMHx of recently diagnosed alcoholic  cirrhosis, who is admitted due to progressively worsening of lower extremities edema. Febrile episode on admission. Though reportedly c/o respiratory complaints, sore throat with negative work up. Another febrile episode overnight 10/7. Blood culture is resulted positive, identified MSSA on verigene. Started on empiric vancomycin, pip-tazo. No hx of fever, chills, sweating prior to admission. No hx of hardware. Does have gastric sleeve about 12 years ago.     SHx:  Heavy alcohol use since age 17, in abstinence since 9/8. No smoking, marijuana or illicit drug use. , lives with wife. Does have a pet - Sinhala bulldog, healthy. Previously used to breed dogs, but none recently.          Review of Systems:     Complete 12 point review of systems negative except as noted in HPI.          Antimicrobial history:     Current:  Cefazolin 10/9 - **    Prior:  Vancomycin, pipt-azo 10/8 - 10/9  Ceftriaxone x1 dose 10/8       Past Medical History:     Past Medical History:   Diagnosis Date    Acute alcoholic hepatitis (H) 09/2024    Alcohol use disorder, severe, in early remission (H)     Alcoholic cirrhosis (H)     Morbid obesity (H)          Allergies:    No Known Allergies       Family History:   Reviewed and noncontributory.   Family History   Problem Relation Age of Onset    Colon Cancer Mother     Diabetes Father     Pancreatic Cancer Sister     Diabetes Maternal Grandmother     Colon Cancer Cousin     Liver Disease No family hx of           Social History:     Social History     Socioeconomic History    Marital status: Single     Spouse name: Not on file    Number of children: Not on file    Years of education: Not on file    Highest education level: Not on file   Occupational History    Not on file   Tobacco Use    Smoking status: Not on file    Smokeless tobacco: Not on file   Substance and Sexual Activity    Alcohol use: Not Currently     Comment: 2 pints of vodka per day for 2 years. Last drink early sept 2024     Drug use: Never    Sexual activity: Not on file   Other Topics Concern    Not on file   Social History Narrative    Not on file     Social Determinants of Health     Financial Resource Strain: Low Risk  (10/3/2024)    Financial Resource Strain     Within the past 12 months, have you or your family members you live with been unable to get utilities (heat, electricity) when it was really needed?: No   Food Insecurity: Low Risk  (10/3/2024)    Food Insecurity     Within the past 12 months, did you worry that your food would run out before you got money to buy more?: No     Within the past 12 months, did the food you bought just not last and you didn t have money to get more?: No   Transportation Needs: Low Risk  (10/3/2024)    Transportation Needs     Within the past 12 months, has lack of transportation kept you from medical appointments, getting your medicines, non-medical meetings or appointments, work, or from getting things that you need?: No   Physical Activity: Not on file   Stress: Not on file   Social Connections: Not on file   Interpersonal Safety: Low Risk  (10/5/2024)    Interpersonal Safety     Do you feel physically and emotionally safe where you currently live?: Yes     Within the past 12 months, have you been hit, slapped, kicked or otherwise physically hurt by someone?: No     Within the past 12 months, have you been humiliated or emotionally abused in other ways by your partner or ex-partner?: No   Recent Concern: Interpersonal Safety - High Risk (9/21/2024)    Interpersonal Safety     Do you feel physically and emotionally safe where you currently live?: No     Within the past 12 months, have you been hit, slapped, kicked or otherwise physically hurt by someone?: No     Within the past 12 months, have you been humiliated or emotionally abused in other ways by your partner or ex-partner?: No   Housing Stability: Low Risk  (10/3/2024)    Housing Stability     Do you have housing? : Yes     Are you  "worried about losing your housing?: No              Physical Examination:     Vital signs:  /66 (BP Location: Right arm)   Pulse 90   Temp 97.6  F (36.4  C) (Oral)   Resp 16   Ht 1.676 m (5' 6\")   Wt 114.9 kg (253 lb 6.4 oz)   SpO2 98%   BMI 40.90 kg/m      GENERAL: alert and no distress  EYES: Eyes grossly normal to inspection, PERRL and conjunctivae, normal, icteric sclerae  NECK: no adenopathy, no asymmetry, masses, or scars  RESP: lungs clear to auscultation - no rales, rhonchi or wheezes  CV: regular rate and rhythm, normal S1 S2  ABDOMEN: soft, nontender, no hepatosplenomegaly, no masses and bowel sounds normal  MS: no gross musculoskeletal defects noted, 2+ pitting edema  SKIN: +jaundice, no suspicious lesions or rashes  NEURO: Normal strength and tone, mentation intact and speech normal  PSYCH: mentation appears normal, affect normal/bright      Lines and devices:    PIV CDI, non-tender, no surrounding erythema.    Labs, Microbiology and Imaging studies reviewed.            Laboratory Data:       Microbiology:    Culture   Date Value Ref Range Status   10/10/2024 No growth after 12 hours  Preliminary   10/08/2024 No growth after 2 days  Preliminary   10/08/2024 No growth after 2 days  Preliminary   10/08/2024 Positive on the 1st day of incubation (A)  Final   10/08/2024 Staphylococcus aureus (AA)  Final     Comment:     1 of 2 bottles   10/03/2024 No Growth  Final   09/20/2024 No Growth  Final     Urine Studies:    Recent Labs   Lab Test 10/08/24  2356 10/06/24  0911 10/03/24  0103 09/20/24  2058   LEUKEST Negative Negative Negative Negative   WBCU 4 3 1 1     Hematology Studies:    Recent Labs   Lab Test 10/11/24  0610 10/10/24  0610 10/09/24  0553 10/08/24  1234 10/08/24  0108 10/07/24  0523 10/06/24  0609   WBC 13.0* 10.7 10.6  --  8.7 10.0 12.9*   HGB 11.7* 11.4* 11.8* 11.0* 11.2* 11.8* 13.7   * 104* 104*  --  104* 102* 100   PLT 83* 75* 74*  --  63* 66* 76*      Metabolic Studies: "   Recent Labs   Lab Test 10/11/24  0610 10/10/24  0610 10/09/24  0553 10/08/24  0108 10/07/24  0523   * 132* 133* 131* 126*   POTASSIUM 4.5 4.2 4.5 4.4 4.0   CHLORIDE 103 103 102 100 96*   CO2 19* 19* 18* 19* 20*   BUN 15.7 13.3 13.9 15.9 13.5   CR 0.66* 0.69 0.79 0.83 0.85   GFRESTIMATED >90 >90 >90 >90 >90     Hepatic Studies:   Recent Labs   Lab Test 10/11/24  0610 10/10/24  0610 10/09/24  0553 10/08/24  0108 10/07/24  0523 10/06/24  0609   BILITOTAL 30.0* 28.7* 30.4* 28.2* 26.7* 26.4*   ALKPHOS 138 125 128 154* 172* 230*   ALBUMIN 3.6 3.5 3.8 3.5 3.0* 2.5*   AST 91* 98* 118* 118* 134* 203*   ALT 57 65 77* 82* 97* 143*              Last check of C difficile  C Difficile Toxin B by PCR   Date Value Ref Range Status   09/21/2024 Negative Negative Final     Comment:     A negative result does not exclude actual disease due to C. difficile and may be due to improper collection, handling and storage of the specimen or the number of organisms in the specimen is below the detection limit of the assay.            Imaging:     No results found for this or any previous visit (from the past 48 hour(s)).

## 2024-10-11 NOTE — PLAN OF CARE
"Assumed cares 7814-4023     /57 (BP Location: Right arm)   Pulse 92   Temp 99.6  F (37.6  C) (Oral)   Resp 16   Ht 1.676 m (5' 6\")   Wt 114.9 kg (253 lb 6.4 oz)   SpO2 96%   BMI 40.90 kg/m       Pain: Patient denied pain.   Neuro: A&Ox4.    Respiratory: Lungs clear and equal, on RA.   Cardiac/Neurovascular: HR and pulse WDL.Baseline numbness in lower extremities. Severe lower extremity edema.   GI/: Adequate urine output. 4 BM's today.   Nutrition: 2g NA diet, 1500ml FR.   Activity: Independent.   Skin: Jaundice.   Lines: Left PIV (SL)  Events this shift: Patient continued with scheduled lactulose, afternoon dose held due to patient already having 4 BM's.      Plan: Continue with plan of care.     Goal Outcome Evaluation:      Plan of Care Reviewed With: patient    Overall Patient Progress: no changeOverall Patient Progress: no change    Outcome Evaluation: Continue with plan of care.      "

## 2024-10-11 NOTE — PLAN OF CARE
Goal Outcome Evaluation:      Plan of Care Reviewed With: patient    Overall Patient Progress: no changeOverall Patient Progress: no change    Outcome Evaluation: A/Ox4, RA, VSS other than a fever of 101 right before writer assumed cares. PRN tylenol given and temp rechecked and had lowered. Given PRN robotussin this shift. Pt continues to be very compliant with fluid restriction and intake and output. Leg edema still not showing much change. Skin still appearing jaundice. Appitite is good. Pt reported 4 BM today and laculose held per order parameters. No acute events occured this shift. Please continue with POC.

## 2024-10-11 NOTE — PROGRESS NOTES
[facility]    Medicine Progress Note - Hospitalist Service, GOLD TEAM 7    Date of Admission:  10/3/2024    Assessment & Plan   Stewart Hunt is a 39 year old male admitted on 10/3/2024. He has hx of alcohol cirrhosis and admitted 9/20-22 for worsening anasarca and hyperbilirubinemia.      # Lower extremity edema, anasarca  # Hyponatremia 2/2 etoh cirrhosis, improving   # ANNA, prerenal with possible HRS physiology (cystatin c 1.8, GFR 39%), improving   Home reg: lasix 20mg daily and aldactone 50mg daily since last admission 10 days ago, and has been having ++ urination without improvement in edema. On lactulose and had been having multiple bowel movements daily along with poor appetite. Albumin 2-3. most likely due to third spacing. Cr wnl 0.94-->0.78-->0.89. Sodium decreased from 131 to 125 10/5 with increased diuretic dosing. BL Na ~136.  Cr at  0.85, but cystatin C at 1.8. GFR calculated with Cystain C 39. Blood osmolality 264. Sodium urine <20, urine osmolality 305.UO improving over the past few days. Suspected ANNA with possible HRS physiology per discussions with Nephrology  - Hepatology and Nephrology consults  - Daily CMP   - Restart Lasix and spironolactone today at lower dose  - 3 day Albumin challenge for hepatorenal syndrome (10/6-10/9) -- responded well to albumin.  - 2 gm sodium diet, 1L FR  - strict I+Os, daily weights     # Decompensated alcoholic cirrhosis  # Alcoholic hepatitis   # Hx of hepatic encephalopathy   # Hx of alcohol use disorder, sober since 9/8/2024  Follows with OP hepatology. Prednisolone taper discontinued. US 9/21 without evidence of HCC, hepatomegaly. No EV on recent EGD. T. Bili initially downtrending, slight increase 10/5. Mentation currently clear. Attending AA and has been sober since 9/8/24. Was recommended inpatient rehab, but he feels that since he has such high MELD, he does not want to be away from his family for 30  days.  - Hepatology consult, appreciate recommendations   - Daily MELD labs  - Diuresis plan as above  - Continue lactulose TID, titrate to achieve 3-5 bowel movements daily  - Thiamine, folic acid daily   - May need rifaximin in the future (per GI)  - Monitor for hepatic encephalopathy  - Continue to attend AA when discharged     MELD 3.0: 31 at 10/11/2024  6:10 AM  MELD-Na: 32 at 10/11/2024  6:10 AM  Calculated from:  Serum Creatinine: 0.66 mg/dL (Using min of 1 mg/dL) at 10/11/2024  6:10 AM  Serum Sodium: 132 mmol/L at 10/11/2024  6:10 AM  Total Bilirubin: 30.0 mg/dL at 10/11/2024  6:10 AM  Serum Albumin: 3.6 g/dL (Using max of 3.5 g/dL) at 10/11/2024  6:10 AM  INR(ratio): 2.94 at 10/11/2024  6:10 AM  Age at listing (hypothetical): 39 years  Sex: Male at 10/11/2024  6:10 AM      # Cough, hemoptysis vs hematemesis vs blood from epistaxis, resolved   # Moderate portal hypertensive gastropathy seen on EGD   # GERD  In addition to ongoing sore throat since ~1 week prior to admission, with grossly negative work-up, has had two episodes of sputum with blood on 10/7, 10/8. Broad ID work-up. Discussed at length with GI given cirrhosis. No varices seen on EGD ~1 month ago at [facility]. Could consider [place] tear with hypertensive gastropathy, coughing, coagulopathy, but no urgent need to scope for UGIB.   - GI consult appreciate recommendations   - No need for EGD on 10/8  - Trend daily CBC   - Continue protonix 40 mg daily, simethicone 80 mg BID   - Monitor     # Staph aureus bacteremia, unclear source   # Elevated procalcitonin   # Sore throat, ongoing, improving   Procal 1.77 on 10/5. WBC WNL on 10/7. No dysuria, no fever, [facility], new O2 needs on 10/7.  Has throat pain but viral panels repetiteively negative. Possibly due to prednisolone that he was taking. CXR and UA unremarkable. Remains afebrile. WBC trending down now that he is off steroids. Elevated temperature to 100.7 overnight  10/7. Strep swab negative.  Continues to have normal WBC, decreasing procal. Normal VSS otherwise. Started on Ceftrixone. No fluid per POCUS exam  on 10/8. Ceftriaxone stopped, but positive GPC 1/2 bottles. Overall, concern for renal dysfunction without clear source for intermittent temperature spikes despite broadly negative work-up. Initally suspected GPCs may be contaminination and held abx after talking with ID given wanting to preserve renal function, but spiked Tmax of 102.8 on 10/8 prompting Zosyn/Vanc start. UA, MRSA negative, other BCx with NGTD. Narrowed to ancef per ID  - ID consult, appreciate recommendations  - TTE ON  10/9--No vegetations on SALENA  - Trend BCx  - Daily BMP, CBC  - Ancef 2 gm q8hr-- day 1 abx: 10/9 . Will need 4 weeks of abx. ID to assist in length of abx and picc line placement  - PRN  lozenges, chloraseptic spray, Robitussin   - Continue to monitor     # BRBPR concerning for anal fissures vs bleeding hemorrhoids, resolved  # Non-thrombosed external hemorrhoids and non-thrombosed internal hemorrhoids found on perianal  Exam seen on colonoscopy (9/10/24)  BRBPR on 10/7, 10/8 with none in stool. No melena seen. States some pressure before BMs happen, but no overt pain. Mild bleeding seen upon 2nd PE on 10/8 slightly inside rectum. Hgb stable with mild downtrend. Per chart review, hemorrhoids seen as above on imaging ~1 month ago. No need for scope per discussion with GI on 10/8.   - Trend Hgb on CBC daily  - Start rectal nitroglycerin 0.4% BID for 4 weeks  - Defer adding PO fiber given needing to have BMs due to cirrhosis   - Sitz baths 2-3 times daily  - Fiber/2 gm Na diet      # Epistaxis, resolved and intermittent   Noted on 10/7 exam. Has had on/off for several days. Resolved with conservative treatment (pressure).   - If ongoing, please consult ENT given coags status, may need packing.   - Monitor coags and Hgb daily   - PRN Ocean spray, Afrin   - Monitor and notify provider if ongoing     # Normocytic  anemia  11.2-->11-->11.8  on 10/8. Suspect from bleeding sources as above in setting of coagulopathy.   - monitor on CBC  - Transfuse for Hgb less than 7    # Left sided chest pain, intermittent   ED visit with same complaints on 7/26/24. ACS and PE workup normal. Started on pepcid and simethicone. Has c/o L sided chest pain for the last 2 mornings. Pain is reproducible with palpation. Noted again on 10/8, EKG with no acute ST/T wave abnormalities or concerning T wave changes. Unable to get trop due to elevated bilirubin. Given pain is reproducible, likely MSK related, could consider from GERD as above, ID process (but less likley given negative work-up thus far).   - ID work-up as above  - GERD treatment as above  - Scheduled lidocaine patches  - PRN acetaminophen     # Moderate malnutrition, in the context of chronic illness/disease   # Vomiting, resolved   # Diarrhea, in setting of lactulose  Nothing tastes good, and had one non bloody emesis several days ago. States appetite better when on  higher dose prednisolone (currently on 1-mg).  - RD consult  - Continue lactulose TID, titrate to achieve 3-5 bowel movements daily  - Discuss renally appropriate MVI in AM  - PRN zofran  - Start calorie counts for 3 days     # Fatigue, improving   # Insomnia, improving   Fell asleep at desk while sitting and working on computer. See work-up as above for cirrhosis. No s/s of hepatic encelopathy. Currently normal mentation  - Cirrhosis treatment as above     # Gallbladder polyp  - Continue to monitor 4 mm polyp seen on US     # Depressed mood/Grief  Both the patient and wife started tearing up talking about poor prognosis and not knowing what to expect including what they can do while 'waiting 90 days.'  - Spiritual Health consult per patient request    # Hepatitis B status  Received hepatis B 1st dose on 9/10/24. Wife inquiring about second dose that should be scheduled for tomorrow. Per discussion with pharmacy, ok to push  "back several days and give prior to discharge.  - 2nd Hepatitis B vaccine prior to discharge per discussion with Pharmacy on 10/6.       Diet: 2 Gram Sodium Diet  Fluid restriction 1000 ML FLUID    DVT Prophylaxis: Anti-embolisim stockings (TEDs) and Ambulate every shift  Gomes Catheter: Not present  Lines: None     Cardiac Monitoring: None  Code Status: Full Code            Diet: Snacks/Supplements Adult: Ensure Max Protein (bariatric); Between Meals  Combination Diet High Fiber Diet; 2 gm NA Diet  Fluid restriction 1500 ML FLUID    DVT Prophylaxis: VTE Prophylaxis contraindicated due to ongoing bleeding   Gomes Catheter: Not present  Lines: None     Cardiac Monitoring: None  Code Status: Full Code      Clinically Significant Risk Factors         # Hyponatremia: Lowest Na = 132 mmol/L in last 2 days, will monitor as appropriate   # Hypocalcemia: Lowest Ca = 8.1 mg/dL in last 2 days, will monitor and replace as appropriate     # Hypoalbuminemia: Lowest albumin = 2.5 g/dL at 10/6/2024  6:09 AM, will monitor as appropriate    # Coagulation Defect: INR = 2.94 (Ref range: 0.85 - 1.15) and/or PTT = 38 Seconds (Ref range: 22 - 38 Seconds), will monitor for bleeding  # Thrombocytopenia: Lowest platelets = 75 in last 2 days, will monitor for bleeding             # Severe Obesity: Estimated body mass index is 40.9 kg/m  as calculated from the following:    Height as of this encounter: 1.676 m (5' 6\").    Weight as of this encounter: 114.9 kg (253 lb 6.4 oz).   # Moderate Malnutrition: based on nutrition assessment           Disposition Plan     Medically Ready for Discharge: Anticipated in 2-4 Days    The patient's care was discussed with the Attending Physician, Dr. Mcfarlane, Bedside Nurse, Patient, Patient's Family, and Nephrology and ID Consultant(s).    DEE DUFF MD  Hospitalist Service, GOLD TEAM 7  [facility]  Securely message with Vocera (more info)  Text page via " [facility] Paging/Directory   See signed in provider for up to date coverage information    This chart documentation was completed with Dragon voice-recognition software. Even though reviewed, this chart may still contain some grammatical, spelling, and word errors. Please contact the author for any questions or clarifications.     ______________________________________________________________________    Interval History   Nursing/SW/consult/interdisciplinary healthcare worker's notes reviewed.     I reviewed all medications, new labs and imaging results over the last 24 hours. Please see discussion of these results in the A/P.     No acute issues overnight. Continues to have BLE edema. No abd pain, nausea, emesis. Still having jaundice, but improved. No dysuria. No flank pain. No diarrhea. No chills. Fever curve improved.    ROS: 12 point ROS negative other than the symptoms noted here or above in the assessment and plan.     Physical Exam   Vital Signs: Temp: 99.6  F (37.6  C) Temp src: Oral BP: 121/57 Pulse: 92   Resp: 16 SpO2: 96 % O2 Device: None (Room air)    Weight: 253 lbs 6.4 oz    Constitutional: awake, alert, cooperative, no apparent distress, and appears stated age  Eyes: Yellow sclera B/L. PERRLA.   ENT: Atrumatic. No swelling noted of uvula or tonsils. No exudate noted. No dried blood seen in nares or mouth.  Hematologic / Lymphatic: no cervical lymphadenopathy and no supraclavicular lymphadenopathy  Respiratory: No increased work of breathing, good air exchange, clear to auscultation bilaterally, no crackles or wheezing  Cardiovascular: Normal apical impulse, regular rate and rhythm, normal S1 and S2, no S3 or S4, and no murmur noted  GI: No scars, normal bowel sounds, soft, non-distended, non-tender, no masses palpated. Rectal exam not performed  Skin: Jaundiced. No other lesions or rashes noted on exposed skin.   Musculoskeletal: There is no redness, warmth, or swelling of the joints.  Full range of  motion noted.  Motor strength is 5 out of 5 all extremities bilaterally.  Tone is normal. +3 pitting edema B/L LE.   Neurologic: Awake, alert, oriented to name, place and time.  Cranial nerves II-XII are grossly intact.  Motor is 5 out of 5 bilaterally. No asterix noted   Neuropsychiatric:   General: normal, calm, and normal eye contact  Level of consciousness: alert / normal  Affect: normal  Orientation: oriented to self, place, time and situation  Memory and insight: normal, memory for past and recent events intact, and thought process normal    Medical Decision Making       60 MINUTES SPENT BY ME on the date of service doing chart review, history, exam, documentation & further activities per the note.      Data     I have personally reviewed the following data over the past 24 hrs:    13.0 (H)  \   11.7 (L)   / 83 (L)     132 (L) 103 15.7 /  86   4.5 19 (L) 0.66 (L) \     ALT: 57 AST: 91 (H) AP: 138 TBILI: 30.0 (HH)   ALB: 3.6 TOT PROTEIN: N/A LIPASE: N/A     INR:  2.94 (H) PTT:  N/A   D-dimer:  N/A Fibrinogen:  N/A       Imaging results reviewed over the past 24 hrs:   No results found for this or any previous visit (from the past 24 hour(s)).

## 2024-10-12 ENCOUNTER — APPOINTMENT (OUTPATIENT)
Dept: ULTRASOUND IMAGING | Facility: CLINIC | Age: 39
DRG: 432 | End: 2024-10-12
Attending: STUDENT IN AN ORGANIZED HEALTH CARE EDUCATION/TRAINING PROGRAM
Payer: COMMERCIAL

## 2024-10-12 ENCOUNTER — APPOINTMENT (OUTPATIENT)
Dept: GENERAL RADIOLOGY | Facility: CLINIC | Age: 39
DRG: 432 | End: 2024-10-12
Attending: INTERNAL MEDICINE
Payer: COMMERCIAL

## 2024-10-12 ENCOUNTER — APPOINTMENT (OUTPATIENT)
Dept: OCCUPATIONAL THERAPY | Facility: CLINIC | Age: 39
DRG: 432 | End: 2024-10-12
Payer: COMMERCIAL

## 2024-10-12 ENCOUNTER — APPOINTMENT (OUTPATIENT)
Dept: CT IMAGING | Facility: CLINIC | Age: 39
DRG: 432 | End: 2024-10-12
Attending: STUDENT IN AN ORGANIZED HEALTH CARE EDUCATION/TRAINING PROGRAM
Payer: COMMERCIAL

## 2024-10-12 LAB
ALBUMIN SERPL BCG-MCNC: 3.5 G/DL (ref 3.5–5.2)
ALBUMIN UR-MCNC: NEGATIVE MG/DL
ALP SERPL-CCNC: 150 U/L (ref 40–150)
ALT SERPL W P-5'-P-CCNC: 47 U/L (ref 0–70)
ANION GAP SERPL CALCULATED.3IONS-SCNC: 11 MMOL/L (ref 7–15)
APPEARANCE UR: CLEAR
AST SERPL W P-5'-P-CCNC: 92 U/L (ref 0–45)
BASOPHILS # BLD AUTO: 0.1 10E3/UL (ref 0–0.2)
BASOPHILS NFR BLD AUTO: 1 %
BILIRUB SERPL-MCNC: 28.3 MG/DL
BILIRUB UR QL STRIP: ABNORMAL
BUN SERPL-MCNC: 17.1 MG/DL (ref 6–20)
C PNEUM DNA SPEC QL NAA+PROBE: NOT DETECTED
CALCIUM SERPL-MCNC: 8 MG/DL (ref 8.8–10.4)
CHLORIDE SERPL-SCNC: 101 MMOL/L (ref 98–107)
COLOR UR AUTO: ABNORMAL
CREAT SERPL-MCNC: 0.77 MG/DL (ref 0.67–1.17)
EGFRCR SERPLBLD CKD-EPI 2021: >90 ML/MIN/1.73M2
EOSINOPHIL # BLD AUTO: 0.2 10E3/UL (ref 0–0.7)
EOSINOPHIL NFR BLD AUTO: 2 %
ERYTHROCYTE [DISTWIDTH] IN BLOOD BY AUTOMATED COUNT: 18.3 % (ref 10–15)
FLUAV H1 2009 PAND RNA SPEC QL NAA+PROBE: NOT DETECTED
FLUAV H1 RNA SPEC QL NAA+PROBE: NOT DETECTED
FLUAV H3 RNA SPEC QL NAA+PROBE: NOT DETECTED
FLUAV RNA SPEC QL NAA+PROBE: NOT DETECTED
FLUBV RNA SPEC QL NAA+PROBE: NOT DETECTED
GLUCOSE SERPL-MCNC: 106 MG/DL (ref 70–99)
GLUCOSE UR STRIP-MCNC: NEGATIVE MG/DL
HADV DNA SPEC QL NAA+PROBE: NOT DETECTED
HCO3 SERPL-SCNC: 18 MMOL/L (ref 22–29)
HCOV PNL SPEC NAA+PROBE: NOT DETECTED
HCT VFR BLD AUTO: 32.5 % (ref 40–53)
HGB BLD-MCNC: 11 G/DL (ref 13.3–17.7)
HGB UR QL STRIP: NEGATIVE
HMPV RNA SPEC QL NAA+PROBE: NOT DETECTED
HPIV1 RNA SPEC QL NAA+PROBE: NOT DETECTED
HPIV2 RNA SPEC QL NAA+PROBE: NOT DETECTED
HPIV3 RNA SPEC QL NAA+PROBE: NOT DETECTED
HPIV4 RNA SPEC QL NAA+PROBE: NOT DETECTED
IMM GRANULOCYTES # BLD: 0.1 10E3/UL
IMM GRANULOCYTES NFR BLD: 1 %
INR PPP: 2.95 (ref 0.85–1.15)
KETONES UR STRIP-MCNC: NEGATIVE MG/DL
LEUKOCYTE ESTERASE UR QL STRIP: NEGATIVE
LYMPHOCYTES # BLD AUTO: 3.8 10E3/UL (ref 0.8–5.3)
LYMPHOCYTES NFR BLD AUTO: 30 %
M PNEUMO DNA SPEC QL NAA+PROBE: NOT DETECTED
MCH RBC QN AUTO: 35.6 PG (ref 26.5–33)
MCHC RBC AUTO-ENTMCNC: 33.8 G/DL (ref 31.5–36.5)
MCV RBC AUTO: 105 FL (ref 78–100)
MONOCYTES # BLD AUTO: 1.1 10E3/UL (ref 0–1.3)
MONOCYTES NFR BLD AUTO: 8 %
NEUTROPHILS # BLD AUTO: 7.5 10E3/UL (ref 1.6–8.3)
NEUTROPHILS NFR BLD AUTO: 59 %
NITRATE UR QL: NEGATIVE
NRBC # BLD AUTO: 0 10E3/UL
NRBC BLD AUTO-RTO: 0 /100
PH UR STRIP: 6 [PH] (ref 5–7)
PLATELET # BLD AUTO: 86 10E3/UL (ref 150–450)
POTASSIUM SERPL-SCNC: 4.2 MMOL/L (ref 3.4–5.3)
PROT SERPL-MCNC: 4.8 G/DL (ref 6.4–8.3)
RBC # BLD AUTO: 3.09 10E6/UL (ref 4.4–5.9)
RBC URINE: 0 /HPF
RSV RNA SPEC QL NAA+PROBE: NOT DETECTED
RSV RNA SPEC QL NAA+PROBE: NOT DETECTED
RV+EV RNA SPEC QL NAA+PROBE: NOT DETECTED
SODIUM SERPL-SCNC: 130 MMOL/L (ref 135–145)
SP GR UR STRIP: 1.01 (ref 1–1.03)
SQUAMOUS EPITHELIAL: <1 /HPF
UROBILINOGEN UR STRIP-MCNC: NORMAL MG/DL
WBC # BLD AUTO: 12.3 10E3/UL (ref 4–11)
WBC # BLD AUTO: 12.3 10E3/UL (ref 4–11)
WBC URINE: 2 /HPF

## 2024-10-12 PROCEDURE — 87486 CHLMYD PNEUM DNA AMP PROBE: CPT | Performed by: STUDENT IN AN ORGANIZED HEALTH CARE EDUCATION/TRAINING PROGRAM

## 2024-10-12 PROCEDURE — 93970 EXTREMITY STUDY: CPT | Mod: 26 | Performed by: RADIOLOGY

## 2024-10-12 PROCEDURE — 71045 X-RAY EXAM CHEST 1 VIEW: CPT

## 2024-10-12 PROCEDURE — 250N000011 HC RX IP 250 OP 636: Mod: JZ

## 2024-10-12 PROCEDURE — 84460 ALANINE AMINO (ALT) (SGPT): CPT

## 2024-10-12 PROCEDURE — 81001 URINALYSIS AUTO W/SCOPE: CPT | Performed by: INTERNAL MEDICINE

## 2024-10-12 PROCEDURE — 71045 X-RAY EXAM CHEST 1 VIEW: CPT | Mod: 26 | Performed by: RADIOLOGY

## 2024-10-12 PROCEDURE — 250N000013 HC RX MED GY IP 250 OP 250 PS 637

## 2024-10-12 PROCEDURE — 97140 MANUAL THERAPY 1/> REGIONS: CPT | Mod: GO | Performed by: OCCUPATIONAL THERAPIST

## 2024-10-12 PROCEDURE — 999N000128 HC STATISTIC PERIPHERAL IV START W/O US GUIDANCE

## 2024-10-12 PROCEDURE — 120N000002 HC R&B MED SURG/OB UMMC

## 2024-10-12 PROCEDURE — 250N000011 HC RX IP 250 OP 636: Performed by: STUDENT IN AN ORGANIZED HEALTH CARE EDUCATION/TRAINING PROGRAM

## 2024-10-12 PROCEDURE — 74177 CT ABD & PELVIS W/CONTRAST: CPT

## 2024-10-12 PROCEDURE — 250N000013 HC RX MED GY IP 250 OP 250 PS 637: Performed by: STUDENT IN AN ORGANIZED HEALTH CARE EDUCATION/TRAINING PROGRAM

## 2024-10-12 PROCEDURE — 85025 COMPLETE CBC W/AUTO DIFF WBC: CPT

## 2024-10-12 PROCEDURE — 36415 COLL VENOUS BLD VENIPUNCTURE: CPT | Performed by: INTERNAL MEDICINE

## 2024-10-12 PROCEDURE — 250N000013 HC RX MED GY IP 250 OP 250 PS 637: Performed by: INTERNAL MEDICINE

## 2024-10-12 PROCEDURE — 93970 EXTREMITY STUDY: CPT

## 2024-10-12 PROCEDURE — 87040 BLOOD CULTURE FOR BACTERIA: CPT | Performed by: INTERNAL MEDICINE

## 2024-10-12 PROCEDURE — 99233 SBSQ HOSP IP/OBS HIGH 50: CPT | Performed by: STUDENT IN AN ORGANIZED HEALTH CARE EDUCATION/TRAINING PROGRAM

## 2024-10-12 PROCEDURE — 74177 CT ABD & PELVIS W/CONTRAST: CPT | Mod: 26 | Performed by: RADIOLOGY

## 2024-10-12 PROCEDURE — 85610 PROTHROMBIN TIME: CPT

## 2024-10-12 RX ORDER — CEFTRIAXONE 2 G/1
2 INJECTION, POWDER, FOR SOLUTION INTRAMUSCULAR; INTRAVENOUS EVERY 24 HOURS
Status: DISCONTINUED | OUTPATIENT
Start: 2024-10-12 | End: 2024-10-15

## 2024-10-12 RX ORDER — ALBUMIN (HUMAN) 12.5 G/50ML
25-50 SOLUTION INTRAVENOUS ONCE
Status: COMPLETED | OUTPATIENT
Start: 2024-10-12 | End: 2024-10-13

## 2024-10-12 RX ORDER — IOPAMIDOL 755 MG/ML
135 INJECTION, SOLUTION INTRAVASCULAR ONCE
Status: COMPLETED | OUTPATIENT
Start: 2024-10-12 | End: 2024-10-12

## 2024-10-12 RX ADMIN — CEFTRIAXONE SODIUM 2 G: 2 INJECTION, POWDER, FOR SOLUTION INTRAMUSCULAR; INTRAVENOUS at 16:00

## 2024-10-12 RX ADMIN — Medication 1 TABLET: at 08:58

## 2024-10-12 RX ADMIN — ACETAMINOPHEN 325 MG: 325 TABLET, FILM COATED ORAL at 01:55

## 2024-10-12 RX ADMIN — CEFAZOLIN SODIUM 2 G: 2 INJECTION, SOLUTION INTRAVENOUS at 08:58

## 2024-10-12 RX ADMIN — IOPAMIDOL 135 ML: 755 INJECTION, SOLUTION INTRAVENOUS at 19:12

## 2024-10-12 RX ADMIN — GUAIFENESIN 200 MG: 200 SOLUTION ORAL at 19:32

## 2024-10-12 RX ADMIN — FOLIC ACID 1 MG: 1 TABLET ORAL at 08:58

## 2024-10-12 RX ADMIN — LACTULOSE 20 G: 20 POWDER, FOR SOLUTION ORAL at 11:49

## 2024-10-12 RX ADMIN — PANTOPRAZOLE SODIUM 40 MG: 40 TABLET, DELAYED RELEASE ORAL at 08:58

## 2024-10-12 RX ADMIN — FUROSEMIDE 20 MG: 20 TABLET ORAL at 08:58

## 2024-10-12 RX ADMIN — SIMETHICONE 80 MG: 80 TABLET, CHEWABLE ORAL at 19:32

## 2024-10-12 RX ADMIN — SPIRONOLACTONE 50 MG: 50 TABLET ORAL at 08:58

## 2024-10-12 RX ADMIN — CEFAZOLIN SODIUM 2 G: 2 INJECTION, SOLUTION INTRAVENOUS at 00:50

## 2024-10-12 RX ADMIN — SIMETHICONE 80 MG: 80 TABLET, CHEWABLE ORAL at 08:58

## 2024-10-12 RX ADMIN — THIAMINE HCL TAB 100 MG 100 MG: 100 TAB at 08:58

## 2024-10-12 RX ADMIN — LACTULOSE 20 G: 20 POWDER, FOR SOLUTION ORAL at 08:58

## 2024-10-12 RX ADMIN — LIDOCAINE 1 PATCH: 4 PATCH TOPICAL at 19:32

## 2024-10-12 RX ADMIN — GUAIFENESIN 200 MG: 200 SOLUTION ORAL at 09:04

## 2024-10-12 ASSESSMENT — ACTIVITIES OF DAILY LIVING (ADL)
ADLS_ACUITY_SCORE: 23
ADLS_ACUITY_SCORE: 22
ADLS_ACUITY_SCORE: 22
ADLS_ACUITY_SCORE: 23
ADLS_ACUITY_SCORE: 22
ADLS_ACUITY_SCORE: 23

## 2024-10-12 NOTE — PROGRESS NOTES
Hospital Medicine  Fever 101.7  Blood culture(s), CXR and UA all ordered.  Patient is on Ancef.  Plan to continue Ancef and add no other antibiotic(s) unless new bug/infectious agent.  César Sloan MD

## 2024-10-12 NOTE — PROGRESS NOTES
[facility]    Medicine Progress Note - Hospitalist Service, GOLD TEAM 7    Date of Admission:  10/3/2024    Assessment & Plan   Stewart Hunt is a 39 year old male admitted on 10/3/2024. He has hx of alcohol cirrhosis and admitted 9/20-22 for worsening anasarca and hyperbilirubinemia.      #Worsening Fever Curve:  - Currently source of fever is MSSA bacteremia. Patient has been on Abx since 10/9. Fever curve initially improved then worsened. WBC count also improved and now worsened. Unsure of new source of fever  - Will switch antibiotic from ancef to ceftriaxone for SBP empirical treatment  - CAPS consulted for paracentesis  - UA negative  - Bcx obtained and pending  - RVP ordered    # Lower extremity edema, anasarca  # Hyponatremia 2/2 etoh cirrhosis, improving   # ANNA, prerenal with possible HRS physiology (cystatin c 1.8, GFR 39%), improving   Home reg: lasix 20mg daily and aldactone 50mg daily since last admission 10 days ago, and has been having ++ urination without improvement in edema. On lactulose and had been having multiple bowel movements daily along with poor appetite. Albumin 2-3. most likely due to third spacing. Cr wnl 0.94-->0.78-->0.89. Sodium decreased from 131 to 125 10/5 with increased diuretic dosing. BL Na ~136.  Cr at  0.85, but cystatin C at 1.8. GFR calculated with Cystain C 39. Blood osmolality 264. Sodium urine <20, urine osmolality 305.UO improving over the past few days. Suspected ANNA with possible HRS physiology per discussions with Nephrology  - Hepatology and Nephrology consults  - Daily CMP   - Lasix and spironolactone today at lower dose  - 3 day Albumin challenge for hepatorenal syndrome (10/6-10/9) -- responded well to albumin.  - 2 gm sodium diet, 1L FR  - strict I+Os, daily weights     # Decompensated alcoholic cirrhosis  # Alcoholic hepatitis   # Hx of hepatic encephalopathy   # Hx of alcohol use disorder, sober since  9/8/2024  Follows with OP hepatology. Prednisolone taper discontinued. US 9/21 without evidence of HCC, hepatomegaly. No EV on recent EGD. T. Bili initially downtrending, slight increase 10/5. Mentation currently clear. Attending AA and has been sober since 9/8/24. Was recommended inpatient rehab, but he feels that since he has such high MELD, he does not want to be away from his family for 30 days.  - Hepatology consult, appreciate recommendations   - Daily MELD labs  - Diuresis plan as above  - Continue lactulose TID, titrate to achieve 3-5 bowel movements daily  - Thiamine, folic acid daily   - May need rifaximin in the future (per GI)  - Monitor for hepatic encephalopathy  - Continue to attend AA when discharged     MELD 3.0: 31 at 10/12/2024  3:47 AM  MELD-Na: 33 at 10/12/2024  3:47 AM  Calculated from:  Serum Creatinine: 0.77 mg/dL (Using min of 1 mg/dL) at 10/12/2024  3:47 AM  Serum Sodium: 130 mmol/L at 10/12/2024  3:47 AM  Total Bilirubin: 28.3 mg/dL at 10/12/2024  3:47 AM  Serum Albumin: 3.5 g/dL at 10/12/2024  3:47 AM  INR(ratio): 2.95 at 10/12/2024  3:47 AM  Age at listing (hypothetical): 39 years  Sex: Male at 10/12/2024  3:47 AM      # Cough, hemoptysis vs hematemesis vs blood from epistaxis, resolved   # Moderate portal hypertensive gastropathy seen on EGD   # GERD  In addition to ongoing sore throat since ~1 week prior to admission, with grossly negative work-up, has had two episodes of sputum with blood on 10/7, 10/8. Broad ID work-up. Discussed at length with GI given cirrhosis. No varices seen on EGD ~1 month ago at [facility]. Could consider [place] tear with hypertensive gastropathy, coughing, coagulopathy, but no urgent need to scope for UGIB.   - GI consult appreciate recommendations   - No need for EGD on 10/8  - Trend daily CBC   - Continue protonix 40 mg daily, simethicone 80 mg BID   - Monitor     # Staph aureus bacteremia, unclear source   # Elevated procalcitonin   # Sore throat,  ongoing, improving   Procal 1.77 on 10/5. WBC WNL on 10/7. No dysuria, no fever, PENN, new O2 needs on 10/7.  Has throat pain but viral panels repetiteively negative. Possibly due to prednisolone that he was taking. CXR and UA unremarkable. Remains afebrile. WBC trending down now that he is off steroids. Elevated temperature to 100.7 overnight  10/7. Strep swab negative. Continues to have normal WBC, decreasing procal. Normal VSS otherwise. Started on Ceftrixone. No fluid per POCUS exam  on 10/8. Ceftriaxone stopped, but positive GPC 1/2 bottles. Overall, concern for renal dysfunction without clear source for intermittent temperature spikes despite broadly negative work-up. Initally suspected GPCs may be contaminination and held abx after talking with ID given wanting to preserve renal function, but spiked Tmax of 102.8 on 10/8 prompting Zosyn/Vanc start. UA, MRSA negative, other BCx with NGTD. Narrowed to ancef per ID  - ID consult, appreciate recommendations  - TTE ON  10/9--No vegetations on SALENA  - Trend BCx  - Daily BMP, CBC  - Ancef to be switched to ceftriaxone 2gm per day for bacteremia and sbp dosing  - PRN  lozenges, chloraseptic spray, Robitussin   - Continue to monitor     # BRBPR concerning for anal fissures vs bleeding hemorrhoids, resolved  # Non-thrombosed external hemorrhoids and non-thrombosed internal hemorrhoids found on perianal  Exam seen on colonoscopy (9/10/24)  BRBPR on 10/7, 10/8 with none in stool. No melena seen. States some pressure before BMs happen, but no overt pain. Mild bleeding seen upon 2nd PE on 10/8 slightly inside rectum. Hgb stable with mild downtrend. Per chart review, hemorrhoids seen as above on imaging ~1 month ago. No need for scope per discussion with GI on 10/8.   - Trend Hgb on CBC daily  - Start rectal nitroglycerin 0.4% BID for 4 weeks  - Defer adding PO fiber given needing to have BMs due to cirrhosis   - Sitz baths 2-3 times daily  - Fiber/2 gm Na diet      #  Epistaxis, resolved and intermittent   Noted on 10/7 exam. Has had on/off for several days. Resolved with conservative treatment (pressure).   - If ongoing, please consult ENT given coags status, may need packing.   - Monitor coags and Hgb daily   - PRN Ocean spray, Afrin   - Monitor and notify provider if ongoing     # Normocytic anemia  11.2-->11-->11.8  on 10/8. Suspect from bleeding sources as above in setting of coagulopathy.   - monitor on CBC  - Transfuse for Hgb less than 7    # Left sided chest pain, intermittent   ED visit with same complaints on 7/26/24. ACS and PE workup normal. Started on pepcid and simethicone. Has c/o L sided chest pain for the last 2 mornings. Pain is reproducible with palpation. Noted again on 10/8, EKG with no acute ST/T wave abnormalities or concerning T wave changes. Unable to get trop due to elevated bilirubin. Given pain is reproducible, likely MSK related, could consider from GERD as above, ID process (but less likley given negative work-up thus far).   - ID work-up as above  - GERD treatment as above  - Scheduled lidocaine patches  - PRN acetaminophen     # Moderate malnutrition, in the context of chronic illness/disease   # Vomiting, resolved   # Diarrhea, in setting of lactulose  Nothing tastes good, and had one non bloody emesis several days ago. States appetite better when on  higher dose prednisolone (currently on 1-mg).  - RD consult  - Continue lactulose TID, titrate to achieve 3-5 bowel movements daily  - Discuss renally appropriate MVI in AM  - PRN zofran  - Start calorie counts for 3 days     # Fatigue, improving   # Insomnia, improving   Fell asleep at desk while sitting and working on computer. See work-up as above for cirrhosis. No s/s of hepatic encelopathy. Currently normal mentation  - Cirrhosis treatment as above     # Gallbladder polyp  - Continue to monitor 4 mm polyp seen on US     # Depressed mood/Grief  Both the patient and wife started tearing up  "talking about poor prognosis and not knowing what to expect including what they can do while 'waiting 90 days.'  - Spiritual Health consult per patient request    # Hepatitis B status  Received hepatis B 1st dose on 9/10/24. Wife inquiring about second dose that should be scheduled for tomorrow. Per discussion with pharmacy, ok to push back several days and give prior to discharge.  - 2nd Hepatitis B vaccine prior to discharge per discussion with Pharmacy on 10/6.       Diet: 2 Gram Sodium Diet  Fluid restriction 1000 ML FLUID    DVT Prophylaxis: Anti-embolisim stockings (TEDs) and Ambulate every shift  Gomes Catheter: Not present  Lines: None     Cardiac Monitoring: None  Code Status: Full Code            Diet: Snacks/Supplements Adult: Ensure Max Protein (bariatric); Between Meals  Combination Diet High Fiber Diet; 2 gm NA Diet  Fluid restriction 1500 ML FLUID    DVT Prophylaxis: VTE Prophylaxis contraindicated due to ongoing bleeding   Gomes Catheter: Not present  Lines: None     Cardiac Monitoring: None  Code Status: Full Code      Clinically Significant Risk Factors         # Hyponatremia: Lowest Na = 130 mmol/L in last 2 days, will monitor as appropriate   # Hypocalcemia: Lowest Ca = 8 mg/dL in last 2 days, will monitor and replace as appropriate     # Hypoalbuminemia: Lowest albumin = 2.5 g/dL at 10/6/2024  6:09 AM, will monitor as appropriate    # Coagulation Defect: INR = 2.95 (Ref range: 0.85 - 1.15) and/or PTT = 38 Seconds (Ref range: 22 - 38 Seconds), will monitor for bleeding  # Thrombocytopenia: Lowest platelets = 83 in last 2 days, will monitor for bleeding             # Severe Obesity: Estimated body mass index is 40.9 kg/m  as calculated from the following:    Height as of this encounter: 1.676 m (5' 6\").    Weight as of this encounter: 114.9 kg (253 lb 6.4 oz).   # Moderate Malnutrition: based on nutrition assessment           Disposition Plan     Medically Ready for Discharge: Anticipated in 2-4 " Days    The patient's care was discussed with the Attending Physician, Dr. Mcfarlane, Bedside Nurse, Patient, Patient's Family, and Nephrology and ID Consultant(s).    DEE DUFF MD  Hospitalist Service, [address]  Securely message with [employer] (more info)  Text page via [facility] Paging/Directory   See signed in provider for up to date coverage information    This chart documentation was completed with Dragon voice-recognition software. Even though reviewed, this chart may still contain some grammatical, spelling, and word errors. Please contact the author for any questions or clarifications.     ______________________________________________________________________    Interval History   Nursing/SW/consult/interdisciplinary healthcare worker's notes reviewed.     I reviewed all medications, new labs and imaging results over the last 24 hours. Please see discussion of these results in the A/P.     Tm in the last 24 hours 101.7F at 0150, also having chills and night sweats overnight. Patient also complaining of abd pain that is in lower abd. No nasal congestion, rhinnorhea, coughing, santos, cp, nausea, emesis. No dysuria, flank pain. No diarrhea.    ROS: 12 point ROS negative other than the symptoms noted here or above in the assessment and plan.     Physical Exam   Vital Signs: Temp: 99.5  F (37.5  C) Temp src: Oral BP: 137/65 Pulse: 100   Resp: 18 SpO2: 99 % O2 Device: None (Room air)    Weight: 253 lbs 6.4 oz    Constitutional: awake, alert, cooperative, no apparent distress, and appears stated age  Eyes: Yellow sclera B/L. PERRLA.   ENT: Atrumatic. No swelling noted of uvula or tonsils. No exudate noted. No dried blood seen in nares or mouth.  Hematologic / Lymphatic: no cervical lymphadenopathy and no supraclavicular lymphadenopathy  Respiratory: No increased work of breathing, good air exchange, clear to auscultation bilaterally, no crackles or  wheezing  Cardiovascular: Normal apical impulse, regular rate and rhythm, normal S1 and S2, no S3 or S4, and no murmur noted  GI: ttp over the LLE. No rebound. No peritoneal sign. No scars, normal bowel sounds, soft, non-distended, no masses palpated. Rectal exam not performed  Skin: Jaundiced. No other lesions or rashes noted on exposed skin.   Musculoskeletal: There is no redness, warmth, or swelling of the joints.  Full range of motion noted.  Motor strength is 5 out of 5 all extremities bilaterally.  Tone is normal. +3 pitting edema B/L LE.   Neurologic: Awake, alert, oriented to name, place and time.  Cranial nerves II-XII are grossly intact.  Motor is 5 out of 5 bilaterally. No asterix noted   Neuropsychiatric:   General: normal, calm, and normal eye contact  Level of consciousness: alert / normal  Affect: normal  Orientation: oriented to self, place, time and situation  Memory and insight: normal, memory for past and recent events intact, and thought process normal    Medical Decision Making       60 MINUTES SPENT BY ME on the date of service doing chart review, history, exam, documentation & further activities per the note.      Data     I have personally reviewed the following data over the past 24 hrs:    12.3 (H)  \   11.0 (L)   / 86 (L)     130 (L) 101 17.1 /  106 (H)   4.2 18 (L) 0.77 \     ALT: 47 AST: 92 (H) AP: 150 TBILI: 28.3 (HH)   ALB: 3.5 TOT PROTEIN: 4.8 (L) LIPASE: N/A     INR:  2.95 (H) PTT:  N/A   D-dimer:  N/A Fibrinogen:  N/A       Imaging results reviewed over the past 24 hrs:   Recent Results (from the past 24 hour(s))   XR Chest Port 1 View    Narrative    Exam: XR CHEST PORT 1 VIEW, 10/12/2024 8:47 AM    Indication: Fever    Comparison: Chest x-ray 10/7/2024    Findings: Frontal radiograph of the chest. Trachea is midline. Cardiac  silhouette is within normal limits. Low lung volumes. Elevated right  hemidiaphragm. Mild perihilar opacities. No significant pneumothorax.      Impression     Impression: Elevated right hemidiaphragm with mild perihilar opacities  favoring atelectasis.    I have personally reviewed the examination and initial interpretation  and I agree with the findings.    SANDRA BATRES MD         SYSTEM ID:  [id number]

## 2024-10-12 NOTE — PLAN OF CARE
"Assumed cares [id number]     /65 (BP Location: Right arm)   Pulse 100   Temp 99.5  F (37.5  C) (Oral)   Resp 18   Ht 1.676 m (5' 6\")   Wt 114.9 kg (253 lb 6.4 oz)   SpO2 99%   BMI 40.90 kg/m       Pain: Patient has some mild abdominal pain. Declined medication.   Neuro: A&Ox4.    Respiratory: Lungs clear and equal, on RA.   Cardiac/Neurovascular: HR and pulse WDL.Baseline numbness in lower extremities. Severe lower extremity edema.   GI/: Adequate urine output. 4 BM's today.   Nutrition: 2g NA diet, 1500ml FR.   Activity: Independent.   Skin: Jaundice.   Lines: Left PIV (SL)  Events this shift: Low grade fever today, rechecked with improvement. Abdominal ultrasound, paracentesis, respiratory panel, lower extremity ultrasound and abdominal CT all ordered today. LE ultrasound completed, lymph wraps removed for exam. Respiratory swab sent down.      Plan: Continue with plan of care.     Goal Outcome Evaluation:      Plan of Care Reviewed With: patient    Overall Patient Progress: no changeOverall Patient Progress: no change    Outcome Evaluation: Continue with plan of care.      "

## 2024-10-12 NOTE — PLAN OF CARE
Goal Outcome Evaluation:      Plan of Care Reviewed With: patient    Overall Patient Progress: no change  Overall Patient Progress: no change    Outcome Evaluation: Assumed cars from [id number]. Pt reports leg pain but denied interventions offered. 420 mL of intake overnight. PRN Robitussin administered for cough. PRN Tylenol administered for fever of 101.7 degrees - this was effective. Held bedtime Lactulose d/t 6 BM's. Urine sample sent to lab. Continue with IV administration/plan of care.     Carolyn Sarabia RN

## 2024-10-13 ENCOUNTER — APPOINTMENT (OUTPATIENT)
Dept: OCCUPATIONAL THERAPY | Facility: CLINIC | Age: 39
DRG: 432 | End: 2024-10-13
Payer: COMMERCIAL

## 2024-10-13 LAB
ALBUMIN SERPL BCG-MCNC: 3.3 G/DL (ref 3.5–5.2)
ALP SERPL-CCNC: 128 U/L (ref 40–150)
ALT SERPL W P-5'-P-CCNC: 40 U/L (ref 0–70)
ANION GAP SERPL CALCULATED.3IONS-SCNC: 11 MMOL/L (ref 7–15)
AST SERPL W P-5'-P-CCNC: 89 U/L (ref 0–45)
BACTERIA BLD CULT: NO GROWTH
BACTERIA BLD CULT: NO GROWTH
BILIRUB SERPL-MCNC: 26.9 MG/DL
BUN SERPL-MCNC: 14.6 MG/DL (ref 6–20)
CALCIUM SERPL-MCNC: 8 MG/DL (ref 8.8–10.4)
CHLORIDE SERPL-SCNC: 103 MMOL/L (ref 98–107)
CK SERPL-CCNC: 29 U/L (ref 39–308)
CREAT SERPL-MCNC: 0.72 MG/DL (ref 0.67–1.17)
EGFRCR SERPLBLD CKD-EPI 2021: >90 ML/MIN/1.73M2
ERYTHROCYTE [DISTWIDTH] IN BLOOD BY AUTOMATED COUNT: 18.4 % (ref 10–15)
GLUCOSE SERPL-MCNC: 97 MG/DL (ref 70–99)
HCO3 SERPL-SCNC: 18 MMOL/L (ref 22–29)
HCT VFR BLD AUTO: 31.3 % (ref 40–53)
HGB BLD-MCNC: 10.7 G/DL (ref 13.3–17.7)
INR PPP: 3.13 (ref 0.85–1.15)
MCH RBC QN AUTO: 35.4 PG (ref 26.5–33)
MCHC RBC AUTO-ENTMCNC: 34.2 G/DL (ref 31.5–36.5)
MCV RBC AUTO: 104 FL (ref 78–100)
PLATELET # BLD AUTO: 87 10E3/UL (ref 150–450)
POTASSIUM SERPL-SCNC: 4 MMOL/L (ref 3.4–5.3)
PROT SERPL-MCNC: ABNORMAL G/DL
RBC # BLD AUTO: 3.02 10E6/UL (ref 4.4–5.9)
SODIUM SERPL-SCNC: 132 MMOL/L (ref 135–145)
WBC # BLD AUTO: 12 10E3/UL (ref 4–11)

## 2024-10-13 PROCEDURE — 250N000013 HC RX MED GY IP 250 OP 250 PS 637

## 2024-10-13 PROCEDURE — 85014 HEMATOCRIT: CPT

## 2024-10-13 PROCEDURE — 97140 MANUAL THERAPY 1/> REGIONS: CPT | Mod: GO | Performed by: OCCUPATIONAL THERAPIST

## 2024-10-13 PROCEDURE — 250N000013 HC RX MED GY IP 250 OP 250 PS 637: Performed by: INTERNAL MEDICINE

## 2024-10-13 PROCEDURE — 82550 ASSAY OF CK (CPK): CPT | Performed by: STUDENT IN AN ORGANIZED HEALTH CARE EDUCATION/TRAINING PROGRAM

## 2024-10-13 PROCEDURE — 99232 SBSQ HOSP IP/OBS MODERATE 35: CPT | Performed by: STUDENT IN AN ORGANIZED HEALTH CARE EDUCATION/TRAINING PROGRAM

## 2024-10-13 PROCEDURE — 85610 PROTHROMBIN TIME: CPT

## 2024-10-13 PROCEDURE — 80048 BASIC METABOLIC PNL TOTAL CA: CPT

## 2024-10-13 PROCEDURE — 99233 SBSQ HOSP IP/OBS HIGH 50: CPT | Performed by: INTERNAL MEDICINE

## 2024-10-13 PROCEDURE — 36415 COLL VENOUS BLD VENIPUNCTURE: CPT

## 2024-10-13 PROCEDURE — 82435 ASSAY OF BLOOD CHLORIDE: CPT

## 2024-10-13 PROCEDURE — 36415 COLL VENOUS BLD VENIPUNCTURE: CPT | Performed by: STUDENT IN AN ORGANIZED HEALTH CARE EDUCATION/TRAINING PROGRAM

## 2024-10-13 PROCEDURE — 250N000011 HC RX IP 250 OP 636: Performed by: STUDENT IN AN ORGANIZED HEALTH CARE EDUCATION/TRAINING PROGRAM

## 2024-10-13 PROCEDURE — 250N000013 HC RX MED GY IP 250 OP 250 PS 637: Performed by: STUDENT IN AN ORGANIZED HEALTH CARE EDUCATION/TRAINING PROGRAM

## 2024-10-13 PROCEDURE — 120N000002 HC R&B MED SURG/OB UMMC

## 2024-10-13 RX ORDER — METRONIDAZOLE 500 MG/100ML
500 INJECTION, SOLUTION INTRAVENOUS EVERY 8 HOURS
Status: DISCONTINUED | OUTPATIENT
Start: 2024-10-13 | End: 2024-10-14

## 2024-10-13 RX ORDER — METRONIDAZOLE 500 MG/100ML
500 INJECTION, SOLUTION INTRAVENOUS EVERY 12 HOURS
Status: DISCONTINUED | OUTPATIENT
Start: 2024-10-13 | End: 2024-10-13

## 2024-10-13 RX ADMIN — CEFTRIAXONE SODIUM 2 G: 2 INJECTION, POWDER, FOR SOLUTION INTRAMUSCULAR; INTRAVENOUS at 14:58

## 2024-10-13 RX ADMIN — SIMETHICONE 80 MG: 80 TABLET, CHEWABLE ORAL at 08:15

## 2024-10-13 RX ADMIN — THIAMINE HCL TAB 100 MG 100 MG: 100 TAB at 08:15

## 2024-10-13 RX ADMIN — Medication 1 TABLET: at 08:15

## 2024-10-13 RX ADMIN — FUROSEMIDE 20 MG: 20 TABLET ORAL at 08:15

## 2024-10-13 RX ADMIN — LACTULOSE 20 G: 20 POWDER, FOR SOLUTION ORAL at 12:00

## 2024-10-13 RX ADMIN — METRONIDAZOLE 500 MG: 500 INJECTION, SOLUTION INTRAVENOUS at 16:11

## 2024-10-13 RX ADMIN — FOLIC ACID 1 MG: 1 TABLET ORAL at 08:15

## 2024-10-13 RX ADMIN — LACTULOSE 20 G: 20 POWDER, FOR SOLUTION ORAL at 05:12

## 2024-10-13 RX ADMIN — GUAIFENESIN 200 MG: 200 SOLUTION ORAL at 08:15

## 2024-10-13 RX ADMIN — ACETAMINOPHEN 325 MG: 325 TABLET, FILM COATED ORAL at 03:07

## 2024-10-13 RX ADMIN — SIMETHICONE 80 MG: 80 TABLET, CHEWABLE ORAL at 19:27

## 2024-10-13 RX ADMIN — LIDOCAINE 1 PATCH: 4 PATCH TOPICAL at 19:27

## 2024-10-13 RX ADMIN — SPIRONOLACTONE 50 MG: 50 TABLET ORAL at 08:15

## 2024-10-13 RX ADMIN — GUAIFENESIN 200 MG: 200 SOLUTION ORAL at 19:27

## 2024-10-13 RX ADMIN — PANTOPRAZOLE SODIUM 40 MG: 40 TABLET, DELAYED RELEASE ORAL at 08:15

## 2024-10-13 ASSESSMENT — ACTIVITIES OF DAILY LIVING (ADL)
ADLS_ACUITY_SCORE: 22

## 2024-10-13 NOTE — CONSULTS
[facility]  CAPS NOTE  Date of Admission:  10/3/2024  Consult Requested by: Medicine  Reason for Consult: diagnostic evaluation of ascites    No results found for this or any previous visit from the past 1 day.        Assessment and Plan:  Requested procedure was not performed.  Insufficient fluid for safe bedside paracentesis.      ANA ROSA SALDIVAR MD  [facility]  Securely message with [employer] (more info)  Text page via [facility] Paging/Directory   See signed in provider for up to date coverage information

## 2024-10-13 NOTE — PROGRESS NOTES
[facility] ID SERVICE: Progress Note  Patient:  Stewart Hunt, Date of birth 1985, Medical record number [id number]  Date of Admission: 10/3/2024  Date of Visit:  10/13/2024  Requesting Provider: Denys Carreon         Assessment and Recommendations:     ID Problem List:  MSSA bacteremia  Positive blood culture 10/8 (1/2 bottles), neg on 10/8, 10/10, 10/12   TTE 10/9/24 - No significant valvular abnormalities were noted. No vegetation or mass identified. EF 60-65%   TTE 9/22/24 - Global and regional left ventricular function is normal with an EF of 55-60%.Right ventricular function, chamber size, wall motion, and thickness are normal. Pulmonary artery systolic pressure is normal. No significant valvular abnormalities present. No pericardial effusion is present.  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  Hx of heavy alcohol use, in abstinence since 9/8/2024  Hx of gastric bypass with sleeve (~12 years ago)  Anemia (10.39 on 10/3/24, --> 10.7 on 10/13)    Thrombocytopenia   Elevated bilirubin and AST   Right base atelectasis   Splenomegaly   Dry cough x 6 weeks   - SARScov 2 / Influenza A/B/ RSV neg 9/20/24, 10/3, 10/7/24 and Respi panel neg 10/7 and 10/12/24    GERD   Nausea   Diarrhea with abdominal distention   Bilateral leg edema and pain - No DVT in bilateral lower extremities (doppler US on 10/12/24)   Mild edematous wall thickening of the gallbladder (CT - 10/12/24, not tedner on exam)     Recommendations:  - check stools for C.diff   - incentive spirometer for atelectasis   - keep head of bed elevated   - Team had swicthed cefazolin to ceftriaxone, ceftriaxone  may cause cholestatic jaundice/ biliary sludge, monitor LFTs   - add empiric metronidazole 500 mg IV/PO q8hr for anaerobic coverage   - another option is continue Cefazolin and add cipro 500 mg po daily for SBP prophylaxis /  metronidazole   - check CK level. pt c/o achy sensation in bilateral legs     ID will continue to follow . Dr Kolb will assume care tomorrow     Harris Abdi MD,M.Med.Sc.  Staff, Infectious Diseases    Time: 65 min     Interval History   dry cough for 6 weeks with negative work up. on Cefazolin for MSSA bacteremia . bilateral leg swelling and achy sensation. legs are edematous, soft not tense. co LLQ discomfort and feels his abdomen is more distended. feels nauseous and vomited up his breakfast when he brushed his teeth. denies back pain. no sinus pain. denies dysuria . diarrhea but also on lactulose     Primary team stopped Cefazolin and started Ceftriaxone yesterday     CT abdomen/pelvis w contrast 10/12/24    IMPRESSION:   1. No acute intra-abdominal pathology. Normal appendix.  2. Cirrhotic hepatic morphology with evidence of portal hypertension, including small volume ascites, splenomegaly, and upper abdominal  portosystemic collaterals.  3. Decreased hepatomegaly and nodular hepatic steatosis since 8/27/2024.    Doppler US 10/12/24  No deep venous thrombosis demonstrated in either leg.     CXR 10/12/24  Elevated right hemidiaphragm with mild perihilar opacities favoring atelectasis.       History of Present Illness:     Stewart Hunt is a 38 yo F with PMHx of recently diagnosed alcoholic cirrhosis, who is admitted due to progressively worsening of lower extremities edema. Febrile episode on admission. Though reportedly c/o respiratory complaints, sore throat with negative work up. Another febrile episode overnight 10/7. Blood culture is resulted positive, identified MSSA on verigene. Started on empiric vancomycin, pip-tazo. No hx of fever, chills, sweating prior to admission. No hx of hardware. Does have gastric sleeve about 12 years ago.     SHx:  Heavy alcohol use since age 17, in abstinence since 9/8. No smoking, marijuana or illicit drug use. , lives with wife. Does have a pet -  Swedish bulldog, healthy. Previously used to breed dogs, but none recently.          Review of Systems:     Complete 12 point review of systems negative except as noted in HPI.          Antimicrobial history:     Current:  Cefazolin 10/9 - **    Prior:  Vancomycin, pipt-azo 10/8 - 10/9  Ceftriaxone x1 dose 10/8       Past Medical History:     Past Medical History:   Diagnosis Date    Acute alcoholic hepatitis (H) 09/2024    Alcohol use disorder, severe, in early remission (H)     Alcoholic cirrhosis (H)     Morbid obesity (H)          Allergies:    No Known Allergies       Family History:   Reviewed and noncontributory.   Family History   Problem Relation Age of Onset    Colon Cancer Mother     Diabetes Father     Pancreatic Cancer Sister     Diabetes Maternal Grandmother     Colon Cancer Cousin     Liver Disease No family hx of           Social History:     Social History     Socioeconomic History    Marital status: Single     Spouse name: Not on file    Number of children: Not on file    Years of education: Not on file    Highest education level: Not on file   Occupational History    Not on file   Tobacco Use    Smoking status: Not on file    Smokeless tobacco: Not on file   Substance and Sexual Activity    Alcohol use: Not Currently     Comment: 2 pints of vodka per day for 2 years. Last drink early sept 2024    Drug use: Never    Sexual activity: Not on file   Other Topics Concern    Not on file   Social History Narrative    Not on file     Social Determinants of Health     Financial Resource Strain: Low Risk  (10/3/2024)    Financial Resource Strain     Within the past 12 months, have you or your family members you live with been unable to get utilities (heat, electricity) when it was really needed?: No   Food Insecurity: Low Risk  (10/3/2024)    Food Insecurity     Within the past 12 months, did you worry that your food would run out before you got money to buy more?: No     Within the past 12 months, did the  "food you bought just not last and you didn t have money to get more?: No   Transportation Needs: Low Risk  (10/3/2024)    Transportation Needs     Within the past 12 months, has lack of transportation kept you from medical appointments, getting your medicines, non-medical meetings or appointments, work, or from getting things that you need?: No   Physical Activity: Not on file   Stress: Not on file   Social Connections: Not on file   Interpersonal Safety: Low Risk  (10/5/2024)    Interpersonal Safety     Do you feel physically and emotionally safe where you currently live?: Yes     Within the past 12 months, have you been hit, slapped, kicked or otherwise physically hurt by someone?: No     Within the past 12 months, have you been humiliated or emotionally abused in other ways by your partner or ex-partner?: No   Recent Concern: Interpersonal Safety - High Risk (9/21/2024)    Interpersonal Safety     Do you feel physically and emotionally safe where you currently live?: No     Within the past 12 months, have you been hit, slapped, kicked or otherwise physically hurt by someone?: No     Within the past 12 months, have you been humiliated or emotionally abused in other ways by your partner or ex-partner?: No   Housing Stability: Low Risk  (10/3/2024)    Housing Stability     Do you have housing? : Yes     Are you worried about losing your housing?: No            Physical Examination:     Vital signs:  /67 (BP Location: Right arm)   Pulse 94   Temp 98  F (36.7  C) (Oral)   Resp 18   Ht 1.676 m (5' 6\")   Wt 115.7 kg (255 lb 1.6 oz)   SpO2 98%   BMI 41.17 kg/m    GENERAL: alert, oriented and no distress, jaundice+   EYES: Eyes grossly normal to inspection, PERRL and conjunctivae, normal, icteric sclerae  NECK: no adenopathy, no asymmetry, masses, or scars  RESP: lungs clear to auscultation - decreased breath sounds in right base   CV: regular rate and rhythm, normal S1 S2 ASHLEIGH 2/6   ABDOMEN: soft, nontender, " active bowel sounds normal  MS:  moderate edema bilateral legs. legs are not red/ tense bilateral pedal edema and left foot more swollen .   SKIN: +jaundice, no suspicious lesions or rashes  NEURO: Normal strength and tone, mentation intact and speech normal  PSYCH: mentation appears normal, affect normal    Lines and devices:    PIV CDI, non-tender, no surrounding erythema.    Labs, Microbiology and Imaging studies reviewed.            Laboratory Data:       Microbiology:    Culture   Date Value Ref Range Status   10/12/2024 No growth after 1 day  Preliminary   10/12/2024 No growth after 1 day  Preliminary   10/10/2024 No growth after 2 days  Preliminary   10/08/2024 No growth after 4 days  Preliminary   10/08/2024 No growth after 4 days  Preliminary   10/08/2024 Positive on the 1st day of incubation (A)  Final   10/08/2024 Staphylococcus aureus (AA)  Final     Comment:     1 of 2 bottles   10/03/2024 No Growth  Final   09/20/2024 No Growth  Final     Urine Studies:    Recent Labs   Lab Test 10/12/24  0524 10/08/24  2356 10/06/24  0911 10/03/24  0103 09/20/24  2058   LEUKEST Negative Negative Negative Negative Negative   WBCU 2 4 3 1 1     Hematology Studies:    Recent Labs   Lab Test 10/13/24  0539 10/12/24  0347 10/11/24  0610 10/10/24  0610 10/09/24  0553 10/08/24  1234 10/08/24  0108   WBC 12.0* 12.3*  12.3* 13.0* 10.7 10.6  --  8.7   HGB 10.7* 11.0* 11.7* 11.4* 11.8* 11.0* 11.2*   * 105* 105* 104* 104*  --  104*   PLT 87* 86* 83* 75* 74*  --  63*      Metabolic Studies:   Recent Labs   Lab Test 10/13/24  0539 10/12/24  0347 10/11/24  0610 10/10/24  0610 10/09/24  0553   * 130* 132* 132* 133*   POTASSIUM 4.0 4.2 4.5 4.2 4.5   CHLORIDE 103 101 103 103 102   CO2 18* 18* 19* 19* 18*   BUN 14.6 17.1 15.7 13.3 13.9   CR 0.72 0.77 0.66* 0.69 0.79   GFRESTIMATED >90 >90 >90 >90 >90     Hepatic Studies:   Recent Labs   Lab Test 10/13/24  0539 10/12/24  0347 10/11/24  0610 10/10/24  0610 10/09/24  0553  10/08/24  0108   BILITOTAL 26.9* 28.3* 30.0* 28.7* 30.4* 28.2*   ALKPHOS 128 150 138 125 128 154*   ALBUMIN 3.3* 3.5 3.6 3.5 3.8 3.5   AST 89* 92* 91* 98* 118* 118*   ALT 40 47 57 65 77* 82*              Last check of C difficile  C Difficile Toxin B by PCR   Date Value Ref Range Status   09/21/2024 Negative Negative Final     Comment:     A negative result does not exclude actual disease due to C. difficile and may be due to improper collection, handling and storage of the specimen or the number of organisms in the specimen is below the detection limit of the assay.            Imaging:     Recent Results (from the past 48 hour(s))   XR Chest Port 1 View    Narrative    Exam: XR CHEST PORT 1 VIEW, 10/12/2024 8:47 AM    Indication: Fever    Comparison: Chest x-ray 10/7/2024    Findings: Frontal radiograph of the chest. Trachea is midline. Cardiac  silhouette is within normal limits. Low lung volumes. Elevated right  hemidiaphragm. Mild perihilar opacities. No significant pneumothorax.      Impression    Impression: Elevated right hemidiaphragm with mild perihilar opacities  favoring atelectasis.    I have personally reviewed the examination and initial interpretation  and I agree with the findings.    SANDRA BATRES MD         SYSTEM ID:  [id number]   US Lower Extremity Venous Duplex Bilateral    Narrative    ULTRASOUND LOWER EXTREMITY VENOUS DUPLEX BILATERAL 10/12/2024 4:36 PM    CLINICAL HISTORY: lower extremity swelling      COMPARISONS: None available.    REFERRING PROVIDER: DEE DUFF    TECHNIQUE: Grayscale, color Doppler, Doppler waveform ultrasound  evaluation was performed through the bilateral common femoral,  femoral, and popliteal veins. Bilateral posterior tibial and peroneal  veins were evaluated with grayscale imaging and compression.    FINDINGS: Right common femoral, femoral, and popliteal veins are fully  compressible with phasic Doppler waveforms.    Right posterior tibial veins are  "fully compressible.    Right peroneal veins are fully compressible.    Left common femoral, femoral, and popliteal veins are fully  compressible with phasic Doppler waveforms.    Left posterior tibial veins are fully compressible.    Left peroneal veins are fully compressible.      Impression    IMPRESSION: No deep venous thrombosis demonstrated in either leg.    Reference: \"Duplex Ultrasound in the Diagnosis of Lower-Extremity Deep  Venous Thrombosis\"- Natalie Howe MD, S; Florian Tapia MD  (Circulation. 2014;129:917-921. http://circ.ahajournals.org)    SHELTON KUMAR MD         SYSTEM ID:  [id number]   CT Abdomen Pelvis w Contrast    Narrative    EXAMINATION: CT ABDOMEN PELVIS W CONTRAST, 10/12/2024 7:24 PM    TECHNIQUE:  Helical CT images from the lung bases through the  symphysis pubis were obtained with IV contrast. Contrast dose: 135ml  isovue 370    COMPARISON: 9/13/2024 MRI, 8/27/2024 CT    HISTORY: RLQ abd pain concerns for ascites and sbp    FINDINGS:    Abdomen and pelvis: Nodular hepatic capsular contour. Decreased size  of the liver, now measuring 17.8 cm in craniocaudal dimension at mid  clavicular line, previously 20.3 cm on 8/27/2024. Resolution of the  previously seen nodular hypoattenuating foci predominantly throughout  the left hepatic lobe. No appreciable new focal suspicious hepatic  lesion. Mild edematous wall thickening of the gallbladder. No intra or  extrahepatic biliary dilation. Normal pancreas. Enlarged spleen,  measuring 15.0 cm in craniocaudal dimension. Normal adrenal glands and  renal cortices. No hydronephrosis, hydroureter, or urinary tract  stone. Normal bladder, prostate, and seminal vesicles. Small volume  ascites. No free air. No bowel obstruction or inflammation. Normal  appendix. Sleeve gastrectomy changes. The major abdominal vasculature  is patent. Recanalized umbilical vein. Paraesophageal varices. Left  splenorenal shunt. No abdominal or pelvic " lymphadenopathy.    Lung bases/lower chest:  Asymmetric elevation of the right  hemidiaphragm with associated right basilar atelectasis. Mosaic  attenuation of the visualized lungs, likely related to hypoinflation.    Bones and soft tissues: No acute or aggressive osseous abnormality.      Impression    IMPRESSION:   1. No acute intra-abdominal pathology. Normal appendix.  2. Cirrhotic hepatic morphology with evidence of portal hypertension,  including small volume ascites, splenomegaly, and upper abdominal  portosystemic collaterals.  3. Decreased hepatomegaly and nodular hepatic steatosis since  8/27/2024.     RUSTY ASH DO         SYSTEM ID:  [id number]

## 2024-10-13 NOTE — PROGRESS NOTES
[facility]    Medicine Progress Note - Hospitalist Service, GOLD TEAM 7    Date of Admission:  10/3/2024    Assessment & Plan   Stewart Hunt is a 39 year old male admitted on 10/3/2024. He has hx of alcohol cirrhosis and admitted 9/20-22 for worsening anasarca and hyperbilirubinemia.      #Worsening Fever Curve:  - Currently source of fever is MSSA bacteremia. Patient has been on Abx since 10/9. Fever curve initially improved then worsened. WBC count also improved and now worsened. Unsure of new source of fever  - Will switch antibiotic from ancef to ceftriaxone for SBP empirical treatment and add flagyl 500mg IV every 8 hours.  - CAPS consulted for paracentesis-- no ascites seen for paracentesis (diagnostic or therapeutic)  - UA negative  - Bcx obtain on 10/12 Am and is pending (so far negative at 24hours)  - RVP negative  - Cdiff ordered  - encourage incentive spirometry for atelectasis    # Lower extremity edema, anasarca  # Hyponatremia 2/2 etoh cirrhosis, improving   # ANNA, prerenal with possible HRS physiology (cystatin c 1.8, GFR 39%), improving   Home reg: lasix 20mg daily and aldactone 50mg daily since last admission 10 days ago, and has been having ++ urination without improvement in edema. On lactulose and had been having multiple bowel movements daily along with poor appetite. Albumin 2-3. most likely due to third spacing. Cr wnl 0.94-->0.78-->0.89. Sodium decreased from 131 to 125 10/5 with increased diuretic dosing. BL Na ~136.  Cr at  0.85, but cystatin C at 1.8. GFR calculated with Cystain C 39. Blood osmolality 264. Sodium urine <20, urine osmolality 305.UO improving over the past few days. Suspected ANNA with possible HRS physiology per discussions with Nephrology  - Hepatology and Nephrology consults  - Daily CMP   - Lasix and spironolactone today at lower dose  - 3 day Albumin challenge for hepatorenal syndrome (10/6-10/9) -- responded well to  albumin.  - 2 gm sodium diet, 1L FR  - strict I+Os, daily weights     # Decompensated alcoholic cirrhosis  # Alcoholic hepatitis   # Hx of hepatic encephalopathy   # Hx of alcohol use disorder, sober since 9/8/2024  Follows with OP hepatology. Prednisolone taper discontinued. US 9/21 without evidence of HCC, hepatomegaly. No EV on recent EGD. T. Bili initially downtrending, slight increase 10/5. Mentation currently clear. Attending AA and has been sober since 9/8/24. Was recommended inpatient rehab, but he feels that since he has such high MELD, he does not want to be away from his family for 30 days.  - Hepatology consult, appreciate recommendations   - Daily MELD labs  - Diuresis plan as above  - Continue lactulose TID, titrate to achieve 3-5 bowel movements daily  - Thiamine, folic acid daily   - May need rifaximin in the future (per GI)  - Monitor for hepatic encephalopathy  - Continue to attend AA when discharged     MELD 3.0: 32 at 10/13/2024  5:39 AM  MELD-Na: 33 at 10/13/2024  5:39 AM  Calculated from:  Serum Creatinine: 0.72 mg/dL (Using min of 1 mg/dL) at 10/13/2024  5:39 AM  Serum Sodium: 132 mmol/L at 10/13/2024  5:39 AM  Total Bilirubin: 26.9 mg/dL at 10/13/2024  5:39 AM  Serum Albumin: 3.3 g/dL at 10/13/2024  5:39 AM  INR(ratio): 3.13 at 10/13/2024  5:39 AM  Age at listing (hypothetical): 39 years  Sex: Male at 10/13/2024  5:39 AM      # Cough, hemoptysis vs hematemesis vs blood from epistaxis, resolved   # Moderate portal hypertensive gastropathy seen on EGD   # GERD  In addition to ongoing sore throat since ~1 week prior to admission, with grossly negative work-up, has had two episodes of sputum with blood on 10/7, 10/8. Broad ID work-up. Discussed at length with GI given cirrhosis. No varices seen on EGD ~1 month ago at [facility]. Could consider [place] tear with hypertensive gastropathy, coughing, coagulopathy, but no urgent need to scope for UGIB.   - GI consult appreciate recommendations   -  No need for EGD on 10/8  - Trend daily CBC   - Continue protonix 40 mg daily, simethicone 80 mg BID   - Monitor     # Staph aureus bacteremia, unclear source   # Elevated procalcitonin   # Sore throat, ongoing, improving   Procal 1.77 on 10/5. WBC WNL on 10/7. No dysuria, no fever, [facility], new O2 needs on 10/7.  Has throat pain but viral panels repetiteively negative. Possibly due to prednisolone that he was taking. CXR and UA unremarkable. Remains afebrile. WBC trending down now that he is off steroids. Elevated temperature to 100.7 overnight  10/7. Strep swab negative. Continues to have normal WBC, decreasing procal. Normal VSS otherwise. Started on Ceftrixone. No fluid per POCUS exam  on 10/8. Ceftriaxone stopped, but positive GPC 1/2 bottles. Overall, concern for renal dysfunction without clear source for intermittent temperature spikes despite broadly negative work-up. Initally suspected GPCs may be contaminination and held abx after talking with ID given wanting to preserve renal function, but spiked Tmax of 102.8 on 10/8 prompting Zosyn/Vanc start. UA, MRSA negative, other BCx with NGTD. Narrowed to ancef per ID  - ID consult, appreciate recommendations  - TTE ON  10/9-- poor candidate for SALENA given esophageal varices  - Trend Bcx so far negative  - Daily BMP, CBC  - Ancef to be switched to ceftriaxone 2gm per day for bacteremia and sbp dosing on 10/12. Added metronidazole 500mg IV every 8 hours  - PRN  lozenges, chloraseptic spray, Robitussin   - Continue to monitor fever curve and daily CBC    # BRBPR concerning for anal fissures vs bleeding hemorrhoids, resolved  # Non-thrombosed external hemorrhoids and non-thrombosed internal hemorrhoids found on perianal  Exam seen on colonoscopy (9/10/24)  BRBPR on 10/7, 10/8 with none in stool. No melena seen. States some pressure before BMs happen, but no overt pain. Mild bleeding seen upon 2nd PE on 10/8 slightly inside rectum. Hgb stable with mild downtrend. Per chart  review, hemorrhoids seen as above on imaging ~1 month ago. No need for scope per discussion with GI on 10/8.   - Trend Hgb on CBC daily  - Start rectal nitroglycerin 0.4% BID for 4 weeks  - Defer adding PO fiber given needing to have BMs due to cirrhosis   - Sitz baths 2-3 times daily  - Fiber/2 gm Na diet      # Epistaxis, resolved and intermittent   Noted on 10/7 exam. Has had on/off for several days. Resolved with conservative treatment (pressure).   - If ongoing, please consult ENT given coags status, may need packing.   - Monitor coags and Hgb daily   - PRN Ocean spray, Afrin   - Monitor and notify provider if ongoing     # Normocytic anemia  11.2-->11-->11.8  on 10/8. Suspect from bleeding sources as above in setting of coagulopathy.   - monitor on CBC  - Transfuse for Hgb less than 7    # Left sided chest pain, intermittent   ED visit with same complaints on 7/26/24. ACS and PE workup normal. Started on pepcid and simethicone. Has c/o L sided chest pain for the last 2 mornings. Pain is reproducible with palpation. Noted again on 10/8, EKG with no acute ST/T wave abnormalities or concerning T wave changes. Unable to get trop due to elevated bilirubin. Given pain is reproducible, likely MSK related, could consider from GERD as above, ID process (but less likley given negative work-up thus far).   - ID work-up as above  - GERD treatment as above  - Scheduled lidocaine patches  - PRN acetaminophen     # Moderate malnutrition, in the context of chronic illness/disease   # Vomiting, resolved   # Diarrhea, in setting of lactulose  Nothing tastes good, and had one non bloody emesis several days ago. States appetite better when on  higher dose prednisolone (currently on 1-mg).  - RD consult  - Continue lactulose TID, titrate to achieve 3-5 bowel movements daily  - Discuss renally appropriate MVI in AM  - PRN zofran  - Start calorie counts for 3 days     # Fatigue, improving   # Insomnia, improving   Fell asleep at  desk while sitting and working on computer. See work-up as above for cirrhosis. No s/s of hepatic encelopathy. Currently normal mentation  - Cirrhosis treatment as above     # Gallbladder polyp  - Continue to monitor 4 mm polyp seen on US     # Depressed mood/Grief  Both the patient and wife started tearing up talking about poor prognosis and not knowing what to expect including what they can do while 'waiting 90 days.'  - Spiritual Health consult per patient request    # Hepatitis B status  Received hepatis B 1st dose on 9/10/24. Wife inquiring about second dose that should be scheduled for tomorrow. Per discussion with pharmacy, ok to push back several days and give prior to discharge.  - 2nd Hepatitis B vaccine prior to discharge per discussion with Pharmacy on 10/6.       Diet: 2 Gram Sodium Diet  Fluid restriction 1000 ML FLUID    DVT Prophylaxis: Anti-embolisim stockings (TEDs) and Ambulate every shift  Gomes Catheter: Not present  Lines: None     Cardiac Monitoring: None  Code Status: Full Code            Diet: Snacks/Supplements Adult: Ensure Max Protein (bariatric); Between Meals  Combination Diet High Fiber Diet; 2 gm NA Diet  Fluid restriction 1500 ML FLUID    DVT Prophylaxis: VTE Prophylaxis contraindicated due to ongoing bleeding   Gomes Catheter: Not present  Lines: None     Cardiac Monitoring: None  Code Status: Full Code      Clinically Significant Risk Factors         # Hyponatremia: Lowest Na = 130 mmol/L in last 2 days, will monitor as appropriate   # Hypocalcemia: Lowest Ca = 8 mg/dL in last 2 days, will monitor and replace as appropriate     # Hypoalbuminemia: Lowest albumin = 2.5 g/dL at 10/6/2024  6:09 AM, will monitor as appropriate    # Coagulation Defect: INR = 3.13 (Ref range: 0.85 - 1.15) and/or PTT = 38 Seconds (Ref range: 22 - 38 Seconds), will monitor for bleeding  # Thrombocytopenia: Lowest platelets = 86 in last 2 days, will monitor for bleeding             # Severe Obesity:  "Estimated body mass index is 41.17 kg/m  as calculated from the following:    Height as of this encounter: 1.676 m (5' 6\").    Weight as of this encounter: 115.7 kg (255 lb 1.6 oz).   # Moderate Malnutrition: based on nutrition assessment           Disposition Plan     Medically Ready for Discharge: Anticipated in 2-4 Days    The patient's care was discussed with the Attending Physician, Dr. Mcfarlane, Bedside Nurse, Patient, Patient's Family, and Nephrology and ID Consultant(s).    DEE DUFF MD  Hospitalist Service, [address]  Securely message with [employer] (more info)  Text page via [facility] Paging/Directory   See signed in provider for up to date coverage information    This chart documentation was completed with Dragon voice-recognition software. Even though reviewed, this chart may still contain some grammatical, spelling, and word errors. Please contact the author for any questions or clarifications.     ______________________________________________________________________    Interval History   Nursing/SW/consult/interdisciplinary healthcare worker's notes reviewed.     I reviewed all medications, new labs and imaging results over the last 24 hours. Please see discussion of these results in the A/P.     Tm in the last 24 hours 101.4F at 0300, also having chills and night sweats overnight once again. Patient also complaining of abd pain that is in lower abd on left side of the abd. No abdominal pain over the RUQ or LUQ. No nasal congestion, rhinnorhea, coughing, santos, cp, nausea, emesis. No dysuria, flank pain. No diarrhea. Patient only complaining of pain on deep palpation.    ROS: 12 point ROS negative other than the symptoms noted here or above in the assessment and plan.     Physical Exam   Vital Signs: Temp: 98.5  F (36.9  C) Temp src: Oral BP: 123/65 Pulse: 96   Resp: 18 SpO2: 96 % O2 Device: None (Room air)    Weight: 255 lbs 1.6 " oz    Constitutional: awake, alert, cooperative, no apparent distress, and appears stated age  Eyes: Yellow sclera B/L. PERRLA.   ENT: Atrumatic. No swelling noted of uvula or tonsils. No exudate noted. No dried blood seen in nares or mouth.  Hematologic / Lymphatic: no cervical lymphadenopathy and no supraclavicular lymphadenopathy  Respiratory: No increased work of breathing, good air exchange, clear to auscultation bilaterally, no crackles or wheezing  Cardiovascular: Normal apical impulse, regular rate and rhythm, normal S1 and S2, no S3 or S4, and no murmur noted  GI: ttp over the LLE. Pain is only illicited on deep palpation. No rebound. No peritoneal sign. No scars, normal bowel sounds, soft, non-distended, no masses palpated.  Skin: Jaundiced. No other lesions or rashes noted on exposed skin.   Musculoskeletal: There is no redness, warmth, or swelling of the joints.  Full range of motion noted.  Motor strength is 5 out of 5 all extremities bilaterally.  Tone is normal. +3 pitting edema B/L LE.   Neurologic: Awake, alert, oriented to name, place and time.  Cranial nerves II-XII are grossly intact.  Motor is 5 out of 5 bilaterally. No asterix noted   Neuropsychiatric:   General: normal, calm, and normal eye contact  Level of consciousness: alert / normal  Affect: normal  Orientation: oriented to self, place, time and situation  Memory and insight: normal, memory for past and recent events intact, and thought process normal    Medical Decision Making       60 MINUTES SPENT BY ME on the date of service doing chart review, history, exam, documentation & further activities per the note.      Data     I have personally reviewed the following data over the past 24 hrs:    12.0 (H)  \   10.7 (L)   / 87 (L)     132 (L) 103 14.6 /  97   4.0 18 (L) 0.72 \     ALT: 40 AST: 89 (H) AP: 128 TBILI: 26.9 (HH)   ALB: 3.3 (L) TOT PROTEIN: N/A LIPASE: N/A     INR:  3.13 (H) PTT:  N/A   D-dimer:  N/A Fibrinogen:  N/A      "  Imaging results reviewed over the past 24 hrs:   Recent Results (from the past 24 hour(s))   US Lower Extremity Venous Duplex Bilateral    Narrative    ULTRASOUND LOWER EXTREMITY VENOUS DUPLEX BILATERAL 10/12/2024 4:36 PM    CLINICAL HISTORY: lower extremity swelling      COMPARISONS: None available.    REFERRING PROVIDER: DEE DUFF    TECHNIQUE: Grayscale, color Doppler, Doppler waveform ultrasound  evaluation was performed through the bilateral common femoral,  femoral, and popliteal veins. Bilateral posterior tibial and peroneal  veins were evaluated with grayscale imaging and compression.    FINDINGS: Right common femoral, femoral, and popliteal veins are fully  compressible with phasic Doppler waveforms.    Right posterior tibial veins are fully compressible.    Right peroneal veins are fully compressible.    Left common femoral, femoral, and popliteal veins are fully  compressible with phasic Doppler waveforms.    Left posterior tibial veins are fully compressible.    Left peroneal veins are fully compressible.      Impression    IMPRESSION: No deep venous thrombosis demonstrated in either leg.    Reference: \"Duplex Ultrasound in the Diagnosis of Lower-Extremity Deep  Venous Thrombosis\"- Natalie Howe MD, S; Florian Tapia MD  (Circulation. 2014;129:917-921. http://circ.ahajournals.org)    SHELOTN KUMAR MD         SYSTEM ID:  [id number]   CT Abdomen Pelvis w Contrast    Narrative    EXAMINATION: CT ABDOMEN PELVIS W CONTRAST, 10/12/2024 7:24 PM    TECHNIQUE:  Helical CT images from the lung bases through the  symphysis pubis were obtained with IV contrast. Contrast dose: 135ml  isovue 370    COMPARISON: 9/13/2024 MRI, 8/27/2024 CT    HISTORY: RLQ abd pain concerns for ascites and sbp    FINDINGS:    Abdomen and pelvis: Nodular hepatic capsular contour. Decreased size  of the liver, now measuring 17.8 cm in craniocaudal dimension at mid  clavicular line, previously 20.3 cm on 8/27/2024. " Resolution of the  previously seen nodular hypoattenuating foci predominantly throughout  the left hepatic lobe. No appreciable new focal suspicious hepatic  lesion. Mild edematous wall thickening of the gallbladder. No intra or  extrahepatic biliary dilation. Normal pancreas. Enlarged spleen,  measuring 15.0 cm in craniocaudal dimension. Normal adrenal glands and  renal cortices. No hydronephrosis, hydroureter, or urinary tract  stone. Normal bladder, prostate, and seminal vesicles. Small volume  ascites. No free air. No bowel obstruction or inflammation. Normal  appendix. Sleeve gastrectomy changes. The major abdominal vasculature  is patent. Recanalized umbilical vein. Paraesophageal varices. Left  splenorenal shunt. No abdominal or pelvic lymphadenopathy.    Lung bases/lower chest:  Asymmetric elevation of the right  hemidiaphragm with associated right basilar atelectasis. Mosaic  attenuation of the visualized lungs, likely related to hypoinflation.    Bones and soft tissues: No acute or aggressive osseous abnormality.      Impression    IMPRESSION:   1. No acute intra-abdominal pathology. Normal appendix.  2. Cirrhotic hepatic morphology with evidence of portal hypertension,  including small volume ascites, splenomegaly, and upper abdominal  portosystemic collaterals.  3. Decreased hepatomegaly and nodular hepatic steatosis since  8/27/2024.     RUSTY ASH DO         SYSTEM ID:  [id number]

## 2024-10-13 NOTE — PLAN OF CARE
"Assumed cares 9080-1725     /65 (BP Location: Right arm)   Pulse 96   Temp 98.5  F (36.9  C) (Oral)   Resp 18   Ht 1.676 m (5' 6\")   Wt 115.7 kg (255 lb 1.6 oz)   SpO2 96%   BMI 41.17 kg/m       Pain: Patient has some mild abdominal pain. Declined medication.   Neuro: A&Ox4.    Respiratory: Lungs clear and equal, on RA.   Cardiac/Neurovascular: HR and pulse WDL.Baseline numbness in lower extremities. Severe lower extremity edema.   GI/: Adequate urine output. 4 BM's today.   Nutrition: 2g NA diet, 1500ml FR.   Activity: Independent.   Skin: Jaundice.   Lines: Left PIV (SL), R PIV (SL)  Events this shift: No para due to inadequate amount of fluid. IV antibiotics switched around. OT was able to re wrap legs today.      Plan: Continue with plan of care.   Goal Outcome Evaluation:      Plan of Care Reviewed With: patient    Overall Patient Progress: no changeOverall Patient Progress: no change    Outcome Evaluation: COntinue with plan of care.      "

## 2024-10-13 NOTE — PLAN OF CARE
Goal Outcome Evaluation:     Plan of Care Reviewed With: patient    Overall Patient Progress: no change  Overall Patient Progress: no change    Outcome Evaluation: Assumed cares from [id number]. Pt has 2 PIV's, 1 in each forearm - both are saline locked. Pt denied pain. PRN Tylenol administered for fever of 101.4 degrees - this was effective. MD notified for recurring fevers. PRN Robitussin administered for cough. Pt continues to report numbness in lower extremities - as his baseline. Lymph wraps remain off. Intake of 420 mL during shift. Continue with plan of care.     Carolyn Sarabia RN

## 2024-10-14 ENCOUNTER — APPOINTMENT (OUTPATIENT)
Dept: CT IMAGING | Facility: CLINIC | Age: 39
DRG: 432 | End: 2024-10-14
Attending: STUDENT IN AN ORGANIZED HEALTH CARE EDUCATION/TRAINING PROGRAM
Payer: COMMERCIAL

## 2024-10-14 LAB
ALBUMIN SERPL BCG-MCNC: 3.2 G/DL (ref 3.5–5.2)
ALP SERPL-CCNC: 140 U/L (ref 40–150)
ALT SERPL W P-5'-P-CCNC: 39 U/L (ref 0–70)
ANION GAP SERPL CALCULATED.3IONS-SCNC: 11 MMOL/L (ref 7–15)
AST SERPL W P-5'-P-CCNC: 86 U/L (ref 0–45)
BILIRUB SERPL-MCNC: 25.3 MG/DL
BUN SERPL-MCNC: 14.9 MG/DL (ref 6–20)
CALCIUM SERPL-MCNC: 7.9 MG/DL (ref 8.8–10.4)
CHLORIDE SERPL-SCNC: 105 MMOL/L (ref 98–107)
CREAT SERPL-MCNC: 0.7 MG/DL (ref 0.67–1.17)
EGFRCR SERPLBLD CKD-EPI 2021: >90 ML/MIN/1.73M2
ERYTHROCYTE [DISTWIDTH] IN BLOOD BY AUTOMATED COUNT: 18.6 % (ref 10–15)
GLUCOSE SERPL-MCNC: 87 MG/DL (ref 70–99)
HCO3 SERPL-SCNC: 17 MMOL/L (ref 22–29)
HCT VFR BLD AUTO: 31.1 % (ref 40–53)
HGB BLD-MCNC: 10.6 G/DL (ref 13.3–17.7)
INR PPP: 3 (ref 0.85–1.15)
MCH RBC QN AUTO: 35.8 PG (ref 26.5–33)
MCHC RBC AUTO-ENTMCNC: 34.1 G/DL (ref 31.5–36.5)
MCV RBC AUTO: 105 FL (ref 78–100)
MRSA DNA SPEC QL NAA+PROBE: NEGATIVE
PLATELET # BLD AUTO: 99 10E3/UL (ref 150–450)
POTASSIUM SERPL-SCNC: 4.2 MMOL/L (ref 3.4–5.3)
PROT SERPL-MCNC: ABNORMAL G/DL
RBC # BLD AUTO: 2.96 10E6/UL (ref 4.4–5.9)
SA TARGET DNA: NEGATIVE
SODIUM SERPL-SCNC: 133 MMOL/L (ref 135–145)
WBC # BLD AUTO: 12.1 10E3/UL (ref 4–11)

## 2024-10-14 PROCEDURE — 85027 COMPLETE CBC AUTOMATED: CPT

## 2024-10-14 PROCEDURE — 70486 CT MAXILLOFACIAL W/O DYE: CPT

## 2024-10-14 PROCEDURE — 250N000011 HC RX IP 250 OP 636: Performed by: STUDENT IN AN ORGANIZED HEALTH CARE EDUCATION/TRAINING PROGRAM

## 2024-10-14 PROCEDURE — 80048 BASIC METABOLIC PNL TOTAL CA: CPT

## 2024-10-14 PROCEDURE — 99232 SBSQ HOSP IP/OBS MODERATE 35: CPT | Performed by: STUDENT IN AN ORGANIZED HEALTH CARE EDUCATION/TRAINING PROGRAM

## 2024-10-14 PROCEDURE — 99233 SBSQ HOSP IP/OBS HIGH 50: CPT | Performed by: STUDENT IN AN ORGANIZED HEALTH CARE EDUCATION/TRAINING PROGRAM

## 2024-10-14 PROCEDURE — 250N000013 HC RX MED GY IP 250 OP 250 PS 637

## 2024-10-14 PROCEDURE — 250N000013 HC RX MED GY IP 250 OP 250 PS 637: Performed by: STUDENT IN AN ORGANIZED HEALTH CARE EDUCATION/TRAINING PROGRAM

## 2024-10-14 PROCEDURE — 250N000013 HC RX MED GY IP 250 OP 250 PS 637: Performed by: INTERNAL MEDICINE

## 2024-10-14 PROCEDURE — 87641 MR-STAPH DNA AMP PROBE: CPT | Performed by: STUDENT IN AN ORGANIZED HEALTH CARE EDUCATION/TRAINING PROGRAM

## 2024-10-14 PROCEDURE — 36415 COLL VENOUS BLD VENIPUNCTURE: CPT

## 2024-10-14 PROCEDURE — 70486 CT MAXILLOFACIAL W/O DYE: CPT | Mod: 26 | Performed by: RADIOLOGY

## 2024-10-14 PROCEDURE — 120N000002 HC R&B MED SURG/OB UMMC

## 2024-10-14 PROCEDURE — 71275 CT ANGIOGRAPHY CHEST: CPT

## 2024-10-14 PROCEDURE — 71275 CT ANGIOGRAPHY CHEST: CPT | Mod: 26 | Performed by: RADIOLOGY

## 2024-10-14 PROCEDURE — 85610 PROTHROMBIN TIME: CPT

## 2024-10-14 RX ORDER — LACTULOSE 10 G/10G
10 SOLUTION ORAL 3 TIMES DAILY
Status: DISCONTINUED | OUTPATIENT
Start: 2024-10-14 | End: 2024-10-18 | Stop reason: HOSPADM

## 2024-10-14 RX ORDER — METRONIDAZOLE 500 MG/1
500 TABLET ORAL 3 TIMES DAILY
Status: DISCONTINUED | OUTPATIENT
Start: 2024-10-14 | End: 2024-10-18 | Stop reason: HOSPADM

## 2024-10-14 RX ORDER — IOPAMIDOL 755 MG/ML
74 INJECTION, SOLUTION INTRAVASCULAR ONCE
Status: COMPLETED | OUTPATIENT
Start: 2024-10-14 | End: 2024-10-14

## 2024-10-14 RX ADMIN — PANTOPRAZOLE SODIUM 40 MG: 40 TABLET, DELAYED RELEASE ORAL at 08:22

## 2024-10-14 RX ADMIN — SIMETHICONE 80 MG: 80 TABLET, CHEWABLE ORAL at 08:22

## 2024-10-14 RX ADMIN — METRONIDAZOLE 500 MG: 500 TABLET ORAL at 21:19

## 2024-10-14 RX ADMIN — METRONIDAZOLE 500 MG: 500 INJECTION, SOLUTION INTRAVENOUS at 08:22

## 2024-10-14 RX ADMIN — ACETAMINOPHEN 325 MG: 325 TABLET, FILM COATED ORAL at 02:12

## 2024-10-14 RX ADMIN — FUROSEMIDE 20 MG: 20 TABLET ORAL at 08:22

## 2024-10-14 RX ADMIN — SPIRONOLACTONE 50 MG: 50 TABLET ORAL at 08:22

## 2024-10-14 RX ADMIN — FOLIC ACID 1 MG: 1 TABLET ORAL at 08:22

## 2024-10-14 RX ADMIN — SIMETHICONE 80 MG: 80 TABLET, CHEWABLE ORAL at 20:13

## 2024-10-14 RX ADMIN — LACTULOSE 20 G: 20 POWDER, FOR SOLUTION ORAL at 05:44

## 2024-10-14 RX ADMIN — GUAIFENESIN 200 MG: 200 SOLUTION ORAL at 20:14

## 2024-10-14 RX ADMIN — CEFTRIAXONE SODIUM 2 G: 2 INJECTION, POWDER, FOR SOLUTION INTRAMUSCULAR; INTRAVENOUS at 15:05

## 2024-10-14 RX ADMIN — ACETAMINOPHEN 325 MG: 325 TABLET, FILM COATED ORAL at 20:17

## 2024-10-14 RX ADMIN — METRONIDAZOLE 500 MG: 500 INJECTION, SOLUTION INTRAVENOUS at 00:21

## 2024-10-14 RX ADMIN — METRONIDAZOLE 500 MG: 500 INJECTION, SOLUTION INTRAVENOUS at 16:29

## 2024-10-14 RX ADMIN — IOPAMIDOL 74 ML: 755 INJECTION, SOLUTION INTRAVENOUS at 23:53

## 2024-10-14 RX ADMIN — GUAIFENESIN 200 MG: 200 SOLUTION ORAL at 13:50

## 2024-10-14 RX ADMIN — THIAMINE HCL TAB 100 MG 100 MG: 100 TAB at 08:22

## 2024-10-14 RX ADMIN — Medication 1 TABLET: at 08:22

## 2024-10-14 RX ADMIN — LIDOCAINE 1 PATCH: 4 PATCH TOPICAL at 20:18

## 2024-10-14 ASSESSMENT — ACTIVITIES OF DAILY LIVING (ADL)
ADLS_ACUITY_SCORE: 22

## 2024-10-14 NOTE — PROGRESS NOTES
[facility]    Medicine Progress Note - Hospitalist Service, GOLD TEAM 7    Date of Admission:  10/3/2024    Assessment & Plan   Stewart Hunt is a 39 year old male admitted on 10/3/2024. He has hx of alcohol cirrhosis and admitted 9/20-22 for worsening anasarca and hyperbilirubinemia.      #RecurrentFever Curve:  - Currently source of fever is MSSA bacteremia. Patient has been on Abx since 10/9. Fever curve initially improved then worsened. WBC count also improved and now worsened. UA done on 10/12 negative. Bcx obtained on 10/12 which remains negative. RVP negative. Unsure of new source of fever. After switching to ceftriaxone and flagyl on 10/13 his fever curve has improved. At this time, possible etiology includes oral source (has tooth and facial pain over the R mxillary bone), and possible PE  - Abx: Cefazolin (10/10- 10/12), ceftriaxone 2g q24h IVP and flagyl (10/13 --> current)  - CAPS consulted for paracentesis on 10/12-- no ascites seen for paracentesis (diagnostic or therapeutic)  - Cdiff ordered  - encourage incentive spirometry for atelectasis  - CT chest angio to eval for PE. Will try to coordinate with facial CT to eval for sinusitis vs dental caries    # Lower extremity edema, anasarca  # Hyponatremia 2/2 etoh cirrhosis, improving   # ANNA, prerenal with possible HRS physiology (cystatin c 1.8, GFR 39%), improving   Home reg: lasix 20mg daily and aldactone 50mg daily since last admission 10 days ago, and has been having ++ urination without improvement in edema. On lactulose and had been having multiple bowel movements daily along with poor appetite. Albumin 2-3. most likely due to third spacing. Cr wnl 0.94-->0.78-->0.89. Sodium decreased from 131 to 125 10/5 with increased diuretic dosing. BL Na ~136.  Cr at  0.85, but cystatin C at 1.8. GFR calculated with Cystain C 39. Blood osmolality 264. Sodium urine <20, urine osmolality 305.UO improving over  the past few days. Suspected ANNA with possible HRS physiology per discussions with Nephrology  - Hepatology and Nephrology consults  - Daily CMP   - Lasix and spironolactone today at lower dose  - 3 day Albumin challenge for hepatorenal syndrome (10/6-10/9) -- responded well to albumin.  - 2 gm sodium diet, 1L FR  - strict I+Os, daily weights     # Decompensated alcoholic cirrhosis  # Alcoholic hepatitis   # Hx of hepatic encephalopathy   # Hx of alcohol use disorder, sober since 9/8/2024  Follows with OP hepatology. Prednisolone taper discontinued. US 9/21 without evidence of HCC, hepatomegaly. No EV on recent EGD. T. Bili initially downtrending, slight increase 10/5. Mentation currently clear. Attending AA and has been sober since 9/8/24. Was recommended inpatient rehab, but he feels that since he has such high MELD, he does not want to be away from his family for 30 days.  - Hepatology consult, appreciate recommendations   - Daily MELD labs  - Diuresis plan as above  - Continue lactulose TID, titrate to achieve 3-5 bowel movements daily  - Thiamine, folic acid daily   - May need rifaximin in the future (per GI)  - Monitor for hepatic encephalopathy  - Continue to attend AA when discharged     MELD 3.0: 31 at 10/14/2024  4:56 AM  MELD-Na: 32 at 10/14/2024  4:56 AM  Calculated from:  Serum Creatinine: 0.70 mg/dL (Using min of 1 mg/dL) at 10/14/2024  4:56 AM  Serum Sodium: 133 mmol/L at 10/14/2024  4:56 AM  Total Bilirubin: 25.3 mg/dL at 10/14/2024  4:56 AM  Serum Albumin: 3.2 g/dL at 10/14/2024  4:56 AM  INR(ratio): 3.00 at 10/14/2024  4:56 AM  Age at listing (hypothetical): 39 years  Sex: Male at 10/14/2024  4:56 AM      # Cough, hemoptysis vs hematemesis vs blood from epistaxis, resolved   # Moderate portal hypertensive gastropathy seen on EGD   # GERD  In addition to ongoing sore throat since ~1 week prior to admission, with grossly negative work-up, has had two episodes of sputum with blood on 10/7, 10/8. Broad  ID work-up. Discussed at length with GI given cirrhosis. No varices seen on EGD ~1 month ago at [facility]. Could consider [place] tear with hypertensive gastropathy, coughing, coagulopathy, but no urgent need to scope for UGIB.   - GI consult appreciate recommendations   - No need for EGD on 10/8  - Trend daily CBC   - Continue protonix 40 mg daily, simethicone 80 mg BID   - Monitor     # Staph aureus bacteremia, unclear source   # Elevated procalcitonin   # Sore throat, ongoing, improving   Procal 1.77 on 10/5. WBC WNL on 10/7. No dysuria, no fever, [facility], new O2 needs on 10/7.  Has throat pain but viral panels repetiteively negative. Possibly due to prednisolone that he was taking. CXR and UA unremarkable. Remains afebrile. WBC trending down now that he is off steroids. Elevated temperature to 100.7 overnight  10/7. Strep swab negative. Continues to have normal WBC, decreasing procal. Normal VSS otherwise. Started on Ceftrixone. No fluid per POCUS exam  on 10/8. Ceftriaxone stopped, but positive GPC 1/2 bottles. Overall, concern for renal dysfunction without clear source for intermittent temperature spikes despite broadly negative work-up. Initally suspected GPCs may be contaminination and held abx after talking with ID given wanting to preserve renal function, but spiked Tmax of 102.8 on 10/8 prompting Zosyn/Vanc start. UA, MRSA negative, other BCx with NGTD. Narrowed to ancef per ID  - ID consult, appreciate recommendations  - TTE ON  10/9-- poor candidate for SALENA given esophageal varices  - Trend Bcx so far negative  - Daily BMP, CBC  - Ancef to be switched to ceftriaxone 2gm per day for bacteremia and sbp dosing on 10/12. Added metronidazole 500mg IV every 8 hours  - PRN  lozenges, chloraseptic spray, Robitussin   - Continue to monitor fever curve and daily CBC    # BRBPR concerning for anal fissures vs bleeding hemorrhoids, resolved  # Non-thrombosed external hemorrhoids and non-thrombosed internal  hemorrhoids found on perianal  Exam seen on colonoscopy (9/10/24)  BRBPR on 10/7, 10/8 with none in stool. No melena seen. States some pressure before BMs happen, but no overt pain. Mild bleeding seen upon 2nd PE on 10/8 slightly inside rectum. Hgb stable with mild downtrend. Per chart review, hemorrhoids seen as above on imaging ~1 month ago. No need for scope per discussion with GI on 10/8.   - Trend Hgb on CBC daily  - rectal nitroglycerin 0.4% BID for 4 weeks  - Defer adding PO fiber given needing to have BMs due to cirrhosis   - Sitz baths 2-3 times daily  - Fiber/2 gm Na diet      # Epistaxis, resolved and intermittent   Noted on 10/7 exam. Has had on/off for several days. Resolved with conservative treatment (pressure).   - If ongoing, please consult ENT given coags status, may need packing.   - Monitor coags and Hgb daily   - PRN Ocean spray, Afrin   - Monitor and notify provider if ongoing     # Normocytic anemia  11.2-->11-->11.8  on 10/8. Suspect from bleeding sources as above in setting of coagulopathy.   - monitor on CBC  - Transfuse for Hgb less than 7    # Left sided chest pain, intermittent   ED visit with same complaints on 7/26/24. ACS and PE workup normal. Started on pepcid and simethicone. Has c/o L sided chest pain for the last 2 mornings. Pain is reproducible with palpation. Noted again on 10/8, EKG with no acute ST/T wave abnormalities or concerning T wave changes. Unable to get trop due to elevated bilirubin. Given pain is reproducible, likely MSK related, could consider from GERD as above, ID process (but less likley given negative work-up thus far).   - ID work-up as above  - GERD treatment as above  - Scheduled lidocaine patches  - PRN acetaminophen     # Moderate malnutrition, in the context of chronic illness/disease   # Vomiting, resolved   # Diarrhea, in setting of lactulose  Nothing tastes good, and had one non bloody emesis several days ago. States appetite better when on  higher  dose prednisolone (currently on 1-mg).  - RD consult  - Continue lactulose TID, titrate to achieve 3-5 bowel movements daily  - Discuss renally appropriate MVI in AM  - PRN zofran  - Start calorie counts for 3 days     # Fatigue, improving   # Insomnia, improving   Fell asleep at desk while sitting and working on computer. See work-up as above for cirrhosis. No s/s of hepatic encelopathy. Currently normal mentation  - Cirrhosis treatment as above     # Gallbladder polyp  - Continue to monitor 4 mm polyp seen on US     # Depressed mood/Grief  Both the patient and wife started tearing up talking about poor prognosis and not knowing what to expect including what they can do while 'waiting 90 days.'  - Spiritual Health consult per patient request    # Hepatitis B status  Received hepatis B 1st dose on 9/10/24. Wife inquiring about second dose that should be scheduled for tomorrow. Per discussion with pharmacy, ok to push back several days and give prior to discharge.  - 2nd Hepatitis B vaccine prior to discharge per discussion with Pharmacy on 10/6.       Diet: 2 Gram Sodium Diet  Fluid restriction 1000 ML FLUID    DVT Prophylaxis: Anti-embolisim stockings (TEDs) and Ambulate every shift  Gomes Catheter: Not present  Lines: None     Cardiac Monitoring: None  Code Status: Full Code            Diet: Snacks/Supplements Adult: Ensure Max Protein (bariatric); Between Meals  Combination Diet High Fiber Diet; 2 gm NA Diet  Fluid restriction 1500 ML FLUID    DVT Prophylaxis: VTE Prophylaxis contraindicated due to ongoing bleeding   Gomes Catheter: Not present  Lines: None     Cardiac Monitoring: None  Code Status: Full Code      Clinically Significant Risk Factors         # Hyponatremia: Lowest Na = 132 mmol/L in last 2 days, will monitor as appropriate   # Hypocalcemia: Lowest Ca = 7.9 mg/dL in last 2 days, will monitor and replace as appropriate     # Hypoalbuminemia: Lowest albumin = 2.5 g/dL at 10/6/2024  6:09 AM, will  "monitor as appropriate    # Coagulation Defect: INR = 3.00 (Ref range: 0.85 - 1.15) and/or PTT = 38 Seconds (Ref range: 22 - 38 Seconds), will monitor for bleeding  # Thrombocytopenia: Lowest platelets = 87 in last 2 days, will monitor for bleeding             # Severe Obesity: Estimated body mass index is 41.22 kg/m  as calculated from the following:    Height as of this encounter: 1.676 m (5' 6\").    Weight as of this encounter: 115.8 kg (255 lb 6.4 oz).   # Moderate Malnutrition: based on nutrition assessment           Disposition Plan     Medically Ready for Discharge: Anticipated in 2-4 Days    The patient's care was discussed with the Attending Physician, Dr. Mcfarlane, Bedside Nurse, Patient, Patient's Family, and Nephrology and ID Consultant(s).    DEE DUFF MD  Hospitalist Service, [address]  Securely message with Vocera (more info)  Text page via [employer] Paging/Directory   See signed in provider for up to date coverage information    This chart documentation was completed with Dragon voice-recognition software. Even though reviewed, this chart may still contain some grammatical, spelling, and word errors. Please contact the author for any questions or clarifications.     ______________________________________________________________________    Interval History   Nursing/SW/consult/interdisciplinary healthcare worker's notes reviewed.     I reviewed all medications, new labs and imaging results over the last 24 hours. Please see discussion of these results in the A/P.     Patient remains afebrile since yesterday. Fever curve improved. Today patient endorses chest pain and sob overnight when sleeping. He also endorses pain over the upper molar, maxillary bone lateral aspect. No CP, palpitation, nausea, emesis, diarrhea, dysuria, flank pain.    ROS: 12 point ROS negative other than the symptoms noted here or above in the assessment and plan. "     Physical Exam   Vital Signs: Temp: 98.9  F (37.2  C) Temp src: Oral BP: 134/67 Pulse: 101   Resp: 18 SpO2: 97 % O2 Device: None (Room air)    Weight: 255 lbs 6.4 oz    Constitutional: awake, alert, cooperative, no apparent distress, and appears stated age  Eyes: Yellow sclera B/L. PERRLA.   ENT: Atrumatic. No swelling noted of uvula or tonsils. No exudate noted. No dried blood seen in nares or mouth.  Hematologic / Lymphatic: no cervical lymphadenopathy and no supraclavicular lymphadenopathy  Respiratory: No increased work of breathing, good air exchange, clear to auscultation bilaterally, no crackles or wheezing  Cardiovascular: Normal apical impulse, regular rate and rhythm, normal S1 and S2, no S3 or S4, and no murmur noted  GI: NBS no rebound.No ttp. . No scars, normal bowel sounds, soft, non-distended, no masses palpated.  Skin: Jaundiced. No other lesions or rashes noted on exposed skin.   Musculoskeletal: There is no redness, warmth, or swelling of the joints.  Full range of motion noted.  Motor strength is 5 out of 5 all extremities bilaterally.  Tone is normal. +3 pitting edema B/L LE.   Neurologic: Awake, alert, oriented to name, place and time.  Cranial nerves II-XII are grossly intact.  Motor is 5 out of 5 bilaterally. No asterix noted   Neuropsychiatric:   General: normal, calm, and normal eye contact  Level of consciousness: alert / normal  Affect: normal  Orientation: oriented to self, place, time and situation  Memory and insight: normal, memory for past and recent events intact, and thought process normal    Medical Decision Making       60 MINUTES SPENT BY ME on the date of service doing chart review, history, exam, documentation & further activities per the note.      Data     I have personally reviewed the following data over the past 24 hrs:    12.1 (H)  \   10.6 (L)   / 99 (L)     133 (L) 105 14.9 /  87   4.2 17 (L) 0.70 \     ALT: 39 AST: 86 (H) AP: 140 TBILI: 25.3 (HH)   ALB: 3.2 (L) TOT  PROTEIN: N/A LIPASE: N/A     INR:  3.00 (H) PTT:  N/A   D-dimer:  N/A Fibrinogen:  N/A       Imaging results reviewed over the past 24 hrs:   No results found for this or any previous visit (from the past 24 hour(s)).

## 2024-10-14 NOTE — CONSULTS
SPIRITUAL HEALTH SERVICES  SPIRITUAL ASSESSMENT Consult Note  [facility]     REFERRAL SOURCE: Staff Consult    I visited nahomy William in his room.      He said there are lots of things the doctors are working on right now.      He did not have any emotional or spiritual needs to address today.  He understands how to request spiritual health support if needed in the future.    PLAN: [facility] is available upon request; please place a consult.    PAN Jimenez  Chaplain Resident

## 2024-10-14 NOTE — PROGRESS NOTES
CLINICAL NUTRITION SERVICES - REASSESSMENT NOTE     Nutrition Prescription    RECOMMENDATIONS FOR MDs/PROVIDERS TO ORDER:  Diet per team.    Malnutrition Status:    Moderate malnutrition in the context of chronic illness    Recommendations already ordered by Registered Dietitian (RD):  Ensure max TID per patient's request     Future/Additional Recommendations:  PO adequacy     EVALUATION OF THE PROGRESS TOWARD GOALS   Diet: 2 g Sodium + high fiber + FR 1500 ml daily + ensure max BID    Intake: Visited with patient today. Patient reports a fair appetite. Tries to eat 3 meals per day. Intake varies due to Na+ dietary restrictions getting smaller portions at times. Eating 100% of meals ordered. Per patient, he ate a bowl of oatmeal and fresh fruit for bkf. Had a turkey sandwich with 4 oz soda and cup of fresh fruit for lunch. Family also brings in food.     Per patient, he is utilizing ensure max protein supplements daily and would like to get up to 3 ensure max per day. Per patient, he was utilizing ensure max at home.     Calorie count:  10/9: insufficent data, only 1 meal intake recorded. Patient ordered 3 meals  1/8: Insufficient data, only 1 meal intake recorded. Patient ordered 3 meals  10/7: Meal intake not recorded. Patient ordered 3 meals   Has been utilizing 2 ensure max per day, data intake not recorded.         NEW FINDINGS   - Admitted on 10/3/2024.   - History of Alcohol cirrhosis and admitted 9/20-22 for worsening anasarca, hyperbilirubinemia, lower extremity edema,Hyponatremia 2/2 etoh cirrhosis and ANNA, prerenal with possible HRS physiology (cystatin c 1.8, GFR 39%), improving.        Wt trend:  Admit wt: 114.3 kg (252 lb) on 10/3  115.8 kg (255 lb 6.4 oz)   Has large ascites     Labs noted:  Total bili: 26 (H)  WBC: 11.3 (H)    GI/stool:   Having daily BM 5-17 per day?      MALNUTRITION  % Intake: Decreased intake does not meet criteria  % Weight Loss: > 5% in 1 month (severe malnutrition)    Subcutaneous Fat Loss: None observed  Muscle Loss: Temporal:  mild and Upper arm (bicep, tricep):  mild   Fluid Accumulation/Edema: Moderate  Malnutrition Diagnosis: Moderate malnutrition in the context of chronic illness    Previous Goals   Patient to consume % of nutritionally adequate meal trays TID, or the equivalent with supplements/snacks.   Evaluation: Met intermittently     Previous Nutrition Diagnosis  Inadequate protein intake related to early satiety, reduced PO intakes as evidenced by increased protein needs, diet history, significant wt loss, tissue wasting     Evaluation: No change    CURRENT NUTRITION DIAGNOSIS  Inadequate protein intake related to early satiety, reduced PO intakes as evidenced by increased protein needs, diet history, significant wt loss, tissue wasting       INTERVENTIONS  Implementation  Medical food supplement therapy    Goals  Patient to consume % of nutritionally adequate meal trays TID, or the equivalent with supplements/snacks.    Monitoring/Evaluation  Progress toward goals will be monitored and evaluated per protocol.    Ginna Liriano RD/LD  Unit [address] ([id number]) and ([id number]),  Monday-Friday: Vocera -> ( clinical Dietitian),   Weekend/Holiday: Vocera -> (Weekend Clinical Dietitian)

## 2024-10-14 NOTE — PLAN OF CARE
Goal Outcome Evaluation:    Plan of Care Reviewed With: patient    Overall Patient Progress: no change  Overall Patient Progress: no change    Outcome Evaluation: Assumed cares from [id number]. Pt denies pain. Continue IV abx Flagyl. PRN Robitussin administered for cough, this was effective. Bilateral lower extremity lymph wraps in place - UTV edema. Numbness noted in bilateral lower extremities - this is baseline per pt. R and L PIV's are saline locked. Temperature slightly elevated overnight. PRN Tylenol administered for slight fever and headache, which was effective. Continue with plan of care.    Carolyn Sarabia RN

## 2024-10-14 NOTE — PROGRESS NOTES
[facility] ID SERVICE: Progress Note  Patient:  Stewart Hunt, Date of birth 1985, Medical record number [id number]  Date of Admission: 10/3/2024  Date of Visit:  10/14/2024  Requesting Provider: Denys Carreon         Assessment and Recommendations:     ID Problem List:  MSSA bacteremia  Positive blood culture 10/8 (1/2 bottles), neg on 10/8, 10/10, 10/12   TTE 10/9/24 - No significant valvular abnormalities were noted. No vegetation or mass identified. EF 60-65%   TTE 9/22/24 - Global and regional left ventricular function is normal with an EF of 55-60%.Right ventricular function, chamber size, wall motion, and thickness are normal. Pulmonary artery systolic pressure is normal. No significant valvular abnormalities present. No pericardial effusion is present.  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  Hx of heavy alcohol use, in abstinence since 9/8/2024  Hx of gastric bypass with sleeve (~12 years ago)  Anemia (10.39 on 10/3/24, --> 10.7 on 10/13)    Thrombocytopenia   Elevated bilirubin and AST   Right base atelectasis   Splenomegaly   Dry cough x 6 weeks   - SARScov 2 / Influenza A/B/ RSV neg 9/20/24, 10/3, 10/7/24 and Respi panel neg 10/7 and 10/12/24    GERD   Nausea   Diarrhea with abdominal distention   Bilateral leg edema and pain - No DVT in bilateral lower extremities (doppler US on 10/12/24)   Mild edematous wall thickening of the gallbladder (CT - 10/12/24, not tedner on exam)     Discussion:  38 yo M with MSSA bacteremia, on blood cultures obtained after a febrile episode overnight 10/7. Was also febrile on admission. Then, reportedly concern of respiratory illness per discussion with primary team with negative workup. Unclear if has been bacteremic since then. Based on hx, no hardware in place, except staples from gastric sleeve surgery. No evidence of secondary infection per  exam. Baseline lower back pain without worsening, more in lumbar paraspinal rather spinal. TTE negative for valvular disease. If additional blood cultures become positive then would get SALENA, until then hold off. Likely source is skin, although no obvious skin abnormality. Anticipate 4 weeks course for MSSA bacteriemia.     The patient fevered while on appropriate antibiotics for MSSA. Some concern for clot or possible PE, consider CT PE.     Recommendations:  - Switch to PO metronidazole 500 mg q8hr for anaerobic coverage   - Agree with CT PE    - incentive spirometer for atelectasis   - Monitor LFTs while on CTX  - another option is continue Cefazolin and add cipro 500 mg po daily for SBP prophylaxis / metronidazole     Plan discussed with patient and attending physician Dr. Kolb. ID will continue to follow.    Renee Murdock, MS4  10/14/24  Available on [employer]      ID Staff Assessment and Recommendations:  This patient was seen, examined and evaluated  by me. I personally reviewed the medical records, vital signs, medications, labs and imaging studies. I agree with the documentation as above by the medical student and I have edited as needed.      Key findings:  38 yo M with recently diagnosed cirrhosis 2/2 alcohol use, in abstinence since 9/8/2024, who is admitted for management of progressively worsening b/l LE edema. However, also had febrile episode on 10/4 when also leukocytosis, no blood cultures then. Rather thought respiratory illness, negative work up. No antibiotics were received. Another febrile episode on 10/8, blood cultures grew MSSA (1/2 bottles), repeat blood cultures on evening of 10/8 onwards negative. TTE negative. Based on clinical presentation, chronology of bacteremia is unclear, whether could have bacteremic on previous febrile episode on 10/4. Recommend 4 weeks of iv cefazolin.      However, has been spiking fevers, without clear source of infection, except previously found MSSA bacteremia.  Antibiotic regimen was changed from iv cefazolin to iv ceftriaxone, metronidazole. Continue this regimen, consider changing to po metronidazole, rather iv if no barrier.      ID team will continue to follow.      Total time spent during this encounter (Chart review, history, physical exam, documentation, MDM, coordination of care): 40 minutes     Shira Kolb MD  Infectious Diseases Staff Physician  Walker [facility]ustin   Date of service (when I saw the patient): 10/14/2024       History of Present Illness:     Stewart Hunt is a 40 yo F with PMHx of recently diagnosed alcoholic cirrhosis, who is admitted due to progressively worsening of lower extremities edema. Febrile episode on admission. Though reportedly c/o respiratory complaints, sore throat with negative work up. Another febrile episode overnight 10/7. Blood culture is resulted positive, identified MSSA on verigene. Started on empiric vancomycin, pip-tazo. No hx of fever, chills, sweating prior to admission. No hx of hardware. Does have gastric sleeve about 12 years ago.     SHx:  Heavy alcohol use since age 17, in abstinence since 9/8. No smoking, marijuana or illicit drug use. , lives with wife. Does have a pet - [employer]iva[employer] bulldog, healthy. Previously used to breed dogs, but none recently.          Review of Systems:     Complete 12 point review of systems negative except as noted in HPI.          Antimicrobial history:     Current:  Ceftriaxone 10/8, 10/13 - Present    Prior:  Cefazolin 10/9 - 10/13  Vancomycin, pipt-azo 10/8 - 10/9       Past Medical History:     Past Medical History:   Diagnosis Date    Acute alcoholic hepatitis (H) 09/2024    Alcohol use disorder, severe, in early remission (H)     Alcoholic cirrhosis (H)     Morbid obesity (H)          Allergies:    No Known Allergies       Family History:   Reviewed and noncontributory.   Family History   Problem Relation Age of Onset    Colon Cancer Mother     Diabetes Father     Pancreatic  Cancer Sister     Diabetes Maternal Grandmother     Colon Cancer Cousin     Liver Disease No family hx of           Social History:     Social History     Socioeconomic History    Marital status: Single     Spouse name: Not on file    Number of children: Not on file    Years of education: Not on file    Highest education level: Not on file   Occupational History    Not on file   Tobacco Use    Smoking status: Not on file    Smokeless tobacco: Not on file   Substance and Sexual Activity    Alcohol use: Not Currently     Comment: 2 pints of vodka per day for 2 years. Last drink early sept 2024    Drug use: Never    Sexual activity: Not on file   Other Topics Concern    Not on file   Social History Narrative    Not on file     Social Determinants of Health     Financial Resource Strain: Low Risk  (10/3/2024)    Financial Resource Strain     Within the past 12 months, have you or your family members you live with been unable to get utilities (heat, electricity) when it was really needed?: No   Food Insecurity: Low Risk  (10/3/2024)    Food Insecurity     Within the past 12 months, did you worry that your food would run out before you got money to buy more?: No     Within the past 12 months, did the food you bought just not last and you didn t have money to get more?: No   Transportation Needs: Low Risk  (10/3/2024)    Transportation Needs     Within the past 12 months, has lack of transportation kept you from medical appointments, getting your medicines, non-medical meetings or appointments, work, or from getting things that you need?: No   Physical Activity: Not on file   Stress: Not on file   Social Connections: Not on file   Interpersonal Safety: Low Risk  (10/5/2024)    Interpersonal Safety     Do you feel physically and emotionally safe where you currently live?: Yes     Within the past 12 months, have you been hit, slapped, kicked or otherwise physically hurt by someone?: No     Within the past 12 months, have you  "been humiliated or emotionally abused in other ways by your partner or ex-partner?: No   Recent Concern: Interpersonal Safety - High Risk (9/21/2024)    Interpersonal Safety     Do you feel physically and emotionally safe where you currently live?: No     Within the past 12 months, have you been hit, slapped, kicked or otherwise physically hurt by someone?: No     Within the past 12 months, have you been humiliated or emotionally abused in other ways by your partner or ex-partner?: No   Housing Stability: Low Risk  (10/3/2024)    Housing Stability     Do you have housing? : Yes     Are you worried about losing your housing?: No            Physical Examination:     Vital signs:  /58 (BP Location: Right arm)   Pulse 91   Temp 99.3  F (37.4  C) (Oral)   Resp 18   Ht 1.676 m (5' 6\")   Wt 115.8 kg (255 lb 6.4 oz)   SpO2 100%   BMI 41.22 kg/m    GENERAL: alert, oriented and no distress, jaundice+   EYES: Eyes grossly normal to inspection, PERRL and conjunctivae, normal, icteric sclerae  NECK: no adenopathy, no asymmetry, masses, or scars  RESP: lungs clear to auscultation - decreased breath sounds in right base   CV: regular rate and rhythm, normal S1 S2 ASHLEIGH 2/6   ABDOMEN: soft, nontender, active bowel sounds normal  MS:  moderate edema bilateral legs. legs are not red/ tense bilateral pedal edema and left foot more swollen .   SKIN: +jaundice, no suspicious lesions or rashes  NEURO: Normal strength and tone, mentation intact and speech normal  PSYCH: mentation appears normal, affect normal    Lines and devices:    PIV CDI, non-tender, no surrounding erythema.    Labs, Microbiology and Imaging studies reviewed.            Laboratory Data:       Microbiology:    Culture   Date Value Ref Range Status   10/12/2024 No growth after 2 days  Preliminary   10/12/2024 No growth after 2 days  Preliminary   10/10/2024 No growth after 3 days  Preliminary   10/08/2024 No Growth  Final   10/08/2024 No Growth  Final "   10/08/2024 Positive on the 1st day of incubation (A)  Final   10/08/2024 Staphylococcus aureus (AA)  Final     Comment:     1 of 2 bottles   10/03/2024 No Growth  Final   09/20/2024 No Growth  Final     Urine Studies:    Recent Labs   Lab Test 10/12/24  0524 10/08/24  2356 10/06/24  0911 10/03/24  0103 09/20/24 2058   LEUKEST Negative Negative Negative Negative Negative   WBCU 2 4 3 1 1     Hematology Studies:    Recent Labs   Lab Test 10/14/24  0456 10/13/24  0539 10/12/24  0347 10/11/24  0610 10/10/24  0610 10/09/24  0553   WBC 12.1* 12.0* 12.3*  12.3* 13.0* 10.7 10.6   HGB 10.6* 10.7* 11.0* 11.7* 11.4* 11.8*   * 104* 105* 105* 104* 104*   PLT 99* 87* 86* 83* 75* 74*      Metabolic Studies:   Recent Labs   Lab Test 10/14/24  0456 10/13/24  0539 10/12/24  0347 10/11/24  0610 10/10/24  0610   * 132* 130* 132* 132*   POTASSIUM 4.2 4.0 4.2 4.5 4.2   CHLORIDE 105 103 101 103 103   CO2 17* 18* 18* 19* 19*   BUN 14.9 14.6 17.1 15.7 13.3   CR 0.70 0.72 0.77 0.66* 0.69   GFRESTIMATED >90 >90 >90 >90 >90     Hepatic Studies:   Recent Labs   Lab Test 10/14/24  0456 10/13/24  0539 10/12/24  0347 10/11/24  0610 10/10/24  0610 10/09/24  0553   BILITOTAL 25.3* 26.9* 28.3* 30.0* 28.7* 30.4*   ALKPHOS 140 128 150 138 125 128   ALBUMIN 3.2* 3.3* 3.5 3.6 3.5 3.8   AST 86* 89* 92* 91* 98* 118*   ALT 39 40 47 57 65 77*              Last check of C difficile  C Difficile Toxin B by PCR   Date Value Ref Range Status   09/21/2024 Negative Negative Final     Comment:     A negative result does not exclude actual disease due to C. difficile and may be due to improper collection, handling and storage of the specimen or the number of organisms in the specimen is below the detection limit of the assay.            Imaging:     Recent Results (from the past 48 hour(s))   US Lower Extremity Venous Duplex Bilateral    Narrative    ULTRASOUND LOWER EXTREMITY VENOUS DUPLEX BILATERAL 10/12/2024 4:36 PM    CLINICAL HISTORY: lower  "extremity swelling      COMPARISONS: None available.    REFERRING PROVIDER: DEE DUFF    TECHNIQUE: Grayscale, color Doppler, Doppler waveform ultrasound  evaluation was performed through the bilateral common femoral,  femoral, and popliteal veins. Bilateral posterior tibial and peroneal  veins were evaluated with grayscale imaging and compression.    FINDINGS: Right common femoral, femoral, and popliteal veins are fully  compressible with phasic Doppler waveforms.    Right posterior tibial veins are fully compressible.    Right peroneal veins are fully compressible.    Left common femoral, femoral, and popliteal veins are fully  compressible with phasic Doppler waveforms.    Left posterior tibial veins are fully compressible.    Left peroneal veins are fully compressible.      Impression    IMPRESSION: No deep venous thrombosis demonstrated in either leg.    Reference: \"Duplex Ultrasound in the Diagnosis of Lower-Extremity Deep  Venous Thrombosis\"- Natalie Howe MD, S; Florian Tapia MD  (Circulation. 2014;129:917-921. http://circ.ahajournals.org)    SHELTON KUMAR MD         SYSTEM ID:  [id number]   CT Abdomen Pelvis w Contrast    Narrative    EXAMINATION: CT ABDOMEN PELVIS W CONTRAST, 10/12/2024 7:24 PM    TECHNIQUE:  Helical CT images from the lung bases through the  symphysis pubis were obtained with IV contrast. Contrast dose: 135ml  isovue 370    COMPARISON: 9/13/2024 MRI, 8/27/2024 CT    HISTORY: RLQ abd pain concerns for ascites and sbp    FINDINGS:    Abdomen and pelvis: Nodular hepatic capsular contour. Decreased size  of the liver, now measuring 17.8 cm in craniocaudal dimension at mid  clavicular line, previously 20.3 cm on 8/27/2024. Resolution of the  previously seen nodular hypoattenuating foci predominantly throughout  the left hepatic lobe. No appreciable new focal suspicious hepatic  lesion. Mild edematous wall thickening of the gallbladder. No intra or  extrahepatic biliary " dilation. Normal pancreas. Enlarged spleen,  measuring 15.0 cm in craniocaudal dimension. Normal adrenal glands and  renal cortices. No hydronephrosis, hydroureter, or urinary tract  stone. Normal bladder, prostate, and seminal vesicles. Small volume  ascites. No free air. No bowel obstruction or inflammation. Normal  appendix. Sleeve gastrectomy changes. The major abdominal vasculature  is patent. Recanalized umbilical vein. Paraesophageal varices. Left  splenorenal shunt. No abdominal or pelvic lymphadenopathy.    Lung bases/lower chest:  Asymmetric elevation of the right  hemidiaphragm with associated right basilar atelectasis. Mosaic  attenuation of the visualized lungs, likely related to hypoinflation.    Bones and soft tissues: No acute or aggressive osseous abnormality.      Impression    IMPRESSION:   1. No acute intra-abdominal pathology. Normal appendix.  2. Cirrhotic hepatic morphology with evidence of portal hypertension,  including small volume ascites, splenomegaly, and upper abdominal  portosystemic collaterals.  3. Decreased hepatomegaly and nodular hepatic steatosis since  8/27/2024.     RUSTY ASH DO         SYSTEM ID:  [id number]

## 2024-10-14 NOTE — PLAN OF CARE
Goal Outcome Evaluation:   Plan of care reviewed with: patient    Overall patient progress: no change    Outcome evaluation: Assumed cares [id number]. A&Ox4. VSS on RA. L + R PIVs SL. Up independently. Robitussin given x1 for cough. Chest and facial CT ordered. Call light within reach. Able to make needs known. Continue with POC

## 2024-10-15 ENCOUNTER — APPOINTMENT (OUTPATIENT)
Dept: OCCUPATIONAL THERAPY | Facility: CLINIC | Age: 39
DRG: 432 | End: 2024-10-15
Payer: COMMERCIAL

## 2024-10-15 ENCOUNTER — HOME INFUSION (PRE-WILLOW HOME INFUSION) (OUTPATIENT)
Dept: PHARMACY | Facility: CLINIC | Age: 39
End: 2024-10-15
Payer: COMMERCIAL

## 2024-10-15 LAB
ALBUMIN SERPL BCG-MCNC: 3.2 G/DL (ref 3.5–5.2)
ALP SERPL-CCNC: 138 U/L (ref 40–150)
ALT SERPL W P-5'-P-CCNC: 43 U/L (ref 0–70)
ANION GAP SERPL CALCULATED.3IONS-SCNC: 9 MMOL/L (ref 7–15)
AST SERPL W P-5'-P-CCNC: 90 U/L (ref 0–45)
BACTERIA BLD CULT: NO GROWTH
BILIRUB SERPL-MCNC: 26 MG/DL
BUN SERPL-MCNC: 13.4 MG/DL (ref 6–20)
CALCIUM SERPL-MCNC: 8 MG/DL (ref 8.8–10.4)
CHLORIDE SERPL-SCNC: 105 MMOL/L (ref 98–107)
CREAT SERPL-MCNC: 0.69 MG/DL (ref 0.67–1.17)
EGFRCR SERPLBLD CKD-EPI 2021: >90 ML/MIN/1.73M2
ERYTHROCYTE [DISTWIDTH] IN BLOOD BY AUTOMATED COUNT: 18.6 % (ref 10–15)
GLUCOSE SERPL-MCNC: 89 MG/DL (ref 70–99)
HCO3 SERPL-SCNC: 18 MMOL/L (ref 22–29)
HCT VFR BLD AUTO: 32.9 % (ref 40–53)
HGB BLD-MCNC: 11.3 G/DL (ref 13.3–17.7)
INR PPP: 2.97 (ref 0.85–1.15)
MCH RBC QN AUTO: 35.8 PG (ref 26.5–33)
MCHC RBC AUTO-ENTMCNC: 34.3 G/DL (ref 31.5–36.5)
MCV RBC AUTO: 104 FL (ref 78–100)
PLATELET # BLD AUTO: 106 10E3/UL (ref 150–450)
POTASSIUM SERPL-SCNC: 4.4 MMOL/L (ref 3.4–5.3)
PROT SERPL-MCNC: ABNORMAL G/DL
RBC # BLD AUTO: 3.16 10E6/UL (ref 4.4–5.9)
SODIUM SERPL-SCNC: 132 MMOL/L (ref 135–145)
WBC # BLD AUTO: 11.3 10E3/UL (ref 4–11)

## 2024-10-15 PROCEDURE — 250N000013 HC RX MED GY IP 250 OP 250 PS 637

## 2024-10-15 PROCEDURE — 250N000013 HC RX MED GY IP 250 OP 250 PS 637: Performed by: INTERNAL MEDICINE

## 2024-10-15 PROCEDURE — 99232 SBSQ HOSP IP/OBS MODERATE 35: CPT | Performed by: STUDENT IN AN ORGANIZED HEALTH CARE EDUCATION/TRAINING PROGRAM

## 2024-10-15 PROCEDURE — 97535 SELF CARE MNGMENT TRAINING: CPT | Mod: GO

## 2024-10-15 PROCEDURE — 80048 BASIC METABOLIC PNL TOTAL CA: CPT

## 2024-10-15 PROCEDURE — 85014 HEMATOCRIT: CPT

## 2024-10-15 PROCEDURE — 250N000011 HC RX IP 250 OP 636: Performed by: STUDENT IN AN ORGANIZED HEALTH CARE EDUCATION/TRAINING PROGRAM

## 2024-10-15 PROCEDURE — 250N000013 HC RX MED GY IP 250 OP 250 PS 637: Performed by: STUDENT IN AN ORGANIZED HEALTH CARE EDUCATION/TRAINING PROGRAM

## 2024-10-15 PROCEDURE — 85610 PROTHROMBIN TIME: CPT

## 2024-10-15 PROCEDURE — 36415 COLL VENOUS BLD VENIPUNCTURE: CPT

## 2024-10-15 PROCEDURE — 120N000002 HC R&B MED SURG/OB UMMC

## 2024-10-15 PROCEDURE — 99233 SBSQ HOSP IP/OBS HIGH 50: CPT | Performed by: STUDENT IN AN ORGANIZED HEALTH CARE EDUCATION/TRAINING PROGRAM

## 2024-10-15 RX ORDER — CEFAZOLIN SODIUM 2 G/100ML
2 INJECTION, SOLUTION INTRAVENOUS EVERY 8 HOURS
Status: DISCONTINUED | OUTPATIENT
Start: 2024-10-15 | End: 2024-10-18 | Stop reason: HOSPADM

## 2024-10-15 RX ORDER — CIPROFLOXACIN 500 MG/1
500 TABLET, FILM COATED ORAL
Status: DISCONTINUED | OUTPATIENT
Start: 2024-10-16 | End: 2024-10-15

## 2024-10-15 RX ORDER — CIPROFLOXACIN 500 MG/1
500 TABLET, FILM COATED ORAL EVERY 12 HOURS SCHEDULED
Status: DISCONTINUED | OUTPATIENT
Start: 2024-10-15 | End: 2024-10-16

## 2024-10-15 RX ADMIN — FUROSEMIDE 20 MG: 20 TABLET ORAL at 07:55

## 2024-10-15 RX ADMIN — CEFAZOLIN SODIUM 2 G: 2 INJECTION, SOLUTION INTRAVENOUS at 23:00

## 2024-10-15 RX ADMIN — METRONIDAZOLE 500 MG: 500 TABLET ORAL at 07:55

## 2024-10-15 RX ADMIN — PANTOPRAZOLE SODIUM 40 MG: 40 TABLET, DELAYED RELEASE ORAL at 07:55

## 2024-10-15 RX ADMIN — FOLIC ACID 1 MG: 1 TABLET ORAL at 07:55

## 2024-10-15 RX ADMIN — GUAIFENESIN 200 MG: 200 SOLUTION ORAL at 17:16

## 2024-10-15 RX ADMIN — METRONIDAZOLE 500 MG: 500 TABLET ORAL at 16:12

## 2024-10-15 RX ADMIN — THIAMINE HCL TAB 100 MG 100 MG: 100 TAB at 07:55

## 2024-10-15 RX ADMIN — SIMETHICONE 80 MG: 80 TABLET, CHEWABLE ORAL at 20:03

## 2024-10-15 RX ADMIN — SIMETHICONE 80 MG: 80 TABLET, CHEWABLE ORAL at 07:55

## 2024-10-15 RX ADMIN — SPIRONOLACTONE 50 MG: 50 TABLET ORAL at 07:55

## 2024-10-15 RX ADMIN — METRONIDAZOLE 500 MG: 500 TABLET ORAL at 20:03

## 2024-10-15 RX ADMIN — CIPROFLOXACIN HYDROCHLORIDE 500 MG: 500 TABLET, FILM COATED ORAL at 20:03

## 2024-10-15 RX ADMIN — LIDOCAINE 1 PATCH: 4 PATCH TOPICAL at 20:04

## 2024-10-15 RX ADMIN — Medication 1 TABLET: at 07:55

## 2024-10-15 RX ADMIN — ACETAMINOPHEN 325 MG: 325 TABLET, FILM COATED ORAL at 05:51

## 2024-10-15 RX ADMIN — CEFAZOLIN SODIUM 2 G: 2 INJECTION, SOLUTION INTRAVENOUS at 16:12

## 2024-10-15 ASSESSMENT — ACTIVITIES OF DAILY LIVING (ADL)
ADLS_ACUITY_SCORE: 22
DEPENDENT_IADLS:: INDEPENDENT
ADLS_ACUITY_SCORE: 22

## 2024-10-15 NOTE — PROGRESS NOTES
Therapy: IV ABX (Ceftriaxone)  Insurance: [employer]     This patient has coverage for IV ABX (Ceftriaxone) through their [employer] plan, patient has a deductible of $2,000.00 met $2,000.00,  once the deductible is met patient is covered at 75%. The patient has an out of pocket of $7,000.00 met $7,000.00, once the out of pocket is met patient is covered at 100%.      In reference to hospital admission to [place] on 10/03/24 and referral from [place].    Please contact Intake with any questions, [phone] or In Basket pool,  [facility] ([phone]).

## 2024-10-15 NOTE — PLAN OF CARE
Goal Outcome Evaluation:      Plan of Care Reviewed With: patient    Overall Patient Progress: no changeOverall Patient Progress: no change    Outcome Evaluation: [id number]. A&OX4. Vss on Ra, afebrile. C/o headache, tylenol given somewhat effective. Robitussin given for cough. Small amt of blood coming from R nostril at start of shift. Two loose BMs and voiding good op. PO flagyl given. Lymph wraps on. Chest and head CT completed this shift. 99.0 temp this AM, tylenol given.

## 2024-10-15 NOTE — PROGRESS NOTES
[facility] ID SERVICE: Progress Note  Patient:  Stewart Hunt, Date of birth 1985, Medical record number [id number]  Date of Admission: 10/3/2024  Date of Visit:  10/15/2024  Requesting Provider: Denys Carreon         Assessment and Recommendations:     ID Problem List:  MSSA bacteremia  Positive blood culture 10/8 (1/2 bottles), neg on 10/8, 10/10, 10/12   TTE 10/9/24 - No significant valvular abnormalities were noted. No vegetation or mass identified. EF 60-65%   TTE 9/22/24 - Global and regional left ventricular function is normal with an EF of 55-60%.Right ventricular function, chamber size, wall motion, and thickness are normal. Pulmonary artery systolic pressure is normal. No significant valvular abnormalities present. No pericardial effusion is present.  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  Hx of heavy alcohol use, in abstinence since 9/8/2024  Hx of gastric bypass with sleeve (~12 years ago)  Anemia (10.39 on 10/3/24, --> 10.7 on 10/13)    Thrombocytopenia   Elevated bilirubin and AST   Right base atelectasis   Splenomegaly   Dry cough x 6 weeks   - SARScov 2 / Influenza A/B/ RSV neg 9/20/24, 10/3, 10/7/24 and Respi panel neg 10/7 and 10/12/24    GERD   Nausea   Diarrhea with abdominal distention   Bilateral leg edema and pain - No DVT in bilateral lower extremities (doppler US on 10/12/24)   Mild edematous wall thickening of the gallbladder (CT - 10/12/24, not tedner on exam)     Discussion:  40 yo M with MSSA bacteremia, on blood cultures obtained after a febrile episode overnight 10/7. Was also febrile on admission. Then, reportedly concern of respiratory illness per discussion with primary team with negative workup. Unclear if has been bacteremic since then. Based on hx, no hardware in place, except staples from gastric sleeve surgery. No evidence of secondary infection per  exam. Baseline lower back pain without worsening, more in lumbar paraspinal rather spinal. TTE negative for valvular disease. If additional blood cultures become positive then would get SALENA, until then hold off. Likely source is skin, although no obvious skin abnormality. Anticipate 4 weeks course for MSSA bacteriemia.     The patient fevered while on appropriate antibiotics for MSSA. Some concern for clot or possible PE, consider CT PE. Negative CT dental. Unclear source of possible secondary infection; however, fever curve is improving. If patient remains afebrile through overnight 10/15, OK to place PICC from ID perspective. Recommend switching to cefazolin as preferred treatment for MSSA bacteremia. Discontinue ceftriaxone. Recommend continuing SBP prophylaxis with metronidazole and ciprofloxacin for total of 7 day course.     Recommendations:  - Start cefazolin IV, plan for 4 week course from last fever.    - Discontinue ceftriaxone  - Continue PO metronidazole 500 mg q8hr  - Start cipro 500 mg PO daily   - Agree with CT PE, final read pending    - incentive spirometer for atelectasis   - Monitor LFTs while on CTX    Plan discussed with patient and attending physician Dr. Kolb. ID will continue to follow.    Renee Murdock, MS4  10/15/24  Available on [employer]    ID Staff Assessment and Recommendations:  This patient was seen, examined and evaluated  by me. I personally reviewed the medical records, vital signs, medications, labs and imaging studies. I agree with the documentation as above by the medical student and I have edited as needed.      Key findings:  40 yo M with recently diagnosed cirrhosis 2/2 alcohol use, in abstinence since 9/8/2024, who is admitted for management of progressively worsening b/l LE edema. However, also had febrile episode on 10/4 when also leukocytosis, no blood cultures then. Rather thought respiratory illness, negative work up. No antibiotics were received. Another febrile episode on  10/8, blood cultures grew MSSA (1/2 bottles), repeat blood cultures on evening of 10/8 onwards negative. TTE negative. Based on clinical presentation, chronology of bacteremia is unclear, whether could have bacteremic on previous febrile episode on 10/4. Recommend 4 weeks of iv cefazolin.      However, has been spiking fevers, without clear source of infection, except previously found MSSA bacteremia. Antibiotic regimen was changed from iv cefazolin to iv ceftriaxone, metronidazole. Afebrile in past 24 hours. Recommend to stop iv ceftriaxone, and start iv cefazolin (renally dosed) for MSSA bacteremia. Continue po metronidazole, and add po ciprofloxacin 500mg BID as SBP prophylaxis. If remains afebrile, no new microbiology data, then can place PICC tomorrow on 10/16.        ID team will continue to follow. Please reach out if any questions or concerns.      Total time spent during this encounter (Chart review, history, physical exam, documentation, MDM, coordination of care): 40 minutes     Shira Kolb MD  Infectious Diseases Staff Physician  [facility]   Date of service (when I saw the patient): 10/15/2024       History of Present Illness:     Stewart Hunt is a 38 yo F with PMHx of recently diagnosed alcoholic cirrhosis, who is admitted due to progressively worsening of lower extremities edema. Febrile episode on admission. Though reportedly c/o respiratory complaints, sore throat with negative work up. Another febrile episode overnight 10/7. Blood culture is resulted positive, identified MSSA on verigene. Started on empiric vancomycin, pip-tazo. No hx of fever, chills, sweating prior to admission. No hx of hardware. Does have gastric sleeve about 12 years ago.     SHx:  Heavy alcohol use since age 17, in abstinence since 9/8. No smoking, marijuana or illicit drug use. , lives with wife. Does have a pet - Hebrew bulldog, healthy. Previously used to breed dogs, but none recently.          Review of  Systems:     Complete 12 point review of systems negative except as noted in HPI.          Antimicrobial history:     Current:  Ceftriaxone 10/8, 10/13 - Present    Prior:  Cefazolin 10/9 - 10/13  Vancomycin, pipt-azo 10/8 - 10/9       Past Medical History:     Past Medical History:   Diagnosis Date    Acute alcoholic hepatitis (H) 09/2024    Alcohol use disorder, severe, in early remission (H)     Alcoholic cirrhosis (H)     Morbid obesity (H)          Allergies:    No Known Allergies       Family History:   Reviewed and noncontributory.   Family History   Problem Relation Age of Onset    Colon Cancer Mother     Diabetes Father     Pancreatic Cancer Sister     Diabetes Maternal Grandmother     Colon Cancer Cousin     Liver Disease No family hx of           Social History:     Social History     Socioeconomic History    Marital status: Single     Spouse name: Not on file    Number of children: Not on file    Years of education: Not on file    Highest education level: Not on file   Occupational History    Not on file   Tobacco Use    Smoking status: Not on file    Smokeless tobacco: Not on file   Substance and Sexual Activity    Alcohol use: Not Currently     Comment: 2 pints of vodka per day for 2 years. Last drink early sept 2024    Drug use: Never    Sexual activity: Not on file   Other Topics Concern    Not on file   Social History Narrative    Not on file     Social Determinants of Health     Financial Resource Strain: Low Risk  (10/3/2024)    Financial Resource Strain     Within the past 12 months, have you or your family members you live with been unable to get utilities (heat, electricity) when it was really needed?: No   Food Insecurity: Low Risk  (10/3/2024)    Food Insecurity     Within the past 12 months, did you worry that your food would run out before you got money to buy more?: No     Within the past 12 months, did the food you bought just not last and you didn t have money to get more?: No  "  Transportation Needs: Low Risk  (10/3/2024)    Transportation Needs     Within the past 12 months, has lack of transportation kept you from medical appointments, getting your medicines, non-medical meetings or appointments, work, or from getting things that you need?: No   Physical Activity: Not on file   Stress: Not on file   Social Connections: Not on file   Interpersonal Safety: Low Risk  (10/5/2024)    Interpersonal Safety     Do you feel physically and emotionally safe where you currently live?: Yes     Within the past 12 months, have you been hit, slapped, kicked or otherwise physically hurt by someone?: No     Within the past 12 months, have you been humiliated or emotionally abused in other ways by your partner or ex-partner?: No   Recent Concern: Interpersonal Safety - High Risk (9/21/2024)    Interpersonal Safety     Do you feel physically and emotionally safe where you currently live?: No     Within the past 12 months, have you been hit, slapped, kicked or otherwise physically hurt by someone?: No     Within the past 12 months, have you been humiliated or emotionally abused in other ways by your partner or ex-partner?: No   Housing Stability: Low Risk  (10/3/2024)    Housing Stability     Do you have housing? : Yes     Are you worried about losing your housing?: No            Physical Examination:     Vital signs:  /55 (BP Location: Left arm)   Pulse 91   Temp 99  F (37.2  C) (Oral)   Resp 18   Ht 1.676 m (5' 6\")   Wt 115.8 kg (255 lb 6.4 oz)   SpO2 99%   BMI 41.22 kg/m    GENERAL: alert, oriented and no distress, jaundice+, tearful while expressing guilt over current health   EYES: Eyes grossly normal to inspection, PERRL and conjunctivae, normal, icteric sclerae  NECK: no adenopathy, no asymmetry, masses, or scars  RESP: lungs clear to auscultation - decreased breath sounds in right base   CV: regular rate and rhythm, normal S1 S2 ASHLEIGH 2/6   ABDOMEN: soft, nontender, active bowel sounds " normal  MS:  moderate edema bilateral legs. legs are not red/ tense bilateral pedal edema and left foot more swollen .   SKIN: +jaundice, no suspicious lesions or rashes. Bruising to L medial malleolus   NEURO: Normal strength and tone, mentation intact and speech normal  PSYCH: mentation appears normal, affect normal    Lines and devices:    PIV CDI, non-tender, no surrounding erythema.    Labs, Microbiology and Imaging studies reviewed.            Laboratory Data:       Microbiology:    Culture   Date Value Ref Range Status   10/12/2024 No growth after 3 days  Preliminary   10/12/2024 No growth after 3 days  Preliminary   10/10/2024 No growth after 4 days  Preliminary   10/08/2024 No Growth  Final   10/08/2024 No Growth  Final   10/08/2024 Positive on the 1st day of incubation (A)  Final   10/08/2024 Staphylococcus aureus (AA)  Final     Comment:     1 of 2 bottles   10/03/2024 No Growth  Final   09/20/2024 No Growth  Final     Urine Studies:    Recent Labs   Lab Test 10/12/24  0524 10/08/24  2356 10/06/24  0911 10/03/24  0103 09/20/24 2058   LEUKEST Negative Negative Negative Negative Negative   WBCU 2 4 3 1 1     Hematology Studies:    Recent Labs   Lab Test 10/15/24  0543 10/14/24  0456 10/13/24  0539 10/12/24  0347 10/11/24  0610 10/10/24  0610   WBC 11.3* 12.1* 12.0* 12.3*  12.3* 13.0* 10.7   HGB 11.3* 10.6* 10.7* 11.0* 11.7* 11.4*   * 105* 104* 105* 105* 104*   * 99* 87* 86* 83* 75*      Metabolic Studies:   Recent Labs   Lab Test 10/15/24  0543 10/14/24  0456 10/13/24  0539 10/12/24  0347 10/11/24  0610   * 133* 132* 130* 132*   POTASSIUM 4.4 4.2 4.0 4.2 4.5   CHLORIDE 105 105 103 101 103   CO2 18* 17* 18* 18* 19*   BUN 13.4 14.9 14.6 17.1 15.7   CR 0.69 0.70 0.72 0.77 0.66*   GFRESTIMATED >90 >90 >90 >90 >90     Hepatic Studies:   Recent Labs   Lab Test 10/15/24  0543 10/14/24  0456 10/13/24  0539 10/12/24  0347 10/11/24  0610 10/10/24  0610   BILITOTAL 26.0* 25.3* 26.9* 28.3* 30.0*  28.7*   ALKPHOS 138 140 128 150 138 125   ALBUMIN 3.2* 3.2* 3.3* 3.5 3.6 3.5   AST 90* 86* 89* 92* 91* 98*   ALT 43 39 40 47 57 65              Last check of C difficile  C Difficile Toxin B by PCR   Date Value Ref Range Status   09/21/2024 Negative Negative Final     Comment:     A negative result does not exclude actual disease due to C. difficile and may be due to improper collection, handling and storage of the specimen or the number of organisms in the specimen is below the detection limit of the assay.            Imaging:     Recent Results (from the past 48 hour(s))   CT Dental wo Contrast    Narrative    CT DENTAL WO CONTRAST 10/15/2024 12:00 AM    History:  evaluate for dental caries as source for recurrent fevers,  tooth pain    Comparison:  None      TECHNIQUE: 3-D reconstruction by the technologists, with curved  multiplanar reformat of thin section imaging through the mandible and  maxilla obtained without intravenous contrast.    FINDINGS:  No significant soft tissue swelling or mass. Normal facial bone  alignment. No bony erosion.     Mucous retention cyst at the inferior medial aspect of the left  maxillary sinus. Otherwise, visualized portions of the paranasal  sinuses are clear. Normal temporomandibular joints.      Impression    IMPRESSION:  Periapical lucency adjacent to the right mandibular  central incisor, previously treated by root canal. Otherwise, no  evidence of periapical abscess.    I have personally reviewed the examination and initial interpretation  and I agree with the findings.    RANDY BURTON MD         SYSTEM ID:  [id number]

## 2024-10-15 NOTE — CONSULTS
Care Management Initial Consult    General Information  Assessment completed with: Patient,    Type of CM/SW Visit: Initial Assessment  Primary Care Provider verified and updated as needed: Yes   Readmission within the last 30 days: other (see comments)   Return Category: Progression of disease    Reason for Consult: discharge planning  Advance Care Planning, Reviewed: concerns discussed    Blank HCD form provided     Communication Assessment  Patient's communication style: spoken language (English or Bilingual)    Hearing Difficulty or Deaf: no   Wear Glasses or Blind: no    Cognitive  Cognitive/Neuro/Behavioral: WDL                      Living Environment:   People in home: significant other     Current living Arrangements: house      Able to return to prior arrangements: yes     Family/Social Support:  Care provided by: self, spouse/significant other  Provides care for: no one  Marital Status: Lives with Significant Other  Support system: Significant OtherPaula  Description of Support System: Supportive, Involved       Current Resources:   Patient receiving home care services: No  Community Resources: None  Equipment currently used at home: none  Supplies currently used at home:      Employment/Financial:  Employment Status: employed full-time, other (see comments) (FMLA)     Financial Concerns: none   Referral to Financial Worker: No  Does the patient's insurance plan have a 3 day qualifying hospital stay waiver?  No    Lifestyle & Psychosocial Needs:  Social Determinants of Health     Food Insecurity: Low Risk  (10/3/2024)    Food Insecurity     Within the past 12 months, did you worry that your food would run out before you got money to buy more?: No     Within the past 12 months, did the food you bought just not last and you didn t have money to get more?: No   Depression: Not at risk (9/16/2024)    Received from [facility]    PHQ-2     PHQ-2 Total: 0   Housing  Stability: Low Risk  (10/3/2024)    Housing Stability     Do you have housing? : Yes     Are you worried about losing your housing?: No   Tobacco Use: Low Risk  (9/27/2024)    Received [facility]    Patient History     Smoking Tobacco Use: Never     Smokeless Tobacco Use: Never     Passive Exposure: Never   Financial Resource Strain: Low Risk  (10/3/2024)    Financial Resource Strain     Within the past 12 months, have you or your family members you live with been unable to get utilities (heat, electricity) when it was really needed?: No   Alcohol Use: Not on file   Transportation Needs: Low Risk  (10/3/2024)    Transportation Needs     Within the past 12 months, has lack of transportation kept you from medical appointments, getting your medicines, non-medical meetings or appointments, work, or from getting things that you need?: No   Physical Activity: Not on file   Interpersonal Safety: Low Risk  (10/5/2024)    Interpersonal Safety     Do you feel physically and emotionally safe where you currently live?: Yes     Within the past 12 months, have you been hit, slapped, kicked or otherwise physically hurt by someone?: No     Within the past 12 months, have you been humiliated or emotionally abused in other ways by your partner or ex-partner?: No   Recent Concern: Interpersonal Safety - High Risk (9/21/2024)    Interpersonal Safety     Do you feel physically and emotionally safe where you currently live?: No     Within the past 12 months, have you been hit, slapped, kicked or otherwise physically hurt by someone?: No     Within the past 12 months, have you been humiliated or emotionally abused in other ways by your partner or ex-partner?: No   Stress: Not on file   Social Connections: Not on file   Health Literacy: Not on file     Functional Status:  Prior to admission patient needed assistance:   Dependent ADLs:: Independent  Dependent IADLs:: Independent     Mental Health  Status:  Mental Health Status: No Current Concerns       Chemical Dependency Status:  Chemical Dependency Status: No Current Concerns           Values/Beliefs:  Spiritual, Cultural Beliefs, Confucianist Practices, Values that affect care: no             Discussed  Partnership in Safe Discharge Planning  document with patient/family: Yes:     Additional Information:  CMA completed at bedside with pt and significant other (via speaker phone), as indicated by anticipated discharge needs - i.e. home infusion, IV Abx. Pt confirmed listed address, PCP ([facility]), and payer source (IMM no). Pt lives with significant other, Paula, and is currently out of work ([employer]) due to illness. Pt IND ADLs, no PTA home care, DME, or OP services. Pt denies concerns r/t financial, Evangelical/spiritual, or BH/LIZ needs. Significant other anticipated for discharge transportation. Benefit check submitted to [facility], pending final ID plan.      [facility]  [address] [place] [address]  Phone: [phone]  Fax: [phone]  IV CFTX -> 100% coverage (deductible and OOP max met)    Next Steps: Follow-up [facility] liaison(s) for discharge coordination        Jose Walker RN BSN  7C RNCC - Phone: [phone]  Care Management Department    Secure message via [employer] (Search Name or 7C Med Surg [phone] RNCC)  For Weekend & Holiday on call RN Care Coordinator:  * [place] (0800 [phone]) Saturday and Sunday    Units: 5A, 5B, & 5C     Units: 6B, 6C, 6D     Units: 7A, 7B, 7C, 7D     Units: 6A/ICU      * [facility] ([phone]) Saturday and Sunday    Units: 6 Med/Surg, 8A, 10 ICU, & Pediatric Units    Units: 5 Ortho, 5Med/Surg & WB ED

## 2024-10-15 NOTE — PLAN OF CARE
Problem: Adult Inpatient Plan of Care  Goal: Plan of Care Review  Description: The Plan of Care Review/Shift note should be completed every shift.  The Outcome Evaluation is a brief statement about your assessment that the patient is improving, declining, or no change.  This information will be displayed automatically on your shift  note.  Flowsheets (Taken 10/15/2024 1045)  Outcome Evaluation:   Ongoing POC   CMA completed due to LOS >7d   Pt makes needs known   IND @ baseline   pending ID plan     Plan of Care Reviewed With:   patient   spouse  Overall Patient Progress: no change

## 2024-10-15 NOTE — PLAN OF CARE
Goal Outcome Evaluation:   Plan of care reviewed with: patient    Overall patient progress: no change    Outcome evaluation: Assumed cares [id number]. A&Ox4. VSS on RA. L + R PIVs SL. Up independently. Robitussin given x1 for cough. Multiple BMs, pt refused lactulose today. Pt bit lip and was bleeding for a while, quick clot given to stop bleeding. Call light within reach. Able to make needs known. Continue with POC

## 2024-10-15 NOTE — PROGRESS NOTES
[facility]    Medicine Progress Note - Hospitalist Service, GOLD TEAM 7    Date of Admission:  10/3/2024    Assessment & Plan   Stewart Hunt is a 39 year old male admitted on 10/3/2024. He has hx of alcohol cirrhosis and admitted 9/20-22 for worsening anasarca and hyperbilirubinemia.     Changes today:  - Has been afebrile for 24 hours, continue to monitor fever curve  - ID following, appreciate recs   - If continues to be afebrile, can place PICC tomorrow   - Stop Ceftriaxone, resume IV Cefazolin   - Start Ciprofloxacin and continue metronidazole for SBP prophylaxis   - Patient and spouse with questions regarding transplant and prognosis; reached out to Hepatology team who will plan to see patient tomorrow to discuss.      #RecurrentFever Curve:  - Currently source of fever is MSSA bacteremia. Patient has been on Abx since 10/9. Fever curve initially improved then worsened. WBC count also improved and now worsened. UA done on 10/12 negative. Bcx obtained on 10/12 which remains negative. RVP negative. Unsure of new source of fever. After switching to ceftriaxone and flagyl on 10/13 his fever curve has improved. At this time, possible etiology includes oral source (has tooth and facial pain over the R mxillary bone), and possible PE  - Abx: Cefazolin (10/10- 10/12), ceftriaxone 2g q24h IVP and flagyl (10/13 --> current)  - CAPS consulted for paracentesis on 10/12-- no ascites seen for paracentesis (diagnostic or therapeutic)  - Cdiff ordered  - encourage incentive spirometry for atelectasis  - CT chest angio to eval for PE. Will try to coordinate with facial CT to eval for sinusitis vs dental caries    # Lower extremity edema, anasarca  # Hyponatremia 2/2 etoh cirrhosis, improving   # ANNA, prerenal with possible HRS physiology (cystatin c 1.8, GFR 39%), improving   Home reg: lasix 20mg daily and aldactone 50mg daily since last admission 10 days ago, and has been  having ++ urination without improvement in edema. On lactulose and had been having multiple bowel movements daily along with poor appetite. Albumin 2-3. most likely due to third spacing. Cr wnl 0.94-->0.78-->0.89. Sodium decreased from 131 to 125 10/5 with increased diuretic dosing. BL Na ~136.  Cr at  0.85, but cystatin C at 1.8. GFR calculated with Cystain C 39. Blood osmolality 264. Sodium urine <20, urine osmolality 305.UO improving over the past few days. Suspected ANNA with possible HRS physiology per discussions with Nephrology  - Hepatology and Nephrology consults  - Daily CMP   - Lasix and spironolactone today at lower dose  - 3 day Albumin challenge for hepatorenal syndrome (10/6-10/9) -- responded well to albumin.  - 2 gm sodium diet, 1L FR  - strict I+Os, daily weights     # Decompensated alcoholic cirrhosis  # Alcoholic hepatitis   # Hx of hepatic encephalopathy   # Hx of alcohol use disorder, sober since 9/8/2024  Follows with OP hepatology. Prednisolone taper discontinued. US 9/21 without evidence of HCC, hepatomegaly. No EV on recent EGD. T. Bili initially downtrending, slight increase 10/5. Mentation currently clear. Attending AA and has been sober since 9/8/24. Was recommended inpatient rehab, but he feels that since he has such high MELD, he does not want to be away from his family for 30 days.  - Hepatology consult, appreciate recommendations   - Daily MELD labs  - Diuresis plan as above  - Continue lactulose TID, titrate to achieve 3-5 bowel movements daily  - Thiamine, folic acid daily   - May need rifaximin in the future (per GI)  - Monitor for hepatic encephalopathy  - Continue to attend AA when discharged     MELD 3.0: 31 at 10/14/2024  4:56 AM  MELD-Na: 32 at 10/14/2024  4:56 AM  Calculated from:  Serum Creatinine: 0.70 mg/dL (Using min of 1 mg/dL) at 10/14/2024  4:56 AM  Serum Sodium: 133 mmol/L at 10/14/2024  4:56 AM  Total Bilirubin: 25.3 mg/dL at 10/14/2024  4:56 AM  Serum Albumin: 3.2  g/dL at 10/14/2024  4:56 AM  INR(ratio): 3.00 at 10/14/2024  4:56 AM  Age at listing (hypothetical): 39 years  Sex: Male at 10/14/2024  4:56 AM      # Cough, hemoptysis vs hematemesis vs blood from epistaxis, resolved   # Moderate portal hypertensive gastropathy seen on EGD   # GERD  In addition to ongoing sore throat since ~1 week prior to admission, with grossly negative work-up, has had two episodes of sputum with blood on 10/7, 10/8. Broad ID work-up. Discussed at length with GI given cirrhosis. No varices seen on EGD ~1 month ago at [facility]. Could consider [place] tear with hypertensive gastropathy, coughing, coagulopathy, but no urgent need to scope for UGIB.   - GI consult appreciate recommendations   - No need for EGD on 10/8  - Trend daily CBC   - Continue protonix 40 mg daily, simethicone 80 mg BID   - Monitor     # Staph aureus bacteremia, unclear source   # Elevated procalcitonin   # Sore throat, ongoing, improving   Procal 1.77 on 10/5. WBC WNL on 10/7. No dysuria, no fever, PENN, new O2 needs on 10/7.  Has throat pain but viral panels repetiteively negative. Possibly due to prednisolone that he was taking. CXR and UA unremarkable. Remains afebrile. WBC trending down now that he is off steroids. Elevated temperature to 100.7 overnight  10/7. Strep swab negative. Continues to have normal WBC, decreasing procal. Normal VSS otherwise. Started on Ceftrixone. No fluid per POCUS exam  on 10/8. Ceftriaxone stopped, but positive GPC 1/2 bottles. Overall, concern for renal dysfunction without clear source for intermittent temperature spikes despite broadly negative work-up. Initally suspected GPCs may be contaminination and held abx after talking with ID given wanting to preserve renal function, but spiked Tmax of 102.8 on 10/8 prompting Zosyn/Vanc start. UA, MRSA negative, other BCx with NGTD. Narrowed to ancef per ID  - ID consult, appreciate recommendations  - TTE ON  10/9-- poor candidate for SALENA given  esophageal varices  - Trend Bcx so far negative  - Daily BMP, CBC  - Ancef to be switched to ceftriaxone 2gm per day for bacteremia and sbp dosing on 10/12. Added metronidazole 500mg IV every 8 hours  - PRN  lozenges, chloraseptic spray, Robitussin   - Continue to monitor fever curve and daily CBC    # BRBPR concerning for anal fissures vs bleeding hemorrhoids, resolved  # Non-thrombosed external hemorrhoids and non-thrombosed internal hemorrhoids found on perianal  Exam seen on colonoscopy (9/10/24)  BRBPR on 10/7, 10/8 with none in stool. No melena seen. States some pressure before BMs happen, but no overt pain. Mild bleeding seen upon 2nd PE on 10/8 slightly inside rectum. Hgb stable with mild downtrend. Per chart review, hemorrhoids seen as above on imaging ~1 month ago. No need for scope per discussion with GI on 10/8.   - Trend Hgb on CBC daily  - rectal nitroglycerin 0.4% BID for 4 weeks  - Defer adding PO fiber given needing to have BMs due to cirrhosis   - Sitz baths 2-3 times daily  - Fiber/2 gm Na diet      # Epistaxis, resolved and intermittent   Noted on 10/7 exam. Has had on/off for several days. Resolved with conservative treatment (pressure).   - If ongoing, please consult ENT given coags status, may need packing.   - Monitor coags and Hgb daily   - PRN Ocean spray, Afrin   - Monitor and notify provider if ongoing     # Normocytic anemia  11.2-->11-->11.8  on 10/8. Suspect from bleeding sources as above in setting of coagulopathy.   - monitor on CBC  - Transfuse for Hgb less than 7    # Left sided chest pain, intermittent   ED visit with same complaints on 7/26/24. ACS and PE workup normal. Started on pepcid and simethicone. Has c/o L sided chest pain for the last 2 mornings. Pain is reproducible with palpation. Noted again on 10/8, EKG with no acute ST/T wave abnormalities or concerning T wave changes. Unable to get trop due to elevated bilirubin. Given pain is reproducible, likely MSK related,  could consider from GERD as above, ID process (but less likley given negative work-up thus far).   - ID work-up as above  - GERD treatment as above  - Scheduled lidocaine patches  - PRN acetaminophen     # Moderate malnutrition, in the context of chronic illness/disease   # Vomiting, resolved   # Diarrhea, in setting of lactulose  Nothing tastes good, and had one non bloody emesis several days ago. States appetite better when on  higher dose prednisolone (currently on 1-mg).  - RD consult  - Continue lactulose TID, titrate to achieve 3-5 bowel movements daily  - Discuss renally appropriate MVI in AM  - PRN zofran  - Start calorie counts for 3 days     # Fatigue, improving   # Insomnia, improving   Fell asleep at desk while sitting and working on computer. See work-up as above for cirrhosis. No s/s of hepatic encelopathy. Currently normal mentation  - Cirrhosis treatment as above     # Gallbladder polyp  - Continue to monitor 4 mm polyp seen on US     # Depressed mood/Grief  Both the patient and wife started tearing up talking about poor prognosis and not knowing what to expect including what they can do while 'waiting 90 days.'  - Spiritual Health consult per patient request    # Hepatitis B status  Received hepatis B 1st dose on 9/10/24. Wife inquiring about second dose that should be scheduled for tomorrow. Per discussion with pharmacy, ok to push back several days and give prior to discharge.  - 2nd Hepatitis B vaccine prior to discharge per discussion with Pharmacy on 10/6.       Diet: 2 Gram Sodium Diet  Fluid restriction 1000 ML FLUID    DVT Prophylaxis: Anti-embolisim stockings (TEDs) and Ambulate every shift  Gomes Catheter: Not present  Lines: None     Cardiac Monitoring: None  Code Status: Full Code            Diet: Combination Diet High Fiber Diet; 2 gm NA Diet  Fluid restriction 1500 ML FLUID  Snacks/Supplements Adult: Ensure Max Protein (bariatric); Between Meals    DVT Prophylaxis: Pneumatic  "Compression Devices  Gomes Catheter: Not present  Lines: None     Cardiac Monitoring: None  Code Status: Full Code      Clinically Significant Risk Factors         # Hyponatremia: Lowest Na = 132 mmol/L in last 2 days, will monitor as appropriate   # Hypocalcemia: Lowest Ca = 7.9 mg/dL in last 2 days, will monitor and replace as appropriate     # Hypoalbuminemia: Lowest albumin = 2.5 g/dL at 10/6/2024  6:09 AM, will monitor as appropriate  # Coagulation Defect: INR = 2.97 (Ref range: 0.85 - 1.15) and/or PTT = 38 Seconds (Ref range: 22 - 38 Seconds), will monitor for bleeding  # Thrombocytopenia: Lowest platelets = 99 in last 2 days, will monitor for bleeding             # Severe Obesity: Estimated body mass index is 41.32 kg/m  as calculated from the following:    Height as of this encounter: 1.676 m (5' 6\").    Weight as of this encounter: 116.1 kg (256 lb).   # Moderate Malnutrition: based on nutrition assessment    # Financial/Environmental Concerns: none         Disposition Plan     Medically Ready for Discharge: Anticipated in 2-4 Days             Katiuska Medina MD  Hospitalist Service, GOLD TEAM 7  [facility]  Securely message with [employer] (more info)  Text page via [facility] Paging/Directory   See signed in provider for up to date coverage information  ______________________________________________________________________    Interval History   No acute events overnight. Afebrile. Has new bruise on top of left foot. Swelling improved with compression stockings.     Patient and spouse updated on plan. Patient tearful and anxious about prognosis given to him based on liver disease, and his candidacy for transplant. Wondering if liver team can come by to answer questions from he and spouse.     Physical Exam   Vital Signs: Temp: 98.2  F (36.8  C) Temp src: Oral BP: 126/68 Pulse: 96   Resp: 16 SpO2: 98 % O2 Device: None (Room air)    Weight: 256 lbs 0 oz    General " Appearance: Sitting up in bed, conversant  Respiratory: Breathing comfortably, LCAB  Cardiovascular: RRR  GI: Distended but soft  Skin: Jaundiced  Other: Bilateral lower extremity edema, compression stockings in place     Medical Decision Making       60 MINUTES SPENT BY ME on the date of service doing chart review, history, exam, documentation & further activities per the note.      Data     I have personally reviewed the following data over the past 24 hrs:    11.3 (H)  \   11.3 (L)   / 106 (L)     132 (L) 105 13.4 /  89   4.4 18 (L) 0.69 \     ALT: 43 AST: 90 (H) AP: 138 TBILI: 26.0 (HH)   ALB: 3.2 (L) TOT PROTEIN: N/A LIPASE: N/A     INR:  2.97 (H) PTT:  N/A   D-dimer:  N/A Fibrinogen:  N/A       Imaging results reviewed over the past 24 hrs:   Recent Results (from the past 24 hour(s))   CT Chest Pulmonary Embolism w Contrast    Narrative    EXAMINATION: CT CHEST PULMONARY EMBOLISM W CONTRAST on 10/14/2024  11:59 PM.    INDICATION: fevers with uncertain etiology, chest pain and sob. Would  need to r/o PE. Coordinate    COMPARISON: X-ray 10/12/2024 and CT abdomen and pelvis 10/12/2024    TECHNIQUE: CT imaging obtained through the chest with contrast.  Coronal and axial MIP reformatted images obtained. Three-dimensional  (3D) post-processed angiographic images were reconstructed, archived  to PACS and used in interpretation of this study.     CONTRAST: 74 ml isovue 370 IV    FINDINGS:    Lines and tubes: None.    Vessels: No central filling defect consistent with a pulmonary  embolism. Normal caliber thoracic aorta and main pulmonary artery.    Mediastinum: No thyroid nodules. Central tracheobronchial tree is  patent. Heart size is normal. No pericardial effusion. No thoracic  lymphadenopathy. Scattered prominent subcentimeter mediastinal nodes.  Normal appearance of the esophagus.    Lungs: Proximal central airways are patent. Mild diffuse bronchial  wall thickening. No consolidation. Atelectasis within the right  middle  lobe and right lower lobe with elevation of the right hemidiaphragm.  Nonspecific small irregular and groundglass opacities throughout the  bilateral upper lobes (for example as seen on series 6, images 79, 96,  100)..    Mosaic attenuation of the superior lower lobes may reflect air  trapping.    No pleural effusion. No pneumothorax    Bones and soft tissues: No acute or suspicious osseous abnormality.  Degenerative changes throughout the visualized spine. Mild anterior  wedging of lower thoracic vertebral bodies.    Upper abdomen: Limited visualized portions of the upper abdomen  demonstrate no acute appearing abnormality. Please refer to recently  performed CT abdomen and pelvis 10/12/2024 for additional findings and  recommendations.      Impression    IMPRESSION:   1. No central filling defect consistent with a pulmonary embolism.  2. Small irregular nodular and groundglass opacities throughout the  bilateral upper lobes, likely likely of infectious or inflammatory  radiology.   2. Please refer to recently performed CT abdomen and pelvis 10/12/2024  for additional findings recommendations below the diaphragm.     I, Dr. Gutierrez, discussed the above findings by telephone with Dr. Medina  on 10/15/2024 12:35 PM.    In the event of a positive result for acute pulmonary embolism  resulting in right heart strain, consider calling the   [facility] patient placement ([phone]) for PERT (Pulmonary Embolism  Response Team) Activation    I have personally reviewed the examination and initial interpretation  and I agree with the findings.    MIAN GUTIERREZ MD         SYSTEM ID:  [id number]   CT Dental wo Contrast    Narrative    CT DENTAL WO CONTRAST 10/15/2024 12:00 AM    History:  evaluate for dental caries as source for recurrent fevers,  tooth pain    Comparison:  None      TECHNIQUE: 3-D reconstruction by the technologists, with curved  multiplanar reformat of thin section imaging through the mandible and  maxilla  obtained without intravenous contrast.    FINDINGS:  No significant soft tissue swelling or mass. Normal facial bone  alignment. No bony erosion.     Mucous retention cyst at the inferior medial aspect of the left  maxillary sinus. Otherwise, visualized portions of the paranasal  sinuses are clear. Normal temporomandibular joints.      Impression    IMPRESSION:  Periapical lucency adjacent to the right mandibular  central incisor, previously treated by root canal. Otherwise, no  evidence of periapical abscess.    I have personally reviewed the examination and initial interpretation  and I agree with the findings.    RANDY BURTON MD         SYSTEM ID:  [id number]

## 2024-10-16 LAB
ALBUMIN SERPL BCG-MCNC: 3.4 G/DL (ref 3.5–5.2)
ALP SERPL-CCNC: 152 U/L (ref 40–150)
ALT SERPL W P-5'-P-CCNC: 46 U/L (ref 0–70)
ANION GAP SERPL CALCULATED.3IONS-SCNC: 11 MMOL/L (ref 7–15)
AST SERPL W P-5'-P-CCNC: 91 U/L (ref 0–45)
BILIRUB SERPL-MCNC: 27.8 MG/DL
BUN SERPL-MCNC: 13.7 MG/DL (ref 6–20)
CALCIUM SERPL-MCNC: 8.4 MG/DL (ref 8.8–10.4)
CHLORIDE SERPL-SCNC: 104 MMOL/L (ref 98–107)
CREAT SERPL-MCNC: 0.7 MG/DL (ref 0.67–1.17)
EGFRCR SERPLBLD CKD-EPI 2021: >90 ML/MIN/1.73M2
ERYTHROCYTE [DISTWIDTH] IN BLOOD BY AUTOMATED COUNT: 18.7 % (ref 10–15)
GLUCOSE SERPL-MCNC: 90 MG/DL (ref 70–99)
HAV IGM SERPL QL IA: NONREACTIVE
HBV CORE IGM SERPL QL IA: NORMAL
HBV SURFACE AG SERPL QL IA: NONREACTIVE
HCO3 SERPL-SCNC: 18 MMOL/L (ref 22–29)
HCT VFR BLD AUTO: 32.7 % (ref 40–53)
HCV AB SERPL QL IA: NONREACTIVE
HGB BLD-MCNC: 11.2 G/DL (ref 13.3–17.7)
HIV 1+2 AB+HIV1 P24 AG SERPL QL IA: NONREACTIVE
INR PPP: 3.08 (ref 0.85–1.15)
LDH SERPL L TO P-CCNC: 239 U/L (ref 0–250)
MCH RBC QN AUTO: 35.4 PG (ref 26.5–33)
MCHC RBC AUTO-ENTMCNC: 34.3 G/DL (ref 31.5–36.5)
MCV RBC AUTO: 104 FL (ref 78–100)
PLATELET # BLD AUTO: 125 10E3/UL (ref 150–450)
POTASSIUM SERPL-SCNC: 4.3 MMOL/L (ref 3.4–5.3)
PROT SERPL-MCNC: ABNORMAL G/DL
RBC # BLD AUTO: 3.16 10E6/UL (ref 4.4–5.9)
SODIUM SERPL-SCNC: 133 MMOL/L (ref 135–145)
WBC # BLD AUTO: 14.3 10E3/UL (ref 4–11)

## 2024-10-16 PROCEDURE — 120N000002 HC R&B MED SURG/OB UMMC

## 2024-10-16 PROCEDURE — 250N000013 HC RX MED GY IP 250 OP 250 PS 637

## 2024-10-16 PROCEDURE — 272N000451 HC KIT SHRLOCK 5FR POWER PICC DOUBLE LUMEN

## 2024-10-16 PROCEDURE — 99232 SBSQ HOSP IP/OBS MODERATE 35: CPT | Performed by: STUDENT IN AN ORGANIZED HEALTH CARE EDUCATION/TRAINING PROGRAM

## 2024-10-16 PROCEDURE — 87449 NOS EACH ORGANISM AG IA: CPT | Performed by: STUDENT IN AN ORGANIZED HEALTH CARE EDUCATION/TRAINING PROGRAM

## 2024-10-16 PROCEDURE — 83615 LACTATE (LD) (LDH) ENZYME: CPT | Performed by: STUDENT IN AN ORGANIZED HEALTH CARE EDUCATION/TRAINING PROGRAM

## 2024-10-16 PROCEDURE — 99233 SBSQ HOSP IP/OBS HIGH 50: CPT | Performed by: NURSE PRACTITIONER

## 2024-10-16 PROCEDURE — 250N000011 HC RX IP 250 OP 636: Mod: JZ | Performed by: STUDENT IN AN ORGANIZED HEALTH CARE EDUCATION/TRAINING PROGRAM

## 2024-10-16 PROCEDURE — 85014 HEMATOCRIT: CPT

## 2024-10-16 PROCEDURE — 36569 INSJ PICC 5 YR+ W/O IMAGING: CPT

## 2024-10-16 PROCEDURE — 87385 HISTOPLASMA CAPSUL AG IA: CPT | Performed by: STUDENT IN AN ORGANIZED HEALTH CARE EDUCATION/TRAINING PROGRAM

## 2024-10-16 PROCEDURE — 250N000013 HC RX MED GY IP 250 OP 250 PS 637: Performed by: INTERNAL MEDICINE

## 2024-10-16 PROCEDURE — 80074 ACUTE HEPATITIS PANEL: CPT | Performed by: STUDENT IN AN ORGANIZED HEALTH CARE EDUCATION/TRAINING PROGRAM

## 2024-10-16 PROCEDURE — 99233 SBSQ HOSP IP/OBS HIGH 50: CPT | Performed by: STUDENT IN AN ORGANIZED HEALTH CARE EDUCATION/TRAINING PROGRAM

## 2024-10-16 PROCEDURE — 250N000013 HC RX MED GY IP 250 OP 250 PS 637: Performed by: STUDENT IN AN ORGANIZED HEALTH CARE EDUCATION/TRAINING PROGRAM

## 2024-10-16 PROCEDURE — 250N000009 HC RX 250: Performed by: STUDENT IN AN ORGANIZED HEALTH CARE EDUCATION/TRAINING PROGRAM

## 2024-10-16 PROCEDURE — 85610 PROTHROMBIN TIME: CPT

## 2024-10-16 PROCEDURE — 36415 COLL VENOUS BLD VENIPUNCTURE: CPT | Performed by: STUDENT IN AN ORGANIZED HEALTH CARE EDUCATION/TRAINING PROGRAM

## 2024-10-16 PROCEDURE — 87389 HIV-1 AG W/HIV-1&-2 AB AG IA: CPT | Performed by: STUDENT IN AN ORGANIZED HEALTH CARE EDUCATION/TRAINING PROGRAM

## 2024-10-16 PROCEDURE — 80048 BASIC METABOLIC PNL TOTAL CA: CPT

## 2024-10-16 PROCEDURE — 36415 COLL VENOUS BLD VENIPUNCTURE: CPT

## 2024-10-16 PROCEDURE — 272N000278 HC DEVICE 5FR SECURACATH

## 2024-10-16 RX ORDER — CIPROFLOXACIN 500 MG/1
500 TABLET, FILM COATED ORAL DAILY
Status: DISCONTINUED | OUTPATIENT
Start: 2024-10-17 | End: 2024-10-18 | Stop reason: HOSPADM

## 2024-10-16 RX ORDER — LIDOCAINE 40 MG/G
CREAM TOPICAL
Status: DISCONTINUED | OUTPATIENT
Start: 2024-10-16 | End: 2024-10-18 | Stop reason: HOSPADM

## 2024-10-16 RX ORDER — HEPARIN SODIUM,PORCINE 10 UNIT/ML
5-20 VIAL (ML) INTRAVENOUS
Status: DISCONTINUED | OUTPATIENT
Start: 2024-10-16 | End: 2024-10-18 | Stop reason: HOSPADM

## 2024-10-16 RX ORDER — HEPARIN SODIUM,PORCINE 10 UNIT/ML
5-20 VIAL (ML) INTRAVENOUS EVERY 24 HOURS
Status: DISCONTINUED | OUTPATIENT
Start: 2024-10-16 | End: 2024-10-18 | Stop reason: HOSPADM

## 2024-10-16 RX ADMIN — ACETAMINOPHEN 325 MG: 325 TABLET, FILM COATED ORAL at 23:38

## 2024-10-16 RX ADMIN — LIDOCAINE HYDROCHLORIDE 1 ML: 10 INJECTION, SOLUTION EPIDURAL; INFILTRATION; INTRACAUDAL; PERINEURAL at 17:24

## 2024-10-16 RX ADMIN — FUROSEMIDE 20 MG: 20 TABLET ORAL at 08:21

## 2024-10-16 RX ADMIN — GUAIFENESIN 200 MG: 200 SOLUTION ORAL at 19:03

## 2024-10-16 RX ADMIN — PANTOPRAZOLE SODIUM 40 MG: 40 TABLET, DELAYED RELEASE ORAL at 08:21

## 2024-10-16 RX ADMIN — CEFAZOLIN SODIUM 2 G: 2 INJECTION, SOLUTION INTRAVENOUS at 15:33

## 2024-10-16 RX ADMIN — FOLIC ACID 1 MG: 1 TABLET ORAL at 08:21

## 2024-10-16 RX ADMIN — LIDOCAINE 1 PATCH: 4 PATCH TOPICAL at 20:16

## 2024-10-16 RX ADMIN — SPIRONOLACTONE 50 MG: 50 TABLET ORAL at 08:21

## 2024-10-16 RX ADMIN — CIPROFLOXACIN HYDROCHLORIDE 500 MG: 500 TABLET, FILM COATED ORAL at 08:21

## 2024-10-16 RX ADMIN — LACTULOSE 10 G: 10 POWDER, FOR SOLUTION ORAL at 05:08

## 2024-10-16 RX ADMIN — THIAMINE HCL TAB 100 MG 100 MG: 100 TAB at 08:22

## 2024-10-16 RX ADMIN — SIMETHICONE 80 MG: 80 TABLET, CHEWABLE ORAL at 08:21

## 2024-10-16 RX ADMIN — CEFAZOLIN SODIUM 2 G: 2 INJECTION, SOLUTION INTRAVENOUS at 08:21

## 2024-10-16 RX ADMIN — Medication 1 TABLET: at 12:01

## 2024-10-16 RX ADMIN — CEFAZOLIN SODIUM 2 G: 2 INJECTION, SOLUTION INTRAVENOUS at 23:25

## 2024-10-16 RX ADMIN — Medication 1 LOZENGE: at 23:38

## 2024-10-16 RX ADMIN — METRONIDAZOLE 500 MG: 500 TABLET ORAL at 20:16

## 2024-10-16 RX ADMIN — GUAIFENESIN 200 MG: 200 SOLUTION ORAL at 23:38

## 2024-10-16 RX ADMIN — METRONIDAZOLE 500 MG: 500 TABLET ORAL at 08:22

## 2024-10-16 RX ADMIN — METRONIDAZOLE 500 MG: 500 TABLET ORAL at 14:13

## 2024-10-16 RX ADMIN — SIMETHICONE 80 MG: 80 TABLET, CHEWABLE ORAL at 20:16

## 2024-10-16 ASSESSMENT — ACTIVITIES OF DAILY LIVING (ADL)
ADLS_ACUITY_SCORE: 22
ADLS_ACUITY_SCORE: 22
ADLS_ACUITY_SCORE: 23
ADLS_ACUITY_SCORE: 23
ADLS_ACUITY_SCORE: 22
ADLS_ACUITY_SCORE: 23
ADLS_ACUITY_SCORE: 23
ADLS_ACUITY_SCORE: 22
ADLS_ACUITY_SCORE: 23
ADLS_ACUITY_SCORE: 22

## 2024-10-16 NOTE — PROVIDER NOTIFICATION
10/16/24 1726   PICC 10/16/24 Double Lumen Left Basilic Antibiotic   Placement Date/Time: 10/16/24 (c) 1726   Lumens (Required): Double Lumen  Lumen Identification: Purple;Red  Catheter Brand: [employer]  Catheter Size: 5 fr  Lot #: [id number]  Full barrier precautions done: Yes, hand hygiene, sterile gown, sterile gloves, mas...   Site Assessment WDL   External Cath Length (cm) (S)  5 cm   Extremity Circumference (cm) 29 cm   Dressing Chlorhexidine disk;Transparent;Securement device   Dressing Status clean;dry;intact   Dressing Change Due (S)  10/23/24   Purple - Status blood return noted;saline locked   Purple - Cap Change Due 10/20/24   Purple - Intervention Flushed   Red - Status blood return noted;saline locked   Red - Cap Change Due 10/20/24   Red - Intervention Flushed   Phlebitis Scale 0-->no symptoms   Infiltration? no   PICC Comment (S)  PICC is OK to use

## 2024-10-16 NOTE — PROGRESS NOTES
[facility]    Medicine Progress Note - Hospitalist Service, GOLD TEAM 7    Date of Admission:  10/3/2024    Assessment & Plan   Stewart Hunt is a 39 year old male admitted on 10/3/2024. He has hx of alcohol cirrhosis and admitted 9/20-22 for worsening anasarca and hyperbilirubinemia.     Changes today:  - Remains afebrile, but WBC up today to 14  - CT with findings of upper lobe bilateral nodular and ground glass opacities, concerning for infection vs. Edema vs. Inflammmation   - Discussed with ID; okay to place PICC line, will do further work up given CT findings. Check HIV, hepatitis panel, histo/blasto urine, beta-d-glucan   - Discharge pending results of infectious work up, fever curve, WBC trending   - RNCC working on home infusion services for anticipated 4 week course IV antibiotics      #Recurrent Fever Curve:  - Currently source of fever is MSSA bacteremia. Patient has been on Abx since 10/9. Fever curve initially improved then worsened. WBC count also improved and now worsened. UA done on 10/12 negative. Bcx obtained on 10/12 which remains negative. RVP negative. Unsure of new source of fever. After switching to ceftriaxone and flagyl on 10/13 his fever curve has improved. At this time, possible etiology includes oral source (has tooth and facial pain over the R mxillary bone), and possible PE  - Abx: Cefazolin (10/10- 10/12), ceftriaxone 2g q24h IVP and flagyl (10/13 --> current)  - CAPS consulted for paracentesis on 10/12-- no ascites seen for paracentesis (diagnostic or therapeutic)  - Cdiff ordered  - encourage incentive spirometry for atelectasis  - CT chest angio to eval for PE. Will try to coordinate with facial CT to eval for sinusitis vs dental caries    # Lower extremity edema, anasarca  # Hyponatremia 2/2 etoh cirrhosis, improving   # ANNA, prerenal with possible HRS physiology (cystatin c 1.8, GFR 39%), improving   Home reg: lasix 20mg daily and  aldactone 50mg daily since last admission 10 days ago, and has been having ++ urination without improvement in edema. On lactulose and had been having multiple bowel movements daily along with poor appetite. Albumin 2-3. most likely due to third spacing. Cr wnl 0.94-->0.78-->0.89. Sodium decreased from 131 to 125 10/5 with increased diuretic dosing. BL Na ~136.  Cr at  0.85, but cystatin C at 1.8. GFR calculated with Cystain C 39. Blood osmolality 264. Sodium urine <20, urine osmolality 305.UO improving over the past few days. Suspected ANNA with possible HRS physiology per discussions with Nephrology  - Hepatology and Nephrology consults  - Daily CMP   - Lasix and spironolactone today at lower dose  - 3 day Albumin challenge for hepatorenal syndrome (10/6-10/9) -- responded well to albumin.  - 2 gm sodium diet, 1L FR  - strict I+Os, daily weights     # Decompensated alcoholic cirrhosis  # Alcoholic hepatitis   # Hx of hepatic encephalopathy   # Hx of alcohol use disorder, sober since 9/8/2024  Follows with OP hepatology. Prednisolone taper discontinued. US 9/21 without evidence of HCC, hepatomegaly. No EV on recent EGD. T. Bili initially downtrending, slight increase 10/5. Mentation currently clear. Attending AA and has been sober since 9/8/24. Was recommended inpatient rehab, but he feels that since he has such high MELD, he does not want to be away from his family for 30 days.  - Hepatology consult, appreciate recommendations   - Daily MELD labs  - Diuresis plan as above  - Continue lactulose TID, titrate to achieve 3-5 bowel movements daily  - Thiamine, folic acid daily   - May need rifaximin in the future (per GI)  - Monitor for hepatic encephalopathy  - Continue to attend AA when discharged     MELD 3.0: 31 at 10/14/2024  4:56 AM  MELD-Na: 32 at 10/14/2024  4:56 AM  Calculated from:  Serum Creatinine: 0.70 mg/dL (Using min of 1 mg/dL) at 10/14/2024  4:56 AM  Serum Sodium: 133 mmol/L at 10/14/2024  4:56  AM  Total Bilirubin: 25.3 mg/dL at 10/14/2024  4:56 AM  Serum Albumin: 3.2 g/dL at 10/14/2024  4:56 AM  INR(ratio): 3.00 at 10/14/2024  4:56 AM  Age at listing (hypothetical): 39 years  Sex: Male at 10/14/2024  4:56 AM      # Cough, hemoptysis vs hematemesis vs blood from epistaxis, resolved   # Moderate portal hypertensive gastropathy seen on EGD   # GERD  In addition to ongoing sore throat since ~1 week prior to admission, with grossly negative work-up, has had two episodes of sputum with blood on 10/7, 10/8. Broad ID work-up. Discussed at length with GI given cirrhosis. No varices seen on EGD ~1 month ago at . Could consider [place] tear with hypertensive gastropathy, coughing, coagulopathy, but no urgent need to scope for UGIB.   - GI consult appreciate recommendations   - No need for EGD on 10/8  - Trend daily CBC   - Continue protonix 40 mg daily, simethicone 80 mg BID   - Monitor     # Staph aureus bacteremia, unclear source   # Elevated procalcitonin   # Sore throat, ongoing, improving   Procal 1.77 on 10/5. WBC WNL on 10/7. No dysuria, no fever, [facility], new O2 needs on 10/7.  Has throat pain but viral panels repetiteively negative. Possibly due to prednisolone that he was taking. CXR and UA unremarkable. Remains afebrile. WBC trending down now that he is off steroids. Elevated temperature to 100.7 overnight  10/7. Strep swab negative. Continues to have normal WBC, decreasing procal. Normal VSS otherwise. Started on Ceftrixone. No fluid per POCUS exam  on 10/8. Ceftriaxone stopped, but positive GPC 1/2 bottles. Overall, concern for renal dysfunction without clear source for intermittent temperature spikes despite broadly negative work-up. Initally suspected GPCs may be contaminination and held abx after talking with ID given wanting to preserve renal function, but spiked Tmax of 102.8 on 10/8 prompting Zosyn/Vanc start. UA, MRSA negative, other BCx with NGTD. Narrowed to ancef per ID  - ID  consult, appreciate recommendations  - TTE ON  10/9-- poor candidate for SALENA given esophageal varices  - Trend Bcx so far negative  - Daily BMP, CBC  - Ancef to be switched to ceftriaxone 2gm per day for bacteremia and sbp dosing on 10/12. Added metronidazole 500mg IV every 8 hours  - PRN  lozenges, chloraseptic spray, Robitussin   - Continue to monitor fever curve and daily CBC    # BRBPR concerning for anal fissures vs bleeding hemorrhoids, resolved  # Non-thrombosed external hemorrhoids and non-thrombosed internal hemorrhoids found on perianal  Exam seen on colonoscopy (9/10/24)  BRBPR on 10/7, 10/8 with none in stool. No melena seen. States some pressure before BMs happen, but no overt pain. Mild bleeding seen upon 2nd PE on 10/8 slightly inside rectum. Hgb stable with mild downtrend. Per chart review, hemorrhoids seen as above on imaging ~1 month ago. No need for scope per discussion with GI on 10/8.   - Trend Hgb on CBC daily  - rectal nitroglycerin 0.4% BID for 4 weeks  - Defer adding PO fiber given needing to have BMs due to cirrhosis   - Sitz baths 2-3 times daily  - Fiber/2 gm Na diet      # Epistaxis, resolved and intermittent   Noted on 10/7 exam. Has had on/off for several days. Resolved with conservative treatment (pressure).   - If ongoing, please consult ENT given coags status, may need packing.   - Monitor coags and Hgb daily   - PRN Ocean spray, Afrin   - Monitor and notify provider if ongoing     # Normocytic anemia  11.2-->11-->11.8  on 10/8. Suspect from bleeding sources as above in setting of coagulopathy.   - monitor on CBC  - Transfuse for Hgb less than 7    # Left sided chest pain, intermittent   ED visit with same complaints on 7/26/24. ACS and PE workup normal. Started on pepcid and simethicone. Has c/o L sided chest pain for the last 2 mornings. Pain is reproducible with palpation. Noted again on 10/8, EKG with no acute ST/T wave abnormalities or concerning T wave changes. Unable to get  trop due to elevated bilirubin. Given pain is reproducible, likely MSK related, could consider from GERD as above, ID process (but less likley given negative work-up thus far).   - ID work-up as above  - GERD treatment as above  - Scheduled lidocaine patches  - PRN acetaminophen     # Moderate malnutrition, in the context of chronic illness/disease   # Vomiting, resolved   # Diarrhea, in setting of lactulose  Nothing tastes good, and had one non bloody emesis several days ago. States appetite better when on  higher dose prednisolone (currently on 1-mg).  - RD consult  - Continue lactulose TID, titrate to achieve 3-5 bowel movements daily  - Discuss renally appropriate MVI in AM  - PRN zofran  - Start calorie counts for 3 days     # Fatigue, improving   # Insomnia, improving   Fell asleep at desk while sitting and working on computer. See work-up as above for cirrhosis. No s/s of hepatic encelopathy. Currently normal mentation  - Cirrhosis treatment as above     # Gallbladder polyp  - Continue to monitor 4 mm polyp seen on US     # Depressed mood/Grief  Both the patient and wife started tearing up talking about poor prognosis and not knowing what to expect including what they can do while 'waiting 90 days.'  - Spiritual Health consult per patient request    # Hepatitis B status  Received hepatis B 1st dose on 9/10/24. Wife inquiring about second dose that should be scheduled for tomorrow. Per discussion with pharmacy, ok to push back several days and give prior to discharge.  - 2nd Hepatitis B vaccine prior to discharge per discussion with Pharmacy on 10/6.       Diet: 2 Gram Sodium Diet  Fluid restriction 2000 ML FLUID    DVT Prophylaxis: Anti-embolisim stockings (TEDs) and Ambulate every shift  Gomes Catheter: Not present  Lines: None     Cardiac Monitoring: None  Code Status: Full Code            Diet: Combination Diet High Fiber Diet; 2 gm NA Diet  Snacks/Supplements Adult: Ensure Max Protein (bariatric); Between  "Meals  Fluid restriction 2000 ML FLUID    DVT Prophylaxis: Pneumatic Compression Devices  Gomes Catheter: Not present  Lines: None     Cardiac Monitoring: None  Code Status: Full Code      Clinically Significant Risk Factors         # Hyponatremia: Lowest Na = 132 mmol/L in last 2 days, will monitor as appropriate   # Hypocalcemia: Lowest Ca = 8 mg/dL in last 2 days, will monitor and replace as appropriate     # Hypoalbuminemia: Lowest albumin = 2.5 g/dL at 10/6/2024  6:09 AM, will monitor as appropriate    # Coagulation Defect: INR = 3.08 (Ref range: 0.85 - 1.15) and/or PTT = 38 Seconds (Ref range: 22 - 38 Seconds), will monitor for bleeding  # Thrombocytopenia: Lowest platelets = 106 in last 2 days, will monitor for bleeding             # Severe Obesity: Estimated body mass index is 41.03 kg/m  as calculated from the following:    Height as of this encounter: 1.676 m (5' 6\").    Weight as of this encounter: 115.3 kg (254 lb 3.1 oz).   # Moderate Malnutrition: based on nutrition assessment      # Financial/Environmental Concerns: none         Disposition Plan     Medically Ready for Discharge: Anticipated in 2-4 Days             Katiuska Medina MD  Hospitalist Service, [facility] TEAM 7  [facility]  Securely message with [employer] (more info)  Text page via [employer] Paging/Directory   See signed in provider for up to date coverage information  ______________________________________________________________________    Interval History   No acute events overnight. Afebrile. No new symptoms. Discussed plan with patient and spouse.     Physical Exam   Vital Signs: Temp: 98.3  F (36.8  C) Temp src: Oral BP: 120/63 Pulse: 92   Resp: 15 SpO2: 97 % O2 Device: None (Room air)    Weight: 254 lbs 3.05 oz    General Appearance: Sitting up in bed, conversant  Respiratory: Breathing comfortably, LCAB  Cardiovascular: RRR  GI: Distended but soft  Skin: Jaundiced  Other: Bilateral lower extremity " edema, compression stockings in place     Medical Decision Making       60 MINUTES SPENT BY ME on the date of service doing chart review, history, exam, documentation & further activities per the note.      Data     I have personally reviewed the following data over the past 24 hrs:    14.3 (H)  \   11.2 (L)   / 125 (L)     133 (L) 104 13.7 /  90   4.3 18 (L) 0.70 \     ALT: 46 AST: 91 (H) AP: 152 (H) TBILI: 27.8 (HH)   ALB: 3.4 (L) TOT PROTEIN: N/A LIPASE: N/A     INR:  3.08 (H) PTT:  N/A   D-dimer:  N/A Fibrinogen:  N/A     Ferritin:  N/A % Retic:  N/A LDH:  239       Imaging results reviewed over the past 24 hrs:   No results found for this or any previous visit (from the past 24 hour(s)).

## 2024-10-16 NOTE — PLAN OF CARE
"Assumed cares 9867-0709     /66 (BP Location: Right arm)   Pulse 100   Temp 98.3  F (36.8  C) (Oral)   Resp 18   Ht 1.676 m (5' 6\")   Wt 115.3 kg (254 lb 3.1 oz)   SpO2 98%   BMI 41.03 kg/m       Pain: Patient has some mild abdominal pain. Declined medication.   Neuro: A&Ox4.    Respiratory: Lungs clear and equal, on RA.   Cardiac/Neurovascular: HR and pulse WDL.Baseline numbness in lower extremities. Severe lower extremity edema.   GI/: Adequate urine output. 4 BM's today.   Nutrition: 2g NA diet, 2000ml FR.   Activity: Independent.   Skin: Jaundice.   Lines: PIV removed. PICC left arm.   Events this shift: PICC line was placed today. Urine sample sent down to lab.     Goal Outcome Evaluation: Continue with plan of care.       Plan of Care Reviewed With: patient    Overall Patient Progress: no changeOverall Patient Progress: no change    Outcome Evaluation: Continue with plan of care.      "

## 2024-10-16 NOTE — PLAN OF CARE
Goal Outcome Evaluation:      Plan of Care Reviewed With: patient    Overall Patient Progress: no changeOverall Patient Progress: no change    [id number]: A&Ox4, VSS on RA, independent, calls appropriately. 2gm sodium diet & 2000mL fluid restriction. Lymph wraps off. Awaiting PICC placement today, left PIV removed and right PIV saline locked. Pt showered this afternoon.

## 2024-10-16 NOTE — PROGRESS NOTES
[facility] ID SERVICE: Progress Note  Patient:  Stewart Hunt, Date of birth 1985, Medical record number [id number]  Date of Admission: 10/3/2024  Date of Visit:  10/16/2024  Requesting Provider: Denys Carreon         Assessment and Recommendations:     ID Problem List:  MSSA bacteremia  Positive blood culture 10/8 (1/2 bottles), neg on 10/8, 10/10, 10/12   TTE 10/9/24 - No significant valvular abnormalities were noted. No vegetation or mass identified. EF 60-65%   TTE 9/22/24 - Global and regional left ventricular function is normal with an EF of 55-60%.Right ventricular function, chamber size, wall motion, and thickness are normal. Pulmonary artery systolic pressure is normal. No significant valvular abnormalities present. No pericardial effusion is present.  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  Hx of heavy alcohol use, in abstinence since 9/8/2024  Hx of gastric bypass with sleeve (~12 years ago)  Anemia (10.39 on 10/3/24, --> 10.7 on 10/13)    Thrombocytopenia   Elevated bilirubin and AST   Right base atelectasis   Splenomegaly   Dry cough x 6 weeks   - SARScov 2 / Influenza A/B/ RSV neg 9/20/24, 10/3, 10/7/24 and Respi panel neg 10/7 and 10/12/24    GERD   Nausea   Diarrhea with abdominal distention   Bilateral leg edema and pain - No DVT in bilateral lower extremities (doppler US on 10/12/24)   Mild edematous wall thickening of the gallbladder (CT - 10/12/24, not tedner on exam)     Discussion:  40 yo M with MSSA bacteremia, on blood cultures obtained after a febrile episode overnight 10/7. Was also febrile on admission. Then, reportedly concern of respiratory illness per discussion with primary team with negative workup. Unclear if has been bacteremic since then. Based on hx, no hardware in place, except staples from gastric sleeve surgery. No evidence of secondary infection per  exam. Baseline lower back pain without worsening, more in lumbar paraspinal rather spinal. TTE negative for valvular disease. If additional blood cultures become positive then would get SALENA, until then hold off. Likely source is skin, although no obvious skin abnormality. Anticipate 4 weeks course for MSSA bacteriemia.     The patient fevered while on appropriate antibiotics for MSSA. Some concern for clot or possible PE, consider CT PE. Negative CT dental. Unclear source of possible secondary infection; however, fever curve is improving. OK to place PICC from ID perspective. Continue cefazolin as preferred treatment for MSSA bacteremia. Recommend continuing SBP prophylaxis with metronidazole and ciprofloxacin for total of 7 day course.     Patient's WBC increasing as of 10/16 with small irregular nodular and ground-glass opacities throughout bilateral upper lobes. Could be infectious (viral vs aspiration vs fungal) or inflammatory in nature. Possible that he is third-spacing retained fluid, causing inflammation. Due to his immunocompromised state with cirrhosis, will recommend further infectious workup including full hepatitis panel, HIV screening, add-on beta-d-glucan culture, histo/blasto urine antigen testing.     Recommendations:  Antibiotics:  - Start cefazolin IV, plan for 4 week course from last fever.    - Discontinue ceftriaxone  - Continue PO metronidazole 500 mg q8hr (to complete 10/20)  - Continue cipro 500 mg PO daily (to complete 10/20)    Workup:  - Hepatitis panel  - HIV screening  - beta-d-glucan add-on  - histo/blasto urine antigen testing    Plan discussed with patient and attending physician Dr. Kolb. ID will continue to follow.    Renee Murdock, MS4  10/16/24  Available on [employer]      ID Staff Assessment and Recommendations:  This patient was seen, examined and evaluated  by me. I personally reviewed the medical records, vital signs, medications, labs and imaging studies. I agree with the  documentation as above by the medical student and I have edited as needed.      Key findings:  38 yo M with recently diagnosed cirrhosis 2/2 alcohol use, in abstinence since 9/8/2024, who is admitted for management of progressively worsening b/l LE edema. However, also had febrile episode on 10/4 when also leukocytosis, no blood cultures then. Rather thought respiratory illness, negative work up. No antibiotics were received. Another febrile episode on 10/8, blood cultures grew MSSA (1/2 bottles), repeat blood cultures on evening of 10/8 onwards negative. TTE negative. Based on clinical presentation, duration of bacteremia is unclear, whether could have bacteremic on previous febrile episode on 10/4. Recommend 4 weeks of iv cefazolin.      However, has been spiking fevers, without clear source of infection, except previously found MSSA bacteremia. Antibiotic regimen was changed from iv cefazolin to iv ceftriaxone, metronidazole during weekend. Afebrile in past 48 hours. Antibiotics were adjusted again. Continue iv cefazolin (renally dosed) for MSSA bacteremia. Continue po metronidazole, and po ciprofloxacin 500mg BID as SBP prophylaxis x7 days total.     Afebrile, clinically stable. Blood cultures remained negative. Okay to place PICC line.       ID team will continue to follow. Please reach out if any questions or concerns.      Total time spent during this encounter (Chart review, history, physical exam, documentation, MDM, coordination of care): 40 minutes     Shira Kolb MD  Infectious Diseases Staff Physician  [facility]   Date of service (when I saw the patient): 10/16/2024       History of Present Illness:     Stewart Hunt is a 38 yo F with PMHx of recently diagnosed alcoholic cirrhosis, who is admitted due to progressively worsening of lower extremities edema. Febrile episode on admission. Though reportedly c/o respiratory complaints, sore throat with negative work up. Another febrile episode  overnight 10/7. Blood culture is resulted positive, identified MSSA on verigene. Started on empiric vancomycin, pip-tazo. No hx of fever, chills, sweating prior to admission. No hx of hardware. Does have gastric sleeve about 12 years ago.     SHx:  Heavy alcohol use since age 17, in abstinence since 9/8. No smoking, marijuana or illicit drug use. , lives with wife. Does have a pet - Frisian bulldog, healthy. Previously used to breed dogs, but none recently.          Review of Systems:     Complete 12 point review of systems negative except as noted in HPI.          Antimicrobial history:     Current:  Cefazolin 10/9 - 10/13, 10/15 - Present    Prior:  Ceftriaxone 10/8, 10/13 - 10/15  Vancomycin, pipt-azo 10/8 - 10/9       Past Medical History:     Past Medical History:   Diagnosis Date    Acute alcoholic hepatitis (H) 09/2024    Alcohol use disorder, severe, in early remission (H)     Alcoholic cirrhosis (H)     Morbid obesity (H)          Allergies:    No Known Allergies       Family History:   Reviewed and noncontributory.   Family History   Problem Relation Age of Onset    Colon Cancer Mother     Diabetes Father     Pancreatic Cancer Sister     Diabetes Maternal Grandmother     Colon Cancer Cousin     Liver Disease No family hx of           Social History:     Social History     Socioeconomic History    Marital status: Single     Spouse name: Not on file    Number of children: Not on file    Years of education: Not on file    Highest education level: Not on file   Occupational History    Not on file   Tobacco Use    Smoking status: Not on file    Smokeless tobacco: Not on file   Substance and Sexual Activity    Alcohol use: Not Currently     Comment: 2 pints of vodka per day for 2 years. Last drink early sept 2024    Drug use: Never    Sexual activity: Not on file   Other Topics Concern    Not on file   Social History Narrative    Not on file     Social Determinants of Health     Financial Resource Strain:  Low Risk  (10/3/2024)    Financial Resource Strain     Within the past 12 months, have you or your family members you live with been unable to get utilities (heat, electricity) when it was really needed?: No   Food Insecurity: Low Risk  (10/3/2024)    Food Insecurity     Within the past 12 months, did you worry that your food would run out before you got money to buy more?: No     Within the past 12 months, did the food you bought just not last and you didn t have money to get more?: No   Transportation Needs: Low Risk  (10/3/2024)    Transportation Needs     Within the past 12 months, has lack of transportation kept you from medical appointments, getting your medicines, non-medical meetings or appointments, work, or from getting things that you need?: No   Physical Activity: Not on file   Stress: Not on file   Social Connections: Not on file   Interpersonal Safety: Low Risk  (10/5/2024)    Interpersonal Safety     Do you feel physically and emotionally safe where you currently live?: Yes     Within the past 12 months, have you been hit, slapped, kicked or otherwise physically hurt by someone?: No     Within the past 12 months, have you been humiliated or emotionally abused in other ways by your partner or ex-partner?: No   Recent Concern: Interpersonal Safety - High Risk (9/21/2024)    Interpersonal Safety     Do you feel physically and emotionally safe where you currently live?: No     Within the past 12 months, have you been hit, slapped, kicked or otherwise physically hurt by someone?: No     Within the past 12 months, have you been humiliated or emotionally abused in other ways by your partner or ex-partner?: No   Housing Stability: Low Risk  (10/3/2024)    Housing Stability     Do you have housing? : Yes     Are you worried about losing your housing?: No            Physical Examination:     Vital signs:  /64 (BP Location: Right arm)   Pulse 101   Temp 98.8  F (37.1  C) (Oral)   Resp 16   Ht 1.676 m  "(5' 6\")   Wt 115.3 kg (254 lb 3.1 oz)   SpO2 98%   BMI 41.03 kg/m    GENERAL: alert, oriented and no distress, jaundice+, tearful while expressing guilt over current health   EYES: Eyes grossly normal to inspection, PERRL and conjunctivae, normal, icteric sclerae  NECK: no adenopathy, no asymmetry, masses, or scars  RESP: lungs clear to auscultation - decreased breath sounds in right base   CV: regular rate and rhythm, normal S1 S2 ASHLEIGH 2/6   ABDOMEN: soft, nontender, active bowel sounds normal  MS:  moderate edema bilateral legs. legs are not red/ tense bilateral pedal edema and left foot more swollen .   SKIN: +jaundice, no suspicious lesions or rashes. Bruising to L medial malleolus   NEURO: Normal strength and tone, mentation intact and speech normal  PSYCH: mentation appears normal, affect normal    Lines and devices:    PIV CDI, non-tender, no surrounding erythema.    Labs, Microbiology and Imaging studies reviewed.            Laboratory Data:       Microbiology:    Culture   Date Value Ref Range Status   10/12/2024 No growth after 3 days  Preliminary   10/12/2024 No growth after 4 days  Preliminary   10/10/2024 No Growth  Final   10/08/2024 No Growth  Final   10/08/2024 No Growth  Final   10/08/2024 Positive on the 1st day of incubation (A)  Final   10/08/2024 Staphylococcus aureus (AA)  Final     Comment:     1 of 2 bottles   10/03/2024 No Growth  Final   09/20/2024 No Growth  Final     Urine Studies:    Recent Labs   Lab Test 10/12/24  0524 10/08/24  2356 10/06/24  0911 10/03/24  0103 09/20/24  2058   LEUKEST Negative Negative Negative Negative Negative   WBCU 2 4 3 1 1     Hematology Studies:    Recent Labs   Lab Test 10/16/24  0641 10/15/24  0543 10/14/24  0456 10/13/24  0539 10/12/24  0347 10/11/24  0610   WBC 14.3* 11.3* 12.1* 12.0* 12.3*  12.3* 13.0*   HGB 11.2* 11.3* 10.6* 10.7* 11.0* 11.7*   * 104* 105* 104* 105* 105*   * 106* 99* 87* 86* 83*      Metabolic Studies:   Recent Labs "   Lab Test 10/16/24  0641 10/15/24  0543 10/14/24  0456 10/13/24  0539 10/12/24  0347   * 132* 133* 132* 130*   POTASSIUM 4.3 4.4 4.2 4.0 4.2   CHLORIDE 104 105 105 103 101   CO2 18* 18* 17* 18* 18*   BUN 13.7 13.4 14.9 14.6 17.1   CR 0.70 0.69 0.70 0.72 0.77   GFRESTIMATED >90 >90 >90 >90 >90     Hepatic Studies:   Recent Labs   Lab Test 10/16/24  0641 10/15/24  0543 10/14/24  0456 10/13/24  0539 10/12/24  0347 10/11/24  0610   BILITOTAL 27.8* 26.0* 25.3* 26.9* 28.3* 30.0*   ALKPHOS 152* 138 140 128 150 138   ALBUMIN 3.4* 3.2* 3.2* 3.3* 3.5 3.6   AST 91* 90* 86* 89* 92* 91*   ALT 46 43 39 40 47 57              Last check of C difficile  C Difficile Toxin B by PCR   Date Value Ref Range Status   09/21/2024 Negative Negative Final     Comment:     A negative result does not exclude actual disease due to C. difficile and may be due to improper collection, handling and storage of the specimen or the number of organisms in the specimen is below the detection limit of the assay.            Imaging:     Recent Results (from the past 48 hour(s))   CT Chest Pulmonary Embolism w Contrast    Narrative    EXAMINATION: CT CHEST PULMONARY EMBOLISM W CONTRAST on 10/14/2024  11:59 PM.    INDICATION: fevers with uncertain etiology, chest pain and sob. Would  need to r/o PE. Coordinate    COMPARISON: X-ray 10/12/2024 and CT abdomen and pelvis 10/12/2024    TECHNIQUE: CT imaging obtained through the chest with contrast.  Coronal and axial MIP reformatted images obtained. Three-dimensional  (3D) post-processed angiographic images were reconstructed, archived  to PACS and used in interpretation of this study.     CONTRAST: 74 ml isovue 370 IV    FINDINGS:    Lines and tubes: None.    Vessels: No central filling defect consistent with a pulmonary  embolism. Normal caliber thoracic aorta and main pulmonary artery.    Mediastinum: No thyroid nodules. Central tracheobronchial tree is  patent. Heart size is normal. No pericardial  effusion. No thoracic  lymphadenopathy. Scattered prominent subcentimeter mediastinal nodes.  Normal appearance of the esophagus.    Lungs: Proximal central airways are patent. Mild diffuse bronchial  wall thickening. No consolidation. Atelectasis within the right middle  lobe and right lower lobe with elevation of the right hemidiaphragm.  Nonspecific small irregular and groundglass opacities throughout the  bilateral upper lobes (for example as seen on series 6, images 79, 96,  100)..    Mosaic attenuation of the superior lower lobes may reflect air  trapping.    No pleural effusion. No pneumothorax    Bones and soft tissues: No acute or suspicious osseous abnormality.  Degenerative changes throughout the visualized spine. Mild anterior  wedging of lower thoracic vertebral bodies.    Upper abdomen: Limited visualized portions of the upper abdomen  demonstrate no acute appearing abnormality. Please refer to recently  performed CT abdomen and pelvis 10/12/2024 for additional findings and  recommendations.      Impression    IMPRESSION:   1. No central filling defect consistent with a pulmonary embolism.  2. Small irregular nodular and groundglass opacities throughout the  bilateral upper lobes, likely likely of infectious or inflammatory  radiology.   2. Please refer to recently performed CT abdomen and pelvis 10/12/2024  for additional findings recommendations below the diaphragm.     I, Dr. Gutierrez, discussed the above findings by telephone with Dr. Medina  on 10/15/2024 12:35 PM.    In the event of a positive result for acute pulmonary embolism  resulting in right heart strain, consider calling the   [facility] patient placement ([phone]) for PERT (Pulmonary Embolism  Response Team) Activation    I have personally reviewed the examination and initial interpretation  and I agree with the findings.    MIAN GUTIERREZ MD         SYSTEM ID:  [id number]   CT Dental wo Contrast    Narrative    CT DENTAL WO CONTRAST  10/15/2024 12:00 AM    History:  evaluate for dental caries as source for recurrent fevers,  tooth pain    Comparison:  None      TECHNIQUE: 3-D reconstruction by the technologists, with curved  multiplanar reformat of thin section imaging through the mandible and  maxilla obtained without intravenous contrast.    FINDINGS:  No significant soft tissue swelling or mass. Normal facial bone  alignment. No bony erosion.     Mucous retention cyst at the inferior medial aspect of the left  maxillary sinus. Otherwise, visualized portions of the paranasal  sinuses are clear. Normal temporomandibular joints.      Impression    IMPRESSION:  Periapical lucency adjacent to the right mandibular  central incisor, previously treated by root canal. Otherwise, no  evidence of periapical abscess.    I have personally reviewed the examination and initial interpretation  and I agree with the findings.    RANDY BURTON MD         SYSTEM ID:  [id number]

## 2024-10-16 NOTE — PROGRESS NOTES
Home Infusion  Received referral from Jose Walker  RNCC for IV Cefazolin.  Benefits verified.  Patient has [employer] and is covered 100%.  Called and spoke with Stewart to review home infusion services, review benefits and offer choice of providers.  Patient would like to remain in the [facility] system and will use [facility] for home infusion.  Confirmed discharge address, phone, and emergency contact information. Patient denies recent illness (not related to pre-existing conditions) or travel in the house hold. Confirmed allergies. No home health agency has been seeing the patient.     Stewart with wife are willing to learn and manage home IV therapy.  Questions answered.  Plan for [facility] to provide nursing services after discharge.    I will continue to follow until discharge and update pt once final orders are determined.    Thank you for the referral    Red Ely LPN, Coordinator   [facility]   [email or web address]  Office: [phone]

## 2024-10-16 NOTE — PROCEDURES
[facility]    Double Lumen PICC Placement    Date/Time: 10/16/2024 5:26 PM    Performed by: Dany Neves RN  Authorized by: Katiuska Medina MD  Indications: Antibiotic.      UNIVERSAL PROTOCOL   Site Marked: Yes  Prior Images Obtained and Reviewed:  Yes  Required items: Required blood products, implants, devices and special equipment available    Patient identity confirmed:  Verbally with patient, hospital-assigned identification number, arm band and provided demographic data  Patient was reevaluated immediately before administering moderate or deep sedation or anesthesia  Confirmation Checklist:  Patient's identity using two indicators, procedure was appropriate and matched the consent or emergent situation, correct equipment/implants were available and relevant allergies  Time out: Immediately prior to the procedure a time out was called    Universal Protocol: the Joint Commission Universal Protocol was followed    Preparation: Patient was prepped and draped in usual sterile fashion       ANESTHESIA    Anesthesia:  See MAR for details  Local Anesthetic:  Lidocaine 1% without epinephrine  Anesthetic Total (mL):  1      SEDATION    Patient Sedated: No        Preparation: skin prepped with ChloraPrep  Skin prep agent: skin prep agent completely dried prior to procedure  Sterile barriers: maximum sterile barriers were used: cap, mask, sterile gown, sterile gloves, and large sterile sheet  Hand hygiene: hand hygiene performed prior to central venous catheter insertion  Type of line used: PICC  Catheter type: double lumen  Lumen Identification: Purple and Red  Catheter size: 5 Fr  Brand: Bard  Lot number: [id number]  Placement method: venipuncture, MST, ultrasound and tip navigation system  Number of attempts: 1  Difficulty threading catheter: no  Successful placement: yes  Orientation: left    Location: basilic vein (vein diameter - 0.43 cm)  Tip Location: SVC  Site  rationale: Pt preference  Arm circumference: adults 10 cm  Extremity circumference: 29  Visible catheter length: 5  Total catheter length: 45  Dressing and securement: alcohol impregnated caps, chlorhexidine disc applied, transparent dressing, tissue adhesive, subcutaneous anchor securement system, sterile dressing applied and site cleansed  Post procedure assessment: blood return through all ports, free fluid flow and placement verified by 3CG technology  PROCEDURE   Patient Tolerance:  Patient tolerated the procedure well with no immediate complicationsDescribe Procedure: Patient tolerated well and denied pain immediately after procedure.  PICC tip is in satisfactory location as verified by [employer] 3CG Tip Confirmation System. PICC is OK to use.  Disposal: sharps and needle count correct at the end of procedure, needles and guidewire disposed in sharps container

## 2024-10-16 NOTE — PROGRESS NOTES
"Care Management Follow Up    Length of Stay (days): 9    Expected Discharge Date: 10/18/2024     Concerns to be Addressed: Discharge planning     Patient plan of care discussed at interdisciplinary rounds: Yes    Anticipated Discharge Disposition:  Home, Home Infusion     Anticipated Discharge Services: None  Anticipated Discharge DME: None    Patient/family educated on Medicare website which has current facility and service quality ratings:    Education Provided on the Discharge Plan:    Patient/Family in Agreement with the Plan:      Referrals Placed by CM/SW:   [facility]  Private pay costs discussed: Not applicable    Discussed  Partnership in Safe Discharge Planning  document with patient/family: No     Handoff Completed:  No, not at this time    Additional Information:  Yesterday Infectious Disease recommended IV Cefazolin every 8 hours for 4 weeks. RNCC sent a referral to [facility] yesterday.   Per [facility] yesterday (at that time pt was on Ceftriaxone): \"patient has coverage for IV ABX (Ceftriaxone) through their [employer] plan, patient has a deductible of $2,000.00 met $2,000.00, once the deductible is met patient is covered at 75%.\"  \"The patient has an out of pocket of $7,000.00 met $7,000.00, once the out of pocket is met patient is covered at 100%.\"   _________________________________    Message sent to Dr Medina inquiring about anticipated discharge date. She reported pt's labs are a little worse today, will need to see trend. If improving pt may be ready for discharge on Friday, 10-18 at the earliest.     Next Steps:  RNCC will follow for discharge planning. Keep [facility] updated.   ---Add Home Infusion orders to AVS.         [facility]  [address] [place] [address]  Phone: [phone]  Fax: [phone]  See coverage info above.      Yumiko Simmons RN Care Coordinator  [facility]  On 10- RNCC coverage for [address] rooms " [phone]. [place] [phone].

## 2024-10-16 NOTE — PROGRESS NOTES
Home Infusion  Stewart is expected to discharge within a couple days and will be going home on IV ancef q 8 hrs.   He has never done home IV therapy before nor has his wife Paula.      Met with Stewart at bedside with Paula listening via phone to discuss discharge plans and provide them with information about IV abx therapy with [facility] services.  Explained about administration method which will be via IVP,  showed Stewart the teaching materials and explained that we  can provide teaching at bedside closer to discharge for CG or self-administration.  Informed them about supplies and delivery of supplies, storage of medication, dosing schedule, plan for SNV and 24/7 availability of [facility] staff while on IV therapy.       Stewart and Paula verbalized understanding of all information given.   They is willing and able to learn and manage home IV therapy and requested their teaching for Fri afternoon.    Questions answered.  Will continue to follow ,update pt with final details as appropriate and plan to provide teaching on Fri 10/18.     Michelle TAVARES  Nurse Liaison  [facility] I [email or web address]  [address] [place] [address]  [email or web address]  [phone] M-F I [facility] main: [phone]       rolling walker/independent/needs device

## 2024-10-16 NOTE — PLAN OF CARE
"  Status: Full Code     VS: /53 (BP Location: Left arm)   Pulse 96   Temp 98.6  F (37  C) (Oral)   Resp 16   Ht 1.676 m (5' 6\")   Wt 116.1 kg (256 lb)   SpO2 98%   BMI 41.32 kg/m      Neuros: A&OX4, numbness to lower extremities. Severe lower extremity edema. Lymph wraps on  Cardiac: WNL  Respiratory: WNL pt RA  GI/: voiding spontaneously   Diet/Nausea: pt denies nausea,   2g NA diet, 1500ml   Skin:   Lymph wraps on, jaundiced  LDA: Left PIV (SL), R PIV (SL)   Pain: control with Oxy and dilaudid   Activity: independent  New changes this shift: no new change this shift   Plan: continues plan of care          Goal Outcome Evaluation:      Plan of Care Reviewed With: patient    Overall Patient Progress: no changeOverall Patient Progress: no change           "

## 2024-10-16 NOTE — PROGRESS NOTES
HEPATOLOGY PROGRESS NOTE  Patient:  Stewart Hunt, Date of birth 1985  Date of Visit:  10/16/2024  Referring Provider No ref. provider found         IMPRESSION:  Stewart Hunt is a 39 year old male with a history of alcohol use disorder causing alcohol associated cirrhosis complicated by nonresponder to steroids, hx sleeve gastrectomy, anasarca and HE who was admitted with lower extremity edema. His inpatient course has been complicated by hyponatremia, staph aureus bacteremia and fevers.       RECOMMENDATIONS:    # Staph aeurus bacteremia  # Pneumonia  - Chest CT with GGO in upper lobes along with right lobe atelectasis. Encouraged using incentive spirometer. Consider RT if needed.   - ID following with plans for 4 weeks of abx following last fever. Remains on ancef and flagyl.   - has been on prednisone. With having fevers at night, consider cortisol level in am.     # Alcohol use disorder  # Alcohol associated cirrhosis  - MELD 32, Continues to have elevated t. Bili, below peak of 31. Was a nonresponder to steroids.   - Very small amount of ascites on CT abd. (10/12) Has not needed clau. No evidence of HCC  - Will need to engage in treatment program in the OP, has reached out to programs for when he is discharged.   - no evidence of GIB.   - EGD 9/10 without EV.     # Hepatic encephalopathy  - has needed less doses of lactulose inpatient. Mentation clear.   - wife reports continues to have episodes of mild memory loss.   - advised need to have 2-3 BM's per day, titrate lactulose.   - may also need rifaximin if mentation worsens.     Patient was discussed with attending physician, Dr. Leventhal.  The above note reflects our joint plan.     Next follow up appointment: Dr. Dodd 10/21/24    Thank you for the opportunity to be involved with  Stewart Hunt care. Please call with any questions or concerns.    Julia Maloney, APRN, CNP  Inpatient Hepatology [facility]              Subjective    Continues to  "have intermittent cough. No current fevers but does have intermittent abdominal pain. Now on compression stockings that have slightly improved his lower extremity edema.         Objective    VS: /64 (BP Location: Right arm)   Pulse 101   Temp 98.8  F (37.1  C) (Oral)   Resp 16   Ht 1.676 m (5' 6\")   Wt 116.1 kg (256 lb)   SpO2 98%   BMI 41.32 kg/m        General: In no acute distress, no facial muscle wasting  Neuro: AOx3, No asterixis  HEENT:   moderate scleral icterus, Nooral lesions  CV:  Skin warm and dry  Lungs: Respirations even and nonlabored on room air  Abd:  Nontender, nondistended   Extrem:  +1-2 lower  peripheral edema   Skin:  mild jaundice  Psych: pleasant    MEDICATIONS:  Current Facility-Administered Medications   Medication Dose Route Frequency Provider Last Rate Last Admin    acetaminophen (TYLENOL) tablet 325 mg  325 mg Oral Q4H PRN Yazmin Francis PA-C   325 mg at 10/15/24 0551    albuterol (PROVENTIL HFA/VENTOLIN HFA) inhaler  2-3 puff Inhalation Q6H PRN Jennifer Cartagena APRN CNP        benzocaine-menthol (CHLORASEPTIC MAX) 15-10 MG lozenge 1 lozenge  1 lozenge Buccal Q1H PRN Dominique Vera PA-C   1 lozenge at 10/09/24 2036    ceFAZolin (ANCEF) 2 g in 100 mL D5W intermittent infusion  2 g Intravenous Q8H Katiuska Medina  mL/hr at 10/15/24 2300 2 g at 10/15/24 2300    ciprofloxacin (CIPRO) tablet 500 mg  500 mg Oral Q12H [facility] (08/20) Katiuska Medina MD   500 mg at 10/15/24 2003    folic acid (FOLVITE) tablet 1 mg  1 mg Oral Daily Deb Erickson MD   1 mg at 10/15/24 0755    furosemide (LASIX) tablet 20 mg  20 mg Oral Daily Leroy Campa MD   20 mg at 10/15/24 0755    guaiFENesin (ROBITUSSIN) 20 mg/mL solution 200 mg  200 mg Oral Q4H PRN Rosa Valente APRN CNP   200 mg at 10/15/24 1716    lactulose (CEPHULAC) Packet 10 g  10 g Oral TID Leroy Campa MD   10 g at 10/16/24 0508    Lidocaine (LIDOCARE) 4 % Patch 1 patch  1 patch Transdermal Q24h " Rosa Valente APRN CNP   1 patch at 10/15/24 2004    metroNIDAZOLE (FLAGYL) tablet 500 mg  500 mg Oral TID Leroy Campa MD   500 mg at 10/15/24 2003    multivitamin w/minerals (THERA-VIT-M) tablet 1 tablet  1 tablet Oral Daily Deb Erickson MD   1 tablet at 10/15/24 0755    nitroGLYcerin (RECTIV) 0.4 % rectal ointment 1.5 mg  1 inch Rectal Q12H Rosa Valente APRN CNP        ondansetron (ZOFRAN ODT) ODT tab 4 mg  4 mg Oral Q6H PRN Deb Erickson MD        Or    ondansetron (ZOFRAN) injection 4 mg  4 mg Intravenous Q6H PRN Deb Erickson MD        pantoprazole (PROTONIX) EC tablet 40 mg  40 mg Oral QAM AC Rosa Valente APRN CNP   40 mg at 10/15/24 0755    phenol (CHLORASEPTIC) 1.4 % spray 1 mL  1 spray Mouth/Throat Q1H PRN Dominique Vera PA-C   1 mL at 10/05/24 2037    senna-docusate (SENOKOT-S/PERICOLACE) 8.6-50 MG per tablet 1 tablet  1 tablet Oral BID PRN Deb Erickson MD        Or    senna-docusate (SENOKOT-S/PERICOLACE) 8.6-50 MG per tablet 2 tablet  2 tablet Oral BID PRN Deb Erickson MD        simethicone (MYLICON) chewable tablet 80 mg  80 mg Oral BID Deb Erickson MD   80 mg at 10/15/24 2003    sodium chloride (OCEAN) 0.65 % nasal spray 1 spray  1 spray Both Nostrils Q1H PRN Rosa Valente APRN CNP        spironolactone (ALDACTONE) tablet 50 mg  50 mg Oral Daily Leroy Campa MD   50 mg at 10/15/24 0755    thiamine (B-1) tablet 100 mg  100 mg Oral Daily Deb Erickson MD   100 mg at 10/15/24 0755       REVIEW OF LABORATORY, PATHOLOGY AND IMAGING RESULTS:  BMP  Recent Labs   Lab 10/15/24  0543 10/14/24  0456 10/13/24  0539 10/12/24  0347   * 133* 132* 130*   POTASSIUM 4.4 4.2 4.0 4.2   CHLORIDE 105 105 103 101   LELAND 8.0* 7.9* 8.0* 8.0*   CO2 18* 17* 18* 18*   BUN 13.4 14.9 14.6 17.1   CR 0.69 0.70 0.72 0.77   GLC 89 87 97 106*     CBC  Recent Labs   Lab 10/16/24  0641 10/15/24  0543 10/14/24  0456  10/13/24  0539   WBC 14.3* 11.3* 12.1* 12.0*   RBC 3.16* 3.16* 2.96* 3.02*   HGB 11.2* 11.3* 10.6* 10.7*   HCT 32.7* 32.9* 31.1* 31.3*   * 104* 105* 104*   MCH 35.4* 35.8* 35.8* 35.4*   MCHC 34.3 34.3 34.1 34.2   RDW 18.7* 18.6* 18.6* 18.4*   * 106* 99* 87*     INR  Recent Labs   Lab 10/16/24  0641 10/15/24  0543 10/14/24  0456 10/13/24  0539   INR 3.08* 2.97* 3.00* 3.13*     LFTs  Recent Labs   Lab 10/15/24  0543 10/14/24  0456 10/13/24  0539 10/12/24  0347   ALKPHOS 138 140 128 150   AST 90* 86* 89* 92*   ALT 43 39 40 47   BILITOTAL 26.0* 25.3* 26.9* 28.3*   PROTTOTAL  --   --   --  4.8*   ALBUMIN 3.2* 3.2* 3.3* 3.5        MELD 3.0: 32 at 10/16/2024  6:41 AM  MELD-Na: 32 at 10/16/2024  6:41 AM  Calculated from:  Serum Creatinine: 0.69 mg/dL (Using min of 1 mg/dL) at 10/15/2024  5:43 AM  Serum Sodium: 132 mmol/L at 10/15/2024  5:43 AM  Total Bilirubin: 26.0 mg/dL at 10/15/2024  5:43 AM  Serum Albumin: 3.2 g/dL at 10/15/2024  5:43 AM  INR(ratio): 3.08 at 10/16/2024  6:41 AM  Age at listing (hypothetical): 39 years  Sex: Male at 10/16/2024  6:41 AM        Imaging Results:  CT dental 10/15/24  IMPRESSION:  Periapical lucency adjacent to the right mandibular  central incisor, previously treated by root canal. Otherwise, no  evidence of periapical abscess.    CT abd w 10/12/24  IMPRESSION:   1. No acute intra-abdominal pathology. Normal appendix.  2. Cirrhotic hepatic morphology with evidence of portal hypertension,  including small volume ascites, splenomegaly, and upper abdominal  portosystemic collaterals.  3. Decreased hepatomegaly and nodular hepatic steatosis since  8/27/2024.

## 2024-10-17 ENCOUNTER — APPOINTMENT (OUTPATIENT)
Dept: OCCUPATIONAL THERAPY | Facility: CLINIC | Age: 39
DRG: 432 | End: 2024-10-17
Payer: COMMERCIAL

## 2024-10-17 LAB
1,3 BETA GLUCAN SER-MCNC: NORMAL PG/ML
ALBUMIN SERPL BCG-MCNC: 3.5 G/DL (ref 3.5–5.2)
ALP SERPL-CCNC: 157 U/L (ref 40–150)
ALT SERPL W P-5'-P-CCNC: 42 U/L (ref 0–70)
ANION GAP SERPL CALCULATED.3IONS-SCNC: 10 MMOL/L (ref 7–15)
AST SERPL W P-5'-P-CCNC: 96 U/L (ref 0–45)
BACTERIA BLD CULT: NO GROWTH
BACTERIA BLD CULT: NO GROWTH
BILIRUB SERPL-MCNC: 28.9 MG/DL
BUN SERPL-MCNC: 14.4 MG/DL (ref 6–20)
CALCIUM SERPL-MCNC: 8.7 MG/DL (ref 8.8–10.4)
CHLORIDE SERPL-SCNC: 104 MMOL/L (ref 98–107)
CREAT SERPL-MCNC: 0.62 MG/DL (ref 0.67–1.17)
EGFRCR SERPLBLD CKD-EPI 2021: >90 ML/MIN/1.73M2
ERYTHROCYTE [DISTWIDTH] IN BLOOD BY AUTOMATED COUNT: 18.6 % (ref 10–15)
GLUCOSE SERPL-MCNC: 102 MG/DL (ref 70–99)
HCO3 SERPL-SCNC: 18 MMOL/L (ref 22–29)
HCT VFR BLD AUTO: 32.3 % (ref 40–53)
HGB BLD-MCNC: 11.2 G/DL (ref 13.3–17.7)
INR PPP: 3.2 (ref 0.85–1.15)
MCH RBC QN AUTO: 35.6 PG (ref 26.5–33)
MCHC RBC AUTO-ENTMCNC: 34.7 G/DL (ref 31.5–36.5)
MCV RBC AUTO: 103 FL (ref 78–100)
OBSERVATION IMP: NORMAL
PLATELET # BLD AUTO: 133 10E3/UL (ref 150–450)
POTASSIUM SERPL-SCNC: 4.3 MMOL/L (ref 3.4–5.3)
PROT SERPL-MCNC: ABNORMAL G/DL
RBC # BLD AUTO: 3.15 10E6/UL (ref 4.4–5.9)
SODIUM SERPL-SCNC: 132 MMOL/L (ref 135–145)
WBC # BLD AUTO: 12.8 10E3/UL (ref 4–11)

## 2024-10-17 PROCEDURE — 99232 SBSQ HOSP IP/OBS MODERATE 35: CPT | Performed by: STUDENT IN AN ORGANIZED HEALTH CARE EDUCATION/TRAINING PROGRAM

## 2024-10-17 PROCEDURE — 250N000013 HC RX MED GY IP 250 OP 250 PS 637

## 2024-10-17 PROCEDURE — 250N000013 HC RX MED GY IP 250 OP 250 PS 637: Performed by: STUDENT IN AN ORGANIZED HEALTH CARE EDUCATION/TRAINING PROGRAM

## 2024-10-17 PROCEDURE — 250N000011 HC RX IP 250 OP 636: Mod: JZ | Performed by: STUDENT IN AN ORGANIZED HEALTH CARE EDUCATION/TRAINING PROGRAM

## 2024-10-17 PROCEDURE — 250N000011 HC RX IP 250 OP 636: Performed by: INTERNAL MEDICINE

## 2024-10-17 PROCEDURE — 36415 COLL VENOUS BLD VENIPUNCTURE: CPT

## 2024-10-17 PROCEDURE — 97140 MANUAL THERAPY 1/> REGIONS: CPT | Mod: GO

## 2024-10-17 PROCEDURE — 250N000013 HC RX MED GY IP 250 OP 250 PS 637: Performed by: INTERNAL MEDICINE

## 2024-10-17 PROCEDURE — 85610 PROTHROMBIN TIME: CPT

## 2024-10-17 PROCEDURE — 80048 BASIC METABOLIC PNL TOTAL CA: CPT

## 2024-10-17 PROCEDURE — 120N000002 HC R&B MED SURG/OB UMMC

## 2024-10-17 PROCEDURE — 85027 COMPLETE CBC AUTOMATED: CPT

## 2024-10-17 RX ADMIN — CEFAZOLIN SODIUM 2 G: 2 INJECTION, SOLUTION INTRAVENOUS at 15:26

## 2024-10-17 RX ADMIN — Medication 1 LOZENGE: at 13:40

## 2024-10-17 RX ADMIN — Medication 5 ML: at 17:08

## 2024-10-17 RX ADMIN — GUAIFENESIN 200 MG: 200 SOLUTION ORAL at 20:07

## 2024-10-17 RX ADMIN — METRONIDAZOLE 500 MG: 500 TABLET ORAL at 13:40

## 2024-10-17 RX ADMIN — SIMETHICONE 80 MG: 80 TABLET, CHEWABLE ORAL at 20:03

## 2024-10-17 RX ADMIN — CEFAZOLIN SODIUM 2 G: 2 INJECTION, SOLUTION INTRAVENOUS at 23:19

## 2024-10-17 RX ADMIN — CIPROFLOXACIN 500 MG: 500 TABLET ORAL at 08:18

## 2024-10-17 RX ADMIN — METRONIDAZOLE 500 MG: 500 TABLET ORAL at 20:03

## 2024-10-17 RX ADMIN — LIDOCAINE 1 PATCH: 4 PATCH TOPICAL at 20:03

## 2024-10-17 RX ADMIN — Medication 1 TABLET: at 12:21

## 2024-10-17 RX ADMIN — PANTOPRAZOLE SODIUM 40 MG: 40 TABLET, DELAYED RELEASE ORAL at 08:18

## 2024-10-17 RX ADMIN — SPIRONOLACTONE 50 MG: 50 TABLET ORAL at 08:18

## 2024-10-17 RX ADMIN — SIMETHICONE 80 MG: 80 TABLET, CHEWABLE ORAL at 08:18

## 2024-10-17 RX ADMIN — THIAMINE HCL TAB 100 MG 100 MG: 100 TAB at 08:18

## 2024-10-17 RX ADMIN — CEFAZOLIN SODIUM 2 G: 2 INJECTION, SOLUTION INTRAVENOUS at 08:17

## 2024-10-17 RX ADMIN — ACETAMINOPHEN 325 MG: 325 TABLET, FILM COATED ORAL at 20:02

## 2024-10-17 RX ADMIN — LACTULOSE 10 G: 10 POWDER, FOR SOLUTION ORAL at 05:01

## 2024-10-17 RX ADMIN — METRONIDAZOLE 500 MG: 500 TABLET ORAL at 08:17

## 2024-10-17 RX ADMIN — FUROSEMIDE 20 MG: 20 TABLET ORAL at 08:17

## 2024-10-17 RX ADMIN — ONDANSETRON 4 MG: 4 TABLET, ORALLY DISINTEGRATING ORAL at 20:07

## 2024-10-17 RX ADMIN — GUAIFENESIN 200 MG: 200 SOLUTION ORAL at 13:40

## 2024-10-17 RX ADMIN — FOLIC ACID 1 MG: 1 TABLET ORAL at 08:17

## 2024-10-17 ASSESSMENT — ACTIVITIES OF DAILY LIVING (ADL)
ADLS_ACUITY_SCORE: 23

## 2024-10-17 NOTE — PROGRESS NOTES
[facility] ID SERVICE: Progress Note  Patient:  Stewart Hunt, Date of birth 1985, Medical record number [id number]  Date of Admission: 10/3/2024  Date of Visit:  10/17/2024  Requesting Provider: Denys Carreon         Assessment and Recommendations:     ID Problem List:  MSSA bacteremia  Positive blood culture 10/8 (1/2 bottles), neg on 10/8, 10/10, 10/12   TTE 10/9/24 - No significant valvular abnormalities were noted. No vegetation or mass identified. EF 60-65%   TTE 9/22/24 - Global and regional left ventricular function is normal with an EF of 55-60%.Right ventricular function, chamber size, wall motion, and thickness are normal. Pulmonary artery systolic pressure is normal. No significant valvular abnormalities present. No pericardial effusion is present.  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  Hx of heavy alcohol use, in abstinence since 9/8/2024  Hx of gastric bypass with sleeve (~12 years ago)  Anemia (10.39 on 10/3/24, --> 10.7 on 10/13)    Thrombocytopenia   Elevated bilirubin and AST   Right base atelectasis   Splenomegaly   Dry cough x 6 weeks   - SARScov 2 / Influenza A/B/ RSV neg 9/20/24, 10/3, 10/7/24 and Respi panel neg 10/7 and 10/12/24    GERD   Nausea   Diarrhea with abdominal distention   Bilateral leg edema and pain - No DVT in bilateral lower extremities (doppler US on 10/12/24)   Mild edematous wall thickening of the gallbladder (CT - 10/12/24, not tender on exam)     Discussion:  40 yo M with MSSA bacteremia, on blood cultures obtained after a febrile episode overnight 10/7. Was also febrile on admission. Then, reportedly concern of respiratory illness per discussion with primary team with negative workup. Unclear if has been bacteremic since then. Based on hx, no hardware in place, except staples from gastric sleeve surgery. No evidence of secondary infection per  exam. Baseline lower back pain without worsening, more in lumbar paraspinal rather spinal. TTE negative for valvular disease. If additional blood cultures become positive then would get SALENA, until then hold off. Likely source is skin, although no obvious skin abnormality. Anticipate 4 weeks course for MSSA bacteriemia.     The patient fevered while on appropriate antibiotics for MSSA. Some concern for clot or possible PE, consider CT PE. Negative CT dental. Unclear source of possible secondary infection; however, fever curve is improving. OK to place PICC from ID perspective. Continue cefazolin as preferred treatment for MSSA bacteremia. Recommend continuing SBP prophylaxis with metronidazole and ciprofloxacin for total of 7 day course.     Patient's WBC increasing as of 10/16 with small irregular nodular and ground-glass opacities throughout bilateral upper lobes. Could be infectious (viral vs aspiration vs fungal) or inflammatory in nature. Possible that he is third-spacing retained fluid, causing inflammation. Due to his immunocompromised state with cirrhosis, recommended further infectious workup which has been negative. Given patient's clinical improvement, we feel comfortable discharging with 4 weeks of cefazolin and ID outpatient follow up.     Recommendations:  Antibiotics:  - Start cefazolin IV, plan for 4 week course from first negative blood culture (to complete 11/5)  - Continue PO metronidazole 500 mg q8hr (to complete 10/20)  - Continue cipro 500 mg PO daily (to complete 10/20)  - ID will schedule outpatient follow up.       Plan discussed with patient and attending physician Dr. Kolb. ID will sign off.    Renee Murdock, MS4  10/17/24  Available on [employer]    ID Staff Assessment and Recommendations:  This patient was seen, examined and evaluated  by me. I personally reviewed the medical records, vital signs, medications, labs and imaging studies. I agree with the documentation as above by the medical  student and I have edited as needed.      Key findings:  38 yo M with recently diagnosed cirrhosis 2/2 alcohol use, in abstinence since 9/8/2024, who is admitted for management of progressively worsening b/l LE edema. However, also had febrile episode on 10/4 when also leukocytosis, no blood cultures then. Rather thought respiratory illness, negative work up. No antibiotics were received. Another febrile episode on 10/8, blood cultures grew MSSA (1/2 bottles), repeat blood cultures on evening of 10/8 onwards negative. TTE negative. Based on clinical presentation, duration of bacteremia is unclear, whether could have bacteremic on previous febrile episode on 10/4. Recommend 4 weeks of iv cefazolin.      However, has been spiking fevers, without clear source of infection, except previously found MSSA bacteremia. Antibiotic regimen was changed from iv cefazolin to iv ceftriaxone, metronidazole during weekend. Afebrile in past 48 hours. Antibiotics were adjusted again. Continue iv cefazolin (renally dosed) for MSSA bacteremia. Continue po metronidazole, and po ciprofloxacin 500mg BID as SBP prophylaxis x7 days total.     Afebrile, clinically stable. Blood cultures remained negative. Leukocytosis is down trending, no focal signs/symptoms of active infection.     LUE PICC is placed on 10/16/2024.     Please see OPAT note below for additional recommendations.    ========================================================  Primary team:  Place order panel  OPAT Pharmacy ([facility]) Review and ID Care Transition   Place imaging order(s) requested above prior to discharge.  Contact ID teams about missed ID clinic appointments and/or ongoing therapy needs.    Prolonged Parenteral/Oral Antibiotic Recommendations and ID Follow up  This template provides final ID recommendations as of this date.     Infectious Diseases Indication: MSSA bacteremia    Antibiotic Information  Name of Antibiotic Dose of Antibiotic1 Anticipated duration  "Effective start date2 End date     IV cefazolin    2g q8h   4 weeks   10/101/2024    11/7/2024                 1.Dose of antibiotic will need to be renally adjusted if creatinine clearance changes  2.Effective start date is the date of adequate therapy with appropriate spectrum    Method of antibiotic delivery:PICC line (LUE). Is the line being used for another indication besides antimicrobials? No At the end of therapy should the line used for antimicrobials be removed or de-accessed? No. Selecting \"yes\" will function as written order to remove PICC line or de-access the indwelling line at the end of therapy.    Weekly labs required: CBC with diff and CMP. Dr. Kolb will follow labs at discharge until ID follow up. Fax labs to ID clinic.    We will attempt to make OPAT appointments within 2 weeks of discharge based on clinic availability.  Provider: Cortes, timing of visit  in 2-3 weeks , and the type of clinic appointment visit Okay for in person or a virtual visit.     Patients discharged to [facility] will be seen by [facility] Infectious Diseases group if ID follow up is need. [facility] ID academic group is not credentialed there.    ID provider route note: \"P P Infectious Disease Adult [facility]\"    [facility] ID Clinic Information:  Phone: [phone]  Fax: [phone] (Attention ID clinic nurses)  ========================================================     ID team will sign off now. Please reach out if any questions or concerns.     Recommendations are discussed with primary team and RNCC.   Updated the patient and family at bedside.     Total time spent during this encounter (Chart review, history, physical exam, documentation, MDM, coordination of care): 40 minutes     Shira Kolb MD  Infectious Diseases Staff Physician  Walker faustin   Date of service (when I saw the patient): 10/17/2024       History of Present Illness:     Stewart Hunt is a 40 yo F with PMHx of " recently diagnosed alcoholic cirrhosis, who is admitted due to progressively worsening of lower extremities edema. Febrile episode on admission. Though reportedly c/o respiratory complaints, sore throat with negative work up. Another febrile episode overnight 10/7. Blood culture is resulted positive, identified MSSA on verigene. Started on empiric vancomycin, pip-tazo. No hx of fever, chills, sweating prior to admission. No hx of hardware. Does have gastric sleeve about 12 years ago.     SHx:  Heavy alcohol use since age 17, in abstinence since 9/8. No smoking, marijuana or illicit drug use. , lives with wife. Does have a pet - fabi bulldog, healthy. Previously used to breed dogs, but none recently.          Review of Systems:     Complete 12 point review of systems negative except as noted in HPI.          Antimicrobial history:     Current:  Cefazolin 10/9 - 10/13, 10/15 - Present    Prior:  Ceftriaxone 10/8, 10/13 - 10/15  Vancomycin, pipt-azo 10/8 - 10/9       Past Medical History:     Past Medical History:   Diagnosis Date    Acute alcoholic hepatitis (H) 09/2024    Alcohol use disorder, severe, in early remission (H)     Alcoholic cirrhosis (H)     Morbid obesity (H)          Allergies:    No Known Allergies       Family History:   Reviewed and noncontributory.   Family History   Problem Relation Age of Onset    Colon Cancer Mother     Diabetes Father     Pancreatic Cancer Sister     Diabetes Maternal Grandmother     Colon Cancer Cousin     Liver Disease No family hx of           Social History:     Social History     Socioeconomic History    Marital status: Single     Spouse name: Not on file    Number of children: Not on file    Years of education: Not on file    Highest education level: Not on file   Occupational History    Not on file   Tobacco Use    Smoking status: Not on file    Smokeless tobacco: Not on file   Substance and Sexual Activity    Alcohol use: Not Currently     Comment: 2 pints of  vodka per day for 2 years. Last drink early sept 2024    Drug use: Never    Sexual activity: Not on file   Other Topics Concern    Not on file   Social History Narrative    Not on file     Social Determinants of Health     Financial Resource Strain: Low Risk  (10/3/2024)    Financial Resource Strain     Within the past 12 months, have you or your family members you live with been unable to get utilities (heat, electricity) when it was really needed?: No   Food Insecurity: Low Risk  (10/3/2024)    Food Insecurity     Within the past 12 months, did you worry that your food would run out before you got money to buy more?: No     Within the past 12 months, did the food you bought just not last and you didn t have money to get more?: No   Transportation Needs: Low Risk  (10/3/2024)    Transportation Needs     Within the past 12 months, has lack of transportation kept you from medical appointments, getting your medicines, non-medical meetings or appointments, work, or from getting things that you need?: No   Physical Activity: Not on file   Stress: Not on file   Social Connections: Not on file   Interpersonal Safety: Low Risk  (10/5/2024)    Interpersonal Safety     Do you feel physically and emotionally safe where you currently live?: Yes     Within the past 12 months, have you been hit, slapped, kicked or otherwise physically hurt by someone?: No     Within the past 12 months, have you been humiliated or emotionally abused in other ways by your partner or ex-partner?: No   Recent Concern: Interpersonal Safety - High Risk (9/21/2024)    Interpersonal Safety     Do you feel physically and emotionally safe where you currently live?: No     Within the past 12 months, have you been hit, slapped, kicked or otherwise physically hurt by someone?: No     Within the past 12 months, have you been humiliated or emotionally abused in other ways by your partner or ex-partner?: No   Housing Stability: Low Risk  (10/3/2024)    Housing  "Stability     Do you have housing? : Yes     Are you worried about losing your housing?: No            Physical Examination:     Vital signs:  /65 (BP Location: Right arm, Patient Position: Semi-Drake's, Cuff Size: Adult Regular)   Pulse 96   Temp 97.8  F (36.6  C) (Oral)   Resp 18   Ht 1.676 m (5' 6\")   Wt 115.3 kg (254 lb 3.1 oz)   SpO2 98%   BMI 41.03 kg/m    GENERAL: alert, oriented and no distress, jaundice+, tearful while expressing guilt over current health   EYES: Eyes grossly normal to inspection, PERRL and conjunctivae, normal, icteric sclerae  NECK: no adenopathy, no asymmetry, masses, or scars  RESP: lungs clear to auscultation - decreased breath sounds in right base   CV: regular rate and rhythm, normal S1 S2 ASHLEIGH 2/6   ABDOMEN: soft, nontender, active bowel sounds normal  MS:  moderate edema bilateral legs. legs are not red/ tense bilateral pedal edema and left foot more swollen .   SKIN: +jaundice, no suspicious lesions or rashes. Bruising to L medial malleolus   NEURO: Normal strength and tone, mentation intact and speech normal  PSYCH: mentation appears normal, affect normal    Lines and devices:    PIV CDI, non-tender, no surrounding erythema.    Labs, Microbiology and Imaging studies reviewed.            Laboratory Data:       Microbiology:    Culture   Date Value Ref Range Status   10/12/2024 No Growth  Final   10/12/2024 No Growth  Final   10/10/2024 No Growth  Final   10/08/2024 No Growth  Final   10/08/2024 No Growth  Final   10/08/2024 Positive on the 1st day of incubation (A)  Final   10/08/2024 Staphylococcus aureus (AA)  Final     Comment:     1 of 2 bottles   10/03/2024 No Growth  Final   09/20/2024 No Growth  Final     Urine Studies:    Recent Labs   Lab Test 10/12/24  0524 10/08/24  2356 10/06/24  0911 10/03/24  0103 09/20/24 2058   LEUKEST Negative Negative Negative Negative Negative   WBCU 2 4 3 1 1     Hematology Studies:    Recent Labs   Lab Test 10/17/24  0555 " 10/16/24  0641 10/15/24  0543 10/14/24  0456 10/13/24  0539 10/12/24  0347   WBC 12.8* 14.3* 11.3* 12.1* 12.0* 12.3*  12.3*   HGB 11.2* 11.2* 11.3* 10.6* 10.7* 11.0*   * 104* 104* 105* 104* 105*   * 125* 106* 99* 87* 86*      Metabolic Studies:   Recent Labs   Lab Test 10/17/24  0555 10/16/24  0641 10/15/24  0543 10/14/24  0456 10/13/24  0539   * 133* 132* 133* 132*   POTASSIUM 4.3 4.3 4.4 4.2 4.0   CHLORIDE 104 104 105 105 103   CO2 18* 18* 18* 17* 18*   BUN 14.4 13.7 13.4 14.9 14.6   CR 0.62* 0.70 0.69 0.70 0.72   GFRESTIMATED >90 >90 >90 >90 >90     Hepatic Studies:   Recent Labs   Lab Test 10/17/24  0555 10/16/24  0641 10/15/24  0543 10/14/24  0456 10/13/24  0539 10/12/24  0347   BILITOTAL 28.9* 27.8* 26.0* 25.3* 26.9* 28.3*   ALKPHOS 157* 152* 138 140 128 150   ALBUMIN 3.5 3.4* 3.2* 3.2* 3.3* 3.5   AST 96* 91* 90* 86* 89* 92*   ALT 42 46 43 39 40 47              Last check of C difficile  C Difficile Toxin B by PCR   Date Value Ref Range Status   09/21/2024 Negative Negative Final     Comment:     A negative result does not exclude actual disease due to C. difficile and may be due to improper collection, handling and storage of the specimen or the number of organisms in the specimen is below the detection limit of the assay.            Imaging:     No results found for this or any previous visit (from the past 48 hour(s)).

## 2024-10-17 NOTE — PLAN OF CARE
Goal Outcome Evaluation:      Plan of Care Reviewed With: patient    Overall Patient Progress: no change  Overall Patient Progress: no change    Outcome Evaluation: Assumed cares from [id number]. PICC was placed 10/16, pt tolerating well. Reported pain 3/10 - administered PRN Tylenol which was effective. PRN Robitussin and   Lozenge administered for cough/sore throat which was effective. Pt initially refused Lactulose but took it successfully after being educated about the importance of this medication. Lymph wraps not on. Continue with plan of care.     Carolyn Sarabia RN

## 2024-10-17 NOTE — PROGRESS NOTES
[facility]    Medicine Progress Note - Hospitalist Service, GOLD TEAM 7    Date of Admission:  10/3/2024    Assessment & Plan   Stewart Hunt is a 39 year old male admitted on 10/3/2024. He has hx of alcohol cirrhosis and admitted 9/20-22 for worsening anasarca and hyperbilirubinemia.     Changes today:  - Remains afebrile, WBC trending down to 12  - Possible discharge tomorrow if WBC count stable or improved, no fevers. Will plan to follow up with ID if fungal studies still in process.      #Recurrent Fever Curve:  - Currently source of fever is MSSA bacteremia. Patient has been on Abx since 10/9. Fever curve initially improved then worsened. WBC count also improved and now worsened. UA done on 10/12 negative. Bcx obtained on 10/12 which remains negative. RVP negative. Unsure of new source of fever. After switching to ceftriaxone and flagyl on 10/13 his fever curve has improved. At this time, possible etiology includes oral source (has tooth and facial pain over the R mxillary bone), and possible PE  - Abx: Cefazolin (10/10- 10/12), ceftriaxone 2g q24h IVP and flagyl (10/13 --> current)  - CAPS consulted for paracentesis on 10/12-- no ascites seen for paracentesis (diagnostic or therapeutic)  - Cdiff ordered  - encourage incentive spirometry for atelectasis  - CT chest angio to eval for PE. Will try to coordinate with facial CT to eval for sinusitis vs dental caries    # Lower extremity edema, anasarca  # Hyponatremia 2/2 etoh cirrhosis, improving   # ANNA, prerenal with possible HRS physiology (cystatin c 1.8, GFR 39%), improving   Home reg: lasix 20mg daily and aldactone 50mg daily since last admission 10 days ago, and has been having ++ urination without improvement in edema. On lactulose and had been having multiple bowel movements daily along with poor appetite. Albumin 2-3. most likely due to third spacing. Cr wnl 0.94-->0.78-->0.89. Sodium decreased from 131  to 125 10/5 with increased diuretic dosing. BL Na ~136.  Cr at  0.85, but cystatin C at 1.8. GFR calculated with Cystain C 39. Blood osmolality 264. Sodium urine <20, urine osmolality 305.UO improving over the past few days. Suspected ANNA with possible HRS physiology per discussions with Nephrology  - Hepatology and Nephrology consults  - Daily CMP   - Lasix and spironolactone today at lower dose  - 3 day Albumin challenge for hepatorenal syndrome (10/6-10/9) -- responded well to albumin.  - 2 gm sodium diet, 1L FR  - strict I+Os, daily weights     # Decompensated alcoholic cirrhosis  # Alcoholic hepatitis   # Hx of hepatic encephalopathy   # Hx of alcohol use disorder, sober since 9/8/2024  Follows with OP hepatology. Prednisolone taper discontinued. US 9/21 without evidence of HCC, hepatomegaly. No EV on recent EGD. T. Bili initially downtrending, slight increase 10/5. Mentation currently clear. Attending AA and has been sober since 9/8/24. Was recommended inpatient rehab, but he feels that since he has such high MELD, he does not want to be away from his family for 30 days.  - Hepatology consult, appreciate recommendations   - Daily MELD labs  - Diuresis plan as above  - Continue lactulose TID, titrate to achieve 3-5 bowel movements daily  - Thiamine, folic acid daily   - May need rifaximin in the future (per GI)  - Monitor for hepatic encephalopathy  - Continue to attend AA when discharged     MELD 3.0: 31 at 10/14/2024  4:56 AM  MELD-Na: 32 at 10/14/2024  4:56 AM  Calculated from:  Serum Creatinine: 0.70 mg/dL (Using min of 1 mg/dL) at 10/14/2024  4:56 AM  Serum Sodium: 133 mmol/L at 10/14/2024  4:56 AM  Total Bilirubin: 25.3 mg/dL at 10/14/2024  4:56 AM  Serum Albumin: 3.2 g/dL at 10/14/2024  4:56 AM  INR(ratio): 3.00 at 10/14/2024  4:56 AM  Age at listing (hypothetical): 39 years  Sex: Male at 10/14/2024  4:56 AM      # Cough, hemoptysis vs hematemesis vs blood from epistaxis, resolved   # Moderate portal  hypertensive gastropathy seen on EGD   # GERD  In addition to ongoing sore throat since ~1 week prior to admission, with grossly negative work-up, has had two episodes of sputum with blood on 10/7, 10/8. Broad ID work-up. Discussed at length with GI given cirrhosis. No varices seen on EGD ~1 month ago at [facility]. Could consider [place] tear with hypertensive gastropathy, coughing, coagulopathy, but no urgent need to scope for UGIB.   - GI consult appreciate recommendations   - No need for EGD on 10/8  - Trend daily CBC   - Continue protonix 40 mg daily, simethicone 80 mg BID   - Monitor     # Staph aureus bacteremia, unclear source   # Elevated procalcitonin   # Sore throat, ongoing, improving   Procal 1.77 on 10/5. WBC WNL on 10/7. No dysuria, no fever, P[facility], new O2 needs on 10/7.  Has throat pain but viral panels repetiteively negative. Possibly due to prednisolone that he was taking. CXR and UA unremarkable. Remains afebrile. WBC trending down now that he is off steroids. Elevated temperature to 100.7 overnight  10/7. Strep swab negative. Continues to have normal WBC, decreasing procal. Normal VSS otherwise. Started on Ceftrixone. No fluid per POCUS exam  on 10/8. Ceftriaxone stopped, but positive GPC 1/2 bottles. Overall, concern for renal dysfunction without clear source for intermittent temperature spikes despite broadly negative work-up. Initally suspected GPCs may be contaminination and held abx after talking with ID given wanting to preserve renal function, but spiked Tmax of 102.8 on 10/8 prompting Zosyn/Vanc start. UA, MRSA negative, other BCx with NGTD. Narrowed to ancef per ID  - ID consult, appreciate recommendations  - TTE ON  10/9-- poor candidate for SALENA given esophageal varices  - Trend Bcx so far negative  - Daily BMP, CBC  - Ancef to be switched to ceftriaxone 2gm per day for bacteremia and sbp dosing on 10/12. Added metronidazole 500mg IV every 8 hours  - PRN  lozenges, chloraseptic spray,  Robitussin   - Continue to monitor fever curve and daily CBC    # BRBPR concerning for anal fissures vs bleeding hemorrhoids, resolved  # Non-thrombosed external hemorrhoids and non-thrombosed internal hemorrhoids found on perianal  Exam seen on colonoscopy (9/10/24)  BRBPR on 10/7, 10/8 with none in stool. No melena seen. States some pressure before BMs happen, but no overt pain. Mild bleeding seen upon 2nd PE on 10/8 slightly inside rectum. Hgb stable with mild downtrend. Per chart review, hemorrhoids seen as above on imaging ~1 month ago. No need for scope per discussion with GI on 10/8.   - Trend Hgb on CBC daily  - rectal nitroglycerin 0.4% BID for 4 weeks  - Defer adding PO fiber given needing to have BMs due to cirrhosis   - Sitz baths 2-3 times daily  - Fiber/2 gm Na diet      # Epistaxis, resolved and intermittent   Noted on 10/7 exam. Has had on/off for several days. Resolved with conservative treatment (pressure).   - If ongoing, please consult ENT given coags status, may need packing.   - Monitor coags and Hgb daily   - PRN Ocean spray, Afrin   - Monitor and notify provider if ongoing     # Normocytic anemia  11.2-->11-->11.8  on 10/8. Suspect from bleeding sources as above in setting of coagulopathy.   - monitor on CBC  - Transfuse for Hgb less than 7    # Left sided chest pain, intermittent   ED visit with same complaints on 7/26/24. ACS and PE workup normal. Started on pepcid and simethicone. Has c/o L sided chest pain for the last 2 mornings. Pain is reproducible with palpation. Noted again on 10/8, EKG with no acute ST/T wave abnormalities or concerning T wave changes. Unable to get trop due to elevated bilirubin. Given pain is reproducible, likely MSK related, could consider from GERD as above, ID process (but less likley given negative work-up thus far).   - ID work-up as above  - GERD treatment as above  - Scheduled lidocaine patches  - PRN acetaminophen     # Moderate malnutrition, in the  context of chronic illness/disease   # Vomiting, resolved   # Diarrhea, in setting of lactulose  Nothing tastes good, and had one non bloody emesis several days ago. States appetite better when on  higher dose prednisolone (currently on 1-mg).  - RD consult  - Continue lactulose TID, titrate to achieve 3-5 bowel movements daily  - Discuss renally appropriate MVI in AM  - PRN zofran  - Start calorie counts for 3 days     # Fatigue, improving   # Insomnia, improving   Fell asleep at desk while sitting and working on computer. See work-up as above for cirrhosis. No s/s of hepatic encelopathy. Currently normal mentation  - Cirrhosis treatment as above     # Gallbladder polyp  - Continue to monitor 4 mm polyp seen on US     # Depressed mood/Grief  Both the patient and wife started tearing up talking about poor prognosis and not knowing what to expect including what they can do while 'waiting 90 days.'  - Spiritual Health consult per patient request    # Hepatitis B status  Received hepatis B 1st dose on 9/10/24. Wife inquiring about second dose that should be scheduled for tomorrow. Per discussion with pharmacy, ok to push back several days and give prior to discharge.  - 2nd Hepatitis B vaccine prior to discharge per discussion with Pharmacy on 10/6.       Diet: 2 Gram Sodium Diet  Fluid restriction 2000 ML FLUID    DVT Prophylaxis: Anti-embolisim stockings (TEDs) and Ambulate every shift  Gomes Catheter: Not present  Lines: None     Cardiac Monitoring: None  Code Status: Full Code            Diet: Combination Diet High Fiber Diet; 2 gm NA Diet  Snacks/Supplements Adult: Ensure Max Protein (bariatric); Between Meals  Fluid restriction 2000 ML FLUID    DVT Prophylaxis: Pneumatic Compression Devices  Gomes Catheter: Not present  Lines: PRESENT      PICC 10/16/24 Double Lumen Left Basilic Antibiotic-Site Assessment: WDL    Cardiac Monitoring: None  Code Status: Full Code      Clinically Significant Risk Factors         #  "Hyponatremia: Lowest Na = 132 mmol/L in last 2 days, will monitor as appropriate   # Hypocalcemia: Lowest Ca = 8.4 mg/dL in last 2 days, will monitor and replace as appropriate     # Hypoalbuminemia: Lowest albumin = 2.5 g/dL at 10/6/2024  6:09 AM, will monitor as appropriate    # Coagulation Defect: INR = 3.20 (Ref range: 0.85 - 1.15) and/or PTT = 38 Seconds (Ref range: 22 - 38 Seconds), will monitor for bleeding  # Thrombocytopenia: Lowest platelets = 125 in last 2 days, will monitor for bleeding             # Severe Obesity: Estimated body mass index is 41.03 kg/m  as calculated from the following:    Height as of this encounter: 1.676 m (5' 6\").    Weight as of this encounter: 115.3 kg (254 lb 3.1 oz).   # Moderate Malnutrition: based on nutrition assessment      # Financial/Environmental Concerns: none         Disposition Plan     Medically Ready for Discharge: Anticipated in 2-4 Days             Katiuska Medina MD  Hospitalist Service, [facility] TEAM [address] [place] [address]  Securely message with [employer] (more info)  Text page via [facility] Paging/Directory   See signed in provider for up to date coverage information  ______________________________________________________________________    Interval History   No acute events overnight. Afebrile. No new symptoms. Discussed plan with patient and spouse.     Physical Exam   Vital Signs: Temp: 98.2  F (36.8  C) Temp src: Oral BP: 114/64 Pulse: 93   Resp: 16 SpO2: 98 % O2 Device: None (Room air)    Weight: 254 lbs 3.05 oz    General Appearance: Sitting up in bed, conversant  Respiratory: Breathing comfortably, LCAB  Cardiovascular: RRR  GI: Distended but soft  Skin: Jaundiced  Other: Bilateral lower extremity edema, compression stockings in place     Medical Decision Making       45 MINUTES SPENT BY ME on the date of service doing chart review, history, exam, documentation & further activities per the note.      Data     I have personally " reviewed the following data over the past 24 hrs:    12.8 (H)  \   11.2 (L)   / 133 (L)     132 (L) 104 14.4 /  102 (H)   4.3 18 (L) 0.62 (L) \     ALT: 42 AST: 96 (H) AP: 157 (H) TBILI: 28.9 (HH)   ALB: 3.5 TOT PROTEIN: N/A LIPASE: N/A     INR:  3.20 (H) PTT:  N/A   D-dimer:  N/A Fibrinogen:  N/A       Imaging results reviewed over the past 24 hrs:   No results found for this or any previous visit (from the past 24 hour(s)).

## 2024-10-17 NOTE — PLAN OF CARE
"Assumed cares [id number]     /64 (BP Location: Right arm)   Pulse 93   Temp 98.2  F (36.8  C) (Oral)   Resp 16   Ht 1.676 m (5' 6\")   Wt 116 kg (255 lb 12.8 oz)   SpO2 98%   BMI 41.29 kg/m       Pain: Patient has some mild abdominal pain. Declined medication.   Neuro: A&Ox4.    Respiratory: Lungs clear and equal, on RA.   Cardiac/Neurovascular: HR and pulse WDL.Baseline numbness in lower extremities. Severe lower extremity edema.   GI/: Adequate urine output. 4 BM's today.   Nutrition: 2g NA diet, 2000ml FR.   Activity: Independent.   Skin: Jaundice.   Lines: PICC left arm (double lumen)  Events this shift: Lymph wraps placed back on today. Potential discharge tomorrow.     Goal Outcome Evaluation:      Plan of Care Reviewed With: patient    Overall Patient Progress: no changeOverall Patient Progress: no change    Outcome Evaluation: Continue with plan of care.      "

## 2024-10-18 ENCOUNTER — APPOINTMENT (OUTPATIENT)
Dept: OCCUPATIONAL THERAPY | Facility: CLINIC | Age: 39
DRG: 432 | End: 2024-10-18
Payer: COMMERCIAL

## 2024-10-18 ENCOUNTER — TELEPHONE (OUTPATIENT)
Dept: INFECTIOUS DISEASES | Facility: CLINIC | Age: 39
End: 2024-10-18
Payer: COMMERCIAL

## 2024-10-18 VITALS
BODY MASS INDEX: 40.92 KG/M2 | OXYGEN SATURATION: 99 % | TEMPERATURE: 98.6 F | RESPIRATION RATE: 18 BRPM | DIASTOLIC BLOOD PRESSURE: 66 MMHG | HEIGHT: 66 IN | WEIGHT: 254.63 LBS | HEART RATE: 95 BPM | SYSTOLIC BLOOD PRESSURE: 125 MMHG

## 2024-10-18 LAB
ALBUMIN SERPL BCG-MCNC: 3 G/DL (ref 3.5–5.2)
ALP SERPL-CCNC: 146 U/L (ref 40–150)
ALT SERPL W P-5'-P-CCNC: 34 U/L (ref 0–70)
ANION GAP SERPL CALCULATED.3IONS-SCNC: 9 MMOL/L (ref 7–15)
AST SERPL W P-5'-P-CCNC: 88 U/L (ref 0–45)
BILIRUB SERPL-MCNC: 24.1 MG/DL
BUN SERPL-MCNC: 15.5 MG/DL (ref 6–20)
CALCIUM SERPL-MCNC: 8.1 MG/DL (ref 8.8–10.4)
CHLORIDE SERPL-SCNC: 105 MMOL/L (ref 98–107)
CREAT SERPL-MCNC: 0.6 MG/DL (ref 0.67–1.17)
EGFRCR SERPLBLD CKD-EPI 2021: >90 ML/MIN/1.73M2
ERYTHROCYTE [DISTWIDTH] IN BLOOD BY AUTOMATED COUNT: 19 % (ref 10–15)
GLUCOSE SERPL-MCNC: 92 MG/DL (ref 70–99)
H CAPSUL AG UR QL IA: NOT DETECTED
H CAPSUL AG UR-MCNC: NOT DETECTED NG/ML
HCO3 SERPL-SCNC: 20 MMOL/L (ref 22–29)
HCT VFR BLD AUTO: 30.5 % (ref 40–53)
HGB BLD-MCNC: 10.4 G/DL (ref 13.3–17.7)
INR PPP: 3.22 (ref 0.85–1.15)
MCH RBC QN AUTO: 35.7 PG (ref 26.5–33)
MCHC RBC AUTO-ENTMCNC: 34.1 G/DL (ref 31.5–36.5)
MCV RBC AUTO: 105 FL (ref 78–100)
PLATELET # BLD AUTO: 126 10E3/UL (ref 150–450)
POTASSIUM SERPL-SCNC: 4.2 MMOL/L (ref 3.4–5.3)
PROT SERPL-MCNC: ABNORMAL G/DL
RBC # BLD AUTO: 2.91 10E6/UL (ref 4.4–5.9)
SCANNED LAB RESULT: NORMAL
SODIUM SERPL-SCNC: 134 MMOL/L (ref 135–145)
WBC # BLD AUTO: 11.3 10E3/UL (ref 4–11)

## 2024-10-18 PROCEDURE — 250N000013 HC RX MED GY IP 250 OP 250 PS 637: Performed by: STUDENT IN AN ORGANIZED HEALTH CARE EDUCATION/TRAINING PROGRAM

## 2024-10-18 PROCEDURE — 99239 HOSP IP/OBS DSCHRG MGMT >30: CPT | Performed by: STUDENT IN AN ORGANIZED HEALTH CARE EDUCATION/TRAINING PROGRAM

## 2024-10-18 PROCEDURE — 250N000013 HC RX MED GY IP 250 OP 250 PS 637

## 2024-10-18 PROCEDURE — 85610 PROTHROMBIN TIME: CPT

## 2024-10-18 PROCEDURE — 250N000013 HC RX MED GY IP 250 OP 250 PS 637: Performed by: INTERNAL MEDICINE

## 2024-10-18 PROCEDURE — 85027 COMPLETE CBC AUTOMATED: CPT

## 2024-10-18 PROCEDURE — 80048 BASIC METABOLIC PNL TOTAL CA: CPT

## 2024-10-18 PROCEDURE — 97535 SELF CARE MNGMENT TRAINING: CPT | Mod: GO | Performed by: OCCUPATIONAL THERAPIST

## 2024-10-18 PROCEDURE — 250N000011 HC RX IP 250 OP 636: Mod: JZ | Performed by: STUDENT IN AN ORGANIZED HEALTH CARE EDUCATION/TRAINING PROGRAM

## 2024-10-18 PROCEDURE — 36592 COLLECT BLOOD FROM PICC: CPT

## 2024-10-18 PROCEDURE — 999N000044 HC STATISTIC CVC DRESSING CHANGE

## 2024-10-18 RX ORDER — CIPROFLOXACIN 500 MG/1
500 TABLET, FILM COATED ORAL DAILY
Qty: 3 TABLET | Refills: 0 | Status: SHIPPED | OUTPATIENT
Start: 2024-10-18 | End: 2024-10-21

## 2024-10-18 RX ORDER — METRONIDAZOLE 500 MG/1
500 TABLET ORAL 3 TIMES DAILY
Qty: 9 TABLET | Refills: 0 | Status: SHIPPED | OUTPATIENT
Start: 2024-10-18 | End: 2024-10-21

## 2024-10-18 RX ORDER — PANTOPRAZOLE SODIUM 40 MG/1
40 TABLET, DELAYED RELEASE ORAL
Qty: 30 TABLET | Refills: 0 | Status: SHIPPED | OUTPATIENT
Start: 2024-10-18 | End: 2024-11-17

## 2024-10-18 RX ORDER — CEFAZOLIN SODIUM 2 G/100ML
2 INJECTION, SOLUTION INTRAVENOUS EVERY 8 HOURS
Status: ACTIVE
Start: 2024-10-18 | End: 2024-11-11

## 2024-10-18 RX ADMIN — FOLIC ACID 1 MG: 1 TABLET ORAL at 08:39

## 2024-10-18 RX ADMIN — FUROSEMIDE 20 MG: 20 TABLET ORAL at 08:39

## 2024-10-18 RX ADMIN — LACTULOSE 10 G: 10 POWDER, FOR SOLUTION ORAL at 05:12

## 2024-10-18 RX ADMIN — SIMETHICONE 80 MG: 80 TABLET, CHEWABLE ORAL at 08:40

## 2024-10-18 RX ADMIN — PANTOPRAZOLE SODIUM 40 MG: 40 TABLET, DELAYED RELEASE ORAL at 08:40

## 2024-10-18 RX ADMIN — Medication 1 TABLET: at 14:13

## 2024-10-18 RX ADMIN — SPIRONOLACTONE 50 MG: 50 TABLET ORAL at 08:40

## 2024-10-18 RX ADMIN — THIAMINE HCL TAB 100 MG 100 MG: 100 TAB at 08:39

## 2024-10-18 RX ADMIN — CEFAZOLIN SODIUM 2 G: 2 INJECTION, SOLUTION INTRAVENOUS at 08:39

## 2024-10-18 RX ADMIN — CEFAZOLIN SODIUM 2 G: 2 INJECTION, SOLUTION INTRAVENOUS at 14:06

## 2024-10-18 RX ADMIN — METRONIDAZOLE 500 MG: 500 TABLET ORAL at 08:39

## 2024-10-18 RX ADMIN — CIPROFLOXACIN 500 MG: 500 TABLET ORAL at 08:40

## 2024-10-18 RX ADMIN — METRONIDAZOLE 500 MG: 500 TABLET ORAL at 14:13

## 2024-10-18 ASSESSMENT — ACTIVITIES OF DAILY LIVING (ADL)
ADLS_ACUITY_SCORE: 23

## 2024-10-18 NOTE — PLAN OF CARE
Goal Outcome Evaluation:     Reviewed with: patient    Overall patient progress: improving.     Shift Hours: 0700 - time of discharge     Assessment/Interventions:  Body systems that were not at patient's baseline: Respiratory, Gastrointestinal, and Skin. Focused body system assessments documented in flow sheets.        Activity     Fall Risk Score: 20   Bed alarm on? No      Mobility: Ind     Pain: Mild BLE pain, pt declined intervention     Lines/Drains: double lumen PICC, to remain in place for discharge     Nutrition: tolerating regular diet     Goal Outcome Evaluation/Barriers to Discharge: adequate for discharge to home with home infusion. [facility] came to bedside and provided education, see note. Plan is for [facility] to deliver supplies to pt's home today. Pt received a dose of IV abx prior to discharge. 5 loose BM today per pt. A&Ox4.     Discharge to: home with [facility]   Transportation: daughter came to bedside and escorted pt to [place], states she will drive home.    Time: 1445  Prescriptions: medications ready at discharge pharmacy, pt verbalizes that he will pick them up on the way out   Belongings:all packed by pt and taken at time of discharge    -if applicable return home meds from inpatient pharmacy: n/a, denies home meds   Access: PICC to remain in place for home ABX  Care plan and education discontinued: complete  Paperwork: AVS reviewed with virtual RN. Pt denies having any questions r/t discharge instructions.

## 2024-10-18 NOTE — PROGRESS NOTES
Home Infusion  Stewart is expected to discharge today and will be going home on IV Cefazolin 2gm q8hrs.   He has never done home IV therapy before.     Met with him at bedside to discuss discharge plans.  Provided him with information about IV abx therapy with [facility] services.  Explained about administration method which will be via IV push,  showed him the teaching materials and did hands on teaching.  Informed him about supplies and delivery of supplies, storage of medication, dosing schedule, plan for SNV and 24/7 availability of I staff while on IV therapy.       Stewart verbalized understanding of all information given.   He is willing and able to learn and manage home IV therapy and is agreeable with the plan.  Questions answered.  Will continue to follow update pt with final details once discharge is confirmed.    Araceli Berman RN, BSN, B.S., [facility], [facility]  [facility], [address] [address]  [email or web address]

## 2024-10-18 NOTE — TELEPHONE ENCOUNTER
From: Tatianna Moreno   Sent: 10/18/2024  12:42 PM CDT   To: [facility]   Subject: Hospital follow up                               Authorizing Provider Encounter Provider   Katiuska Mednia MD       Follow up with Infectious Disease./ OPAT     Was seen/discussed with Dr Kolb 10/17- pt is new to clinic, per protocols to schedule pt as new regardless if seen in hospital by ID provider.     Pt scheduled 11/18 with Dr Kolb, (pt request) please review for timeframe, thank you       Called patient and offered 11/11 video visit with Dr. Kolb, clarified only needs to be 30 minutes as it is hospital follow up. Patient accepted appointment and confirmed he will be in the state of [place]. No questions. Cancelled 11/18 appointment.

## 2024-10-18 NOTE — PROGRESS NOTES
[facility]  Parenteral ANtibiotic Review at Departure from Acute Care Collaborative Note     Patient: Stewart Hunt  MRN: [id number]  Allergies: Patient has no known allergies.    Current Location: [facility]  OPAT to be provided by: [facility]       Line Type: PICC    Diagnosis/Indications: MSSA bacteremia  Organism(s): MSSA  MRDO? No  Pending Cultures/Microbiological Tests: no      Inpatient ID involved in developing OPAT plan: Yes - discharge OPAT plan has no changes from ID provider, Dr. Kolb, OPAT plan charted on 10/17/2024    Outpatient ID Follow-up: ID OPAT Clinic Referral Placed ([facility] Ph: [phone] and Fax: [phone]) - appointment scheduled  Designated Provider: Shira Kolb MD    Antimicrobial Regimen / Route Anticipated  Duration Start Date Stop /  Reassess Date   Cefazolin 2 g every 8 hours/IV 4 weeks   (MSSA bacteremia) 10/10/2024 11/7/2024   Ciprofloxacin 500 mg every 24 hours/PO 7 days  (SBP prophylaxis ) 10/12/2024 10/20/2024   Metronidazole 500 mg every 8 hours/PO 7 days  (SBP prophylaxis) 10/12/2024 10/20/2024     Laboratory Tests and Monitoring Frequency: CBC with Diff, CMP Once Weekly    Imaging/Miscellaneous Monitoring: None    ID Pharmacist Interventions: None                          Adrianne Nowak, PharmD, BCIDP  Pager: [phone]

## 2024-10-18 NOTE — PROGRESS NOTES
Care Management Follow Up    Length of Stay (days): 11    Expected Discharge Date: 10/18/2024     Concerns to be Addressed: discharge planning     Patient plan of care discussed at interdisciplinary rounds: Yes    Anticipated Discharge Disposition: Home, Home Infusion              Anticipated Discharge Services: None  Anticipated Discharge DME: None    Patient/family educated on Medicare website which has current facility and service quality ratings:    Education Provided on the Discharge Plan:    Patient/Family in Agreement with the Plan:      Referrals Placed by CM/SW:    Private pay costs discussed: Not applicable    Discussed  Partnership in Safe Discharge Planning  document with patient/family: No     Handoff Completed: No, handoff not indicated or clinically appropriate    Additional Information:  Messaged Dr. Medina inquiring if plan is to discharge pt today as home infusion will need orders no later than Noon today.     Next Steps:   Coordinate with home infusion liaison  Ensure home infusion order placed  Awaiting medical clearance.      Care Management Discharge Note    Discharge Date: 10/18/2024       Discharge Disposition: Home, Home Infusion    Discharge Services: None    Discharge DME: None    Discharge Transportation: family or friend will provide    Private pay costs discussed: Not applicable    Does the patient's insurance plan have a 3 day qualifying hospital stay waiver?  No    PAS Confirmation Code:    Patient/family educated on Medicare website which has current facility and service quality ratings:      Education Provided on the Discharge Plan:    Persons Notified of Discharge Plans: patient  Patient/Family in Agreement with the Plan:      Handoff Referral Completed: Yes, non-[facility] PCP: External handoff communication completed    Additional Information:  Final discharge orders signed.  Updated [facility] Liaison.  Spoke with pt via phone.  Per discussion with pt his daughter will be  transporting him home as his wife had a family emergency and will not be available for education or discharge transport.  Per discussion with pt he is comfortable learning the infusion education.  Per [facility] will deliver supplies to pt home.  Education will be done today before 330 p.m..   Once eduction has been completed patient will be able to discharge home. [facility] will be the agency providing home RN support/services. Messaged Naheed RN with update.      Home Infusion  [facility]  Phone # [phone]  Fax # [phone]   RN, IV antibiotic and supplies    Cheryle Diaz RN BSN, PHN, ACM-RN  October 18, 2024  7A Nurse Coordinator    Phone: [phone]  Available on [employer] 7A SOT RNCC  Weekend/Holiday 7A SOT RNCC    10/18/2024

## 2024-10-18 NOTE — DISCHARGE SUMMARY
"[facility]  Hospitalist Discharge Summary      Date of Admission:  10/3/2024  Date of Discharge:  10/18/2024  3:00 PM  Discharging Provider: Katiuska Medina MD  Discharge Service: Hospitalist Service, GOLD TEAM 7    Discharge Diagnoses   See below    Clinically Significant Risk Factors     # Severe Obesity: Estimated body mass index is 41.1 kg/m  as calculated from the following:    Height as of this encounter: 1.676 m (5' 6\").    Weight as of this encounter: 115.5 kg (254 lb 10.1 oz).  # Moderate Malnutrition: based on nutrition assessment      Follow-ups Needed After Discharge   Follow-up Appointments     Adult [facility] Follow-up and recommended labs and tests      Follow up with Dr. Dodd from Gastroenterology as scheduled on 10/21/24.     Follow up with Infectious Disease. They will call to schedule your   appointment.     Appointments on [place] and/or [facility] (with [facility] or [facility]   provider or service). Call [phone] if you haven't heard regarding   these appointments within 7 days of discharge.            Unresulted Labs Ordered in the Past 30 Days of this Admission       Date and Time Order Name Status Description    10/16/2024 11:20 AM Histoplasma Galactomannan Antigen Quant by EIA, Urine In process         These results will be followed up by ID.    Discharge Disposition   Discharged to home  Condition at discharge: Good    Hospital Course   Stewart Hunt is a 39 year old male admitted on 10/3/2024. He has hx of alcohol cirrhosis and admitted 9/20-22 for worsening anasarca and hyperbilirubinemia.      #MSSA bacteremia, unclear source   # Recurrent Fever, resolved  Patient admitted with elevated pro-clayton, WBC, and fever. Broad infectious work up completed with only positive micro finding 1 blood culture positive for GPC, speciated as MSSA. Unclear ource, TTE negative and could not undergo SALENA due to esophageal varices. No murmurs on exam. Repeat " blood cultures all negative. Throughout admission had intermittent fever and WBC elevations, and different antibiotic regimens started and stopped once evaluations were negative. ID followed throughout admission. Ultimately was started on Cefazolin IV for bacteremia, and Metronidazole/Ciprofloxacin for SBP prophylaxis. He then defervesced and WBC was trending down.   - Continue Cefazolin planned duration 4 weeks (finish 11/7/24). PICC in place and teaching complete  - Finish Cipro and Flagyl 10/20   - CMP and CBC weekly  - Follow up with ID outpatient   - Fungal studies obtained due to findings of ground glass and nodular opacities in the chest; Blasto negative, histoplasma still in process at discharge and to be followed up by ID     # Lower extremity edema, anasarca  # Hyponatremia 2/2 etoh cirrhosis, improved  # ANNA, prerenal with possible HRS physiology (cystatin c 1.8, GFR 39%), improved  Home reg: lasix 20mg daily and aldactone 50mg daily since last admission 10 days ago, and has been having ++ urination without improvement in edema. On lactulose and had been having multiple bowel movements daily along with poor appetite. Albumin 2-3. most likely due to third spacing. Cr wnl 0.94-->0.78-->0.89. Sodium decreased from 131 to 125 10/5 with increased diuretic dosing. BL Na ~136.  Cr at  0.85, but cystatin C at 1.8. GFR calculated with Cystain C 39. Blood osmolality 264. Sodium urine <20, urine osmolality 305.UO improving over the past few days. Suspected ANNA with possible HRS physiology per discussions with Nephrology. S/p 3 day Albumin challenge and responded well.   - Continue lasix and spironolactone  - Compression wraps or stockings  - Encouraged 2L fluid restriction  - 2 gm sodium diet  - Daily weights at home    # Decompensated alcoholic cirrhosis  # Alcoholic hepatitis   # Hx of hepatic encephalopathy   # Hx of alcohol use disorder, sober since 9/8/2024  Follows with OP hepatology. Prednisolone taper  discontinued. US 9/21 without evidence of HCC, hepatomegaly. No EV on recent EGD. T. Bili initially downtrending, slight increase 10/5. Mentation currently clear. Attending AA and has been sober since 9/8/24. Was recommended inpatient rehab, but he feels that since he has such high MELD, he does not want to be away from his family for 30 days.  MELD 3.0: 32 at 10/18/2024  5:56 AM  MELD-Na: 32 at 10/18/2024  5:56 AM  Calculated from:  Serum Creatinine: 0.60 mg/dL (Using min of 1 mg/dL) at 10/18/2024  5:56 AM  Serum Sodium: 134 mmol/L at 10/18/2024  5:56 AM  Total Bilirubin: 24.1 mg/dL at 10/18/2024  5:56 AM  Serum Albumin: 3.0 g/dL at 10/18/2024  5:56 AM  INR(ratio): 3.22 at 10/18/2024  5:56 AM  Age at listing (hypothetical): 39 years  Sex: Male at 10/18/2024  5:56 AM  - Lasix and spironolactone as above   - Continue lactulose TID, titrate to achieve 3-5 bowel movements daily  - Thiamine, folic acid daily   - Continue to attend AA when discharged   - Follow up with Hepatology arranged      # Cough, hemoptysis vs hematemesis vs blood from epistaxis, resolved   # Moderate portal hypertensive gastropathy seen on EGD   # GERD  In addition to ongoing sore throat since ~1 week prior to admission, with grossly negative work-up, has had two episodes of sputum with blood on 10/7, 10/8. Broad ID work-up. Discussed at length with GI given cirrhosis. No varices seen on EGD ~1 month ago at [facility]. Could consider [place] tear with hypertensive gastropathy, coughing, coagulopathy, but no urgent need to scope for UGIB.   - Resolved without intervention  - Continue protonix 40 mg daily, simethicone 80 mg BID     # BRBPR concerning for anal fissures vs bleeding hemorrhoids, resolved  # Non-thrombosed external hemorrhoids and non-thrombosed internal hemorrhoids found on perianal  Exam seen on colonoscopy (9/10/24)  BRBPR on 10/7, 10/8 with none in stool. No melena seen. States some pressure before BMs happen, but no overt pain.  Mild bleeding seen upon 2nd PE on 10/8 slightly inside rectum. Hgb stable with mild downtrend. Per chart review, hemorrhoids seen as above on imaging ~1 month ago. No need for scope per discussion with GI on 10/8.   - Sitz baths 2-3 times daily  - Fiber/2 gm Na diet      # Epistaxis, resolved and intermittent   Noted on 10/7 exam. Has had on/off for several days. Resolved with conservative treatment (pressure).   - PRN Charles spray, Afrin     # Normocytic anemia  11.2-->11-->11.8  on 10/8. Suspect from bleeding sources as above in setting of coagulopathy.   - Transfuse for Hgb less than 7    # Left sided chest pain, intermittent   ED visit with same complaints on 7/26/24. ACS and PE workup normal. Started on pepcid and simethicone. Pain is reproducible with palpation. Noted again on 10/8, EKG with no acute ST/T wave abnormalities or concerning T wave changes. Unable to get trop due to elevated bilirubin. Given pain is reproducible, likely MSK related, could consider from GERD as above, ID process (but less likley given negative work-up thus far).   - GERD treatment as above  - Scheduled lidocaine patches  - PRN acetaminophen     # Moderate malnutrition, in the context of chronic illness/disease   # Vomiting, resolved   - RD consulted and followed  - Continue high protein supplement     # Gallbladder polyp  - Continue to monitor 4 mm polyp seen on US     # Depressed mood/Grief  Both the patient and wife started tearing up talking about poor prognosis and not knowing what to expect including what they can do while 'waiting 90 days.'  - Spiritual Health consult per patient request      Consultations This Hospital Stay   GI HEPATOLOGY ADULT IP CONSULT  NEPHROLOGY GENERAL ADULT IP CONSULT  SPIRITUAL HEALTH SERVICES IP CONSULT  NUTRITION SERVICES ADULT IP CONSULT  GI HEPATOLOGY ADULT IP CONSULT  GI HEPATOLOGY ADULT IP CONSULT  INFECTIOUS DISEASE GENERAL ADULT IP CONSULT  PHARMACY TO DOSE [facility]  NURSING TO CONSULT FOR  VASCULAR ACCESS CARE IP CONSULT  LYMPHEDEMA THERAPY IP CONSULT  INTERNAL MEDICINE PROCEDURE TEAM ADULT IP CONSULT [facility]  NURSING TO CONSULT FOR VASCULAR ACCESS CARE IP CONSULT  SPIRITUAL HEALTH SERVICES IP CONSULT  CARE MANAGEMENT / SOCIAL WORK IP CONSULT  VASCULAR ACCESS FOR PICC PLACEMENT ADULT IP CONSULT  [facility] PHARMACY IP CONSULT  NURSING TO CONSULT FOR VASCULAR ACCESS CARE IP CONSULT  NURSING TO CONSULT FOR VIRTUAL CARE IP    Code Status   Full Code    Time Spent on this Encounter   I, Katiuska Medina MD, personally saw the patient today and spent greater than 30 minutes discharging this patient.       Katiuska Medina MD  [facility] 7C MED SURG  [address] [address]  Phone: [phone]  ______________________________________________________________________    Physical Exam   Vital Signs: Temp: 98.6  F (37  C) Temp src: Oral BP: 125/66 Pulse: 95   Resp: 18 SpO2: 99 % O2 Device: None (Room air)    Weight: 254 lbs 10.1 oz  General Appearance: Sitting up in bed, awake and alert  Respiratory: LCAB  Cardiovascular: RRR  GI: Distended but soft, non-tender to palpation  Skin: Jaundiced, scleral icterus   Other: Bilateral lower extremity pitting edema with wraps on        Primary Care Physician   Jay Mcmillan    Discharge Orders      Lymphedema Therapy  Referral      Home Infusion Referral      OPAT enrollment and ID Clinic Referral      Reason for your hospital stay    Liver disease, leg swelling, fever and blood stream infection     Activity    Your activity upon discharge: activity as tolerated     Adult [facility] Follow-up and recommended labs and tests    Follow up with Dr. Dodd from Gastroenterology as scheduled on 10/21/24.     Follow up with Infectious Disease. They will call to schedule your appointment.     Appointments on [place] and/or [facility] (with [facility] or [facility] provider or service). Call [phone] if you haven't heard regarding these  appointments within 7 days of discharge.     Diet    Follow this diet upon discharge: Low sodium, 2L fluid restriction       Significant Results and Procedures   Most Recent 3 CBC's:  Recent Labs   Lab Test 10/18/24  0556 10/17/24  0555 10/16/24  0641   WBC 11.3* 12.8* 14.3*   HGB 10.4* 11.2* 11.2*   * 103* 104*   * 133* 125*     Most Recent 3 BMP's:  Recent Labs   Lab Test 10/18/24  0556 10/17/24  0555 10/16/24  0641   * 132* 133*   POTASSIUM 4.2 4.3 4.3   CHLORIDE 105 104 104   CO2 20* 18* 18*   BUN 15.5 14.4 13.7   CR 0.60* 0.62* 0.70   ANIONGAP 9 10 11   LELAND 8.1* 8.7* 8.4*   GLC 92 102* 90     Most Recent 2 LFT's:  Recent Labs   Lab Test 10/18/24  0556 10/17/24  0555   AST 88* 96*   ALT 34 42   ALKPHOS 146 157*   BILITOTAL 24.1* 28.9*     Most Recent 3 INR's:  Recent Labs   Lab Test 10/18/24  0556 10/17/24  0555 10/16/24  0641   INR 3.22* 3.20* 3.08*   ,   Results for orders placed or performed during the hospital encounter of 10/03/24   XR Chest 2 Views    Narrative    EXAM: XR CHEST 2 VIEWS  LOCATION: [facility]  DATE: 10/3/2024    INDICATION: infectious w u  COMPARISON: 9/20/2024.      Impression    IMPRESSION: Low lung volumes and elevation of right hemidiaphragm. Mild right basilar atelectasis. Lungs otherwise clear. Normal heart size and pulmonary vascularity. No pleural fluid or pneumothorax.   POC US Guide for Paracentesis    Impression    US Indication: abdominal distension    Limited abdominal ultrasound was performed to evaluate for ascites and need for paracentesis.     Views/Acquisition: hepatorenal/RUQ, right lower quadrant, parasplenic/LUQ, and left lower quadrant    Findings/Interpretation: No significant ascites    Denys Carreon MD    XR Chest Port 1 View    Narrative    Exam: XR CHEST PORT 1 VIEW  10/7/2024 12:51 PM     History:  Productive cough       Comparison:  10/3/2024 chest x-ray, 2 view    Findings: Single view of the  chest. Low lung volumes and elevation of  the right hemidiaphragm. Trachea is midline. The cardiomediastinal  silhouette is within normal limits. No significant pleural effusion or  pneumothorax. Bibasilar streaky linear opacities. The visualized upper  abdomen is unremarkable.      Impression    Impression: Low lung volumes, right greater than left with elevation  of right diaphragm. Bibasilar atelectasis. Similar appearance to  previous exam and no acute airspace disease.    I have personally reviewed the examination and initial interpretation  and I agree with the findings.    MONICO SPAULDING MD         SYSTEM ID:  [id number]   POC US Guide for Paracentesis    Impression    US Indication: fever    Limited abdominal ultrasound was performed to evaluate for ascites and need for paracentesis.     Views/Acquisition: hepatorenal/RUQ, right lower quadrant, parasplenic/LUQ, and left lower quadrant    Findings/Interpretation: No significant ascites    Denys Carreon MD    XR Chest Port 1 View    Narrative    Exam: XR CHEST PORT 1 VIEW, 10/12/2024 8:47 AM    Indication: Fever    Comparison: Chest x-ray 10/7/2024    Findings: Frontal radiograph of the chest. Trachea is midline. Cardiac  silhouette is within normal limits. Low lung volumes. Elevated right  hemidiaphragm. Mild perihilar opacities. No significant pneumothorax.      Impression    Impression: Elevated right hemidiaphragm with mild perihilar opacities  favoring atelectasis.    I have personally reviewed the examination and initial interpretation  and I agree with the findings.    SANDRA BATRES MD         SYSTEM ID:  [id number]   US Lower Extremity Venous Duplex Bilateral    Narrative    ULTRASOUND LOWER EXTREMITY VENOUS DUPLEX BILATERAL 10/12/2024 4:36 PM    CLINICAL HISTORY: lower extremity swelling      COMPARISONS: None available.    REFERRING PROVIDER: DEE DUFF    TECHNIQUE: Grayscale, color Doppler, Doppler waveform ultrasound  evaluation was  "performed through the bilateral common femoral,  femoral, and popliteal veins. Bilateral posterior tibial and peroneal  veins were evaluated with grayscale imaging and compression.    FINDINGS: Right common femoral, femoral, and popliteal veins are fully  compressible with phasic Doppler waveforms.    Right posterior tibial veins are fully compressible.    Right peroneal veins are fully compressible.    Left common femoral, femoral, and popliteal veins are fully  compressible with phasic Doppler waveforms.    Left posterior tibial veins are fully compressible.    Left peroneal veins are fully compressible.      Impression    IMPRESSION: No deep venous thrombosis demonstrated in either leg.    Reference: \"Duplex Ultrasound in the Diagnosis of Lower-Extremity Deep  Venous Thrombosis\"- Natalie Howe MD, S; Florian Tapia MD  (Circulation. 2014;129:917-921. http://circ.ahajournals.org)    SHELTON KUMAR MD         SYSTEM ID:  [id number]   CT Abdomen Pelvis w Contrast    Narrative    EXAMINATION: CT ABDOMEN PELVIS W CONTRAST, 10/12/2024 7:24 PM    TECHNIQUE:  Helical CT images from the lung bases through the  symphysis pubis were obtained with IV contrast. Contrast dose: 135ml  isovue 370    COMPARISON: 9/13/2024 MRI, 8/27/2024 CT    HISTORY: RLQ abd pain concerns for ascites and sbp    FINDINGS:    Abdomen and pelvis: Nodular hepatic capsular contour. Decreased size  of the liver, now measuring 17.8 cm in craniocaudal dimension at mid  clavicular line, previously 20.3 cm on 8/27/2024. Resolution of the  previously seen nodular hypoattenuating foci predominantly throughout  the left hepatic lobe. No appreciable new focal suspicious hepatic  lesion. Mild edematous wall thickening of the gallbladder. No intra or  extrahepatic biliary dilation. Normal pancreas. Enlarged spleen,  measuring 15.0 cm in craniocaudal dimension. Normal adrenal glands and  renal cortices. No hydronephrosis, hydroureter, or urinary " tract  stone. Normal bladder, prostate, and seminal vesicles. Small volume  ascites. No free air. No bowel obstruction or inflammation. Normal  appendix. Sleeve gastrectomy changes. The major abdominal vasculature  is patent. Recanalized umbilical vein. Paraesophageal varices. Left  splenorenal shunt. No abdominal or pelvic lymphadenopathy.    Lung bases/lower chest:  Asymmetric elevation of the right  hemidiaphragm with associated right basilar atelectasis. Mosaic  attenuation of the visualized lungs, likely related to hypoinflation.    Bones and soft tissues: No acute or aggressive osseous abnormality.      Impression    IMPRESSION:   1. No acute intra-abdominal pathology. Normal appendix.  2. Cirrhotic hepatic morphology with evidence of portal hypertension,  including small volume ascites, splenomegaly, and upper abdominal  portosystemic collaterals.  3. Decreased hepatomegaly and nodular hepatic steatosis since  8/27/2024.     RUSTY ASH DO         SYSTEM ID:  [id number]   CT Chest Pulmonary Embolism w Contrast    Narrative    EXAMINATION: CT CHEST PULMONARY EMBOLISM W CONTRAST on 10/14/2024  11:59 PM.    INDICATION: fevers with uncertain etiology, chest pain and sob. Would  need to r/o PE. Coordinate    COMPARISON: X-ray 10/12/2024 and CT abdomen and pelvis 10/12/2024    TECHNIQUE: CT imaging obtained through the chest with contrast.  Coronal and axial MIP reformatted images obtained. Three-dimensional  (3D) post-processed angiographic images were reconstructed, archived  to PACS and used in interpretation of this study.     CONTRAST: 74 ml isovue 370 IV    FINDINGS:    Lines and tubes: None.    Vessels: No central filling defect consistent with a pulmonary  embolism. Normal caliber thoracic aorta and main pulmonary artery.    Mediastinum: No thyroid nodules. Central tracheobronchial tree is  patent. Heart size is normal. No pericardial effusion. No thoracic  lymphadenopathy. Scattered prominent  subcentimeter mediastinal nodes.  Normal appearance of the esophagus.    Lungs: Proximal central airways are patent. Mild diffuse bronchial  wall thickening. No consolidation. Atelectasis within the right middle  lobe and right lower lobe with elevation of the right hemidiaphragm.  Nonspecific small irregular and groundglass opacities throughout the  bilateral upper lobes (for example as seen on series 6, images 79, 96,  100)..    Mosaic attenuation of the superior lower lobes may reflect air  trapping.    No pleural effusion. No pneumothorax    Bones and soft tissues: No acute or suspicious osseous abnormality.  Degenerative changes throughout the visualized spine. Mild anterior  wedging of lower thoracic vertebral bodies.    Upper abdomen: Limited visualized portions of the upper abdomen  demonstrate no acute appearing abnormality. Please refer to recently  performed CT abdomen and pelvis 10/12/2024 for additional findings and  recommendations.      Impression    IMPRESSION:   1. No central filling defect consistent with a pulmonary embolism.  2. Small irregular nodular and groundglass opacities throughout the  bilateral upper lobes, likely likely of infectious or inflammatory  radiology.   2. Please refer to recently performed CT abdomen and pelvis 10/12/2024  for additional findings recommendations below the diaphragm.     I, Dr. Gutierrez, discussed the above findings by telephone with Dr. Medina  on 10/15/2024 12:35 PM.    In the event of a positive result for acute pulmonary embolism  resulting in right heart strain, consider calling the   [facility] patient placement ([phone]) for PERT (Pulmonary Embolism  Response Team) Activation    I have personally reviewed the examination and initial interpretation  and I agree with the findings.    MIAN GUTIERREZ MD         SYSTEM ID:  [id number]   CT Dental wo Contrast    Narrative    CT DENTAL WO CONTRAST 10/15/2024 12:00 AM    History:  evaluate for dental caries as source  for recurrent fevers,  tooth pain    Comparison:  None      TECHNIQUE: 3-D reconstruction by the technologists, with curved  multiplanar reformat of thin section imaging through the mandible and  maxilla obtained without intravenous contrast.    FINDINGS:  No significant soft tissue swelling or mass. Normal facial bone  alignment. No bony erosion.     Mucous retention cyst at the inferior medial aspect of the left  maxillary sinus. Otherwise, visualized portions of the paranasal  sinuses are clear. Normal temporomandibular joints.      Impression    IMPRESSION:  Periapical lucency adjacent to the right mandibular  central incisor, previously treated by root canal. Otherwise, no  evidence of periapical abscess.    I have personally reviewed the examination and initial interpretation  and I agree with the findings.    RANDY BURTON MD         SYSTEM ID:  [id number]   Echo Limited     Value    LVEF  60-65%    Narrative    [id number]  [id number]  [id number]  [id number]^BRANDON^BERENICE     [facility]  Echocardiography Laboratory  [address] [place] [address]     Name: KARINA FARIAS  MRN: [id number]  : 1985  Study Date: 10/09/2024 09:45 AM  Age: 39 yrs  Gender: Male  Patient Location: [facility]  Reason For Study: Endocarditis  Ordering Physician: BERENICE TAYLOR  Performed By: Frederick Maloney     BSA: 2.2 m2  Height: 66 in  Weight: 253 lb  HR: 98  BP: 119/51 mmHg  ______________________________________________________________________________  Procedure  Limited Portable Echo Adult.  ______________________________________________________________________________  Interpretation Summary  No significant valvular abnormalities were noted. No vegetation or mass  identified.  ______________________________________________________________________________  Left Ventricle  Global and regional left ventricular function is normal with an EF of 60-65%.     Right Ventricle  Right  ventricular function, chamber size, wall motion, and thickness are  normal.     Mitral Valve  The mitral valve is normal. Trace mitral insufficiency is present.     Aortic Valve  Aortic valve is normal in structure and function.     Tricuspid Valve  The tricuspid valve is normal. Trace tricuspid insufficiency is present.     Pulmonic Valve  The pulmonic valve is normal.     Vessels  IVC diameter and respiratory changes fall into an intermediate range  suggesting an RA pressure of 8 mmHg.     Pericardium  No pericardial effusion is present.     ______________________________________________________________________________  Report approved by: Carlos Gonzalez 10/09/2024 10:51 AM     ______________________________________________________________________________          Discharge Medications   Current Discharge Medication List        START taking these medications    Details   ceFAZolin (ANCEF) intermittent infusion 2 g in 100 mL dextrose PRE-MIX Inject 100 mLs (2 g) at 200 mL/hr over 30 minutes into the vein every 8 hours for 24 days.    Associated Diagnoses: Bacteremia      ciprofloxacin (CIPRO) 500 MG tablet Take 1 tablet (500 mg) by mouth daily for 3 days.  Qty: 3 tablet, Refills: 0    Associated Diagnoses: Bacteremia      metroNIDAZOLE (FLAGYL) 500 MG tablet Take 1 tablet (500 mg) by mouth 3 times daily for 3 days.  Qty: 9 tablet, Refills: 0    Associated Diagnoses: Bacteremia      pantoprazole (PROTONIX) 40 MG EC tablet Take 1 tablet (40 mg) by mouth every morning (before breakfast).  Qty: 30 tablet, Refills: 0    Associated Diagnoses: Alcoholic cirrhosis of liver without ascites (H)           CONTINUE these medications which have NOT CHANGED    Details   albuterol (PROAIR HFA/PROVENTIL HFA/VENTOLIN HFA) 108 (90 Base) MCG/ACT inhaler Inhale 2-3 puffs into the lungs every 6 hours as needed for shortness of breath, wheezing or cough.      famotidine (PEPCID) 20 MG tablet Take 20 mg by mouth daily.      folic acid  (FOLVITE) 1 MG tablet Take 1 tablet (1 mg) by mouth daily.  Qty: 30 tablet, Refills: 0    Associated Diagnoses: Alcoholic cirrhosis of liver without ascites (H); Alcoholic cirrhosis of liver without ascites (H)      furosemide (LASIX) 20 MG tablet Take 1 tablet (20 mg) by mouth daily.  Qty: 30 tablet, Refills: 0    Associated Diagnoses: Alcoholic cirrhosis of liver without ascites (H); Alcoholic cirrhosis of liver without ascites (H)      lactulose (CEPHULAC) 20 GM packet Take 1 packet (20 g) by mouth 3 times daily.  Qty: 90 each, Refills: 0    Associated Diagnoses: Alcoholic cirrhosis of liver without ascites (H); Alcoholic cirrhosis of liver without ascites (H)      multivitamin w/minerals (THERA-VIT-M) tablet Take 1 tablet by mouth daily.  Qty: 30 tablet, Refills: 0    Associated Diagnoses: Alcoholic cirrhosis of liver without ascites (H); Alcoholic cirrhosis of liver without ascites (H)      simethicone (MYLICON) 125 MG chewable tablet Take 125 mg by mouth 2 times daily.      spironolactone (ALDACTONE) 50 MG tablet Take 1 tablet (50 mg) by mouth daily.  Qty: 30 tablet, Refills: 0    Associated Diagnoses: Alcoholic cirrhosis of liver without ascites (H); Alcoholic cirrhosis of liver without ascites (H)      thiamine (B-1) 100 MG tablet Take 1 tablet (100 mg) by mouth daily.  Qty: 30 tablet, Refills: 0    Associated Diagnoses: Alcoholic cirrhosis of liver without ascites (H); Alcoholic cirrhosis of liver without ascites (H)           STOP taking these medications       prednisoLONE 5 MG tablet Comments:   Reason for Stopping:             Allergies   No Known Allergies

## 2024-10-18 NOTE — PLAN OF CARE
Goal Outcome Evaluation:    Plan of Care Reviewed With: patient    Overall Patient Progress: no change  Overall Patient Progress: no change    Outcome Evaluation: Assumed cares from [id number]. PRN Tylenol administered for headache 3/10, which was effective. PRN Robitussin administered for cough, which was effective. Pt experienced nausea/vomiting which led to a bloody nose. PRN Zofran administered, which was effective. PICC in L arm saline locked. On RA. Lymph wraps on - pt continues to report baseline numbness in lower extremities. Good urine output measured overnight - urine continues to be cherry/tea colored. Sclera and skin still yellow. Critical lab result at 0652, Bilirubin 24.1 - MD notified. Possible discharge 10/18 or 10/19. Continue with plan of care.     Carolyn Sarabia RN

## 2024-10-20 PROCEDURE — 99358 PROLONG SERVICE W/O CONTACT: CPT | Performed by: INTERNAL MEDICINE

## 2024-10-21 ENCOUNTER — OFFICE VISIT (OUTPATIENT)
Dept: GASTROENTEROLOGY | Facility: CLINIC | Age: 39
End: 2024-10-21
Attending: INTERNAL MEDICINE
Payer: COMMERCIAL

## 2024-10-21 ENCOUNTER — LAB (OUTPATIENT)
Dept: LAB | Facility: CLINIC | Age: 39
End: 2024-10-21
Payer: COMMERCIAL

## 2024-10-21 ENCOUNTER — PATIENT OUTREACH (OUTPATIENT)
Dept: CARE COORDINATION | Facility: CLINIC | Age: 39
End: 2024-10-21
Payer: COMMERCIAL

## 2024-10-21 ENCOUNTER — PRE VISIT (OUTPATIENT)
Dept: GASTROENTEROLOGY | Facility: CLINIC | Age: 39
End: 2024-10-21
Payer: COMMERCIAL

## 2024-10-21 VITALS
SYSTOLIC BLOOD PRESSURE: 117 MMHG | TEMPERATURE: 97.9 F | OXYGEN SATURATION: 99 % | WEIGHT: 248.5 LBS | HEART RATE: 103 BPM | DIASTOLIC BLOOD PRESSURE: 77 MMHG | BODY MASS INDEX: 40.11 KG/M2

## 2024-10-21 DIAGNOSIS — K70.9 ALCOHOLIC LIVER DISEASE (H): ICD-10-CM

## 2024-10-21 DIAGNOSIS — K70.10 ALCOHOLIC HEPATITIS WITHOUT ASCITES (H): Primary | ICD-10-CM

## 2024-10-21 PROBLEM — M79.89 LEG SWELLING: Status: RESOLVED | Noted: 2024-10-03 | Resolved: 2024-10-21

## 2024-10-21 PROBLEM — K70.30 ALCOHOLIC CIRRHOSIS OF LIVER WITHOUT ASCITES (H): Status: RESOLVED | Noted: 2024-09-20 | Resolved: 2024-10-21

## 2024-10-21 PROBLEM — E80.6 BILIRUBINEMIA: Status: RESOLVED | Noted: 2024-09-20 | Resolved: 2024-10-21

## 2024-10-21 LAB
ALBUMIN SERPL BCG-MCNC: 3.3 G/DL (ref 3.5–5.2)
ALP SERPL-CCNC: 156 U/L (ref 40–150)
ALT SERPL W P-5'-P-CCNC: 34 U/L (ref 0–70)
ANION GAP SERPL CALCULATED.3IONS-SCNC: 10 MMOL/L (ref 7–15)
AST SERPL W P-5'-P-CCNC: 126 U/L (ref 0–45)
BILIRUB DIRECT SERPL-MCNC: 20.12 MG/DL (ref 0–0.3)
BILIRUB SERPL-MCNC: 25.2 MG/DL
BUN SERPL-MCNC: 13.6 MG/DL (ref 6–20)
CALCIUM SERPL-MCNC: 8.6 MG/DL (ref 8.8–10.4)
CHLORIDE SERPL-SCNC: 105 MMOL/L (ref 98–107)
CREAT SERPL-MCNC: 0.68 MG/DL (ref 0.67–1.17)
EGFRCR SERPLBLD CKD-EPI 2021: >90 ML/MIN/1.73M2
ERYTHROCYTE [DISTWIDTH] IN BLOOD BY AUTOMATED COUNT: 19.9 % (ref 10–15)
GLUCOSE SERPL-MCNC: 107 MG/DL (ref 70–99)
HCO3 SERPL-SCNC: 22 MMOL/L (ref 22–29)
HCT VFR BLD AUTO: 32.9 % (ref 40–53)
HGB BLD-MCNC: 11.7 G/DL (ref 13.3–17.7)
INR PPP: 3.61 (ref 0.85–1.15)
MCH RBC QN AUTO: 35.9 PG (ref 26.5–33)
MCHC RBC AUTO-ENTMCNC: 35.6 G/DL (ref 31.5–36.5)
MCV RBC AUTO: 101 FL (ref 78–100)
PLATELET # BLD AUTO: 150 10E3/UL (ref 150–450)
POTASSIUM SERPL-SCNC: 4.2 MMOL/L (ref 3.4–5.3)
PROT SERPL-MCNC: ABNORMAL G/DL
RBC # BLD AUTO: 3.26 10E6/UL (ref 4.4–5.9)
SODIUM SERPL-SCNC: 137 MMOL/L (ref 135–145)
WBC # BLD AUTO: 14 10E3/UL (ref 4–11)

## 2024-10-21 PROCEDURE — 80048 BASIC METABOLIC PNL TOTAL CA: CPT | Performed by: PATHOLOGY

## 2024-10-21 PROCEDURE — 82247 BILIRUBIN TOTAL: CPT | Performed by: PATHOLOGY

## 2024-10-21 PROCEDURE — 85027 COMPLETE CBC AUTOMATED: CPT | Performed by: PATHOLOGY

## 2024-10-21 PROCEDURE — 84450 TRANSFERASE (AST) (SGOT): CPT | Performed by: PATHOLOGY

## 2024-10-21 PROCEDURE — 85007 BL SMEAR W/DIFF WBC COUNT: CPT | Performed by: PATHOLOGY

## 2024-10-21 PROCEDURE — 82040 ASSAY OF SERUM ALBUMIN: CPT | Performed by: PATHOLOGY

## 2024-10-21 PROCEDURE — 84460 ALANINE AMINO (ALT) (SGPT): CPT | Performed by: PATHOLOGY

## 2024-10-21 PROCEDURE — 99213 OFFICE O/P EST LOW 20 MIN: CPT | Performed by: INTERNAL MEDICINE

## 2024-10-21 PROCEDURE — 82248 BILIRUBIN DIRECT: CPT | Performed by: PATHOLOGY

## 2024-10-21 PROCEDURE — 36415 COLL VENOUS BLD VENIPUNCTURE: CPT | Performed by: PATHOLOGY

## 2024-10-21 PROCEDURE — 85610 PROTHROMBIN TIME: CPT | Performed by: PATHOLOGY

## 2024-10-21 PROCEDURE — 99417 PROLNG OP E/M EACH 15 MIN: CPT | Performed by: INTERNAL MEDICINE

## 2024-10-21 PROCEDURE — 99215 OFFICE O/P EST HI 40 MIN: CPT | Performed by: INTERNAL MEDICINE

## 2024-10-21 PROCEDURE — 84075 ASSAY ALKALINE PHOSPHATASE: CPT | Performed by: PATHOLOGY

## 2024-10-21 RX ORDER — LOTEPREDNOL ETABONATE 3.8 MG/G
GEL OPHTHALMIC
COMMUNITY
Start: 2024-01-02 | End: 2024-10-21

## 2024-10-21 ASSESSMENT — PAIN SCALES - GENERAL: PAINLEVEL: SEVERE PAIN (6)

## 2024-10-21 NOTE — PROGRESS NOTES
[facility]: [facility]    Background: Transitional Care Management program identified per system criteria and reviewed by [facility] [place] team for possible outreach.    Assessment: Upon chart review, [facility] Team member will not proceed with patient outreach related to this episode of Transitional Care Management program due to reason below:    Patient has a follow up appointment with an appropriate provider today for hospital discharge    Plan: Transitional Care Management episode addressed appropriately per reason noted above.      Belkys Tang  Community Health Worker  [facility]  [phone]      *Connected Care Resource Team does NOT follow patient ongoing. Referrals are identified based on internal discharge reports and the outreach is to ensure patient has an understanding of their discharge instructions.

## 2024-10-21 NOTE — PROGRESS NOTES
[facility] Hepatology    New Patient Visit    Referring provider:  Jay Mcmillan    39 year old male    Chief complaint:  Alcohol-related hepatitis    HPI:  Patient comes to clinic this morning with his wife.    Patient presents to clinic this morning to establish care for alcohol-related hepatitis.  Patient presented to local GI in early August 2024 with jaundice.  He was advised to stop drinking alcohol at that time and was started on prednisone.  EGD at that time was reportedly negative for esophageal varices.  Patient did not respond to prednisone and he was sent to [facility] in late September 2024 for further evaluation.  Patient was observed for 2 nights and discharged home.  Liver function tests were stable.    Patient was admitted to the hospital in early October 2024 with fever and malaise secondary to MSSA bacteremia of unclear source.  TTE was negative for vegetations.  Subsequent blood cultures were negative.  Patient was started on lactulose for ?hepatic encephalopathy.  Patient was seen by ID and discharged home on 4 weeks of cefazolin.  Patient will see ID in clinic early next month.    Patient is doing okay today.  He remains very jaundiced and reports ongoing fatigue and malaise.  Patient has not had any fevers since discharge home from hospital.    Patient is taking lactulose once or twice per day with up to 12 bowel movements per day.  He has had several bowel movements per day since starting on antibiotics.  His wife reports that the patient is slow to respond in conversation but not overtly confused.    Patient has ongoing lower extremity edema but this is improved overall compared to last month.  Patient denies any abdominal distention.  Imaging since initial presentation has never revealed any evidence of ascites.    No history of melena, hematemesis or hematochezia.    Weight has been stable.  Appetite is very good.    Patient last drank alcohol on 9/7/2024.  There was a delay between  advised to stop drinking and stopping drinking as the patient took some time to process this.  Patient is currently attending AA since his diagnosis of alcohol related hepatitis.  He has tried to start treatment for AUD.  Patient was enrolled to start intensive outpatient treatment at [facility] last week but was admitted to the hospital at that time.  No prior medical issues due to alcohol.  Patient has never done one-on-one counseling or been to rehab for AUD in the past.  No history of DUIs.    Patient has never smoked.  He denies any recreational or illicit substance use including marijuana, INR or IV drug use.    Medical hx Surgical hx   Past Medical History:   Diagnosis Date    Acute alcoholic hepatitis (H) 09/2024    Alcohol use disorder, severe, in early remission (H)     Alcoholic cirrhosis (H)     Morbid obesity (H)       Past Surgical History:   Procedure Laterality Date    GASTRECTOMY LAPAROSCOPIC SLEEVE  2012    PICC INSERTION - DOUBLE LUMEN Left 10/16/2024    45-5cm, Basilic vein    WISDOM TOOTH EXTRACTION            Medications  Current Outpatient Medications   Medication Sig Dispense Refill    albuterol (PROAIR HFA/PROVENTIL HFA/VENTOLIN HFA) 108 (90 Base) MCG/ACT inhaler Inhale 2-3 puffs into the lungs every 6 hours as needed for shortness of breath, wheezing or cough.      ceFAZolin (ANCEF) intermittent infusion 2 g in 100 mL dextrose PRE-MIX Inject 100 mLs (2 g) at 200 mL/hr over 30 minutes into the vein every 8 hours for 24 days.      famotidine (PEPCID) 20 MG tablet Take 20 mg by mouth daily.      folic acid (FOLVITE) 1 MG tablet Take 1 tablet (1 mg) by mouth daily. 30 tablet 0    furosemide (LASIX) 20 MG tablet Take 1 tablet (20 mg) by mouth daily. 30 tablet 0    lactulose (CEPHULAC) 20 GM packet Take 1 packet (20 g) by mouth 3 times daily. 90 each 0    multivitamin w/minerals (THERA-VIT-M) tablet Take 1 tablet by mouth daily. 30 tablet 0    pantoprazole (PROTONIX) 40 MG EC tablet Take  1 tablet (40 mg) by mouth every morning (before breakfast). 30 tablet 0    simethicone (MYLICON) 125 MG chewable tablet Take 125 mg by mouth 2 times daily.      spironolactone (ALDACTONE) 50 MG tablet Take 1 tablet (50 mg) by mouth daily. 30 tablet 0    thiamine (B-1) 100 MG tablet Take 1 tablet (100 mg) by mouth daily. 30 tablet 0       Allergies  Allergies   Allergen Reactions    Adhesive Tape        Family hx Social hx   Family History   Problem Relation Age of Onset    Colon Cancer Mother 42    Diabetes Father     Pancreatic Cancer Sister 15    Diabetes Maternal Grandmother     Colon Cancer Cousin 32    Liver Disease No family hx of       Social History     Tobacco Use    Smoking status: Never    Smokeless tobacco: Never   Vaping Use    Vaping status: Never Used   Substance Use Topics    Alcohol use: Not Currently     Comment: 2 pints of vodka per day for 2 years. Last drink early sept 2024    Drug use: Never     Patient lives in [place], [place] with his wife and children.  Patient has a son who is 13 and a daughter who is 19, both of whom are healthy.  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  He is able to work from home.     Review of systems  A 10-point review of systems was negative.    Examination  /77 (BP Location: Right arm, Patient Position: Sitting, Cuff Size: Adult Regular)   Pulse 103   Temp 97.9  F (36.6  C) (Oral)   Wt 112.7 kg (248 lb 8 oz)   SpO2 99%   BMI 40.11 kg/m    Body mass index is 40.11 kg/m .    Gen- well, NAD, A+Ox3, jaundiced  Eye- EOMI, scleral icterus  ENT- MMM, normal oropharynx  Lym- no palpable lymphadenopathy  CVS- S1, S2 normal, no added sounds, RRR  RS- CTA  Abd- obese, striae, soft, non-tender, palpable hepatomegaly, no ascites or splenomegaly on palpation or percussion, BS+  Extr- pulses good, mild pitting bilateral JAKE to ankles  MS- hands normal- no clubbing  Neuro- A+Ox3, no asterixis  Skin- jaundiced  Psych- normal mood    Laboratory  Lab Results    Component Value Date     10/18/2024    POTASSIUM 4.2 10/18/2024    CHLORIDE 105 10/18/2024    CHLORIDE 102 09/27/2024    CO2 20 10/18/2024    BUN 15.5 10/18/2024    CR 0.60 10/18/2024       Lab Results   Component Value Date    BILITOTAL 24.1 10/18/2024    ALT 34 10/18/2024    AST 88 10/18/2024    ALKPHOS 146 10/18/2024       Lab Results   Component Value Date    ALBUMIN 3.0 10/18/2024    PROTTOTAL  10/18/2024      Comment:      Unsatisfactory specimen - icteric.        Lab Results   Component Value Date    WBC 11.3 10/18/2024    HGB 10.4 10/18/2024     10/18/2024     10/18/2024       Lab Results   Component Value Date    INR 3.22 10/18/2024    INR 3.1 09/27/2024         Radiology  CT chest (PE protocol) Oct 2024 reviewed  CT A/P Oct 2024 reviewed- ?decreased hepatomegaly since 8/27/2024  Abd U/S Sep 2024 reviewed    Assessment  39 year old male who presents to establish care for severe alcohol-related hepatitis complicated with hepatic encephalopathy, probably superimposed on MET-ALD cirrhosis.  MELD 3.0 = ?  Last ETOH = 9/7/2024.  Patient is a non-responder to steroids for alcohol-related hepatitis.  No evidence of ascites since initial presentation.  Renal function normal on low-dose diuretics.      Patient is currently on home IV antibiotics for recent MSSA bacteremia of unclear source for another 2 weeks.  Will continue supportive care and close observation with ongoing sobriety from alcohol-the patient's young age, normal platelet count and renal function meaning that he could potentially recover without need for transplantation.      If patient's liver function fails to improve, would consider liver transplant evaluation.  Patient would be an acceptable candidate for liver transplantation for alcohol-related liver disease with short-term sobriety- he has good insight into the role of alcohol in his disease, has started AA and has made a good effort to start treatment for AUD.    We  discussed the natural history and complications of alcohol related liver disease, liver transplant evaluation process, MELD 3.0 and liver transplant waiting list and the importance in maintaining complete sobriety from alcohol with regard to preservation and improvement in liver function.    Plan  Complete abstinence from alcohol  Low Na diet  Continue diuretics at current doses  Continue lactulose  No fluid restriction- not helpful in patients with liver disease in the absence of severe hyponatremia  Complete 4-week course of IV cefazolin per ID  Check CMP, INR, CBC weekly for now  Obtain EGD report from local GI  Follow-up in 6 weeks    Dragan Dodd MD  Hepatology  [facility]    I spent 60 minutes on the date of the encounter doing chart review, history and exam, documentation and further activities as noted above.     On 10/20/2024, approximately 35 minutes of non face-to-face time were spent in review of the patient's medical record to date.  This included review of previous: clinic visits, hospital records, lab results, imaging studies, and procedural documentation.  The findings from this review are summarized in the above note.

## 2024-10-21 NOTE — NURSING NOTE
Chief Complaint   Patient presents with    Consult     NEW LIVER - Complete RM, added pt per Dodd message, he saw him inpatient needed to be seen 4 weeks from 9/23       /77 (BP Location: Right arm, Patient Position: Sitting, Cuff Size: Adult Regular)   Pulse 103   Temp 97.9  F (36.6  C) (Oral)   Wt 112.7 kg (248 lb 8 oz)   SpO2 99%   BMI 40.11 kg/m      Eloy Lawrence CMA on 10/21/2024 at 9:16 AM

## 2024-10-21 NOTE — LETTER
10/21/2024      Stewart Hunt  [address] [address]      Dear Colleague,    Thank you for referring your patient, Stewart Hunt, to the [facility] [place]. Please see a copy of my visit note below.    [facility] Hepatology    New Patient Visit    Referring provider:  Jay Mcmillan    39 year old male    Chief complaint:  Alcohol-related hepatitis    HPI:  Patient comes to clinic this morning with his wife.    Patient presents to clinic this morning to establish care for alcohol-related hepatitis.  Patient presented to local GI in early August 2024 with jaundice.  He was advised to stop drinking alcohol at that time and was started on prednisone.  EGD at that time was reportedly negative for esophageal varices.  Patient did not respond to prednisone and he was sent to [facility] in late September 2024 for further evaluation.  Patient was observed for 2 nights and discharged home.  Liver function tests were stable.    Patient was admitted to the hospital in early October 2024 with fever and malaise secondary to MSSA bacteremia of unclear source.  TTE was negative for vegetations.  Subsequent blood cultures were negative.  Patient was started on lactulose for ?hepatic encephalopathy.  Patient was seen by ID and discharged home on 4 weeks of cefazolin.  Patient will see ID in clinic early next month.    Patient is doing okay today.  He remains very jaundiced and reports ongoing fatigue and malaise.  Patient has not had any fevers since discharge home from hospital.    Patient is taking lactulose once or twice per day with up to 12 bowel movements per day.  He has had several bowel movements per day since starting on antibiotics.  His wife reports that the patient is slow to respond in conversation but not overtly confused.    Patient has ongoing lower extremity edema but this is improved overall compared to last month.  Patient denies any abdominal distention.   Imaging since initial presentation has never revealed any evidence of ascites.    No history of melena, hematemesis or hematochezia.    Weight has been stable.  Appetite is very good.    Patient last drank alcohol on 9/7/2024.  There was a delay between advised to stop drinking and stopping drinking as the patient took some time to process this.  Patient is currently attending AA since his diagnosis of alcohol related hepatitis.  He has tried to start treatment for AUD.  Patient was enrolled to start intensive outpatient treatment at [facility] last week but was admitted to the hospital at that time.  No prior medical issues due to alcohol.  Patient has never done one-on-one counseling or been to rehab for AUD in the past.  No history of DUIs.    Patient has never smoked.  He denies any recreational or illicit substance use including marijuana, INR or IV drug use.    Medical hx Surgical hx   Past Medical History:   Diagnosis Date     Acute alcoholic hepatitis (H) 09/2024     Alcohol use disorder, severe, in early remission (H)      Alcoholic cirrhosis (H)      Morbid obesity (H)       Past Surgical History:   Procedure Laterality Date     GASTRECTOMY LAPAROSCOPIC SLEEVE  2012     PICC INSERTION - DOUBLE LUMEN Left 10/16/2024    45-5cm, Basilic vein     WISDOM TOOTH EXTRACTION            Medications  Current Outpatient Medications   Medication Sig Dispense Refill     albuterol (PROAIR HFA/PROVENTIL HFA/VENTOLIN HFA) 108 (90 Base) MCG/ACT inhaler Inhale 2-3 puffs into the lungs every 6 hours as needed for shortness of breath, wheezing or cough.       ceFAZolin (ANCEF) intermittent infusion 2 g in 100 mL dextrose PRE-MIX Inject 100 mLs (2 g) at 200 mL/hr over 30 minutes into the vein every 8 hours for 24 days.       famotidine (PEPCID) 20 MG tablet Take 20 mg by mouth daily.       folic acid (FOLVITE) 1 MG tablet Take 1 tablet (1 mg) by mouth daily. 30 tablet 0     furosemide (LASIX) 20 MG tablet Take 1 tablet  (20 mg) by mouth daily. 30 tablet 0     lactulose (CEPHULAC) 20 GM packet Take 1 packet (20 g) by mouth 3 times daily. 90 each 0     multivitamin w/minerals (THERA-VIT-M) tablet Take 1 tablet by mouth daily. 30 tablet 0     pantoprazole (PROTONIX) 40 MG EC tablet Take 1 tablet (40 mg) by mouth every morning (before breakfast). 30 tablet 0     simethicone (MYLICON) 125 MG chewable tablet Take 125 mg by mouth 2 times daily.       spironolactone (ALDACTONE) 50 MG tablet Take 1 tablet (50 mg) by mouth daily. 30 tablet 0     thiamine (B-1) 100 MG tablet Take 1 tablet (100 mg) by mouth daily. 30 tablet 0       Allergies  Allergies   Allergen Reactions     Adhesive Tape        Family hx Social hx   Family History   Problem Relation Age of Onset     Colon Cancer Mother 42     Diabetes Father      Pancreatic Cancer Sister 15     Diabetes Maternal Grandmother      Colon Cancer Cousin 32     Liver Disease No family hx of       Social History     Tobacco Use     Smoking status: Never     Smokeless tobacco: Never   Vaping Use     Vaping status: Never Used   Substance Use Topics     Alcohol use: Not Currently     Comment: 2 pints of vodka per day for 2 years. Last drink early sept 2024     Drug use: Never     Patient lives in [place], [place] with his wife and children.  Patient has a son who is 13 and a daughter who is 19, both of whom are healthy.  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     Review of systems  A 10-point review of systems was negative.    Examination  /77 (BP Location: Right arm, Patient Position: Sitting, Cuff Size: Adult Regular)   Pulse 103   Temp 97.9  F (36.6  C) (Oral)   Wt 112.7 kg (248 lb 8 oz)   SpO2 99%   BMI 40.11 kg/m    Body mass index is 40.11 kg/m .    Gen- well, NAD, A+Ox3, jaundiced  Eye- EOMI, scleral icterus  ENT- MMM, normal oropharynx  Lym- no palpable lymphadenopathy  CVS- S1, S2 normal, no added sounds, RRR  RS- CTA  Abd- obese,  striae, soft, non-tender, palpable hepatomegaly, no ascites or splenomegaly on palpation or percussion, BS+  Extr- pulses good, mild pitting bilateral JAKE to ankles  MS- hands normal- no clubbing  Neuro- A+Ox3, no asterixis  Skin- jaundiced  Psych- normal mood    Laboratory  Lab Results   Component Value Date     10/18/2024    POTASSIUM 4.2 10/18/2024    CHLORIDE 105 10/18/2024    CHLORIDE 102 09/27/2024    CO2 20 10/18/2024    BUN 15.5 10/18/2024    CR 0.60 10/18/2024       Lab Results   Component Value Date    BILITOTAL 24.1 10/18/2024    ALT 34 10/18/2024    AST 88 10/18/2024    ALKPHOS 146 10/18/2024       Lab Results   Component Value Date    ALBUMIN 3.0 10/18/2024    PROTTOTAL  10/18/2024      Comment:      Unsatisfactory specimen - icteric.        Lab Results   Component Value Date    WBC 11.3 10/18/2024    HGB 10.4 10/18/2024     10/18/2024     10/18/2024       Lab Results   Component Value Date    INR 3.22 10/18/2024    INR 3.1 09/27/2024         Radiology  CT chest (PE protocol) Oct 2024 reviewed  CT A/P Oct 2024 reviewed- ?decreased hepatomegaly since 8/27/2024  Abd U/S Sep 2024 reviewed    Assessment  39 year old male who presents to establish care for severe alcohol-related hepatitis complicated with hepatic encephalopathy, probably superimposed on MET-ALD cirrhosis.  MELD 3.0 = ?  Last ETOH = 9/7/2024.  Patient is a non-responder to steroids for alcohol-related hepatitis.  No evidence of ascites since initial presentation.  Renal function normal on low-dose diuretics.      Patient is currently on home IV antibiotics for recent MSSA bacteremia of unclear source for another 2 weeks.  Will continue supportive care and close observation with ongoing sobriety from alcohol-the patient's young age, normal platelet count and renal function meaning that he could potentially recover without need for transplantation.      If patient's liver function fails to improve, would consider liver  transplant evaluation.  Patient would be an acceptable candidate for liver transplantation for alcohol-related liver disease with short-term sobriety- he has good insight into the role of alcohol in his disease, has started AA and has made a good effort to start treatment for AUD.    We discussed the natural history and complications of alcohol related liver disease, liver transplant evaluation process, MELD 3.0 and liver transplant waiting list and the importance in maintaining complete sobriety from alcohol with regard to preservation and improvement in liver function.    Plan  Complete abstinence from alcohol  Low Na diet  Continue diuretics at current doses  Continue lactulose  No fluid restriction- not helpful in patients with liver disease in the absence of severe hyponatremia  Complete 4-week course of IV cefazolin per ID  Check CMP, INR, CBC weekly for now  Obtain EGD report from local GI  Follow-up in 6 weeks    Dragan Dodd MD  Hepatology  [facility]    I spent 60 minutes on the date of the encounter doing chart review, history and exam, documentation and further activities as noted above.     On 10/20/2024, approximately 35 minutes of non face-to-face time were spent in review of the patient's medical record to date.  This included review of previous: clinic visits, hospital records, lab results, imaging studies, and procedural documentation.  The findings from this review are summarized in the above note.      Again, thank you for allowing me to participate in the care of your patient.        Sincerely,        Dragan Dodd MD

## 2024-10-21 NOTE — Clinical Note
Can we put this patient in the 1030 slot on MOnday 25 Nov please?  He will need labs prior to visit.  Can we get his EGD from Aug/Sep 2024 from [facility] please?  thanks

## 2024-10-22 ENCOUNTER — DOCUMENTATION ONLY (OUTPATIENT)
Dept: INFECTIOUS DISEASES | Facility: CLINIC | Age: 39
End: 2024-10-22
Payer: COMMERCIAL

## 2024-10-22 ENCOUNTER — ENROLLMENT (OUTPATIENT)
Dept: HOME HEALTH SERVICES | Facility: HOME HEALTH | Age: 39
End: 2024-10-22
Payer: COMMERCIAL

## 2024-10-22 ENCOUNTER — HOME INFUSION (OUTPATIENT)
Dept: HOME HEALTH SERVICES | Facility: HOME HEALTH | Age: 39
End: 2024-10-22
Payer: COMMERCIAL

## 2024-10-22 DIAGNOSIS — R78.81 BACTEREMIA: Primary | ICD-10-CM

## 2024-10-22 LAB
BASOPHILS # BLD MANUAL: 0 10E3/UL (ref 0–0.2)
BASOPHILS NFR BLD MANUAL: 0 %
EOSINOPHIL # BLD MANUAL: 0 10E3/UL (ref 0–0.7)
EOSINOPHIL NFR BLD MANUAL: 0 %
LYMPHOCYTES # BLD MANUAL: 3.8 10E3/UL (ref 0.8–5.3)
LYMPHOCYTES NFR BLD MANUAL: 27 %
MONOCYTES # BLD MANUAL: 0.8 10E3/UL (ref 0–1.3)
MONOCYTES NFR BLD MANUAL: 6 %
NEUTROPHILS # BLD MANUAL: 9.4 10E3/UL (ref 1.6–8.3)
NEUTROPHILS NFR BLD MANUAL: 67 %
PLAT MORPH BLD: ABNORMAL
RBC MORPH BLD: ABNORMAL
WBC # BLD AUTO: 14.1 10E3/UL (ref 4–11)

## 2024-10-22 NOTE — PROGRESS NOTES
10/15/2024  PT HAS COVERAGE FOR TPA, LINE CARE AND HYDRATION.  [facility] REFERRAL EMAILED FOR REVIEW. GEOVANY

## 2024-10-23 ENCOUNTER — TELEPHONE (OUTPATIENT)
Dept: GASTROENTEROLOGY | Facility: CLINIC | Age: 39
End: 2024-10-23
Payer: COMMERCIAL

## 2024-10-23 RX ORDER — CEFAZOLIN SODIUM 10 G/1
2 INJECTION, POWDER, FOR SOLUTION INTRAVENOUS EVERY 8 HOURS
Qty: 999999 ML | Refills: 0 | Status: DISPENSED | OUTPATIENT
Start: 2024-10-23 | End: 2024-11-01

## 2024-10-23 NOTE — TELEPHONE ENCOUNTER
Patient confirmed scheduled appointment:     Date: 11/25  Time: 10:30 am  Appointment Type: Return Liver  Provider: Dr. Dragan Dodd  Location: [place]  Testing/imaging: Labs scheduled on 2nd floor  Additional Notes:

## 2024-10-25 ENCOUNTER — THERAPY VISIT (OUTPATIENT)
Dept: OCCUPATIONAL THERAPY | Facility: CLINIC | Age: 39
End: 2024-10-25
Attending: STUDENT IN AN ORGANIZED HEALTH CARE EDUCATION/TRAINING PROGRAM
Payer: COMMERCIAL

## 2024-10-25 ENCOUNTER — HOME INFUSION (OUTPATIENT)
Dept: HOME HEALTH SERVICES | Facility: HOME HEALTH | Age: 39
End: 2024-10-25
Payer: COMMERCIAL

## 2024-10-25 DIAGNOSIS — R78.81 BACTEREMIA: ICD-10-CM

## 2024-10-25 DIAGNOSIS — I89.0 LYMPHEDEMA: Primary | ICD-10-CM

## 2024-10-25 DIAGNOSIS — M79.89 LEG SWELLING: ICD-10-CM

## 2024-10-25 PROCEDURE — 97140 MANUAL THERAPY 1/> REGIONS: CPT | Mod: GO | Performed by: OCCUPATIONAL THERAPIST

## 2024-10-25 PROCEDURE — 97165 OT EVAL LOW COMPLEX 30 MIN: CPT | Mod: GO | Performed by: OCCUPATIONAL THERAPIST

## 2024-10-25 NOTE — PROGRESS NOTES
OCCUPATIONAL THERAPY EVALUATION  Type of Visit: Evaluation              Subjective      Presenting condition or subjective complaint: (Patient-Rptd) Need thw correct compretsion socks  Date of onset:      Relevant medical history:   Decompensated alcoholic cirrhosis  Hyperbilirubinemia most likely due to alcoholic cirrhosis  Jaundice  Hepatic encephalopathy  Anasarca  Hypoalbuminemia  Acute diarrhea  Hypertension  Leukocytosis  Alcohol use disorde  Dates & types of surgery:    10/16/2024  Picc insertion - double lumen (Left) 45-5cm, Basilic vein  2012  Gastrectomy laparoscopic sleeve  Date Unknown  [place] tooth extraction    Prior diagnostic imaging/testing results: (Patient-Rptd) MRI; CT scan; X-ray; EMG; Bone scan; Video fluoroscopic swallow study     Prior therapy history for the same diagnosis, illness or injury: (Patient-Rptd) No      Prior Level of Function  Transfers: Independent  Ambulation: Independent  ADL: Independent, wife helps as needed  IADL: Currently unable to drive due to meds, currently not working    Living Environment  Social support: (Patient-Rptd) With a significant other or spouse   Type of home: (Patient-Rptd) [facility]; 2-story   Stairs to enter the home: (Patient-Rptd) Yes (Patient-Rptd) 1 Is there a railing: (Patient-Rptd) Yes     Ramp: (Patient-Rptd) No   Stairs inside the home: (Patient-Rptd) Yes (Patient-Rptd) 1 Is there a railing: (Patient-Rptd) Yes     Help at home: (Patient-Rptd) None  Equipment owned:       Employment: (Patient-Rptd) Yes (Patient-Rptd) [employer]  Hobbies/Interests:      Patient goals for therapy: get compression stockings, advice on how to deal with edema     Pain assessment: skin tightness, legs heavy     Objective       Assessment & Plan   CLINICAL IMPRESSIONS  Medical Diagnosis: Lymphedema    Treatment Diagnosis: Lymphedema    Impression/Assessment: Pt is a 39 year old male presenting to Occupational Therapy due to BLE lymphedema (anasarca) related to cirrhosis of  liver due to alcoholism .His lymphedema interferes with fit of shoes/clothing and ambulation, community mobility. He is motivated for home management vs coming to clinic for therapy due to inability to drive and having many appts. Plans on going to CD treatment when able from a medical perspective. Pt is appropriate for therapy to focus on development of home program for self management of lymphedema.      Clinical Decision Making (Complexity):  Assessment of Occupational Performance: 1-3 Performance Deficits  Occupational Performance Limitations: dressing, functional mobility, driving and community mobility, and health management and maintenance  Clinical Decision Making (Complexity): Moderate complexity    PLAN OF CARE  Treatment Interventions:  Interventions: Self-Care/Home Management, Manual Therapy    Long Term Goals   OT Goal 1  Goal Identifier: Home Program and Precautions  Goal Description: Pt will report understanding s/s lymphedema and methods of prevention and demonstrate independence in performing prescribed exercises to facilitate the lymph system and muscle pumping systems for max reductions needed to help improve mobility engagement and increase I with LB cares  Rationale: In order to maximize safety and independence with performance of self-care activities  Target Date: 11/29/24  OT Goal 2  Goal Identifier: GCB  Goal Description: Tolerate gradient compression bandaging/wearing compression garments 23 hrs./day to prevent re-accumulation of extracellular fluid  for max reductions needed to help improve mobility engagement and increase I with LB cares and be able to independently don or instruct caregiver with quick wrap version of GCB  Rationale: In order to maximize safety and independence with performance of self-care activities  Target Date: 11/29/24  OT Goal 3  Goal Identifier: self mLD  Goal Description: Pt to demo I with self MLD to promote limb volume reduction  Rationale: In order to maximize  safety and independence with functional transfers and functional mobility within the home or community  Target Date: 11/29/24  OT Goal 4  Goal Identifier: comrpession garments  Goal Description: Pt to be Indpendent  with donning/doffing compression velcro garments as alternative to compression stockings for easier donning, to allow increased ease of transfers and ambulation  Target Date: 11/29/24      Frequency of Treatment: 1 x week1 x week   Duration of Treatment: 4 weeks 4 weeks    Recommended Referrals to Other Professionals: NA  Education Assessment: Learner/Method: Patient  Education Comments: Elevation, self MLD, quick wrap, compression options, bandage care, wearing schedule for comrpession stockings and velcro wraps.     Risks and benefits of evaluation/treatment have been explained.   Patient/Family/caregiver agrees with Plan of Care.     Evaluation Time:    OT Eval, Low Complexity Minutes (82212): 10       Signing Clinician: Sima Bermeo OT                     [TextEntry] : Gen: Pleasant in no distress MSK: OA of finger joints HEENT: Sclera non-icteric, conjunctiva non-injected; oropharynx clear w/o thrush; + right carotid bruit Neck: supple w/o cervical LAD; no bruits CV: Regular rate and rhythm, no murmurs, rubs or gallops Lungs: CTAB, no rales, wheezes, squeaks or rhonchi Extremities: no clubbing, cyanosis or edema Skin: no rash Psych: normal mood and affect Neuro:  non-focal

## 2024-10-28 ENCOUNTER — HOME INFUSION BILLING (OUTPATIENT)
Dept: HOME HEALTH SERVICES | Facility: HOME HEALTH | Age: 39
End: 2024-10-28
Payer: COMMERCIAL

## 2024-10-28 PROCEDURE — A4221 SUPP NON-INSULIN INF CATH/WK: HCPCS

## 2024-10-29 ENCOUNTER — HOME CARE VISIT (OUTPATIENT)
Dept: HOME HEALTH SERVICES | Facility: HOME HEALTH | Age: 39
End: 2024-10-29
Payer: COMMERCIAL

## 2024-10-29 ENCOUNTER — LAB REQUISITION (OUTPATIENT)
Dept: LAB | Facility: CLINIC | Age: 39
End: 2024-10-29
Payer: COMMERCIAL

## 2024-10-29 ENCOUNTER — TELEPHONE (OUTPATIENT)
Dept: INFECTIOUS DISEASES | Facility: CLINIC | Age: 39
End: 2024-10-29

## 2024-10-29 VITALS
DIASTOLIC BLOOD PRESSURE: 48 MMHG | SYSTOLIC BLOOD PRESSURE: 112 MMHG | HEART RATE: 100 BPM | TEMPERATURE: 97.7 F | OXYGEN SATURATION: 96 % | RESPIRATION RATE: 16 BRPM

## 2024-10-29 DIAGNOSIS — R78.81 BACTEREMIA: ICD-10-CM

## 2024-10-29 LAB
ALBUMIN SERPL BCG-MCNC: 3.1 G/DL (ref 3.5–5.2)
ALP SERPL-CCNC: 217 U/L (ref 40–150)
ALT SERPL W P-5'-P-CCNC: 40 U/L (ref 0–70)
ANION GAP SERPL CALCULATED.3IONS-SCNC: 11 MMOL/L (ref 7–15)
AST SERPL W P-5'-P-CCNC: 164 U/L (ref 0–45)
BASOPHILS # BLD AUTO: 0.2 10E3/UL (ref 0–0.2)
BASOPHILS NFR BLD AUTO: 1 %
BILIRUB SERPL-MCNC: 26.3 MG/DL
BUN SERPL-MCNC: 14.5 MG/DL (ref 6–20)
BURR CELLS BLD QL SMEAR: SLIGHT
CALCIUM SERPL-MCNC: 8.7 MG/DL (ref 8.8–10.4)
CHLORIDE SERPL-SCNC: 98 MMOL/L (ref 98–107)
CK SERPL-CCNC: 51 U/L (ref 39–308)
CREAT SERPL-MCNC: 0.55 MG/DL (ref 0.67–1.17)
CRP SERPL-MCNC: 23.64 MG/L
EGFRCR SERPLBLD CKD-EPI 2021: >90 ML/MIN/1.73M2
EOSINOPHIL # BLD AUTO: 0.3 10E3/UL (ref 0–0.7)
EOSINOPHIL NFR BLD AUTO: 2 %
ERYTHROCYTE [DISTWIDTH] IN BLOOD BY AUTOMATED COUNT: 19.2 % (ref 10–15)
GLUCOSE SERPL-MCNC: 114 MG/DL (ref 70–99)
HCO3 SERPL-SCNC: 24 MMOL/L (ref 22–29)
HCT VFR BLD AUTO: 31.5 % (ref 40–53)
HGB BLD-MCNC: 11 G/DL (ref 13.3–17.7)
IMM GRANULOCYTES # BLD: 0.2 10E3/UL
IMM GRANULOCYTES NFR BLD: 1 %
INR PPP: 2.81 (ref 0.85–1.15)
LYMPHOCYTES # BLD AUTO: 1.9 10E3/UL (ref 0.8–5.3)
LYMPHOCYTES NFR BLD AUTO: 13 %
MCH RBC QN AUTO: 35.6 PG (ref 26.5–33)
MCHC RBC AUTO-ENTMCNC: 34.9 G/DL (ref 31.5–36.5)
MCV RBC AUTO: 102 FL (ref 78–100)
MONOCYTES # BLD AUTO: 1.6 10E3/UL (ref 0–1.3)
MONOCYTES NFR BLD AUTO: 11 %
NEUTROPHILS # BLD AUTO: 9.9 10E3/UL (ref 1.6–8.3)
NEUTROPHILS NFR BLD AUTO: 70 %
NRBC # BLD AUTO: 0 10E3/UL
NRBC BLD AUTO-RTO: 0 /100
PLAT MORPH BLD: ABNORMAL
PLATELET # BLD AUTO: 122 10E3/UL (ref 150–450)
POTASSIUM SERPL-SCNC: 4.6 MMOL/L (ref 3.4–5.3)
PROT SERPL-MCNC: 6 G/DL (ref 6.4–8.3)
RBC # BLD AUTO: 3.09 10E6/UL (ref 4.4–5.9)
RBC MORPH BLD: ABNORMAL
SODIUM SERPL-SCNC: 133 MMOL/L (ref 135–145)
TARGETS BLD QL SMEAR: SLIGHT
WBC # BLD AUTO: 14 10E3/UL (ref 4–11)

## 2024-10-29 PROCEDURE — 86140 C-REACTIVE PROTEIN: CPT | Performed by: STUDENT IN AN ORGANIZED HEALTH CARE EDUCATION/TRAINING PROGRAM

## 2024-10-29 PROCEDURE — 85610 PROTHROMBIN TIME: CPT | Performed by: STUDENT IN AN ORGANIZED HEALTH CARE EDUCATION/TRAINING PROGRAM

## 2024-10-29 PROCEDURE — 85004 AUTOMATED DIFF WBC COUNT: CPT | Performed by: STUDENT IN AN ORGANIZED HEALTH CARE EDUCATION/TRAINING PROGRAM

## 2024-10-29 PROCEDURE — 82550 ASSAY OF CK (CPK): CPT | Performed by: STUDENT IN AN ORGANIZED HEALTH CARE EDUCATION/TRAINING PROGRAM

## 2024-10-29 PROCEDURE — 82947 ASSAY GLUCOSE BLOOD QUANT: CPT | Performed by: STUDENT IN AN ORGANIZED HEALTH CARE EDUCATION/TRAINING PROGRAM

## 2024-10-29 PROCEDURE — 99601 HOME NFS VISIT <2 HRS: CPT

## 2024-10-29 ASSESSMENT — PAIN SCALES - GENERAL: PAINLEVEL: NO PAIN (0)

## 2024-10-29 NOTE — PROGRESS NOTES
Nursing Visit Note:  Nurse visit today for CLC and labs  for Stewart Hunt.     present during visit today: Not Applicable.    Lab-Only Nursing Note    Labs obtained via PICC    [facility] Tracking number: [id number]    VSS, pt alert and oriented x4 and is feeling generally well. Denies infectious concerns or missed abx doses. Continues with loose stools about 4-5 times per day and is taking his Lactulose daily. Goal is 4 BM s per day. Pt confirms he is pushing fluids. Has intermittent occasional nausea from abx, otherwise tolerating okay. CLC performed and both lumen extension tubings changed. Labs drawn without incident. INR per hepatology provider also drawn today per pt request.     [facility]    Next Visit Plan:   Next Tuesday for CLC and labs     Jennifer Maloney RN 10/29/2024      Jennifer Maloney RN 10/29/2024

## 2024-10-29 NOTE — TELEPHONE ENCOUNTER
[facility] Call Center    Phone Message    May a detailed message be left on voicemail: yes     Reason for Call: Call received from [facility] lab reporting a critical value for Dr. Kolb's pt (did not give any further information), would like a call back from RN at (PH) [phone].    Action Taken: Other: Infectious Disease    Travel Screening: Not Applicable

## 2024-11-01 RX ORDER — CEFAZOLIN SODIUM 10 G/1
2 INJECTION, POWDER, FOR SOLUTION INTRAVENOUS EVERY 8 HOURS
Qty: 600 ML | Refills: 0 | Status: DISPENSED | OUTPATIENT
Start: 2024-11-01 | End: 2024-11-12

## 2024-11-02 ENCOUNTER — HOSPITAL ENCOUNTER (INPATIENT)
Facility: CLINIC | Age: 39
LOS: 1 days | Discharge: HOME | DRG: 433 | End: 2024-11-03
Attending: INTERNAL MEDICINE
Payer: COMMERCIAL

## 2024-11-02 ENCOUNTER — APPOINTMENT (OUTPATIENT)
Dept: CT IMAGING | Facility: CLINIC | Age: 39
DRG: 433 | End: 2024-11-02
Attending: INTERNAL MEDICINE
Payer: COMMERCIAL

## 2024-11-02 DIAGNOSIS — R26.81 UNSTEADY GAIT: ICD-10-CM

## 2024-11-02 DIAGNOSIS — R11.0 NAUSEA: Primary | ICD-10-CM

## 2024-11-02 DIAGNOSIS — R78.81 GRAM-POSITIVE BACTEREMIA: ICD-10-CM

## 2024-11-02 DIAGNOSIS — R53.1 WEAKNESS: ICD-10-CM

## 2024-11-02 DIAGNOSIS — E87.1 HYPONATREMIA: ICD-10-CM

## 2024-11-02 DIAGNOSIS — K72.10 END STAGE LIVER DISEASE (H): ICD-10-CM

## 2024-11-02 LAB
ALBUMIN SERPL BCG-MCNC: 3 G/DL (ref 3.5–5.2)
ALBUMIN UR-MCNC: 10 MG/DL
ALP SERPL-CCNC: 245 U/L (ref 40–150)
ALT SERPL W P-5'-P-CCNC: 39 U/L (ref 0–70)
AMMONIA PLAS-SCNC: 40 UMOL/L (ref 16–60)
ANION GAP SERPL CALCULATED.3IONS-SCNC: 10 MMOL/L (ref 7–15)
APPEARANCE UR: ABNORMAL
AST SERPL W P-5'-P-CCNC: 158 U/L (ref 0–45)
BACTERIA #/AREA URNS HPF: ABNORMAL /HPF
BASOPHILS # BLD AUTO: 0.1 10E3/UL (ref 0–0.2)
BASOPHILS NFR BLD AUTO: 1 %
BILIRUB SERPL-MCNC: 25.2 MG/DL
BILIRUB UR QL STRIP: ABNORMAL
BUN SERPL-MCNC: 14 MG/DL (ref 6–20)
CALCIUM SERPL-MCNC: 8.5 MG/DL (ref 8.8–10.4)
CHLORIDE SERPL-SCNC: 99 MMOL/L (ref 98–107)
COLOR UR AUTO: ABNORMAL
CREAT SERPL-MCNC: 0.56 MG/DL (ref 0.67–1.17)
CRP SERPL-MCNC: 22.2 MG/L
EGFRCR SERPLBLD CKD-EPI 2021: >90 ML/MIN/1.73M2
EOSINOPHIL # BLD AUTO: 0.4 10E3/UL (ref 0–0.7)
EOSINOPHIL NFR BLD AUTO: 2 %
ERYTHROCYTE [DISTWIDTH] IN BLOOD BY AUTOMATED COUNT: 18.9 % (ref 10–15)
ERYTHROCYTE [SEDIMENTATION RATE] IN BLOOD BY WESTERGREN METHOD: >140 MM/HR (ref 0–15)
GLUCOSE SERPL-MCNC: 98 MG/DL (ref 70–99)
GLUCOSE UR STRIP-MCNC: NEGATIVE MG/DL
HCO3 SERPL-SCNC: 21 MMOL/L (ref 22–29)
HCT VFR BLD AUTO: 30.2 % (ref 40–53)
HGB BLD-MCNC: 10.2 G/DL (ref 13.3–17.7)
HGB UR QL STRIP: NEGATIVE
HYALINE CASTS: 1 /LPF
IMM GRANULOCYTES # BLD: 0.2 10E3/UL
IMM GRANULOCYTES NFR BLD: 1 %
KETONES UR STRIP-MCNC: NEGATIVE MG/DL
LEUKOCYTE ESTERASE UR QL STRIP: NEGATIVE
LYMPHOCYTES # BLD AUTO: 3.2 10E3/UL (ref 0.8–5.3)
LYMPHOCYTES NFR BLD AUTO: 20 %
MAGNESIUM SERPL-MCNC: 1.5 MG/DL (ref 1.7–2.3)
MCH RBC QN AUTO: 35.3 PG (ref 26.5–33)
MCHC RBC AUTO-ENTMCNC: 33.8 G/DL (ref 31.5–36.5)
MCV RBC AUTO: 105 FL (ref 78–100)
MONOCYTES # BLD AUTO: 1.7 10E3/UL (ref 0–1.3)
MONOCYTES NFR BLD AUTO: 11 %
MUCOUS THREADS #/AREA URNS LPF: PRESENT /LPF
NEUTROPHILS # BLD AUTO: 10.3 10E3/UL (ref 1.6–8.3)
NEUTROPHILS NFR BLD AUTO: 65 %
NITRATE UR QL: NEGATIVE
NRBC # BLD AUTO: 0 10E3/UL
NRBC BLD AUTO-RTO: 0 /100
PH UR STRIP: 6 [PH] (ref 5–7)
PLAT MORPH BLD: NORMAL
PLATELET # BLD AUTO: 114 10E3/UL (ref 150–450)
POTASSIUM SERPL-SCNC: 4.5 MMOL/L (ref 3.4–5.3)
PROT SERPL-MCNC: ABNORMAL G/DL
RBC # BLD AUTO: 2.89 10E6/UL (ref 4.4–5.9)
RBC MORPH BLD: NORMAL
RBC URINE: 0 /HPF
SODIUM SERPL-SCNC: 130 MMOL/L (ref 135–145)
SP GR UR STRIP: 1.02 (ref 1–1.03)
SQUAMOUS EPITHELIAL: <1 /HPF
TRANSITIONAL EPI: <1 /HPF
UROBILINOGEN UR STRIP-MCNC: NORMAL MG/DL
WBC # BLD AUTO: 15.9 10E3/UL (ref 4–11)
WBC URINE: 6 /HPF

## 2024-11-02 PROCEDURE — 82947 ASSAY GLUCOSE BLOOD QUANT: CPT | Performed by: INTERNAL MEDICINE

## 2024-11-02 PROCEDURE — 70450 CT HEAD/BRAIN W/O DYE: CPT | Mod: 26 | Performed by: RADIOLOGY

## 2024-11-02 PROCEDURE — 81001 URINALYSIS AUTO W/SCOPE: CPT | Performed by: INTERNAL MEDICINE

## 2024-11-02 PROCEDURE — 83735 ASSAY OF MAGNESIUM: CPT | Performed by: INTERNAL MEDICINE

## 2024-11-02 PROCEDURE — 99285 EMERGENCY DEPT VISIT HI MDM: CPT | Performed by: INTERNAL MEDICINE

## 2024-11-02 PROCEDURE — 85652 RBC SED RATE AUTOMATED: CPT | Performed by: INTERNAL MEDICINE

## 2024-11-02 PROCEDURE — 85025 COMPLETE CBC W/AUTO DIFF WBC: CPT | Performed by: INTERNAL MEDICINE

## 2024-11-02 PROCEDURE — 80048 BASIC METABOLIC PNL TOTAL CA: CPT | Performed by: INTERNAL MEDICINE

## 2024-11-02 PROCEDURE — 36415 COLL VENOUS BLD VENIPUNCTURE: CPT | Performed by: INTERNAL MEDICINE

## 2024-11-02 PROCEDURE — 86140 C-REACTIVE PROTEIN: CPT | Performed by: INTERNAL MEDICINE

## 2024-11-02 PROCEDURE — 82140 ASSAY OF AMMONIA: CPT | Performed by: INTERNAL MEDICINE

## 2024-11-02 PROCEDURE — 99285 EMERGENCY DEPT VISIT HI MDM: CPT | Mod: 25 | Performed by: INTERNAL MEDICINE

## 2024-11-02 PROCEDURE — 70450 CT HEAD/BRAIN W/O DYE: CPT

## 2024-11-02 RX ORDER — FOLIC ACID 1 MG/1
1 TABLET ORAL DAILY
COMMUNITY
End: 2024-11-05

## 2024-11-02 RX ORDER — MULTIPLE VITAMINS W/ MINERALS TAB 9MG-400MCG
1 TAB ORAL DAILY
COMMUNITY

## 2024-11-02 RX ORDER — CEFAZOLIN SODIUM 2 G/100ML
2 INJECTION, SOLUTION INTRAVENOUS ONCE
Status: COMPLETED | OUTPATIENT
Start: 2024-11-03 | End: 2024-11-03

## 2024-11-02 RX ORDER — LANOLIN ALCOHOL/MO/W.PET/CERES
100 CREAM (GRAM) TOPICAL DAILY
COMMUNITY
End: 2024-11-05

## 2024-11-02 ASSESSMENT — ACTIVITIES OF DAILY LIVING (ADL)
ADLS_ACUITY_SCORE: 0

## 2024-11-03 VITALS
HEART RATE: 95 BPM | OXYGEN SATURATION: 99 % | BODY MASS INDEX: 39.99 KG/M2 | RESPIRATION RATE: 16 BRPM | HEIGHT: 65 IN | SYSTOLIC BLOOD PRESSURE: 118 MMHG | WEIGHT: 240 LBS | DIASTOLIC BLOOD PRESSURE: 59 MMHG | TEMPERATURE: 98 F

## 2024-11-03 PROBLEM — K72.10 END STAGE LIVER DISEASE (H): Status: ACTIVE | Noted: 2024-11-03

## 2024-11-03 PROBLEM — R26.81 UNSTEADY GAIT: Status: ACTIVE | Noted: 2024-11-03

## 2024-11-03 PROBLEM — R78.81 GRAM-POSITIVE BACTEREMIA: Status: ACTIVE | Noted: 2024-11-03

## 2024-11-03 PROBLEM — E87.1 HYPONATREMIA: Status: ACTIVE | Noted: 2024-11-03

## 2024-11-03 PROBLEM — R53.1 WEAKNESS: Status: ACTIVE | Noted: 2024-11-03

## 2024-11-03 LAB
ALBUMIN SERPL BCG-MCNC: 2.8 G/DL (ref 3.5–5.2)
ALP SERPL-CCNC: 209 U/L (ref 40–150)
ALT SERPL W P-5'-P-CCNC: 35 U/L (ref 0–70)
ANION GAP SERPL CALCULATED.3IONS-SCNC: 9 MMOL/L (ref 7–15)
AST SERPL W P-5'-P-CCNC: 148 U/L (ref 0–45)
BILIRUB SERPL-MCNC: 23.4 MG/DL
BUN SERPL-MCNC: 12.6 MG/DL (ref 6–20)
CALCIUM SERPL-MCNC: 8.5 MG/DL (ref 8.8–10.4)
CHLORIDE SERPL-SCNC: 99 MMOL/L (ref 98–107)
CREAT SERPL-MCNC: 0.49 MG/DL (ref 0.67–1.17)
EGFRCR SERPLBLD CKD-EPI 2021: >90 ML/MIN/1.73M2
ERYTHROCYTE [DISTWIDTH] IN BLOOD BY AUTOMATED COUNT: 18.8 % (ref 10–15)
GLUCOSE SERPL-MCNC: 85 MG/DL (ref 70–99)
HCO3 SERPL-SCNC: 22 MMOL/L (ref 22–29)
HCT VFR BLD AUTO: 29.3 % (ref 40–53)
HGB BLD-MCNC: 10 G/DL (ref 13.3–17.7)
INR PPP: 2.83 (ref 0.85–1.15)
MCH RBC QN AUTO: 35.8 PG (ref 26.5–33)
MCHC RBC AUTO-ENTMCNC: 34.1 G/DL (ref 31.5–36.5)
MCV RBC AUTO: 105 FL (ref 78–100)
PLATELET # BLD AUTO: 112 10E3/UL (ref 150–450)
POTASSIUM SERPL-SCNC: 4.3 MMOL/L (ref 3.4–5.3)
PROT SERPL-MCNC: ABNORMAL G/DL
RBC # BLD AUTO: 2.79 10E6/UL (ref 4.4–5.9)
SODIUM SERPL-SCNC: 130 MMOL/L (ref 135–145)
WBC # BLD AUTO: 13.9 10E3/UL (ref 4–11)

## 2024-11-03 PROCEDURE — 80048 BASIC METABOLIC PNL TOTAL CA: CPT

## 2024-11-03 PROCEDURE — 99236 HOSP IP/OBS SAME DATE HI 85: CPT | Mod: GC

## 2024-11-03 PROCEDURE — 36415 COLL VENOUS BLD VENIPUNCTURE: CPT | Performed by: INTERNAL MEDICINE

## 2024-11-03 PROCEDURE — 96365 THER/PROPH/DIAG IV INF INIT: CPT | Performed by: INTERNAL MEDICINE

## 2024-11-03 PROCEDURE — 85014 HEMATOCRIT: CPT

## 2024-11-03 PROCEDURE — 250N000011 HC RX IP 250 OP 636

## 2024-11-03 PROCEDURE — 250N000011 HC RX IP 250 OP 636: Performed by: INTERNAL MEDICINE

## 2024-11-03 PROCEDURE — 82947 ASSAY GLUCOSE BLOOD QUANT: CPT

## 2024-11-03 PROCEDURE — 85610 PROTHROMBIN TIME: CPT

## 2024-11-03 PROCEDURE — 250N000013 HC RX MED GY IP 250 OP 250 PS 637

## 2024-11-03 PROCEDURE — 120N000002 HC R&B MED SURG/OB UMMC

## 2024-11-03 PROCEDURE — 36415 COLL VENOUS BLD VENIPUNCTURE: CPT

## 2024-11-03 PROCEDURE — 87040 BLOOD CULTURE FOR BACTERIA: CPT | Performed by: INTERNAL MEDICINE

## 2024-11-03 RX ORDER — FOLIC ACID 1 MG/1
1 TABLET ORAL DAILY
Status: DISCONTINUED | OUTPATIENT
Start: 2024-11-03 | End: 2024-11-03 | Stop reason: HOSPADM

## 2024-11-03 RX ORDER — ALBUTEROL SULFATE 90 UG/1
2-3 INHALANT RESPIRATORY (INHALATION) EVERY 6 HOURS PRN
Status: DISCONTINUED | OUTPATIENT
Start: 2024-11-03 | End: 2024-11-03 | Stop reason: HOSPADM

## 2024-11-03 RX ORDER — PANTOPRAZOLE SODIUM 40 MG/1
40 TABLET, DELAYED RELEASE ORAL
Status: DISCONTINUED | OUTPATIENT
Start: 2024-11-03 | End: 2024-11-03 | Stop reason: HOSPADM

## 2024-11-03 RX ORDER — FAMOTIDINE 20 MG/1
20 TABLET, FILM COATED ORAL DAILY
Status: DISCONTINUED | OUTPATIENT
Start: 2024-11-03 | End: 2024-11-03 | Stop reason: HOSPADM

## 2024-11-03 RX ORDER — PROCHLORPERAZINE MALEATE 10 MG
10 TABLET ORAL EVERY 6 HOURS PRN
Status: DISCONTINUED | OUTPATIENT
Start: 2024-11-03 | End: 2024-11-03 | Stop reason: HOSPADM

## 2024-11-03 RX ORDER — ONDANSETRON 4 MG/1
4 TABLET, ORALLY DISINTEGRATING ORAL EVERY 6 HOURS PRN
Qty: 30 TABLET | Refills: 0 | Status: SHIPPED | OUTPATIENT
Start: 2024-11-03

## 2024-11-03 RX ORDER — AMOXICILLIN 250 MG
2 CAPSULE ORAL 2 TIMES DAILY PRN
Status: DISCONTINUED | OUTPATIENT
Start: 2024-11-03 | End: 2024-11-03 | Stop reason: HOSPADM

## 2024-11-03 RX ORDER — ONDANSETRON 2 MG/ML
4 INJECTION INTRAMUSCULAR; INTRAVENOUS EVERY 6 HOURS PRN
Status: DISCONTINUED | OUTPATIENT
Start: 2024-11-03 | End: 2024-11-03 | Stop reason: HOSPADM

## 2024-11-03 RX ORDER — SPIRONOLACTONE 50 MG/1
50 TABLET, FILM COATED ORAL DAILY
Status: DISCONTINUED | OUTPATIENT
Start: 2024-11-03 | End: 2024-11-03 | Stop reason: HOSPADM

## 2024-11-03 RX ORDER — PROCHLORPERAZINE 25 MG
25 SUPPOSITORY, RECTAL RECTAL EVERY 12 HOURS PRN
Status: DISCONTINUED | OUTPATIENT
Start: 2024-11-03 | End: 2024-11-03 | Stop reason: HOSPADM

## 2024-11-03 RX ORDER — CEFAZOLIN SODIUM 2 G/100ML
2 INJECTION, SOLUTION INTRAVENOUS EVERY 8 HOURS
Status: DISCONTINUED | OUTPATIENT
Start: 2024-11-03 | End: 2024-11-03 | Stop reason: HOSPADM

## 2024-11-03 RX ORDER — SIMETHICONE 80 MG
80 TABLET,CHEWABLE ORAL 2 TIMES DAILY
Status: DISCONTINUED | OUTPATIENT
Start: 2024-11-03 | End: 2024-11-03 | Stop reason: HOSPADM

## 2024-11-03 RX ORDER — AMOXICILLIN 250 MG
1 CAPSULE ORAL 2 TIMES DAILY PRN
Status: DISCONTINUED | OUTPATIENT
Start: 2024-11-03 | End: 2024-11-03 | Stop reason: HOSPADM

## 2024-11-03 RX ORDER — LIDOCAINE 40 MG/G
CREAM TOPICAL
Status: DISCONTINUED | OUTPATIENT
Start: 2024-11-03 | End: 2024-11-03 | Stop reason: HOSPADM

## 2024-11-03 RX ORDER — FUROSEMIDE 20 MG/1
20 TABLET ORAL DAILY
Status: DISCONTINUED | OUTPATIENT
Start: 2024-11-03 | End: 2024-11-03 | Stop reason: HOSPADM

## 2024-11-03 RX ORDER — CALCIUM CARBONATE 500 MG/1
1000 TABLET, CHEWABLE ORAL 4 TIMES DAILY PRN
Status: DISCONTINUED | OUTPATIENT
Start: 2024-11-03 | End: 2024-11-03 | Stop reason: HOSPADM

## 2024-11-03 RX ORDER — ONDANSETRON 4 MG/1
4 TABLET, ORALLY DISINTEGRATING ORAL EVERY 6 HOURS PRN
Status: DISCONTINUED | OUTPATIENT
Start: 2024-11-03 | End: 2024-11-03 | Stop reason: HOSPADM

## 2024-11-03 RX ADMIN — FAMOTIDINE 20 MG: 20 TABLET ORAL at 08:13

## 2024-11-03 RX ADMIN — THIAMINE HCL TAB 100 MG 100 MG: 100 TAB at 08:13

## 2024-11-03 RX ADMIN — LACTULOSE 20 G: 20 POWDER, FOR SOLUTION ORAL at 14:55

## 2024-11-03 RX ADMIN — PANTOPRAZOLE SODIUM 40 MG: 40 TABLET, DELAYED RELEASE ORAL at 08:13

## 2024-11-03 RX ADMIN — CEFAZOLIN SODIUM 2 G: 2 INJECTION, SOLUTION INTRAVENOUS at 08:15

## 2024-11-03 RX ADMIN — SIMETHICONE 80 MG: 80 TABLET, CHEWABLE ORAL at 08:14

## 2024-11-03 RX ADMIN — SPIRONOLACTONE 50 MG: 50 TABLET ORAL at 08:14

## 2024-11-03 RX ADMIN — FOLIC ACID 1 MG: 1 TABLET ORAL at 08:13

## 2024-11-03 RX ADMIN — LACTULOSE 20 G: 20 POWDER, FOR SOLUTION ORAL at 08:15

## 2024-11-03 RX ADMIN — FUROSEMIDE 20 MG: 20 TABLET ORAL at 08:15

## 2024-11-03 RX ADMIN — CEFAZOLIN SODIUM 2 G: 2 INJECTION, SOLUTION INTRAVENOUS at 00:13

## 2024-11-03 ASSESSMENT — ACTIVITIES OF DAILY LIVING (ADL)
ADLS_ACUITY_SCORE: 0
DEPENDENT_IADLS:: INDEPENDENT
ADLS_ACUITY_SCORE: 0

## 2024-11-03 NOTE — CONSULTS
Care Management Initial Consult    General Information  Assessment completed with: Patient, VM-chart review, Pt Stewart  Type of CM/SW Visit: Initial Assessment    Primary Care Provider verified and updated as needed: Yes (Jay Mcmillan [phone] [place] [id number])   Readmission within the last 30 days: previous discharge plan unsuccessful      Reason for Consult: suicide assessment, other (see comments) (elevated risk score)  Advance Care Planning: Advance Care Planning Reviewed: concerns discussed (Pt is not ready to complete at this admission)        Communication Assessment  Patient's communication style: spoken language (English or Bilingual)        Cognitive  Cognitive/Neuro/Behavioral: WDL  Level of Consciousness: alert  Arousal Level: opens eyes spontaneously  Orientation: oriented x 4  Mood/Behavior: combative, calm, cooperative  Best Language: 0 - No aphasia  Speech: logical, spontaneous, clear    Living Environment:   People in home: significant other  Paula Hunt  Current living Arrangements: house      Able to return to prior arrangements: yes     Family/Social Support:  Care provided by: self, spouse/significant other  Provides care for: no one  Marital Status: Single  Support system: Significant Other       Paula Hunt  Description of Support System: Supportive, Involved       Current Resources:   Patient receiving home care services: No     Community Resources: None  Equipment currently used at home: none  Supplies currently used at home:      Employment/Financial:  Employment Status: employed full-time, other (see comments) (FMLA)        Financial Concerns: none      Does the patient's insurance plan have a 3 day qualifying hospital stay waiver?  No    Lifestyle & Psychosocial Needs:  Social Drivers of Health     Food Insecurity: Low Risk  (10/3/2024)    Food Insecurity     Within the past 12 months, did you worry that your food would run out before  you got money to buy more?: No     Within the past 12 months, did the food you bought just not last and you didn t have money to get more?: No   Depression: Not at risk (10/21/2024)    PHQ-2     PHQ-2 Score: 0   Housing Stability: Low Risk  (10/3/2024)    Housing Stability     Do you have housing? : Yes     Are you worried about losing your housing?: No   Tobacco Use: Low Risk  (10/21/2024)    Received from [facility] and [facility]    Patient History     Smoking Tobacco Use: Never     Smokeless Tobacco Use: Never     Passive Exposure: Never   Financial Resource Strain: Low Risk  (10/3/2024)    Financial Resource Strain     Within the past 12 months, have you or your family members you live with been unable to get utilities (heat, electricity) when it was really needed?: No   Alcohol Use: Not on file   Transportation Needs: Low Risk  (10/3/2024)    Transportation Needs     Within the past 12 months, has lack of transportation kept you from medical appointments, getting your medicines, non-medical meetings or appointments, work, or from getting things that you need?: No   Physical Activity: Not on file   Interpersonal Safety: Low Risk  (10/5/2024)    Interpersonal Safety     Do you feel physically and emotionally safe where you currently live?: Yes     Within the past 12 months, have you been hit, slapped, kicked or otherwise physically hurt by someone?: No     Within the past 12 months, have you been humiliated or emotionally abused in other ways by your partner or ex-partner?: No   Recent Concern: Interpersonal Safety - High Risk (9/21/2024)    Interpersonal Safety     Do you feel physically and emotionally safe where you currently live?: No     Within the past 12 months, have you been hit, slapped, kicked or otherwise physically hurt by someone?: No     Within the past 12 months, have you been humiliated or emotionally abused in other ways by your partner or ex-partner?: No   Stress: Not on  "file   Social Connections: Not on file   Health Literacy: Not on file     Functional Status:  Prior to admission patient needed assistance:   Dependent ADLs:: Independent  Dependent IADLs:: Independent     Mental Health Status:  Mental Health Status: No Current Concerns       Chemical Dependency Status:  Chemical Dependency Status: No Current Concerns           Values/Beliefs:  Spiritual, Cultural Beliefs, Restorationist Practices, Values that affect care: no             Discussed  Partnership in Safe Discharge Planning  document with patient/family: Yes    Additional Information:    Backgrounds: \"Stewart Hunt is a 39 year old male admitted on 11/2/2024. He has a history of AUD, alcohol associated cirrhosis with ascities and anasacrca, MSSA bacteremia on OPAT with cefazolin, and is admitted for dizziness, fatigue and confusion.\" (Rajat Shannon MD)    Care management assessment completed at the bedside with the pt, Stewart, d/t elevated re-admission risk score. RNCC introduced self and explained the nature of the care management assessment. Stewart agreed to speak with RNCC. The pt verified address, PCP, and health insurance in chart is current.     Stewart lives in a house with his SO, Paula Hunt. He is independent with I/ADLs. He is currently out of work on FMLA due to illness. He doesn't need DME. Pt was discharge home on 10/18 with IVAB infused at home with [facility]. Pt shared that the home IVAB will be done on 11/11/24. [facility] is the agency providing home RN support/services. Pt stated \"the doctor plan on discharge me this afternoon, I have everything I need to continue antibiotic at home and she [Paula] can pick me up.\"     Per [facility] resource nurse, no needs resumption order since the pt is in the hospital less than 24 hours and his IVAB plan is unchanged.     The provider confirmed that the pt will be discharge this afternoon.    Home Infusion  [facility]  Phone # [phone]  Fax # [phone] "   RN, IV antibiotic and supplies    Next Steps: No further care management intervention anticipated at this time.  Please re-consult if further needs arise.  Care management signing off.       Estela De Guzman, RN    7C RN Coordinator  Phone: [phone]  11/3/2024  Units: 7C Med Surg 7401 thru 7418 RNCC     Social Work and Care Management Department   SEARCHABLE in [facility] - search CARE COORDINATOR   [facility] ([phone]) Saturday & Sunday; ([phone]) [facility] Recognized Holidays   Units: 5A Onc 5201 - 5219 RNCC,  5A Onc 5220 thru 5240 RNCC, 5C OFFSERVICE [phone] RNCC & 5C OFF SERVICE [id number] RNCC   Units: 6B Vocera, 6C Card 6401 thru 6420 RNCC, 6C Card 6502 thru 6514 RNCC & 6C Card 6515 thru 6519 RNCC    Units: 7A SOT RNCC Vocera, 7B Med Surg Vocera, & 7C Med Surg 7502 thru 7521 RNCC   Units: 6A Vocera & 4A CVICU Vocera, 4C MICU Vocera, and 4E SICU Vocera     Units: 5 Ortho Vocera & 5 Med Surg Vocera    Units: 6 Med Surg Vocera & 8 Med Surg Vocera

## 2024-11-03 NOTE — H&P
[facility]    History and Physical - Hospitalist Service, GOLD TEAM 8       Date of Admission:  11/2/2024    Assessment & Plan      Stewart Hunt is a 39 year old male admitted on 11/2/2024. He has a history of AUD, alcohol associated cirrhosis with ascities and anasacrca, MSSA bacteremia on OPAT with cefazolin, and is admitted for dizziness, fatigue and confusion.     Hyponatermia 2/2 cirrhosis  Lower extremity edema  Na on admission 130. Cr is WNL. Previous admission Urine sodium <20 and Urine osm 305. Likely hyponatremia due to 3rd spacing, also a possible component given cirrhosis may be poor effective arterial flow to kidney with resulting increased ADH.  - Continue PTA Lasix and Spironolactone  - Consider compression stocking  - Sodium restricted diet  - Daily weights    Alcohol associated cirrhosis  Alchol related hepatitis  Hx of hepatic encephalopathy  Hyperbilirubinemia  AUD  Follows with Dr. Dodd as outpatient hepatologist. US 9/2024 without evidence for HCC or hepatomegaly. No esophageal varices on EGD. Clear mentation on admission. Sober since 9/8/24 and attending AA. No fevers or abdominal tenderness on exam, low suspicion of SBP at this time. Patient is good historian, fully oriented, participates in conversation, ammonia 40, unlikely his presentation of dizziness/unsteadiness is due to hepatic encephalopathy.   - MELD labs in AM  - Lactulose titrated for 3-5 BM per day (patient reports using once per day)  - GI consult  - Evaluate for ascites fluid for diagnostic paracentesis    MELD 3.0: 31 at 10/29/2024 11:30 AM  MELD-Na: 31 at 10/29/2024 11:30 AM  Calculated from:  Serum Creatinine: 0.55 mg/dL (Using min of 1 mg/dL) at 10/29/2024 11:30 AM  Serum Sodium: 133 mmol/L at 10/29/2024 11:30 AM  Total Bilirubin: 26.3 mg/dL at 10/29/2024 11:30 AM  Serum Albumin: 3.1 g/dL at 10/29/2024 11:30 AM  INR(ratio): 2.81 at 10/29/2024 11:30 AM  Age at listing  (hypothetical): 39 years  Sex: Male at 10/29/2024 11:30 AM    MSSA bacteremia on OPAT  leukocytosis  Recently admitted 10/3-10/18 with MSSA bacteremia of unidentified source. During that admission, TTE negative for vegetations. On 4 weeks of cefazolin (to finish 11/7/24) and was infusing at home with PICC line. No recent fevers, leukocytosis 15.9 and CRP 22.2. No murmur on exam, no joint pain or swelling. No concerns for OPAT adverse drug event.  - Blood Cultures 11/3  - Continue IV Ancef 2g q8hr    GERD  Moderate portal hypertensive gastropathy on EGD (DATE 9/10/24)  Nausea  No varices on EGD 9/10. No reflux symptoms on presentation, no recent emesis.   - Zofran PRN for nausea  - Continue protonix 40mg daily  - Simethicone 80mg BID    Macrocytic anemia  Hgb 10.2 on admission. Baseline 10-11. He is without signs or symptoms of acute bleeding. Likely multifactorial in the setting of liver disease with folate/B12 deficiency, previous chronic alcohol use, and in the case of severe liver disease membrane lipid composition changes that affect the cell surface area.   - CBC qAM  - monitor for signs of bleeding  - continue PTA folate/B12 supplementation        Diet: Regular Diet Adult  DVT Prophylaxis: Pneumatic Compression Devices  Gomes Catheter: Not present  Fluids: None  Lines: PRESENT         Cardiac Monitoring: None  Code Status: Full Code    Clinically Significant Risk Factors Present on Admission         # Hyponatremia: Lowest Na = 130 mmol/L in last 2 days, will monitor as appropriate     # Hypomagnesemia: Lowest Mg = 1.5 mg/dL in last 2 days, will replace as needed   # Hypoalbuminemia: Lowest albumin = 3 g/dL at 11/2/2024  9:14 PM, will monitor as appropriate   # Thrombocytopenia: Lowest platelets = 114 in last 2 days, will monitor for bleeding        # Anemia: based on hgb <11       # Obesity: Estimated body mass index is 39.94 kg/m  as calculated from the following:    Height as of this encounter: 1.651 m (5'  "5\").    Weight as of this encounter: 108.9 kg (240 lb).         # Financial/Environmental Concerns:           Disposition Plan      Expected Discharge Date: 11/07/2024                The patient will be formally staffed in the morning.       Rajat Shannon MD  Hospitalist Service, GOLD TEAM [address] [place] [address]  Securely message with [employer] (more info)  Text page via [facility] Paging/Directory   See signed in provider for up to date coverage information  ______________________________________________________________________    Chief Complaint   dizziness/balance and weakness    History is obtained from the patient, patient's spouse, and electronic health record    History of Present Illness   Stewart Hunt is a 39 year old male who has a history of alcohol related cirrhosis is admitted for monitoring and workup of dizziness and weakness.    Reports last 2 days has worsening dizziness/balance and weakness, as well as 'slowness' or confusion. He also endorses nausea. These symptoms have been chronic since diagnosis this summer with liver disease but per spouse and patient appear to have worsened in past two days. States 2 instances of getting up from a chair and feeling unsteady and needing to balance self holding onto a table or chair back, but no falls, syncope, or LOC.     Stewart and his wife are worried his acute symptoms may be due to hyponatremia. During recent admission 10/3-10/18/24 for feer and MSSA bacteremia, had significant hyponatremia. Wife notes his sodium has fallen since that admission (137-> 133 -> 130).     He is currently being treated for MSSA bacteremia of unclear source with 4 week OP[place] course of [place] [facility] [place]. Per chart review, negative TTE for vegetations, no repeat positive blood cultures. Patient is without subjective fevers or other infectious symptoms. No new joint or back pain.     Past Medical History    Past Medical History:   Diagnosis " "Date    Acute alcoholic hepatitis (H) 09/2024    Alcohol use disorder, severe, in early remission (H)     Alcoholic cirrhosis (H)     Morbid obesity (H)        Past Surgical History   Past Surgical History:   Procedure Laterality Date    GASTRECTOMY LAPAROSCOPIC SLEEVE  2012    PICC INSERTION - DOUBLE LUMEN Left 10/16/2024    45-5cm, Basilic vein    WISDOM TOOTH EXTRACTION         Prior to Admission Medications   Prior to Admission Medications   Prescriptions Last Dose Informant Patient Reported? Taking?   Emergency Supply Kit, Central, 11/2/2024 Self No Yes   Sig: Patient use for emergency only. Contents: 3 sodium chloride 0.9% flushes, 1 dressing kit, 1 microclave ext set 14\", 4 nitrile gloves (med), 6 alcohol prep pads, 1 bacitracin, 1 syringe (10 cc 20 G 1\"). Call [phone] to reorder.   albuterol (PROAIR HFA/PROVENTIL HFA/VENTOLIN HFA) 108 (90 Base) MCG/ACT inhaler 11/1/2024 Self Yes Yes   Sig: Inhale 2-3 puffs into the lungs every 6 hours as needed for shortness of breath, wheezing or cough.   ceFAZolin (ANCEF) injection 11/2/2024 at  3:00 PM Self No Yes   Sig: Inject 20 mLs (2 g) over 5-10 minutes into the vein via push every 8 hours for 10 days.   ceFAZolin (ANCEF) intermittent infusion 2 g in 100 mL dextrose PRE-MIX 11/2/2024 Self No Yes   Sig: Inject 100 mLs (2 g) at 200 mL/hr over 30 minutes into the vein every 8 hours for 24 days.   famotidine (PEPCID) 20 MG tablet 11/2/2024 Self Yes Yes   Sig: Take 20 mg by mouth daily.   folic acid (FOLVITE) 1 MG tablet 11/2/2024 Self Yes Yes   Sig: Take 1 mg by mouth daily.   furosemide (LASIX) 20 MG tablet 11/2/2024  No Yes   Sig: Take 1 tablet (20 mg) by mouth daily.   lactulose (CEPHULAC) 20 GM packet 11/1/2024 at  6:00 PM Self No Yes   Sig: Take 1 packet (20 g) by mouth 3 times daily.   multivitamin w/minerals (THERA-VIT-M) tablet 11/2/2024 Self Yes Yes   Sig: Take 1 tablet by mouth daily.   pantoprazole (PROTONIX) 40 MG EC tablet 11/2/2024 at  7:00 AM Self " No Yes   Sig: Take 1 tablet (40 mg) by mouth every morning (before breakfast).   simethicone (MYLICON) 125 MG chewable tablet 11/2/2024 Self Yes Yes   Sig: Take 125 mg by mouth 2 times daily.   sodium chloride, PF, 0.9% PF flush  Self No No   Sig: Inject 10 mLs into the vein as needed for line flush. Flush IV before and after medication administration as directed and/or at least every 24 hours.   spironolactone (ALDACTONE) 50 MG tablet 11/2/2024  No Yes   Sig: Take 1 tablet (50 mg) by mouth daily.   thiamine (B-1) 100 MG tablet 11/2/2024 Self Yes Yes   Sig: Take 100 mg by mouth daily.      Facility-Administered Medications: None        Social History   I have reviewed this patient's social history and updated it with pertinent information if needed.  Social History     Tobacco Use    Smoking status: Never    Smokeless tobacco: Never   Vaping Use    Vaping status: Never Used   Substance Use Topics    Alcohol use: Not Currently     Comment: 2 pints of vodka per day for 2 years. Last drink early sept 2024    Drug use: Never         Family History   I have reviewed this patient's family history and updated it with pertinent information if needed.  Family History   Problem Relation Age of Onset    Colon Cancer Mother 42    Diabetes Father     Pancreatic Cancer Sister 15    Diabetes Maternal Grandmother     Colon Cancer Cousin 32    Liver Disease No family hx of          Allergies   Allergies   Allergen Reactions    Adhesive Tape         Physical Exam   Vital Signs: Temp: 98.1  F (36.7  C) Temp src: Oral BP: 124/60 Pulse: 99   Resp: 16 SpO2: 100 % O2 Device: None (Room air)    Weight: 240 lbs 0 oz    Constitutional: awake, alert, cooperative, no apparent distress, and appears stated age  Eyes: scleral icterus, EOM intact, PERRL  ENT: NCAT, nares patent, jaundiced pharynx, no exudate/erythema, fair dentition, moist oral mucosa, no cervical LAD  Respiratory: No increased work of breathing, good air exchange, clear to  auscultation bilaterally, no crackles or wheezing  Cardiovascular: RRR, no appreciable murmur/rub/gallop, extremities are warm and well perfused, DP pulses appreciated bilaterally  GI: soft, non-tender, bowel sounds appreciated, no fluid wave shift  Skin: small subcentimeter brown papules noted on forearms, no rashes/lesions, jaundiced skin  Musculoskeletal: There is no redness, warmth, or swelling of the joints.Tone is normal.  Neurologic: oriented x4, good historian, no confabulation; cranial nerves II-XII intact, no nystagmus, FNF normal, negative romburg, no tongue fasciculations or asterixis. No noticeable tremors.     Medical Decision Making       Please see A&P for additional details of medical decision making.      Data     I have personally reviewed the following data over the past 24 hrs:    15.9 (H)  \   10.2 (L)   / 114 (L)     130 (L) 99 14.0 /  98   4.5 21 (L) 0.56 (L) \     ALT: 39 AST: 158 (H) AP: 245 (H) TBILI: 25.2 (HH)   ALB: 3.0 (L) TOT PROTEIN: N/A LIPASE: N/A     Procal: N/A CRP: 22.20 (H) Lactic Acid: N/A         Imaging results reviewed over the past 24 hrs:   Recent Results (from the past 24 hours)   CT Head w/o Contrast    Narrative    EXAM: CT HEAD W/O CONTRAST  LOCATION: [facility]  DATE: 11/2/2024    INDICATION: confusion  COMPARISON: None.  TECHNIQUE: Routine CT Head without IV contrast. Multiplanar reformats. Dose reduction techniques were used.    FINDINGS:  INTRACRANIAL CONTENTS: No intracranial hemorrhage, extraaxial collection, or mass effect.  No CT evidence of acute infarct. Normal parenchymal attenuation. Normal ventricles and sulci.     VISUALIZED ORBITS/SINUSES/MASTOIDS: No intraorbital abnormality. No paranasal sinus mucosal disease. No middle ear or mastoid effusion.    BONES/SOFT TISSUES: No acute abnormality.      Impression    IMPRESSION:  1.  No acute intracranial process.

## 2024-11-03 NOTE — DISCHARGE SUMMARY
"[facility]  Hospitalist Discharge Summary      Date of Admission:  11/2/2024  Date of Discharge:  11/3/2024  4:48 PM  Discharging Provider: Gabriela Lucas MD  Discharge Service: Hospitalist Service, GOLD TEAM 8    Discharge Diagnoses   Alcohol associated cirrhosis  Hyponatermia 2/2 cirrhosis, chronic   Hx of hepatic encephalopathy  Hyperbilirubinemia  AUD  MSSA bacteremia, on outpatient cefazolin  Leukocytosis  GERD    Clinically Significant Risk Factors     # Obesity: Estimated body mass index is 39.94 kg/m  as calculated from the following:    Height as of this encounter: 1.651 m (5' 5\").    Weight as of this encounter: 108.9 kg (240 lb).       Follow-ups Needed After Discharge   Follow-up Appointments       Adult [facility] Follow-up and recommended labs and tests      Follow up with your infectious disease doctor and gastroenterology doctor (GI) as scheduled.     Appointments on [place] and/or [facility] (with [facility] or [facility] provider or service). Call [phone] if you haven't heard regarding these appointments within 7 days of discharge.                Unresulted Labs Ordered in the Past 30 Days of this Admission       Date and Time Order Name Status Description    11/2/2024 11:37 PM Blood Culture Peripheral Blood Preliminary         These results will be followed up by ID     Discharge Disposition   Discharged to home  Condition at discharge: Stable    Hospital Course     Patient is a 39 you M w/ PMH alcohol-associated cirrhosis with recent admission for MSSA bacteremia on active treatment with cefazolin who was admitted initially on 11/2/24 for concerns regarding dizziness, balance, weakness, intermittent confusion, hand tremors, nausea, and bilateral lower extremity edema. On interview by admitting medicine team on 11/3, the patient clarified that he and his wife became concerned after he  experienced a transient episode of bilateral hand cramps three days " "prior to admission which lasted about 15 minutes; and a after he experienced an afternoon of having significant fatigue when out with his father and having not taking lactulose that day, which occurred  two days prior to admission. The fatigue self-resolved by next day after sleeping and taking lactulose. Since his discharge from the hospital, he has also experienced two episodes of losing his balance, once when he was standing up from using the toilet where he had to grab the counter next to him to steady himself, and another instance where he was getting up from a chair and needed to hold onto the table next to him to steady himself. He has not had any other instances of unsteadiness and was able to walk without issue after both incidents, and he denied associated lightheadedness or dizziness. He denied syncope, LOC. He denies confusion and demonstrated was an excellent historian of his time since discharge. He was feeling at his health baseline during his time in the ED and since his admission to the hospital. Labs were checked including CBC, BMP, LFT, UA, blood culture, CT head. CT head was negative for acute processes. Blood cultures NGTD. UA not consistent with infection. Other labs were stable or improving from prior checks. Orthostatic Bps were normal. Was going to have PT see him but patient declined and denied any difficulty getting up and around, with no problems with his balance. He was observed throughout the day 11/3 and continued to feel \"normal\" and at his health baseline. He was discharged in stable condition in early evening on 11/3/24. Zofran was ordered at discharge for occasional nausea episodes at home (did not need during admission, Qtc wnl) on EKG). He will continue to have close follow-up with ID for his ongoing IV antibiotic treatment.       Consultations This Hospital Stay   CARE MANAGEMENT / SOCIAL WORK IP CONSULT  PHYSICAL THERAPY ADULT IP CONSULT  OCCUPATIONAL THERAPY ADULT IP " CONSULT    Code Status   Full Code    Time Spent on this Encounter   I, Gabriela Lucas MD, personally saw the patient today and spent greater than 30 minutes discharging this patient.       Gabriela Lucas MD  [facility] EMERGENCY DEPARTMENT  [address] [address]  Phone: [phone]  ______________________________________________________________________    Physical Exam   Vital Signs: Temp: 98  F (36.7  C) Temp src: Oral BP: 118/59 Pulse: 95   Resp: 16 SpO2: 99 % O2 Device: None (Room air)    Weight: 240 lbs 0 oz      Constitutional: awake, alert, cooperative, no apparent distress, and appears stated age  Eyes: scleral icterus  Respiratory: No increased work of breathing, good air exchange, clear to auscultation bilaterally, no crackles or wheezing  Cardiovascular: RRR, no appreciable murmur/rub/gallop  GI: distended but soft, nontender to palpation  Skin: +jaundice   Musculoskeletal: There is no redness, warmth, or swelling of the joints. Tone is normal.  Neurologic: oriented x4, good historian, no confabulation; cranial nerves II-XII intact, negative romberg         Primary Care Physician   Jay Mcmillan    Discharge Orders      Reason for your hospital stay    Dear Stewart Hunt,    You were hospitalized at [facility] for observation of your symptoms. Your labs were checked and were stable as compared to your previous labs. Your symptoms resolved and today you are ready to be discharged home. If you develop fever, shortness of breath, light headedness, chest pain or other symptoms that concern you, please seek medical attention.    We are suggesting the following medication changes:  - Ondansetron  (Zofran) tablets have been prescribed for nausea.     Thank you for allowing us the privilege of caring for you. We wish you the best in your ongoing recovery.     Activity    Your activity upon discharge: activity as tolerated     Adult [facility] Follow-up  and recommended labs and tests    Follow up with your infectious disease doctor and gastroenterology doctor (GI) as scheduled.     Appointments on [place] and/or [facility] (with [facility] or [facility] provider or service). Call [phone] if you haven't heard regarding these appointments within 7 days of discharge.     Diet    Follow this diet upon discharge: Current Diet:Orders Placed This Encounter      Regular Diet Adult       Significant Results and Procedures   Most Recent 3 CBC's:  Recent Labs   Lab Test 11/05/24  1325 11/03/24 0633 11/02/24 2114   WBC 14.6* 13.9* 15.9*   HGB 10.2* 10.0* 10.2*   * 105* 105*   * 112* 114*     Most Recent 3 BMP's:  Recent Labs   Lab Test 11/05/24  1325 11/03/24 0633 11/02/24 2114   * 130* 130*   POTASSIUM 4.7 4.3 4.5   CHLORIDE 97* 99 99   CO2 20* 22 21*   BUN 14.2 12.6 14.0   CR 0.55* 0.49* 0.56*   ANIONGAP 13 9 10   LELAND 8.7* 8.5* 8.5*   * 85 98     Most Recent 2 LFT's:  Recent Labs   Lab Test 11/05/24  1325 11/03/24 0633   * 148*   ALT 45 35   ALKPHOS 239* 209*   BILITOTAL 26.3* 23.4*   ,   Results for orders placed or performed during the hospital encounter of 11/02/24   CT Head w/o Contrast    Narrative    EXAM: CT HEAD W/O CONTRAST  LOCATION: [facility]  DATE: 11/2/2024    INDICATION: confusion  COMPARISON: None.  TECHNIQUE: Routine CT Head without IV contrast. Multiplanar reformats. Dose reduction techniques were used.    FINDINGS:  INTRACRANIAL CONTENTS: No intracranial hemorrhage, extraaxial collection, or mass effect.  No CT evidence of acute infarct. Normal parenchymal attenuation. Normal ventricles and sulci.     VISUALIZED ORBITS/SINUSES/MASTOIDS: No intraorbital abnormality. No paranasal sinus mucosal disease. No middle ear or mastoid effusion.    BONES/SOFT TISSUES: No acute abnormality.      Impression    IMPRESSION:  1.  No acute intracranial process.       Discharge Medications  "  Current Discharge Medication List        START taking these medications    Details   ondansetron (ZOFRAN ODT) 4 MG ODT tab Take 1 tablet (4 mg) by mouth every 6 hours as needed for nausea or vomiting.  Qty: 30 tablet, Refills: 0    Associated Diagnoses: Nausea           CONTINUE these medications which have NOT CHANGED    Details   albuterol (PROAIR HFA/PROVENTIL HFA/VENTOLIN HFA) 108 (90 Base) MCG/ACT inhaler Inhale 2-3 puffs into the lungs every 6 hours as needed for shortness of breath, wheezing or cough.      ceFAZolin (ANCEF) injection Inject 20 mLs (2 g) over 5-10 minutes into the vein via push every 8 hours for 10 days.  Qty: 600 mL, Refills: 0    Associated Diagnoses: Bacteremia      ceFAZolin (ANCEF) intermittent infusion 2 g in 100 mL dextrose PRE-MIX Inject 100 mLs (2 g) at 200 mL/hr over 30 minutes into the vein every 8 hours for 24 days.    Associated Diagnoses: Bacteremia      Emergency Supply Kit, Central, Patient use for emergency only. Contents: 3 sodium chloride 0.9% flushes, 1 dressing kit, 1 microclave ext set 14\", 4 nitrile gloves (med), 6 alcohol prep pads, 1 bacitracin, 1 syringe (10 cc 20 G 1\"). Call [phone] to reorder.  Qty: [id number] kit, Refills: 0    Associated Diagnoses: Bacteremia      famotidine (PEPCID) 20 MG tablet Take 20 mg by mouth daily.      folic acid (FOLVITE) 1 MG tablet Take 1 mg by mouth daily.      furosemide (LASIX) 20 MG tablet Take 1 tablet (20 mg) by mouth daily.  Qty: 30 tablet, Refills: 0    Associated Diagnoses: Alcoholic cirrhosis of liver without ascites (H); Alcoholic cirrhosis of liver without ascites (H)      lactulose (CEPHULAC) 20 GM packet Take 1 packet (20 g) by mouth 3 times daily.  Qty: 90 each, Refills: 0    Associated Diagnoses: Alcoholic cirrhosis of liver without ascites (H); Alcoholic cirrhosis of liver without ascites (H)      multivitamin w/minerals (THERA-VIT-M) tablet Take 1 tablet by mouth daily.      pantoprazole (PROTONIX) 40 MG EC " tablet Take 1 tablet (40 mg) by mouth every morning (before breakfast).  Qty: 30 tablet, Refills: 0    Associated Diagnoses: Alcoholic cirrhosis of liver without ascites (H)      simethicone (MYLICON) 125 MG chewable tablet Take 125 mg by mouth 2 times daily.      spironolactone (ALDACTONE) 50 MG tablet Take 1 tablet (50 mg) by mouth daily.  Qty: 30 tablet, Refills: 0    Associated Diagnoses: Alcoholic cirrhosis of liver without ascites (H); Alcoholic cirrhosis of liver without ascites (H)      thiamine (B-1) 100 MG tablet Take 100 mg by mouth daily.      sodium chloride, PF, 0.9% PF flush Inject 10 mLs into the vein as needed for line flush. Flush IV before and after medication administration as directed and/or at least every 24 hours.  Qty: 394710 mL, Refills: 0    Associated Diagnoses: Bacteremia           Allergies   Allergies   Allergen Reactions    Adhesive Tape

## 2024-11-03 NOTE — PLAN OF CARE
Goal Outcome Evaluation:      Plan of Care Reviewed With: patient    Overall Patient Progress: no changeOverall Patient Progress: no change    Outcome Evaluation: Plan to return home and resumption IVAB with [facility].    Estela De Guzman, RN    7C RN Coordinator  Phone: [phone]  11/3/2024  Units: 7C Med Surg 7401 thru 7418 RNCC     Social Work and Care Management Department   SEARCHABLE in [facility] - search CARE COORDINATOR   [place] ([phone]) Saturday & Sunday; ([phone]) [facility] Recognized Holidays   Units: 5A Onc 5201 - 5219 RNCC,  5A Onc 5220 thru 5240 RNCC, 5C OFFSERVICE [phone] RNCC & 5C OFF SERVICE [id number] RNCC   Units: 6B Vocera, 6C Card 6401 thru 6420 RNCC, 6C Card 6502 thru 6514 RNCC & 6C Card 6515 thru 6519 RNCC    Units: 7A SOT RNCC Vocera, 7B Med Surg Vocera, & 7C Med Surg 7502 thru 7521 RNCC   Units: 6A Vocera & 4A CVICU Vocera, 4C MICU Vocera, and 4E SICU Vocera     Units: 5 Ortho Vocera & 5 Med Surg Vocera    Units: 6 Med Surg Vocera & 8 Med Surg Vocera

## 2024-11-03 NOTE — ED PROVIDER NOTES
[facility] EMERGENCY DEPARTMENT ([facility])    11/02/24       ED PROVIDER NOTE    History     Chief Complaint   Patient presents with    Abdominal Pain    Nausea    hands shakes    Tremors    Altered Mental Status    Jaundice     HPI  Stewart Hunt is a 39 year old male with history of alcohol use disorder and alcoholic cirrhosis with ascites and anasarca who presents to the ED for evaluation of multiple symptoms.  Patient has a PICC line in place on left upper extremity for cefazolin infusions.  History is provided by both the patient and his wife.  They reports that in the past 2 days he has had worsening dizziness, balance, weakness, intermittent confusion, hand tremors, nausea, and bilateral lower extremity swelling.  The majority of the symptoms have been chronic for him with his liver disease but appear to have acutely worsened in the past 2 days.  His wife reports concern that his symptoms may be due to hyponatremia.  She notes that on 10/21/2024 his sodium was 137 and on 10/29/2024 it dropped to 133.  Patient denies any fevers but reports feeling very cold frequently.  He denies any new pain anywhere.  No other symptoms noted.    Per chart review, patient was recently admitted here at [facility] from 10/3/2024 to 10/18/2024 with fever and malaise 2/2 MSSA bacteremia of unclear source.  TTE was negative for vegetations.  Subsequent blood cultures were negative.  Patient was started on lactulose for hepatic encephalopathy.  Patient was seen by ID and discharged home with 4 weeks of cefazolin.      Past Medical History  Past Medical History:   Diagnosis Date    Acute alcoholic hepatitis (H) 09/2024    Alcohol use disorder, severe, in early remission (H)     Alcoholic cirrhosis (H)     Morbid obesity (H)      Past Surgical History:   Procedure Laterality Date    GASTRECTOMY LAPAROSCOPIC SLEEVE  2012    PICC INSERTION - DOUBLE LUMEN Left 10/16/2024    45-5cm, Basilic vein    WISDOM TOOTH EXTRACTION    "    albuterol (PROAIR HFA/PROVENTIL HFA/VENTOLIN HFA) 108 (90 Base) MCG/ACT inhaler  ceFAZolin (ANCEF) injection  ceFAZolin (ANCEF) intermittent infusion 2 g in 100 mL dextrose PRE-MIX  Emergency Supply Kit, Central,  famotidine (PEPCID) 20 MG tablet  folic acid (FOLVITE) 1 MG tablet  furosemide (LASIX) 20 MG tablet  lactulose (CEPHULAC) 20 GM packet  multivitamin w/minerals (THERA-VIT-M) tablet  pantoprazole (PROTONIX) 40 MG EC tablet  simethicone (MYLICON) 125 MG chewable tablet  spironolactone (ALDACTONE) 50 MG tablet  thiamine (B-1) 100 MG tablet  sodium chloride, PF, 0.9% PF flush      Allergies   Allergen Reactions    Adhesive Tape      Family History  Family History   Problem Relation Age of Onset    Colon Cancer Mother 42    Diabetes Father     Pancreatic Cancer Sister 15    Diabetes Maternal Grandmother     Colon Cancer Cousin 32    Liver Disease No family hx of      Social History   Social History     Tobacco Use    Smoking status: Never    Smokeless tobacco: Never   Vaping Use    Vaping status: Never Used   Substance Use Topics    Alcohol use: Not Currently     Comment: 2 pints of vodka per day for 2 years. Last drink early sept 2024    Drug use: Never      Past medical history, past surgical history, medications, allergies, family history, and social history were reviewed with the patient. No additional pertinent items.     A complete review of systems was performed with pertinent positives and negatives noted in the HPI, and all other systems negative.    Physical Exam   BP: 118/55  Pulse: 87  Temp: 98.1  F (36.7  C)  Resp: 18  Height: 165.1 cm (5' 5\")  Weight: 108.9 kg (240 lb)  SpO2: 99 %  Physical Exam  Vitals and nursing note reviewed.   Constitutional:       General: He is not in acute distress.     Appearance: Normal appearance. He is not toxic-appearing or diaphoretic.   HENT:      Head: Atraumatic.      Mouth/Throat:      Mouth: Mucous membranes are dry.   Eyes:      General: No scleral " icterus.     Conjunctiva/sclera: Conjunctivae normal.      Pupils: Pupils are equal, round, and reactive to light.   Cardiovascular:      Rate and Rhythm: Normal rate and regular rhythm.      Heart sounds: Normal heart sounds. No murmur heard.     No friction rub. No gallop.   Pulmonary:      Effort: Pulmonary effort is normal. No respiratory distress.      Breath sounds: Normal breath sounds. No stridor. No wheezing, rhonchi or rales.   Chest:      Chest wall: No tenderness.   Abdominal:      General: Bowel sounds are normal. There is distension.      Palpations: Abdomen is soft. There is no mass.      Tenderness: There is no abdominal tenderness. There is no right CVA tenderness, left CVA tenderness, guarding or rebound.      Hernia: No hernia is present.   Musculoskeletal:         General: No tenderness or deformity.      Cervical back: Neck supple.   Skin:     General: Skin is warm and dry.      Findings: No rash.   Neurological:      General: No focal deficit present.      Mental Status: He is alert.      Cranial Nerves: No cranial nerve deficit.      Sensory: No sensory deficit.      Motor: No weakness.      Coordination: Coordination normal.      Gait: Gait abnormal.      Deep Tendon Reflexes: Reflexes normal.      Comments: Occasional confusion-per wife   Psychiatric:         Mood and Affect: Mood normal.           ED Course, Procedures, & Data      Procedures       IV Antibiotics given and/or elevated Lactate of 0 and no sepsis note found - Delete this reminder and enter the sepsis note or '.edcms' before signing chart.>>>     Results for orders placed or performed during the hospital encounter of 11/02/24   CT Head w/o Contrast     Status: None    Narrative    EXAM: CT HEAD W/O CONTRAST  LOCATION: [facility]  DATE: 11/2/2024    INDICATION: confusion  COMPARISON: None.  TECHNIQUE: Routine CT Head without IV contrast. Multiplanar reformats. Dose reduction  techniques were used.    FINDINGS:  INTRACRANIAL CONTENTS: No intracranial hemorrhage, extraaxial collection, or mass effect.  No CT evidence of acute infarct. Normal parenchymal attenuation. Normal ventricles and sulci.     VISUALIZED ORBITS/SINUSES/MASTOIDS: No intraorbital abnormality. No paranasal sinus mucosal disease. No middle ear or mastoid effusion.    BONES/SOFT TISSUES: No acute abnormality.      Impression    IMPRESSION:  1.  No acute intracranial process.   Comprehensive metabolic panel     Status: Abnormal   Result Value Ref Range    Sodium 130 (L) 135 - 145 mmol/L    Potassium 4.5 3.4 - 5.3 mmol/L    Carbon Dioxide (CO2) 21 (L) 22 - 29 mmol/L    Anion Gap 10 7 - 15 mmol/L    Urea Nitrogen 14.0 6.0 - 20.0 mg/dL    Creatinine 0.56 (L) 0.67 - 1.17 mg/dL    GFR Estimate >90 >60 mL/min/1.73m2    Calcium 8.5 (L) 8.8 - 10.4 mg/dL    Chloride 99 98 - 107 mmol/L    Glucose 98 70 - 99 mg/dL    Alkaline Phosphatase 245 (H) 40 - 150 U/L     (H) 0 - 45 U/L    ALT 39 0 - 70 U/L    Protein Total      Albumin 3.0 (L) 3.5 - 5.2 g/dL    Bilirubin Total 25.2 (HH) <=1.2 mg/dL   Magnesium     Status: Abnormal   Result Value Ref Range    Magnesium 1.5 (L) 1.7 - 2.3 mg/dL   Ammonia     Status: Normal   Result Value Ref Range    Ammonia 40 16 - 60 umol/L   UA with Microscopic reflex to Culture     Status: Abnormal    Specimen: Urine, Clean Catch   Result Value Ref Range    Color Urine Dark Yellow (A) Colorless, Straw, Light Yellow, Yellow    Appearance Urine Slightly Cloudy (A) Clear    Glucose Urine Negative Negative mg/dL    Bilirubin Urine Large (A) Negative    Ketones Urine Negative Negative mg/dL    Specific Gravity Urine 1.020 1.003 - 1.035    Blood Urine Negative Negative    pH Urine 6.0 5.0 - 7.0    Protein Albumin Urine 10 (A) Negative mg/dL    Urobilinogen Urine Normal Normal, 2.0 mg/dL    Nitrite Urine Negative Negative    Leukocyte Esterase Urine Negative Negative    Bacteria Urine Few (A) None Seen /HPF     Mucus Urine Present (A) None Seen /LPF    RBC Urine 0 <=2 /HPF    WBC Urine 6 (H) <=5 /HPF    Squamous Epithelials Urine <1 <=1 /HPF    Transitional Epithelials Urine <1 <=1 /HPF    Hyaline Casts Urine 1 <=2 /LPF    Narrative    Urine Culture not indicated   CRP inflammation     Status: Abnormal   Result Value Ref Range    CRP Inflammation 22.20 (H) <5.00 mg/L   Erythrocyte sedimentation rate auto     Status: Abnormal   Result Value Ref Range    Erythrocyte Sedimentation Rate >140 (H) 0 - 15 mm/hr   CBC with platelets and differential     Status: Abnormal   Result Value Ref Range    WBC Count 15.9 (H) 4.0 - 11.0 10e3/uL    RBC Count 2.89 (L) 4.40 - 5.90 10e6/uL    Hemoglobin 10.2 (L) 13.3 - 17.7 g/dL    Hematocrit 30.2 (L) 40.0 - 53.0 %     (H) 78 - 100 fL    MCH 35.3 (H) 26.5 - 33.0 pg    MCHC 33.8 31.5 - 36.5 g/dL    RDW 18.9 (H) 10.0 - 15.0 %    Platelet Count 114 (L) 150 - 450 10e3/uL    % Neutrophils 65 %    % Lymphocytes 20 %    % Monocytes 11 %    % Eosinophils 2 %    % Basophils 1 %    % Immature Granulocytes 1 %    NRBCs per 100 WBC 0 <1 /100    Absolute Neutrophils 10.3 (H) 1.6 - 8.3 10e3/uL    Absolute Lymphocytes 3.2 0.8 - 5.3 10e3/uL    Absolute Monocytes 1.7 (H) 0.0 - 1.3 10e3/uL    Absolute Eosinophils 0.4 0.0 - 0.7 10e3/uL    Absolute Basophils 0.1 0.0 - 0.2 10e3/uL    Absolute Immature Granulocytes 0.2 <=0.4 10e3/uL    Absolute NRBCs 0.0 10e3/uL   RBC and Platelet Morphology     Status: None   Result Value Ref Range    RBC Morphology Confirmed RBC Indices     Platelet Assessment  Automated Count Confirmed. Platelet morphology is normal.     Automated Count Confirmed. Platelet morphology is normal.   CBC with platelets differential     Status: Abnormal    Narrative    The following orders were created for panel order CBC with platelets differential.  Procedure                               Abnormality         Status                     ---------                               -----------          ------                     CBC with platelets and d...[[id number]]  Abnormal            Final result               RBC and Platelet Morphology[[id number]]                      Final result                 Please view results for these tests on the individual orders.     Medications   pharmacy alert - intermittent dosing (has no administration in time range)   lidocaine 1 % 0.1-1 mL (has no administration in time range)   lidocaine (LMX4) cream (has no administration in time range)   sodium chloride (PF) 0.9% PF flush 3 mL (has no administration in time range)   sodium chloride (PF) 0.9% PF flush 3 mL (has no administration in time range)   senna-docusate (SENOKOT-S/PERICOLACE) 8.6-50 MG per tablet 1 tablet (has no administration in time range)     Or   senna-docusate (SENOKOT-S/PERICOLACE) 8.6-50 MG per tablet 2 tablet (has no administration in time range)   calcium carbonate (TUMS) chewable tablet 1,000 mg (has no administration in time range)   ondansetron (ZOFRAN ODT) ODT tab 4 mg (has no administration in time range)     Or   ondansetron (ZOFRAN) injection 4 mg (has no administration in time range)   prochlorperazine (COMPAZINE) injection 10 mg (has no administration in time range)     Or   prochlorperazine (COMPAZINE) tablet 10 mg (has no administration in time range)     Or   prochlorperazine (COMPAZINE) suppository 25 mg (has no administration in time range)   ceFAZolin (ANCEF) 2 g in 100 mL D5W intermittent infusion (2 g Intravenous $New Bag 11/3/24 0013)     Labs Ordered and Resulted from Time of ED Arrival to Time of ED Departure   COMPREHENSIVE METABOLIC PANEL - Abnormal       Result Value    Sodium 130 (*)     Potassium 4.5      Carbon Dioxide (CO2) 21 (*)     Anion Gap 10      Urea Nitrogen 14.0      Creatinine 0.56 (*)     GFR Estimate >90      Calcium 8.5 (*)     Chloride 99      Glucose 98      Alkaline Phosphatase 245 (*)      (*)     ALT 39      Protein Total        Albumin 3.0 (*)      Bilirubin Total 25.2 (*)    MAGNESIUM - Abnormal    Magnesium 1.5 (*)    ROUTINE UA WITH MICROSCOPIC REFLEX TO CULTURE - Abnormal    Color Urine Dark Yellow (*)     Appearance Urine Slightly Cloudy (*)     Glucose Urine Negative      Bilirubin Urine Large (*)     Ketones Urine Negative      Specific Gravity Urine 1.020      Blood Urine Negative      pH Urine 6.0      Protein Albumin Urine 10 (*)     Urobilinogen Urine Normal      Nitrite Urine Negative      Leukocyte Esterase Urine Negative      Bacteria Urine Few (*)     Mucus Urine Present (*)     RBC Urine 0      WBC Urine 6 (*)     Squamous Epithelials Urine <1      Transitional Epithelials Urine <1      Hyaline Casts Urine 1     CRP INFLAMMATION - Abnormal    CRP Inflammation 22.20 (*)    ERYTHROCYTE SEDIMENTATION RATE AUTO - Abnormal    Erythrocyte Sedimentation Rate >140 (*)    CBC WITH PLATELETS AND DIFFERENTIAL - Abnormal    WBC Count 15.9 (*)     RBC Count 2.89 (*)     Hemoglobin 10.2 (*)     Hematocrit 30.2 (*)      (*)     MCH 35.3 (*)     MCHC 33.8      RDW 18.9 (*)     Platelet Count 114 (*)     % Neutrophils 65      % Lymphocytes 20      % Monocytes 11      % Eosinophils 2      % Basophils 1      % Immature Granulocytes 1      NRBCs per 100 WBC 0      Absolute Neutrophils 10.3 (*)     Absolute Lymphocytes 3.2      Absolute Monocytes 1.7 (*)     Absolute Eosinophils 0.4      Absolute Basophils 0.1      Absolute Immature Granulocytes 0.2      Absolute NRBCs 0.0     AMMONIA - Normal    Ammonia 40     RBC AND PLATELET MORPHOLOGY    RBC Morphology Confirmed RBC Indices      Platelet Assessment        Value: Automated Count Confirmed. Platelet morphology is normal.   BLOOD CULTURE     CT Head w/o Contrast   Final Result   IMPRESSION:   1.  No acute intracranial process.             Critical care was not performed.     Medical Decision Making  The patient's presentation was of high complexity (a chronic illness severe exacerbation, progression, or  side effect of treatment).    The patient's evaluation involved:  ordering and/or review of 3+ test(s) in this encounter (see separate area of note for details)    The patient's management necessitated high risk (a decision regarding hospitalization).    Assessment & Plan    Worsening weakness with falls in the pt with ESLD, recent MSSA bacteremia of unclear source on ancef per PIC line under ID's direction, Na down to 130 and more elevated inflammatory markers, CT head neg, D/W Medicine-admit.    I have reviewed the nursing notes. I have reviewed the findings, diagnosis, plan and need for follow up with the patient.    New Prescriptions    No medications on file       Final diagnoses:   Weakness   Unsteady gait   Hyponatremia   End stage liver disease (H)   Gram-positive bacteremia of unclear source, on treatment   I, Norah Spivey, am serving as a trained medical scribe to document services personally performed by José Lin Md MD based on the provider's statements to me on November 2, 2024.  This document has been checked and approved by the attending provider.    I, José Lin Md MD, was physically present and have reviewed and verified the accuracy of this note documented by Norah Spviey medical scribe.      José Lin Md MD  [facility] EMERGENCY DEPARTMENT  11/2/2024     José Lin MD  11/03/24 0120

## 2024-11-03 NOTE — MEDICATION SCRIBE - ADMISSION MEDICATION HISTORY
"Medication Scribe Admission Medication History    Admission medication history is complete. The information provided in this note is only as accurate as the sources available at the time of the update.    Information Source(s): Patient via in-person    Pertinent Information: Pt reported taking medications on PTA medication list as directed    Changes made to PTA medication list:  Added: None  Deleted: None  Changed: None    Allergies reviewed with patient and updates made in EHR: yes    Medication History Completed By: Alicia Browne 11/3/2024 3:42 AM    PTA Med List   Medication Sig Last Dose/Taking    albuterol (PROAIR HFA/PROVENTIL HFA/VENTOLIN HFA) 108 (90 Base) MCG/ACT inhaler Inhale 2-3 puffs into the lungs every 6 hours as needed for shortness of breath, wheezing or cough. 11/1/2024    ceFAZolin (ANCEF) injection Inject 20 mLs (2 g) over 5-10 minutes into the vein via push every 8 hours for 10 days. 11/2/2024 at  3:00 PM    ceFAZolin (ANCEF) intermittent infusion 2 g in 100 mL dextrose PRE-MIX Inject 100 mLs (2 g) at 200 mL/hr over 30 minutes into the vein every 8 hours for 24 days. 11/2/2024    Emergency Supply Kit, Central, Patient use for emergency only. Contents: 3 sodium chloride 0.9% flushes, 1 dressing kit, 1 microclave ext set 14\", 4 nitrile gloves (med), 6 alcohol prep pads, 1 bacitracin, 1 syringe (10 cc 20 G 1\"). Call [phone] to reorder. 11/2/2024    famotidine (PEPCID) 20 MG tablet Take 20 mg by mouth daily. 11/2/2024    folic acid (FOLVITE) 1 MG tablet Take 1 mg by mouth daily. 11/2/2024    furosemide (LASIX) 20 MG tablet Take 1 tablet (20 mg) by mouth daily. 11/2/2024    lactulose (CEPHULAC) 20 GM packet Take 1 packet (20 g) by mouth 3 times daily. 11/1/2024 at  6:00 PM    multivitamin w/minerals (THERA-VIT-M) tablet Take 1 tablet by mouth daily. 11/2/2024    pantoprazole (PROTONIX) 40 MG EC tablet Take 1 tablet (40 mg) by mouth every morning (before breakfast). 11/2/2024 at  7:00 AM "    simethicone (MYLICON) 125 MG chewable tablet Take 125 mg by mouth 2 times daily. 11/2/2024    spironolactone (ALDACTONE) 50 MG tablet Take 1 tablet (50 mg) by mouth daily. 11/2/2024    thiamine (B-1) 100 MG tablet Take 100 mg by mouth daily. 11/2/2024

## 2024-11-03 NOTE — ED TRIAGE NOTES
Ambulatory from home with family for complains of hands tremors,  abdominal pain, confusion,  swelling in ankles, jaundice. Patient report some of the above symptoms are chronic but started x 2 days ago.   PMH Liver cirrhosis, was discharge  recently   Triage Assessment (Adult)       Row Name 11/02/24 2012          Triage Assessment    Airway WDL WDL        Respiratory WDL    Respiratory WDL X   SOB intermittently        Skin Circulation/Temperature WDL    Skin Circulation/Temperature WDL WDL;X   jaundice        Cardiac WDL    Cardiac WDL WDL        Peripheral/Neurovascular WDL    Peripheral Neurovascular WDL X   numbness feet baseline, swelling in ankle, tingling and hands        Cognitive/Neuro/Behavioral WDL    Cognitive/Neuro/Behavioral WDL WDL

## 2024-11-04 ENCOUNTER — TELEPHONE (OUTPATIENT)
Dept: INFECTIOUS DISEASES | Facility: CLINIC | Age: 39
End: 2024-11-04

## 2024-11-04 ENCOUNTER — HOME INFUSION BILLING (OUTPATIENT)
Dept: HOME HEALTH SERVICES | Facility: HOME HEALTH | Age: 39
End: 2024-11-04
Payer: COMMERCIAL

## 2024-11-04 PROCEDURE — A4245 ALCOHOL WIPES PER BOX: HCPCS

## 2024-11-04 PROCEDURE — A4221 SUPP NON-INSULIN INF CATH/WK: HCPCS

## 2024-11-04 NOTE — TELEPHONE ENCOUNTER
Called patient back to discuss. He reports he is almost out of:  -Spironolactone  - Furosemide  -Folic Acid    Informed patient that these were prescribed during his most recent hospitalization, but not managed by Dr Kolb or ID. Advised patient to reach out to his PCP to discuss, but he states his PCP is not managing any of these medications and is deferring to specialty providers. Per chart review, These are managed by Dr Mcmillan. Will send patient a mychart with Dr Mcmillan's contact information.

## 2024-11-04 NOTE — TELEPHONE ENCOUNTER
[facility] Call Center    Phone Message    May a detailed message be left on voicemail: yes     Reason for Call: Other: Stewart called because a few of his prescriptions are almost out and he wants to speak with a Dr Mcguire or a nurse to confirm if the doctor will keep him on them or will there be changes. Please contact Stewart mobley to assist thank you     Action Taken: Other: ID    Travel Screening: Not Applicable     Date of Service:

## 2024-11-05 ENCOUNTER — TELEPHONE (OUTPATIENT)
Dept: INFECTIOUS DISEASES | Facility: CLINIC | Age: 39
End: 2024-11-05

## 2024-11-05 ENCOUNTER — PATIENT OUTREACH (OUTPATIENT)
Dept: CARE COORDINATION | Facility: CLINIC | Age: 39
End: 2024-11-05
Payer: COMMERCIAL

## 2024-11-05 ENCOUNTER — LAB REQUISITION (OUTPATIENT)
Dept: LAB | Facility: CLINIC | Age: 39
End: 2024-11-05
Payer: COMMERCIAL

## 2024-11-05 ENCOUNTER — HOME CARE VISIT (OUTPATIENT)
Dept: HOME HEALTH SERVICES | Facility: HOME HEALTH | Age: 39
End: 2024-11-05
Payer: COMMERCIAL

## 2024-11-05 VITALS
SYSTOLIC BLOOD PRESSURE: 110 MMHG | DIASTOLIC BLOOD PRESSURE: 52 MMHG | RESPIRATION RATE: 18 BRPM | OXYGEN SATURATION: 97 % | HEART RATE: 100 BPM | TEMPERATURE: 97.3 F

## 2024-11-05 DIAGNOSIS — R78.81 BACTEREMIA: ICD-10-CM

## 2024-11-05 DIAGNOSIS — K70.30 ALCOHOLIC CIRRHOSIS OF LIVER WITHOUT ASCITES (H): ICD-10-CM

## 2024-11-05 LAB
ALBUMIN SERPL BCG-MCNC: 3 G/DL (ref 3.5–5.2)
ALP SERPL-CCNC: 239 U/L (ref 40–150)
ALT SERPL W P-5'-P-CCNC: 45 U/L (ref 0–70)
ANION GAP SERPL CALCULATED.3IONS-SCNC: 13 MMOL/L (ref 7–15)
AST SERPL W P-5'-P-CCNC: 167 U/L (ref 0–45)
BASOPHILS # BLD AUTO: 0.1 10E3/UL (ref 0–0.2)
BASOPHILS NFR BLD AUTO: 1 %
BILIRUB SERPL-MCNC: 26.3 MG/DL
BUN SERPL-MCNC: 14.2 MG/DL (ref 6–20)
CALCIUM SERPL-MCNC: 8.7 MG/DL (ref 8.8–10.4)
CHLORIDE SERPL-SCNC: 97 MMOL/L (ref 98–107)
CREAT SERPL-MCNC: 0.55 MG/DL (ref 0.67–1.17)
CRP SERPL-MCNC: 25.49 MG/L
EGFRCR SERPLBLD CKD-EPI 2021: >90 ML/MIN/1.73M2
EOSINOPHIL # BLD AUTO: 0.3 10E3/UL (ref 0–0.7)
EOSINOPHIL NFR BLD AUTO: 2 %
ERYTHROCYTE [DISTWIDTH] IN BLOOD BY AUTOMATED COUNT: 18.1 % (ref 10–15)
GLUCOSE SERPL-MCNC: 111 MG/DL (ref 70–99)
HCO3 SERPL-SCNC: 20 MMOL/L (ref 22–29)
HCT VFR BLD AUTO: 29.2 % (ref 40–53)
HGB BLD-MCNC: 10.2 G/DL (ref 13.3–17.7)
IMM GRANULOCYTES # BLD: 0.2 10E3/UL
IMM GRANULOCYTES NFR BLD: 1 %
LYMPHOCYTES # BLD AUTO: 2.2 10E3/UL (ref 0.8–5.3)
LYMPHOCYTES NFR BLD AUTO: 15 %
MCH RBC QN AUTO: 35.8 PG (ref 26.5–33)
MCHC RBC AUTO-ENTMCNC: 34.9 G/DL (ref 31.5–36.5)
MCV RBC AUTO: 103 FL (ref 78–100)
MONOCYTES # BLD AUTO: 1.4 10E3/UL (ref 0–1.3)
MONOCYTES NFR BLD AUTO: 10 %
NEUTROPHILS # BLD AUTO: 10.4 10E3/UL (ref 1.6–8.3)
NEUTROPHILS NFR BLD AUTO: 71 %
NRBC # BLD AUTO: 0 10E3/UL
NRBC BLD AUTO-RTO: 0 /100
PLATELET # BLD AUTO: 120 10E3/UL (ref 150–450)
POTASSIUM SERPL-SCNC: 4.7 MMOL/L (ref 3.4–5.3)
PROT SERPL-MCNC: 5.5 G/DL (ref 6.4–8.3)
RBC # BLD AUTO: 2.85 10E6/UL (ref 4.4–5.9)
SODIUM SERPL-SCNC: 130 MMOL/L (ref 135–145)
WBC # BLD AUTO: 14.6 10E3/UL (ref 4–11)

## 2024-11-05 PROCEDURE — 86140 C-REACTIVE PROTEIN: CPT | Performed by: STUDENT IN AN ORGANIZED HEALTH CARE EDUCATION/TRAINING PROGRAM

## 2024-11-05 PROCEDURE — 85004 AUTOMATED DIFF WBC COUNT: CPT | Performed by: STUDENT IN AN ORGANIZED HEALTH CARE EDUCATION/TRAINING PROGRAM

## 2024-11-05 PROCEDURE — 82947 ASSAY GLUCOSE BLOOD QUANT: CPT | Performed by: STUDENT IN AN ORGANIZED HEALTH CARE EDUCATION/TRAINING PROGRAM

## 2024-11-05 PROCEDURE — 99601 HOME NFS VISIT <2 HRS: CPT

## 2024-11-05 RX ORDER — SPIRONOLACTONE 50 MG/1
50 TABLET, FILM COATED ORAL DAILY
Qty: 90 TABLET | Refills: 1 | Status: SHIPPED | OUTPATIENT
Start: 2024-11-05

## 2024-11-05 RX ORDER — FOLIC ACID 1 MG/1
1 TABLET ORAL DAILY
Qty: 90 TABLET | Refills: 0 | Status: SHIPPED | OUTPATIENT
Start: 2024-11-05

## 2024-11-05 RX ORDER — FUROSEMIDE 20 MG/1
20 TABLET ORAL DAILY
Qty: 90 TABLET | Refills: 1 | Status: SHIPPED | OUTPATIENT
Start: 2024-11-05

## 2024-11-05 RX ORDER — LANOLIN ALCOHOL/MO/W.PET/CERES
100 CREAM (GRAM) TOPICAL DAILY
Qty: 90 TABLET | Refills: 0 | Status: SHIPPED | OUTPATIENT
Start: 2024-11-05

## 2024-11-05 NOTE — PROGRESS NOTES
Nursing Visit Note:  Nurse visit today for CLC and Labs  for Stewart [facility].     present during visit today: Not Applicable.    VSS, pt alert and oriented x4. Was seen in the ED and kept overnight due to weakness, confusion and trembling. No new interventions were prescribed other than the addition of oral Zofran for nausea. Pt has tried Zofran but it makes him have a headache. Writer will reach out to provider to see if something else can be prescribed. Otherwise pt is eating and drinking. Encouraged addition of electrolytes. Still having intermittent days of increased loose stools. Pt is holding his Lactulose today. No new issues or concerns hospitalization. Denies fevers or pain currently. CLC performed and labs drawn; sent routine to [facility] via OTD. Next visit next Tuesday 11-12 for either PICC removal or CLC/labs pending plan of care.       Jennifer Maloney RN 11/5/2024

## 2024-11-05 NOTE — PROGRESS NOTES
Clinic Care Coordination Contact  [facility]/Voicemail    Clinical Data: Care Coordinator Outreach    Outreach Documentation Number of Outreach Attempt   11/5/2024   9:16 AM 2       Left message on patient's voicemail with call back information and requested return call.      Care Coordinator will do no further outreaches at this time.    Bella Reynolds  [phone]  Care

## 2024-11-05 NOTE — TELEPHONE ENCOUNTER
[facility] Call Center    Phone Message    May a detailed message be left on voicemail: yes     Reason for Call: Other: [facility] called to report a critical value. Please call her back asap at [phone].      Action Taken: Other: ID    Travel Screening: Not Applicable     Date of Service:

## 2024-11-06 NOTE — TELEPHONE ENCOUNTER
DATE/TIME OF CALL RECEIVED FROM LAB:  11/6/24 at 9:53 AM   LAB TEST:  Bilirubin  LAB VALUE:  26.3 (in line with previous labs)  PROVIDER NOTIFIED?: Yes- called PCP (Dr [phone])  as Bilirubin is not monitored by our clinic  PROVIDER NAME: Dr [phone]  DATE/TIME LAB VALUE REPORTED TO PROVIDER: 11/06/2024-10:03am  MECHANISM OF PROVIDER NOTIFICATION: Phone Call  PROVIDER RESPONSE: Acknowledged, their clinic will address.

## 2024-11-07 LAB — BACTERIA BLD CULT: NO GROWTH

## 2024-11-11 ENCOUNTER — VIRTUAL VISIT (OUTPATIENT)
Dept: INFECTIOUS DISEASES | Facility: CLINIC | Age: 39
End: 2024-11-11
Attending: STUDENT IN AN ORGANIZED HEALTH CARE EDUCATION/TRAINING PROGRAM
Payer: COMMERCIAL

## 2024-11-11 VITALS
OXYGEN SATURATION: 100 % | SYSTOLIC BLOOD PRESSURE: 121 MMHG | WEIGHT: 244 LBS | DIASTOLIC BLOOD PRESSURE: 50 MMHG | HEIGHT: 66 IN | HEART RATE: 91 BPM | BODY MASS INDEX: 39.21 KG/M2

## 2024-11-11 DIAGNOSIS — R79.82 ELEVATED C-REACTIVE PROTEIN (CRP): ICD-10-CM

## 2024-11-11 DIAGNOSIS — Z45.2 PICC (PERIPHERALLY INSERTED CENTRAL CATHETER) IN PLACE: ICD-10-CM

## 2024-11-11 DIAGNOSIS — R78.81 BACTEREMIA: Primary | ICD-10-CM

## 2024-11-11 DIAGNOSIS — Z79.2 ENCOUNTER FOR LONG-TERM (CURRENT) USE OF ANTIBIOTICS: ICD-10-CM

## 2024-11-11 DIAGNOSIS — D72.829 LEUKOCYTOSIS, UNSPECIFIED TYPE: ICD-10-CM

## 2024-11-11 DIAGNOSIS — K70.10 ALCOHOLIC HEPATITIS WITHOUT ASCITES (H): ICD-10-CM

## 2024-11-11 DIAGNOSIS — R79.89 ELEVATED LFTS: ICD-10-CM

## 2024-11-11 ASSESSMENT — PAIN SCALES - GENERAL: PAINLEVEL_OUTOF10: MILD PAIN (2)

## 2024-11-11 NOTE — LETTER
11/11/2024       RE: Stewart Hunt  [address]  [facility] [address]     Dear Colleague,    Thank you for referring your patient, Stewart Hunt, to the [facility] [place] at [facility]. Please see a copy of my visit note below.    Virtual Visit Details    Type of service:  Video Visit     Video Start Time: 10:30AM  Video End Time: 11:00AM    Originating Location (pt. 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 However, also had febrile episode on 10/4 when also leukocytosis, no blood cultures then. Rather thought respiratory illness, negative work up. No antibiotics were received. Another febrile episode on 10/8, blood cultures grew MSSA (1/2 bottles), repeat blood cultures on evening of 10/8 onwards negative. TTE negative. Based on clinical presentation, duration of bacteremia is unclear, whether could have bacteremic on previous febrile episode on 10/4.  Lower back pain has been stable. Based on hx, no hardware in place, except staples from gastric sleeve  surgery. No evidence of metastatic infection per exam. TTE 10/9 did not show vegetation. Repeat blood cultures remained negative. During hospitalization, continues to have fever spikes and leukocytosis (range 12-14) which prompted CT-dental, CT-chest PE, CT-AP and did not show focal signs of infection. Did receive 7 days of empiric gram negative coverage as well. Determined for 4 weeks of iv cefazolin.     Today's visit, no focal signs and symptoms suggestive of infection. Recommend to stop iv cefazolin as already receive 4 weeks course, and remove PICC line. Given persistent leukocytosis without focal signs of infection, and potential evaluation for transplant in near future, would obtain surveillance blood cultures and labs next week, and PET-CT.      Recommendations:  Stop cefazolin 2g q8h  Remove PICC line  Surveillance blood cultures at weeks 1 and 2 after stopping antibiotic  Labs in one week - CBC, CMP, CRP  PET-CT to assess for infectious foci given persistent leukocytosis while on antibiotic and recent MSSA bacteremia, without metastatic foci on work up at that time.   ID follow up based on above work up.     40 minutes spent by me on the date of the encounter doing chart review, history and exam, documentation and further activities per the note.      History of Present Illness    Pertinent history obtain from: chart review, patient, and patient's partner/spouse  Mr. Hunt presents for follow up regarding bacteremia, on iv antibiotic via PICC line. Denies particular complaints. Stable baseline back pain. Did experience some pain of left shoulder a few days ago, but then it improved spontaneously and today able to move the joint without any difficulty.     Specialty Problems    None       Physical Exam  General: appears comfortable, not in acute distress  Otherwise deferred due to virtual video visit    Data  Laboratory data and imaging listed below was reviewed prior to this encounter.      Microbiology:    Culture   Date Value Ref Range Status   11/03/2024 No Growth  Final   10/12/2024 No Growth  Final   10/12/2024 No Growth  Final   10/10/2024 No Growth  Final   10/08/2024 No Growth  Final   10/08/2024 No Growth  Final   10/08/2024 Positive on the 1st day of incubation (A)  Final   10/08/2024 Staphylococcus aureus (AA)  Final     Comment:     1 of 2 bottles   10/03/2024 No Growth  Final   09/20/2024 No Growth  Final     Inflammatory Markers:   Recent Labs   Lab Test 11/02/24 2114   SED >140*     Urine Studies:    Recent Labs   Lab Test 11/02/24  2123 10/12/24  0524 10/08/24  2356 10/06/24  0911 10/03/24  0103   LEUKEST Negative Negative Negative Negative Negative   WBCU 6* 2 4 3 1     Hematology Studies:    Recent Labs   Lab Test 11/05/24  1325 11/03/24  0633 11/02/24  2114 10/29/24  1130 10/21/24  1112 10/18/24  0556   WBC 14.6* 13.9* 15.9* 14.0* 14.1*  14.0* 11.3*   ANEU  --   --   --   --  9.4*  --    AEOS  --   --   --   --  0.0  --    HGB 10.2* 10.0* 10.2* 11.0* 11.7* 10.4*   * 105* 105* 102* 101* 105*   * 112* 114* 122* 150 126*      Metabolic Studies:   Recent Labs   Lab Test 11/05/24  1325 11/03/24  0633 11/02/24  2114 10/29/24  1130 10/21/24  1112   * 130* 130* 133* 137   POTASSIUM 4.7 4.3 4.5 4.6 4.2   CHLORIDE 97* 99 99 98 105   CO2 20* 22 21* 24 22   BUN 14.2 12.6 14.0 14.5 13.6   CR 0.55* 0.49* 0.56* 0.55* 0.68   GFRESTIMATED >90 >90 >90 >90 >90     Hepatic Studies:   Recent Labs   Lab Test 11/05/24  1325 11/03/24  0633 11/02/24  2114 10/29/24  1130 10/21/24  1112 10/18/24  0556   BILITOTAL 26.3* 23.4* 25.2* 26.3* 25.2* 24.1*   ALKPHOS 239* 209* 245* 217* 156* 146   ALBUMIN 3.0* 2.8* 3.0* 3.1* 3.3* 3.0*   * 148* 158* 164* 126* 88*   ALT 45 35 39 40 34 34     Hepatitis B Testing:   Recent Labs   Lab Test 10/16/24  1138   HEPBANG Nonreactive       Hepatitis C Testing:   Hepatitis C Antibody   Date Value Ref Range Status   10/16/2024 Nonreactive Nonreactive  Final     Comment:     A nonreactive screening test result does not exclude the possibility of exposure to or infection with HCV. Nonreactive screening test results in individuals with prior exposure to HCV may be due to antibody levels below the limit of detection of this assay or lack of reactivity to the HCV antigens used in this assay. Patients with recent HCV infections (<3 months from time of exposure) may have false-negative HCV antibody results due to the time needed for seroconversion (average of 8 to 9 weeks).     TB Quant:   Recent Labs   Lab Test 10/08/24  1234   TBRES Negative   TBA1 0.00   TBA2 -0.01   MMNIL 9.73   NIL 0.27   QGREY 0.27   QYELLOW 0.26   QPURPLE 10.00                       Again, thank you for allowing me to participate in the care of your patient.      Sincerely,    Shira Kolb MD

## 2024-11-11 NOTE — PROGRESS NOTES
Virtual Visit Details    Type of service:  Video Visit     Video Start Time: 10:30AM  Video End Time: 11:00AM    Originating Location (pt. 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 However, also had febrile episode on 10/4 when also leukocytosis, no blood cultures then. Rather thought respiratory illness, negative work up. No antibiotics were received. Another febrile episode on 10/8, blood cultures grew MSSA (1/2 bottles), repeat blood cultures on evening of 10/8 onwards negative. TTE negative. Based on clinical presentation, duration of bacteremia is unclear, whether could have bacteremic on previous febrile episode on 10/4.  Lower back pain has been stable. Based on hx, no hardware in place, except staples from gastric sleeve surgery. No evidence of metastatic infection per exam. TTE 10/9 did not show vegetation. Repeat blood cultures remained negative. During hospitalization, continues to have fever spikes and leukocytosis (range 12-14) which prompted CT-dental, CT-chest PE, CT-AP and did not show focal signs of infection. Did receive 7 days of  empiric gram negative coverage as well. Determined for 4 weeks of iv cefazolin.     Today's visit, no focal signs and symptoms suggestive of infection. Recommend to stop iv cefazolin as already receive 4 weeks course, and remove PICC line. Given persistent leukocytosis without focal signs of infection, and potential evaluation for transplant in near future, would obtain surveillance blood cultures and labs next week, and PET-CT.      Recommendations:  Stop cefazolin 2g q8h  Remove PICC line  Surveillance blood cultures at weeks 1 and 2 after stopping antibiotic  Labs in one week - CBC, CMP, CRP  PET-CT to assess for infectious foci given persistent leukocytosis while on antibiotic and recent MSSA bacteremia, without metastatic foci on work up at that time.   ID follow up based on above work up.     40 minutes spent by me on the date of the encounter doing chart review, history and exam, documentation and further activities per the note.      History of Present Illness     Pertinent history obtain from: chart review, patient, and patient's partner/spouse  Mr. Hunt presents for follow up regarding bacteremia, on iv antibiotic via PICC line. Denies particular complaints. Stable baseline back pain. Did experience some pain of left shoulder a few days ago, but then it improved spontaneously and today able to move the joint without any difficulty.     Specialty Problems    None       Physical Exam   General: appears comfortable, not in acute distress  Otherwise deferred due to virtual video visit    Data   Laboratory data and imaging listed below was reviewed prior to this encounter.     Microbiology:    Culture   Date Value Ref Range Status   11/03/2024 No Growth  Final   10/12/2024 No Growth  Final   10/12/2024 No Growth  Final   10/10/2024 No Growth  Final   10/08/2024 No Growth  Final   10/08/2024 No Growth  Final   10/08/2024 Positive on the 1st day of incubation (A)  Final   10/08/2024 Staphylococcus aureus  (AA)  Final     Comment:     1 of 2 bottles   10/03/2024 No Growth  Final   09/20/2024 No Growth  Final     Inflammatory Markers:   Recent Labs   Lab Test 11/02/24 2114   SED >140*     Urine Studies:    Recent Labs   Lab Test 11/02/24  2123 10/12/24  0524 10/08/24  2356 10/06/24  0911 10/03/24  0103   LEUKEST Negative Negative Negative Negative Negative   WBCU 6* 2 4 3 1     Hematology Studies:    Recent Labs   Lab Test 11/05/24  1325 11/03/24  0633 11/02/24  2114 10/29/24  1130 10/21/24  1112 10/18/24  0556   WBC 14.6* 13.9* 15.9* 14.0* 14.1*  14.0* 11.3*   ANEU  --   --   --   --  9.4*  --    AEOS  --   --   --   --  0.0  --    HGB 10.2* 10.0* 10.2* 11.0* 11.7* 10.4*   * 105* 105* 102* 101* 105*   * 112* 114* 122* 150 126*      Metabolic Studies:   Recent Labs   Lab Test 11/05/24  1325 11/03/24  0633 11/02/24  2114 10/29/24  1130 10/21/24  1112   * 130* 130* 133* 137   POTASSIUM 4.7 4.3 4.5 4.6 4.2   CHLORIDE 97* 99 99 98 105   CO2 20* 22 21* 24 22   BUN 14.2 12.6 14.0 14.5 13.6   CR 0.55* 0.49* 0.56* 0.55* 0.68   GFRESTIMATED >90 >90 >90 >90 >90     Hepatic Studies:   Recent Labs   Lab Test 11/05/24  1325 11/03/24  0633 11/02/24  2114 10/29/24  1130 10/21/24  1112 10/18/24  0556   BILITOTAL 26.3* 23.4* 25.2* 26.3* 25.2* 24.1*   ALKPHOS 239* 209* 245* 217* 156* 146   ALBUMIN 3.0* 2.8* 3.0* 3.1* 3.3* 3.0*   * 148* 158* 164* 126* 88*   ALT 45 35 39 40 34 34     Hepatitis B Testing:   Recent Labs   Lab Test 10/16/24  1138   HEPBANG Nonreactive       Hepatitis C Testing:   Hepatitis C Antibody   Date Value Ref Range Status   10/16/2024 Nonreactive Nonreactive Final     Comment:     A nonreactive screening test result does not exclude the possibility of exposure to or infection with HCV. Nonreactive screening test results in individuals with prior exposure to HCV may be due to antibody levels below the limit of detection of this assay or lack of reactivity to the HCV antigens used in this  assay. Patients with recent HCV infections (<3 months from time of exposure) may have false-negative HCV antibody results due to the time needed for seroconversion (average of 8 to 9 weeks).     TB Quant:   Recent Labs   Lab Test 10/08/24  1234   TBRES Negative   TBA1 0.00   TBA2 -0.01   MMNIL 9.73   NIL 0.27   QGREY 0.27   QYELLOW 0.26   QPURPLE 10.00

## 2024-11-11 NOTE — NURSING NOTE
Current patient location: [address] [place] [address]   [facility] [address]    Is the patient currently in the state of [place]? YES    Visit mode:VIDEO    If the visit is dropped, the patient can be reconnected by: VIDEO VISIT: Text to cell phone:   Telephone Information:   Mobile [phone]    and VIDEO VISIT: Send to e-mail at: [email or web address]    Will anyone else be joining the visit? Pts significant other present  (If patient encounters technical issues they should call [phone]    Are changes needed to the allergy or medication list? No    Patient denies any changes since echeck-in completion and states all information entered during echeck-in remains accurate.    Are refills needed on medications prescribed by this physician? Discuss with provider    Rooming Documentation:  Questionnaire(s) completed    Reason for visit: MICHAEL Leroy MA VVF

## 2024-11-12 ENCOUNTER — DOCUMENTATION ONLY (OUTPATIENT)
Dept: INFECTIOUS DISEASES | Facility: CLINIC | Age: 39
End: 2024-11-12

## 2024-11-12 ENCOUNTER — LAB REQUISITION (OUTPATIENT)
Dept: LAB | Facility: CLINIC | Age: 39
End: 2024-11-12
Payer: COMMERCIAL

## 2024-11-12 ENCOUNTER — HOME CARE VISIT (OUTPATIENT)
Dept: HOME HEALTH SERVICES | Facility: HOME HEALTH | Age: 39
End: 2024-11-12
Payer: COMMERCIAL

## 2024-11-12 VITALS
OXYGEN SATURATION: 99 % | DIASTOLIC BLOOD PRESSURE: 40 MMHG | SYSTOLIC BLOOD PRESSURE: 120 MMHG | HEART RATE: 94 BPM | TEMPERATURE: 97.5 F | RESPIRATION RATE: 18 BRPM

## 2024-11-12 DIAGNOSIS — R79.82 ELEVATED C-REACTIVE PROTEIN (CRP): ICD-10-CM

## 2024-11-12 DIAGNOSIS — R78.81 MSSA BACTEREMIA: ICD-10-CM

## 2024-11-12 DIAGNOSIS — D72.829 LEUKOCYTOSIS, UNSPECIFIED TYPE: ICD-10-CM

## 2024-11-12 DIAGNOSIS — B95.61 MSSA BACTEREMIA: ICD-10-CM

## 2024-11-12 DIAGNOSIS — K70.30 ALCOHOLIC CIRRHOSIS OF LIVER WITHOUT ASCITES (H): Primary | ICD-10-CM

## 2024-11-12 DIAGNOSIS — R78.71 ABNORMAL LEAD LEVEL IN BLOOD: ICD-10-CM

## 2024-11-12 LAB
ALBUMIN SERPL BCG-MCNC: 2.9 G/DL (ref 3.5–5.2)
ALP SERPL-CCNC: 292 U/L (ref 40–150)
ALT SERPL W P-5'-P-CCNC: 55 U/L (ref 0–70)
ANION GAP SERPL CALCULATED.3IONS-SCNC: 10 MMOL/L (ref 7–15)
AST SERPL W P-5'-P-CCNC: 165 U/L (ref 0–45)
BASOPHILS # BLD AUTO: 0.1 10E3/UL (ref 0–0.2)
BASOPHILS NFR BLD AUTO: 1 %
BILIRUB SERPL-MCNC: 19.8 MG/DL
BUN SERPL-MCNC: 16.1 MG/DL (ref 6–20)
CALCIUM SERPL-MCNC: 8.5 MG/DL (ref 8.8–10.4)
CHLORIDE SERPL-SCNC: 101 MMOL/L (ref 98–107)
CK SERPL-CCNC: 59 U/L (ref 39–308)
CREAT SERPL-MCNC: 0.85 MG/DL (ref 0.67–1.17)
CRP SERPL-MCNC: 21.43 MG/L
EGFRCR SERPLBLD CKD-EPI 2021: >90 ML/MIN/1.73M2
EOSINOPHIL # BLD AUTO: 0.2 10E3/UL (ref 0–0.7)
EOSINOPHIL NFR BLD AUTO: 2 %
ERYTHROCYTE [DISTWIDTH] IN BLOOD BY AUTOMATED COUNT: 17.7 % (ref 10–15)
GLUCOSE SERPL-MCNC: 123 MG/DL (ref 70–99)
HCO3 SERPL-SCNC: 20 MMOL/L (ref 22–29)
HCT VFR BLD AUTO: 27.4 % (ref 40–53)
HGB BLD-MCNC: 9.4 G/DL (ref 13.3–17.7)
IMM GRANULOCYTES # BLD: 0.1 10E3/UL
IMM GRANULOCYTES NFR BLD: 1 %
LYMPHOCYTES # BLD AUTO: 1.7 10E3/UL (ref 0.8–5.3)
LYMPHOCYTES NFR BLD AUTO: 15 %
MCH RBC QN AUTO: 35.3 PG (ref 26.5–33)
MCHC RBC AUTO-ENTMCNC: 34.3 G/DL (ref 31.5–36.5)
MCV RBC AUTO: 103 FL (ref 78–100)
MONOCYTES # BLD AUTO: 0.9 10E3/UL (ref 0–1.3)
MONOCYTES NFR BLD AUTO: 7 %
NEUTROPHILS # BLD AUTO: 8.9 10E3/UL (ref 1.6–8.3)
NEUTROPHILS NFR BLD AUTO: 75 %
NRBC # BLD AUTO: 0 10E3/UL
NRBC BLD AUTO-RTO: 0 /100
PLATELET # BLD AUTO: 110 10E3/UL (ref 150–450)
POTASSIUM SERPL-SCNC: 4.2 MMOL/L (ref 3.4–5.3)
PROT SERPL-MCNC: 5.7 G/DL (ref 6.4–8.3)
RBC # BLD AUTO: 2.66 10E6/UL (ref 4.4–5.9)
SODIUM SERPL-SCNC: 131 MMOL/L (ref 135–145)
WBC # BLD AUTO: 11.9 10E3/UL (ref 4–11)

## 2024-11-12 PROCEDURE — 85014 HEMATOCRIT: CPT | Performed by: STUDENT IN AN ORGANIZED HEALTH CARE EDUCATION/TRAINING PROGRAM

## 2024-11-12 PROCEDURE — 84155 ASSAY OF PROTEIN SERUM: CPT | Performed by: STUDENT IN AN ORGANIZED HEALTH CARE EDUCATION/TRAINING PROGRAM

## 2024-11-12 PROCEDURE — 82550 ASSAY OF CK (CPK): CPT | Performed by: STUDENT IN AN ORGANIZED HEALTH CARE EDUCATION/TRAINING PROGRAM

## 2024-11-12 PROCEDURE — 86140 C-REACTIVE PROTEIN: CPT | Performed by: STUDENT IN AN ORGANIZED HEALTH CARE EDUCATION/TRAINING PROGRAM

## 2024-11-12 PROCEDURE — 82435 ASSAY OF BLOOD CHLORIDE: CPT | Performed by: STUDENT IN AN ORGANIZED HEALTH CARE EDUCATION/TRAINING PROGRAM

## 2024-11-12 PROCEDURE — 85004 AUTOMATED DIFF WBC COUNT: CPT | Performed by: STUDENT IN AN ORGANIZED HEALTH CARE EDUCATION/TRAINING PROGRAM

## 2024-11-12 PROCEDURE — 99601 HOME NFS VISIT <2 HRS: CPT

## 2024-11-12 NOTE — PROGRESS NOTES
Nursing Visit Note:  Nurse visit today for labs and PICC pull  for Stewart Hunt.     present during visit today: Not Applicable.    Lab-Only Nursing Note    Labs obtained via PICC    [facility] Tracking number: [id number]    Facility sent to: [facility]    Next Visit Plan:   Pt discharged from [facility]. PICC pulled per [facility] policy and insertion site covered with bacitracin and sterile gauze/dressing. Pt aware to keep intact for 24 hours and to monitor for signs and symptoms of infection. Pt will f/u with local lab for weekly labs moving forward.     Jennifer Maloney RN 11/12/2024      Jennifer Maloney RN 11/12/2024

## 2024-11-12 NOTE — PROGRESS NOTES
Documentation of OPAT Completion    OPAT completed on: 11/11/2024    Date Infusion company notified on: 11/11/2024    PICC Pulled on: 11/12    Episode closed: yes

## 2024-11-13 ENCOUNTER — TELEPHONE (OUTPATIENT)
Dept: INFECTIOUS DISEASES | Facility: CLINIC | Age: 39
End: 2024-11-13
Payer: COMMERCIAL

## 2024-11-13 NOTE — TELEPHONE ENCOUNTER
EP called 11/13 to sched a lab appt per Dr. Kolb.       ----- Message from Shira Kolb sent at 11/12/2024  3:54 PM CST -----  [facility],     Appreciate your assistance for scheduling lab visit for this patient.     Next week Tuesday 11/19 - blood cultures, CBC, CMP, CRP  Tuesday 11/26 - blood cultures.     Please let the patient know.     FYI. PET - CT: I did order which needs prior authorization and then nuclear radiology division schedulers will reach out to the patient. In case, patient inquires about it.         Let me know if you have any other questions.     Thanks,   Shira

## 2024-11-14 ENCOUNTER — TELEPHONE (OUTPATIENT)
Dept: INFECTIOUS DISEASES | Facility: CLINIC | Age: 39
End: 2024-11-14

## 2024-11-14 ENCOUNTER — DOCUMENTATION ONLY (OUTPATIENT)
Dept: LAB | Facility: CLINIC | Age: 39
End: 2024-11-14

## 2024-11-14 NOTE — PROGRESS NOTES
Dr. Kolb, you have ordered on MRN [id number] some up coming blood cultures..there are 2 sets ordered for the 19th and one set ordered for the 26th . Did you mean to order one set each week?  Please just let us know.. Thanks

## 2024-11-14 NOTE — TELEPHONE ENCOUNTER
EP called 11/14 to sched a 1 month follow up in Dec 2024 with Dr. Kolb per checkout notes from 11/11. Sched as video, pt said that she'll be in [place] for this appt.

## 2024-11-19 ENCOUNTER — LAB (OUTPATIENT)
Dept: LAB | Facility: CLINIC | Age: 39
End: 2024-11-19
Payer: COMMERCIAL

## 2024-11-19 DIAGNOSIS — R79.82 ELEVATED C-REACTIVE PROTEIN (CRP): ICD-10-CM

## 2024-11-19 DIAGNOSIS — R78.81 BACTEREMIA: ICD-10-CM

## 2024-11-19 DIAGNOSIS — D72.829 LEUKOCYTOSIS, UNSPECIFIED TYPE: ICD-10-CM

## 2024-11-19 DIAGNOSIS — K70.30 ALCOHOLIC CIRRHOSIS OF LIVER WITHOUT ASCITES (H): ICD-10-CM

## 2024-11-19 LAB
ERYTHROCYTE [DISTWIDTH] IN BLOOD BY AUTOMATED COUNT: 17.7 % (ref 10–15)
HCT VFR BLD AUTO: 28.4 % (ref 40–53)
HGB BLD-MCNC: 9.8 G/DL (ref 13.3–17.7)
INR PPP: 2.25 (ref 0.85–1.15)
MCH RBC QN AUTO: 35.9 PG (ref 26.5–33)
MCHC RBC AUTO-ENTMCNC: 34.5 G/DL (ref 31.5–36.5)
MCV RBC AUTO: 104 FL (ref 78–100)
PLATELET # BLD AUTO: 118 10E3/UL (ref 150–450)
RBC # BLD AUTO: 2.73 10E6/UL (ref 4.4–5.9)
WBC # BLD AUTO: 11.6 10E3/UL (ref 4–11)

## 2024-11-19 PROCEDURE — 87040 BLOOD CULTURE FOR BACTERIA: CPT

## 2024-11-19 PROCEDURE — 85610 PROTHROMBIN TIME: CPT

## 2024-11-19 PROCEDURE — 82248 BILIRUBIN DIRECT: CPT

## 2024-11-19 PROCEDURE — 80053 COMPREHEN METABOLIC PANEL: CPT

## 2024-11-19 PROCEDURE — 36415 COLL VENOUS BLD VENIPUNCTURE: CPT

## 2024-11-19 PROCEDURE — 85027 COMPLETE CBC AUTOMATED: CPT

## 2024-11-20 LAB
ALBUMIN SERPL BCG-MCNC: 2.9 G/DL (ref 3.5–5.2)
ALP SERPL-CCNC: 292 U/L (ref 40–150)
ALT SERPL W P-5'-P-CCNC: 80 U/L (ref 0–70)
ANION GAP SERPL CALCULATED.3IONS-SCNC: 9 MMOL/L (ref 7–15)
AST SERPL W P-5'-P-CCNC: 156 U/L (ref 0–45)
BILIRUB DIRECT SERPL-MCNC: 12.96 MG/DL (ref 0–0.3)
BILIRUB SERPL-MCNC: 15.9 MG/DL
BUN SERPL-MCNC: 13.2 MG/DL (ref 6–20)
CALCIUM SERPL-MCNC: 8.7 MG/DL (ref 8.8–10.4)
CHLORIDE SERPL-SCNC: 101 MMOL/L (ref 98–107)
CREAT SERPL-MCNC: 0.65 MG/DL (ref 0.67–1.17)
EGFRCR SERPLBLD CKD-EPI 2021: >90 ML/MIN/1.73M2
GLUCOSE SERPL-MCNC: 89 MG/DL (ref 70–99)
HCO3 SERPL-SCNC: 20 MMOL/L (ref 22–29)
POTASSIUM SERPL-SCNC: 4.5 MMOL/L (ref 3.4–5.3)
PROT SERPL-MCNC: 5.7 G/DL (ref 6.4–8.3)
SODIUM SERPL-SCNC: 130 MMOL/L (ref 135–145)

## 2024-11-21 LAB — BACTERIA BLD CULT: NORMAL

## 2024-11-24 LAB — BACTERIA BLD CULT: NO GROWTH

## 2024-11-25 ENCOUNTER — OFFICE VISIT (OUTPATIENT)
Dept: GASTROENTEROLOGY | Facility: CLINIC | Age: 39
End: 2024-11-25
Attending: INTERNAL MEDICINE
Payer: COMMERCIAL

## 2024-11-25 ENCOUNTER — LAB (OUTPATIENT)
Dept: LAB | Facility: CLINIC | Age: 39
End: 2024-11-25
Attending: INTERNAL MEDICINE
Payer: COMMERCIAL

## 2024-11-25 VITALS
HEIGHT: 66 IN | TEMPERATURE: 98.6 F | HEART RATE: 85 BPM | OXYGEN SATURATION: 100 % | BODY MASS INDEX: 38.97 KG/M2 | SYSTOLIC BLOOD PRESSURE: 124 MMHG | DIASTOLIC BLOOD PRESSURE: 70 MMHG | WEIGHT: 242.5 LBS

## 2024-11-25 DIAGNOSIS — D72.829 LEUKOCYTOSIS, UNSPECIFIED TYPE: ICD-10-CM

## 2024-11-25 DIAGNOSIS — K70.30 ALCOHOLIC CIRRHOSIS OF LIVER WITHOUT ASCITES (H): Primary | ICD-10-CM

## 2024-11-25 DIAGNOSIS — K70.30 ALCOHOLIC CIRRHOSIS OF LIVER WITHOUT ASCITES (H): ICD-10-CM

## 2024-11-25 DIAGNOSIS — R78.81 BACTEREMIA: ICD-10-CM

## 2024-11-25 DIAGNOSIS — R79.82 ELEVATED C-REACTIVE PROTEIN (CRP): ICD-10-CM

## 2024-11-25 LAB
ALBUMIN SERPL BCG-MCNC: 2.9 G/DL (ref 3.5–5.2)
ALP SERPL-CCNC: 256 U/L (ref 40–150)
ALT SERPL W P-5'-P-CCNC: 86 U/L (ref 0–70)
ANION GAP SERPL CALCULATED.3IONS-SCNC: 10 MMOL/L (ref 7–15)
AST SERPL W P-5'-P-CCNC: 165 U/L (ref 0–45)
BILIRUB DIRECT SERPL-MCNC: 8.08 MG/DL (ref 0–0.3)
BILIRUB SERPL-MCNC: 12.8 MG/DL
BUN SERPL-MCNC: 12.7 MG/DL (ref 6–20)
CALCIUM SERPL-MCNC: 8.7 MG/DL (ref 8.8–10.4)
CHLORIDE SERPL-SCNC: 104 MMOL/L (ref 98–107)
CREAT SERPL-MCNC: 0.64 MG/DL (ref 0.67–1.17)
EGFRCR SERPLBLD CKD-EPI 2021: >90 ML/MIN/1.73M2
ERYTHROCYTE [DISTWIDTH] IN BLOOD BY AUTOMATED COUNT: 17.9 % (ref 10–15)
GLUCOSE SERPL-MCNC: 95 MG/DL (ref 70–99)
HCO3 SERPL-SCNC: 22 MMOL/L (ref 22–29)
HCT VFR BLD AUTO: 28.5 % (ref 40–53)
HGB BLD-MCNC: 10 G/DL (ref 13.3–17.7)
INR PPP: 2.24 (ref 0.85–1.15)
MCH RBC QN AUTO: 36.2 PG (ref 26.5–33)
MCHC RBC AUTO-ENTMCNC: 35.1 G/DL (ref 31.5–36.5)
MCV RBC AUTO: 103 FL (ref 78–100)
PLATELET # BLD AUTO: 121 10E3/UL (ref 150–450)
POTASSIUM SERPL-SCNC: 3.8 MMOL/L (ref 3.4–5.3)
PROT SERPL-MCNC: 6 G/DL (ref 6.4–8.3)
RBC # BLD AUTO: 2.76 10E6/UL (ref 4.4–5.9)
SODIUM SERPL-SCNC: 136 MMOL/L (ref 135–145)
WBC # BLD AUTO: 9.1 10E3/UL (ref 4–11)

## 2024-11-25 PROCEDURE — 85041 AUTOMATED RBC COUNT: CPT

## 2024-11-25 PROCEDURE — 87040 BLOOD CULTURE FOR BACTERIA: CPT

## 2024-11-25 PROCEDURE — 36415 COLL VENOUS BLD VENIPUNCTURE: CPT

## 2024-11-25 PROCEDURE — 85610 PROTHROMBIN TIME: CPT

## 2024-11-25 PROCEDURE — 82248 BILIRUBIN DIRECT: CPT

## 2024-11-25 PROCEDURE — 99215 OFFICE O/P EST HI 40 MIN: CPT | Mod: GC | Performed by: INTERNAL MEDICINE

## 2024-11-25 PROCEDURE — 82040 ASSAY OF SERUM ALBUMIN: CPT

## 2024-11-25 PROCEDURE — 85018 HEMOGLOBIN: CPT

## 2024-11-25 PROCEDURE — 99213 OFFICE O/P EST LOW 20 MIN: CPT | Performed by: INTERNAL MEDICINE

## 2024-11-25 ASSESSMENT — PAIN SCALES - GENERAL: PAINLEVEL_OUTOF10: NO PAIN (0)

## 2024-11-25 NOTE — NURSING NOTE
Chief Complaint   Patient presents with    Lab Only     Labs (including blood cultures) drawn via  by KIM.     Ansley Goode RN

## 2024-11-25 NOTE — NURSING NOTE
"Chief Complaint   Patient presents with    RECHECK     Follow Up     /70   Pulse 85   Temp 98.6  F (37  C) (Oral)   Ht 1.676 m (5' 6\")   Wt 110 kg (242 lb 8 oz)   SpO2 100%   BMI 39.14 kg/m    Marina Stroud on 11/25/2024 at 10:17 AM    "

## 2024-11-25 NOTE — PATIENT INSTRUCTIONS
Plan  No alcohol  Start rifaximin for confusion  Continue other medications at same doses  Check blood tests in 6 weeks  Follow-up in 3 months    Dragan Dodd MD   Hepatology

## 2024-11-25 NOTE — PROGRESS NOTES
[facility] Hepatology    Follow-up Visit    Follow-up visit for severe ETOH hepatitis    Subjective:  39 year old male    Last seen 10/2024. Overall health seems to be improving. Not as fatigued as he used to be. Is able to walk more with longer distances. Over the last couple of weeks his blood pressure has been low 90/38. Eating tacos, pork tacos can sometimes make him feel worse. This happened for 4 days straight around 1-2 weeks ago. Will feel weak when it comes on and then will go take a short nap.  After the nap will wake up and feel better. Will have dizziness, nausea and sweaty before the low blood pressure. Did go to the ER visit for this on 11/02. Did stay the night, but quickly discharged.     Will have swollen feet. Pain in abdomen, lower abdomen. Worse in the morning. Throughout day it improved.      Is confused most days. It is persistent throughout the day. Almost forgot to clinic appointment today.     Random nose bleeds once a week. Stops around 10 minutes. No nasal spray or Vaseline.     Taking lactulose once a day with 5 bowel movements daily. Denies melena, hematemesis or hematochezia. Will have variable sizes. Sometime they look like popcorn chicken.    Patient denies jaundice, lower extremity edema, abdominal distension, lethargy or confusion.    Patient denies fevers, sweats or chills.  Weight stable.    Not currently drinking alcohol and is going to AA weekly.    Medical hx Surgical hx   Past Medical History:   Diagnosis Date    Acute alcoholic hepatitis (H) 09/2024    Alcohol use disorder, severe, in early remission (H)     Alcoholic cirrhosis (H)     Morbid obesity (H)       Past Surgical History:   Procedure Laterality Date    GASTRECTOMY LAPAROSCOPIC SLEEVE  2012    PICC INSERTION - DOUBLE LUMEN Left 10/16/2024    45-5cm, Basilic vein    WISDOM TOOTH EXTRACTION            Medications  Prior to Admission medications    Medication Sig Start Date End Date Taking? Authorizing Provider  "  albuterol (PROAIR HFA/PROVENTIL HFA/VENTOLIN HFA) 108 (90 Base) MCG/ACT inhaler Inhale 2-3 puffs into the lungs every 6 hours as needed for shortness of breath, wheezing or cough.    Unknown, Entered By History   famotidine (PEPCID) 20 MG tablet Take 20 mg by mouth daily.    Unknown, Entered By History   folic acid (FOLVITE) 1 MG tablet Take 1 tablet (1 mg) by mouth daily. 11/5/24   Dragan Muhammad MD   furosemide (LASIX) 20 MG tablet Take 1 tablet (20 mg) by mouth daily. 11/5/24   Dragan Muhammad MD   lactulose (CEPHULAC) 20 GM packet Take 1 packet (20 g) by mouth 3 times daily. 9/22/24   Momo Navarrete,    multivitamin w/minerals (THERA-VIT-M) tablet Take 1 tablet by mouth daily.    Reported, Patient   ondansetron (ZOFRAN ODT) 4 MG ODT tab Take 1 tablet (4 mg) by mouth every 6 hours as needed for nausea or vomiting. 11/3/24   Gabriela Lucas MD   simethicone (MYLICON) 125 MG chewable tablet Take 125 mg by mouth 2 times daily.    Unknown, Entered By History   spironolactone (ALDACTONE) 50 MG tablet Take 1 tablet (50 mg) by mouth daily. 11/5/24   Dragan Muhammad MD   thiamine (B-1) 100 MG tablet Take 1 tablet (100 mg) by mouth daily. 11/5/24   Dragan Muhammad MD       Allergies  Allergies   Allergen Reactions    Adhesive Tape        Review of systems  A 10-point review of systems was negative    Examination  /70   Pulse 85   Temp 98.6  F (37  C) (Oral)   Ht 1.676 m (5' 6\")   Wt 110 kg (242 lb 8 oz)   SpO2 100%   BMI 39.14 kg/m    Body mass index is 39.14 kg/m .    Gen- well, NAD, A+Ox3, jaundiced  CVS- RRR  RS- CTA  Abd- obese, Non-distended, soft, no pain to palpation, BS+  Extr- hands normal, pitting edema in the ankles bilaterally  Skin- jaundiced, edema in abdominal wall  Neuro- no asterixis  Psych- normal mood    Laboratory  Lab Results   Component Value Date     11/19/2024    POTASSIUM 4.5 11/19/2024    CHLORIDE 101 11/19/2024    CHLORIDE 102 09/27/2024    " CO2 20 11/19/2024    BUN 13.2 11/19/2024    CR 0.65 11/19/2024       Lab Results   Component Value Date    BILITOTAL 12.8 11/25/2024    ALT 86 11/25/2024     11/25/2024    ALKPHOS 256 11/25/2024       Lab Results   Component Value Date    ALBUMIN 2.9 11/25/2024    PROTTOTAL 6.0 11/25/2024        Lab Results   Component Value Date    WBC 9.1 11/25/2024    HGB 10.0 11/25/2024     11/25/2024     11/25/2024       Lab Results   Component Value Date    INR 2.24 11/25/2024    INR 3.1 09/27/2024       MELD 3.0: 26 at 11/25/2024  9:44 AM  MELD-Na: 25 at 11/25/2024  9:44 AM  Calculated from:  Serum Creatinine: 0.64 mg/dL (Using min of 1 mg/dL) at 11/25/2024  9:44 AM  Serum Sodium: 136 mmol/L at 11/25/2024  9:44 AM  Total Bilirubin: 12.8 mg/dL at 11/25/2024  9:44 AM  Serum Albumin: 2.9 g/dL at 11/25/2024  9:44 AM  INR(ratio): 2.24 at 11/25/2024  9:44 AM  Age at listing (hypothetical): 39 years  Sex: Male at 11/25/2024  9:44 AM      Radiology  No recent imaging since last visit.     Assessment  39 year old male who presents in follow up for severe alcohol related hepatitis complicated with hepatic encephalopathy, likely superimposed on MET-ALD cirrhosis. MELD 3.0 = 26.  Continues to abstain from ETOH w/ last use on 9/7/2024. Since last visit 10/2024 he has completed course of IV antibiotics for MSSA bacteremia. This in combination with ongoing sobriety we have seen improvements clinically and with overall lab work.     In regards to history of HE, continues to describe episode of confusion. At this time will start rifaximin twice daily to help reduce HE and continue lactulose once daily as he is having adequate bowel movement. Discussed increased abdominal edema and LE edema for which he takes lasix 20 mg daily and spironolactone 50 mg daily. With intermittent dizziness/low BP's will hold on increasing doses. Continue to encourage to abstain from ETOH use. Otherwise will plan to follow up in 3  months.    Plan  Start rifaximin 550 mg twice daily  Continue current doses of medications as noted above  Encouraged continued sobriety  Follow up in 3 months with labs    Seen with and care plan discussed with Dr. Dodd.    Yaa Lu MD   Resident PGY 3  [facility]

## 2024-11-25 NOTE — LETTER
11/25/2024      Stewart Hunt  [address]  [facility] [address]      Dear Colleague,    Thank you for referring your patient, Stewart Hunt, to the [facility] [place]. Please see a copy of my visit note below.    [facility] Hepatology    Follow-up Visit    Follow-up visit for severe ETOH hepatitis    Subjective:  39 year old male    Last seen 10/2024. Overall health seems to be improving. Not as fatigued as he used to be. Is able to walk more with longer distances. Over the last couple of weeks his blood pressure has been low 90/38. Eating tacos, pork tacos can sometimes make him feel worse. This happened for 4 days straight around 1-2 weeks ago. Will feel weak when it comes on and then will go take a short nap.  After the nap will wake up and feel better. Will have dizziness, nausea and sweaty before the low blood pressure. Did go to the ER visit for this on 11/02. Did stay the night, but quickly discharged.     Will have swollen feet. Pain in abdomen, lower abdomen. Worse in the morning. Throughout day it improved.      Is confused most days. It is persistent throughout the day. Almost forgot to clinic appointment today.     Random nose bleeds once a week. Stops around 10 minutes. No nasal spray or Vaseline.     Taking lactulose once a day with 5 bowel movements daily. Denies melena, hematemesis or hematochezia. Will have variable sizes. Sometime they look like popcorn chicken.    Patient denies jaundice, lower extremity edema, abdominal distension, lethargy or confusion.    Patient denies fevers, sweats or chills.  Weight stable.    Not currently drinking alcohol and is going to AA weekly.    Medical hx Surgical hx   Past Medical History:   Diagnosis Date     Acute alcoholic hepatitis (H) 09/2024     Alcohol use disorder, severe, in early remission (H)      Alcoholic cirrhosis (H)      Morbid obesity (H)       Past Surgical History:   Procedure Laterality Date  "    GASTRECTOMY LAPAROSCOPIC SLEEVE  2012     PICC INSERTION - DOUBLE LUMEN Left 10/16/2024    45-5cm, Basilic vein     WISDOM TOOTH EXTRACTION            Medications  Prior to Admission medications    Medication Sig Start Date End Date Taking? Authorizing Provider   albuterol (PROAIR HFA/PROVENTIL HFA/VENTOLIN HFA) 108 (90 Base) MCG/ACT inhaler Inhale 2-3 puffs into the lungs every 6 hours as needed for shortness of breath, wheezing or cough.    Unknown, Entered By History   famotidine (PEPCID) 20 MG tablet Take 20 mg by mouth daily.    Unknown, Entered By History   folic acid (FOLVITE) 1 MG tablet Take 1 tablet (1 mg) by mouth daily. 11/5/24   Dragan Muhammad MD   furosemide (LASIX) 20 MG tablet Take 1 tablet (20 mg) by mouth daily. 11/5/24   Dragan Muhammad MD   lactulose (CEPHULAC) 20 GM packet Take 1 packet (20 g) by mouth 3 times daily. 9/22/24   Momo Navarrete,    multivitamin w/minerals (THERA-VIT-M) tablet Take 1 tablet by mouth daily.    Reported, Patient   ondansetron (ZOFRAN ODT) 4 MG ODT tab Take 1 tablet (4 mg) by mouth every 6 hours as needed for nausea or vomiting. 11/3/24   Gabriela Lucas MD   simethicone (MYLICON) 125 MG chewable tablet Take 125 mg by mouth 2 times daily.    Unknown, Entered By History   spironolactone (ALDACTONE) 50 MG tablet Take 1 tablet (50 mg) by mouth daily. 11/5/24   Dragan Muhammad MD   thiamine (B-1) 100 MG tablet Take 1 tablet (100 mg) by mouth daily. 11/5/24   Dragan Muhammad MD       Allergies  Allergies   Allergen Reactions     Adhesive Tape        Review of systems  A 10-point review of systems was negative    Examination  /70   Pulse 85   Temp 98.6  F (37  C) (Oral)   Ht 1.676 m (5' 6\")   Wt 110 kg (242 lb 8 oz)   SpO2 100%   BMI 39.14 kg/m    Body mass index is 39.14 kg/m .    Gen- well, NAD, A+Ox3, jaundiced  CVS- RRR  RS- CTA  Abd- obese, Non-distended, soft, no pain to palpation, BS+  Extr- hands normal, pitting edema " in the ankles bilaterally  Skin- jaundiced, edema in abdominal wall  Neuro- no asterixis  Psych- normal mood    Laboratory  Lab Results   Component Value Date     11/19/2024    POTASSIUM 4.5 11/19/2024    CHLORIDE 101 11/19/2024    CHLORIDE 102 09/27/2024    CO2 20 11/19/2024    BUN 13.2 11/19/2024    CR 0.65 11/19/2024       Lab Results   Component Value Date    BILITOTAL 12.8 11/25/2024    ALT 86 11/25/2024     11/25/2024    ALKPHOS 256 11/25/2024       Lab Results   Component Value Date    ALBUMIN 2.9 11/25/2024    PROTTOTAL 6.0 11/25/2024        Lab Results   Component Value Date    WBC 9.1 11/25/2024    HGB 10.0 11/25/2024     11/25/2024     11/25/2024       Lab Results   Component Value Date    INR 2.24 11/25/2024    INR 3.1 09/27/2024       MELD 3.0: 26 at 11/25/2024  9:44 AM  MELD-Na: 25 at 11/25/2024  9:44 AM  Calculated from:  Serum Creatinine: 0.64 mg/dL (Using min of 1 mg/dL) at 11/25/2024  9:44 AM  Serum Sodium: 136 mmol/L at 11/25/2024  9:44 AM  Total Bilirubin: 12.8 mg/dL at 11/25/2024  9:44 AM  Serum Albumin: 2.9 g/dL at 11/25/2024  9:44 AM  INR(ratio): 2.24 at 11/25/2024  9:44 AM  Age at listing (hypothetical): 39 years  Sex: Male at 11/25/2024  9:44 AM      Radiology  No recent imaging since last visit.     Assessment  39 year old male who presents in follow up for severe alcohol related hepatitis complicated with hepatic encephalopathy, likely superimposed on MET-ALD cirrhosis. MELD 3.0 = 26.  Continues to abstain from ETOH w/ last use on 9/7/2024. Since last visit 10/2024 he has completed course of IV antibiotics for MSSA bacteremia. This in combination with ongoing sobriety we have seen improvements clinically and with overall lab work.     In regards to history of HE, continues to describe episode of confusion. At this time will start rifaximin twice daily to help reduce HE and continue lactulose once daily as he is having adequate bowel movement. Discussed increased  abdominal edema and LE edema for which he takes lasix 20 mg daily and spironolactone 50 mg daily. With intermittent dizziness/low BP's will hold on increasing doses. Continue to encourage to abstain from ETOH use. Otherwise will plan to follow up in 3 months.    Plan  Start rifaximin 550 mg twice daily  Continue current doses of medications as noted above  Encouraged continued sobriety  Follow up in 3 months with labs    Seen with and care plan discussed with Dr. Dodd.    Yaa Lu MD   Resident PGY 3  [facility]      Attestation with edits by Dragan Muhammad MD at 11/26/2024  9:34 AM:  Physician Attestation  I, Dragan Dodd, saw this patient with the learner and agree with the learner s findings and plan of care as documented in the note.  I personally reviewed vital signs, medications, labs, and imaging.    Completed antibiotics for MSSA bacteremia earlier this month.  Feeling better overall- more energy- but still some episodes of confusion.  MELD 3.0= 26 (improved).  Last ETOH= 9/7/2024.  Renal function remains normal.  Will continue supportive care for now.  Will add rifaximin for HE.  Patient is passing lots of urine which is impacting his daily activities therefore will not change diuretic doses- expect this to improve as liver function improves further.  Patient continues to attend AA.    We discussed starting a liver transplant evaluation if the patient's liver function plateaus or worsens due to an unexpected acute event.  Patient would like to avoid LT if possible.  He would be a good candidate for LT for ALD with short-term sobriety if needed.    Dragan Dodd  Date of Service (when I saw the patient): 11/25/24    I spent 40 minutes on the date of the encounter doing chart review, history and exam, documentation and further activities as noted above.       Again, thank you for allowing me to participate in the care of your patient.        Sincerely,        Dragan Dodd MD

## 2024-11-26 ENCOUNTER — TELEPHONE (OUTPATIENT)
Dept: GASTROENTEROLOGY | Facility: CLINIC | Age: 39
End: 2024-11-26
Payer: COMMERCIAL

## 2024-11-26 DIAGNOSIS — K76.82 HEPATIC ENCEPHALOPATHY (H): Primary | ICD-10-CM

## 2024-11-26 PROBLEM — R53.83 OTHER FATIGUE: Status: RESOLVED | Noted: 2024-10-03 | Resolved: 2024-11-26

## 2024-11-26 PROBLEM — R78.81 BACTEREMIA: Status: RESOLVED | Noted: 2024-10-22 | Resolved: 2024-11-26

## 2024-11-26 PROBLEM — E87.1 HYPONATREMIA: Status: RESOLVED | Noted: 2024-11-03 | Resolved: 2024-11-26

## 2024-11-26 PROBLEM — K72.10 END STAGE LIVER DISEASE (H): Status: RESOLVED | Noted: 2024-11-03 | Resolved: 2024-11-26

## 2024-11-26 PROBLEM — R78.81 GRAM-POSITIVE BACTEREMIA: Status: RESOLVED | Noted: 2024-11-03 | Resolved: 2024-11-26

## 2024-11-26 PROBLEM — R53.1 WEAKNESS: Status: RESOLVED | Noted: 2024-11-03 | Resolved: 2024-11-26

## 2024-11-26 NOTE — TELEPHONE ENCOUNTER
Retail Pharmacy Prior Authorization Team   Phone: [phone]    EPA APPROVED - RX RELEASED TO PHARMACY ONCE APPROVED

## 2024-11-26 NOTE — TELEPHONE ENCOUNTER
Prior Authorization Retail Medication Request    Medication/Dose: Xifaxan 550 mg tab  Diagnosis and ICD code (if different than what is on RX):  Hepatic encephalopathy  New/renewal/insurance change PA/secondary ins. PA:  Previously Tried and Failed:  Lactulose alone  Rationale:  Xifaxan helps to lower the toxin build up in the blood while lactulose works by cleansing the toxin build up in the liver. Adjunctive therapy.      Insurance   Primary:   Insurance ID:      Secondary (if applicable):  Insurance ID:      Pharmacy Information (if different than what is on RX)  Name:    Phone:    Fax:    Clinic Information  Preferred routing pool for dept communication:

## 2024-11-28 LAB — BACTERIA BLD CULT: NORMAL

## 2024-11-30 LAB — BACTERIA BLD CULT: NO GROWTH

## 2024-12-02 DIAGNOSIS — K70.30 ALCOHOLIC CIRRHOSIS OF LIVER WITHOUT ASCITES (H): Primary | ICD-10-CM

## 2024-12-04 ENCOUNTER — TELEPHONE (OUTPATIENT)
Dept: FAMILY MEDICINE | Facility: CLINIC | Age: 39
End: 2024-12-04
Payer: COMMERCIAL

## 2024-12-04 NOTE — TELEPHONE ENCOUNTER
General Call      Reason for Call:  Lab orders    What are your questions or concerns:  Pt would like to know if he should get some blood work done before seeing pcp on the 11th of December?    Date of last appointment with provider: LIANNE    Could we send this information to you in [employer] or would you prefer to receive a phone call?:   Patient would like to be contacted via [employer]

## 2024-12-10 ENCOUNTER — HOSPITAL ENCOUNTER (OUTPATIENT)
Dept: PET IMAGING | Facility: CLINIC | Age: 39
Discharge: HOME | End: 2024-12-10
Attending: STUDENT IN AN ORGANIZED HEALTH CARE EDUCATION/TRAINING PROGRAM
Payer: COMMERCIAL

## 2024-12-10 DIAGNOSIS — K70.30 ALCOHOLIC CIRRHOSIS OF LIVER WITHOUT ASCITES (H): ICD-10-CM

## 2024-12-10 DIAGNOSIS — B95.61 MSSA BACTEREMIA: ICD-10-CM

## 2024-12-10 DIAGNOSIS — R79.82 ELEVATED C-REACTIVE PROTEIN (CRP): ICD-10-CM

## 2024-12-10 DIAGNOSIS — D72.829 LEUKOCYTOSIS, UNSPECIFIED TYPE: ICD-10-CM

## 2024-12-10 DIAGNOSIS — R78.81 MSSA BACTEREMIA: ICD-10-CM

## 2024-12-10 PROCEDURE — 343N000001 HC RX 343 MED OP 636: Performed by: STUDENT IN AN ORGANIZED HEALTH CARE EDUCATION/TRAINING PROGRAM

## 2024-12-10 PROCEDURE — 71260 CT THORAX DX C+: CPT

## 2024-12-10 PROCEDURE — 78816 PET IMAGE W/CT FULL BODY: CPT | Mod: PS

## 2024-12-10 PROCEDURE — A9552 F18 FDG: HCPCS | Performed by: STUDENT IN AN ORGANIZED HEALTH CARE EDUCATION/TRAINING PROGRAM

## 2024-12-10 PROCEDURE — 70491 CT SOFT TISSUE NECK W/DYE: CPT

## 2024-12-10 PROCEDURE — 74177 CT ABD & PELVIS W/CONTRAST: CPT

## 2024-12-10 PROCEDURE — 250N000011 HC RX IP 250 OP 636: Performed by: STUDENT IN AN ORGANIZED HEALTH CARE EDUCATION/TRAINING PROGRAM

## 2024-12-10 PROCEDURE — 70491 CT SOFT TISSUE NECK W/DYE: CPT | Mod: 26 | Performed by: RADIOLOGY

## 2024-12-10 RX ORDER — FLUDEOXYGLUCOSE F 18 200 MCI/ML
10-18 INJECTION, SOLUTION INTRAVENOUS ONCE
Status: COMPLETED | OUTPATIENT
Start: 2024-12-10 | End: 2024-12-10

## 2024-12-10 RX ORDER — IOPAMIDOL 755 MG/ML
10-135 INJECTION, SOLUTION INTRAVASCULAR ONCE
Status: COMPLETED | OUTPATIENT
Start: 2024-12-10 | End: 2024-12-10

## 2024-12-10 RX ADMIN — FLUDEOXYGLUCOSE F 18 14.23 MILLICURIE: 200 INJECTION, SOLUTION INTRAVENOUS at 07:25

## 2024-12-10 RX ADMIN — IOPAMIDOL 135 ML: 755 INJECTION, SOLUTION INTRAVENOUS at 08:19

## 2024-12-11 ENCOUNTER — OFFICE VISIT (OUTPATIENT)
Dept: FAMILY MEDICINE | Facility: CLINIC | Age: 39
End: 2024-12-11
Attending: INTERNAL MEDICINE
Payer: COMMERCIAL

## 2024-12-11 ENCOUNTER — APPOINTMENT (OUTPATIENT)
Dept: ULTRASOUND IMAGING | Facility: CLINIC | Age: 39
End: 2024-12-11
Attending: EMERGENCY MEDICINE
Payer: COMMERCIAL

## 2024-12-11 ENCOUNTER — HOSPITAL ENCOUNTER (EMERGENCY)
Facility: CLINIC | Age: 39
Discharge: HOME | End: 2024-12-11
Attending: EMERGENCY MEDICINE
Payer: COMMERCIAL

## 2024-12-11 VITALS
OXYGEN SATURATION: 100 % | RESPIRATION RATE: 16 BRPM | TEMPERATURE: 98.7 F | DIASTOLIC BLOOD PRESSURE: 53 MMHG | SYSTOLIC BLOOD PRESSURE: 112 MMHG | HEART RATE: 90 BPM

## 2024-12-11 VITALS
OXYGEN SATURATION: 100 % | RESPIRATION RATE: 16 BRPM | TEMPERATURE: 97.7 F | HEART RATE: 94 BPM | BODY MASS INDEX: 40.59 KG/M2 | SYSTOLIC BLOOD PRESSURE: 112 MMHG | HEIGHT: 65 IN | WEIGHT: 243.6 LBS | DIASTOLIC BLOOD PRESSURE: 68 MMHG

## 2024-12-11 DIAGNOSIS — K70.30 ALCOHOLIC CIRRHOSIS OF LIVER WITHOUT ASCITES (H): ICD-10-CM

## 2024-12-11 DIAGNOSIS — Z76.89 ENCOUNTER TO ESTABLISH CARE: Primary | ICD-10-CM

## 2024-12-11 DIAGNOSIS — R60.0 LOWER EXTREMITY EDEMA: ICD-10-CM

## 2024-12-11 LAB
ALBUMIN SERPL BCG-MCNC: 3.1 G/DL (ref 3.5–5.2)
ALP SERPL-CCNC: 221 U/L (ref 40–150)
ALT SERPL W P-5'-P-CCNC: 63 U/L (ref 0–70)
AMMONIA PLAS-SCNC: 40 UMOL/L (ref 16–60)
ANION GAP SERPL CALCULATED.3IONS-SCNC: 7 MMOL/L (ref 7–15)
AST SERPL W P-5'-P-CCNC: 126 U/L (ref 0–45)
BASOPHILS # BLD AUTO: 0 10E3/UL (ref 0–0.2)
BASOPHILS NFR BLD AUTO: 1 %
BILIRUB DIRECT SERPL-MCNC: 5.11 MG/DL (ref 0–0.3)
BILIRUB SERPL-MCNC: 8.1 MG/DL
BUN SERPL-MCNC: 12 MG/DL (ref 6–20)
CALCIUM SERPL-MCNC: 8.8 MG/DL (ref 8.8–10.4)
CHLORIDE SERPL-SCNC: 104 MMOL/L (ref 98–107)
CREAT SERPL-MCNC: 0.81 MG/DL (ref 0.67–1.17)
EGFRCR SERPLBLD CKD-EPI 2021: >90 ML/MIN/1.73M2
EOSINOPHIL # BLD AUTO: 0.1 10E3/UL (ref 0–0.7)
EOSINOPHIL NFR BLD AUTO: 2 %
ERYTHROCYTE [DISTWIDTH] IN BLOOD BY AUTOMATED COUNT: 16.5 % (ref 10–15)
GLUCOSE SERPL-MCNC: 98 MG/DL (ref 70–99)
HCO3 SERPL-SCNC: 25 MMOL/L (ref 22–29)
HCT VFR BLD AUTO: 29.5 % (ref 40–53)
HGB BLD-MCNC: 9.8 G/DL (ref 13.3–17.7)
HOLD SPECIMEN: NORMAL
IMM GRANULOCYTES # BLD: 0.1 10E3/UL
IMM GRANULOCYTES NFR BLD: 1 %
INR PPP: 2.07 (ref 0.85–1.15)
LIPASE SERPL-CCNC: 57 U/L (ref 13–60)
LYMPHOCYTES # BLD AUTO: 1.2 10E3/UL (ref 0.8–5.3)
LYMPHOCYTES NFR BLD AUTO: 15 %
MCH RBC QN AUTO: 35.4 PG (ref 26.5–33)
MCHC RBC AUTO-ENTMCNC: 33.2 G/DL (ref 31.5–36.5)
MCV RBC AUTO: 107 FL (ref 78–100)
MONOCYTES # BLD AUTO: 1 10E3/UL (ref 0–1.3)
MONOCYTES NFR BLD AUTO: 13 %
NEUTROPHILS # BLD AUTO: 5.5 10E3/UL (ref 1.6–8.3)
NEUTROPHILS NFR BLD AUTO: 70 %
NRBC # BLD AUTO: 0 10E3/UL
NRBC BLD AUTO-RTO: 0 /100
PLATELET # BLD AUTO: 138 10E3/UL (ref 150–450)
POTASSIUM SERPL-SCNC: 4.6 MMOL/L (ref 3.4–5.3)
PROT SERPL-MCNC: 6.5 G/DL (ref 6.4–8.3)
RBC # BLD AUTO: 2.77 10E6/UL (ref 4.4–5.9)
SODIUM SERPL-SCNC: 136 MMOL/L (ref 135–145)
WBC # BLD AUTO: 7.9 10E3/UL (ref 4–11)

## 2024-12-11 PROCEDURE — 80053 COMPREHEN METABOLIC PANEL: CPT | Performed by: EMERGENCY MEDICINE

## 2024-12-11 PROCEDURE — 85610 PROTHROMBIN TIME: CPT | Performed by: EMERGENCY MEDICINE

## 2024-12-11 PROCEDURE — 99284 EMERGENCY DEPT VISIT MOD MDM: CPT | Mod: 25 | Performed by: EMERGENCY MEDICINE

## 2024-12-11 PROCEDURE — 85004 AUTOMATED DIFF WBC COUNT: CPT | Performed by: EMERGENCY MEDICINE

## 2024-12-11 PROCEDURE — 99284 EMERGENCY DEPT VISIT MOD MDM: CPT | Performed by: EMERGENCY MEDICINE

## 2024-12-11 PROCEDURE — 82140 ASSAY OF AMMONIA: CPT | Performed by: EMERGENCY MEDICINE

## 2024-12-11 PROCEDURE — 93970 EXTREMITY STUDY: CPT | Mod: 26 | Performed by: RADIOLOGY

## 2024-12-11 PROCEDURE — 93970 EXTREMITY STUDY: CPT

## 2024-12-11 PROCEDURE — 83690 ASSAY OF LIPASE: CPT | Performed by: EMERGENCY MEDICINE

## 2024-12-11 PROCEDURE — 82247 BILIRUBIN TOTAL: CPT | Performed by: EMERGENCY MEDICINE

## 2024-12-11 PROCEDURE — 99204 OFFICE O/P NEW MOD 45 MIN: CPT | Performed by: PHYSICIAN ASSISTANT

## 2024-12-11 PROCEDURE — 82248 BILIRUBIN DIRECT: CPT | Performed by: EMERGENCY MEDICINE

## 2024-12-11 PROCEDURE — 36415 COLL VENOUS BLD VENIPUNCTURE: CPT | Performed by: EMERGENCY MEDICINE

## 2024-12-11 RX ORDER — ESOMEPRAZOLE MAGNESIUM 40 MG/1
40 CAPSULE, DELAYED RELEASE ORAL 2 TIMES DAILY
COMMUNITY
Start: 2024-11-07

## 2024-12-11 ASSESSMENT — PAIN SCALES - GENERAL: PAINLEVEL_OUTOF10: SEVERE PAIN (6)

## 2024-12-11 ASSESSMENT — ACTIVITIES OF DAILY LIVING (ADL)
ADLS_ACUITY_SCORE: 56

## 2024-12-11 NOTE — PROGRESS NOTES
"  Assessment & Plan     Encounter to establish care      Alcoholic cirrhosis of liver without ascites (H)  No changes made to his medications, he has follow up next week with ID and follow up in 3 months with hepatology.  Bilirubin has been trending down.  Advised to continue with Xifaxan, if headaches persist we could reach out to hepatology for further management/dosing changes  Follow up in the clinic in 3 months  Vaccines declined today  - Primary Care Referral    Total time: 45 minutes:  Time spend reading discharge summary, specialist notes and discussing next steps with patient.  All questions answered    MED REC REQUIRED  Post Medication Reconciliation Status:  Discharge medications reconciled, continue medications without change  BMI  Estimated body mass index is 40.1 kg/m  as calculated from the following:    Height as of this encounter: 1.66 m (5' 5.35\").    Weight as of this encounter: 110.5 kg (243 lb 9.6 oz).         Follow up in 3 months  Joi William is a 39 year old, presenting for the following health issues:  Establish Care and ER F/U        12/11/2024     9:04 AM   Additional Questions   Roomed by Mona MEADE     ED/UC Followup: Emergency Room.     Facility:  [facility] Emergency Department.  Date of visit: 11/02/2024  Reason for visit: Abdominal Pain, Nausea, Hands Shake, Tremors, Jaundice, Altered Mental Starus  Current Status: Doing better, feet are still hurting and reports edema and occasional shortness of breath.    Faraz presents to the clinic today to establish care.  He was previously seen at [facility] but over the past few months has been admitted to [place] for Alcoholic liver disease with hepatic encephalopathy ( See notes).  He has been sober now since September, previous to that he was drinking alcohol daily.  He is currently followed by hepatology, labs weekly.  For now they are holding off on liver transplant evaluation but may consider in the future " "pending labs and clinical status.   He is following with ID after recent hospitalization for bacteremia and need for PICC.  PICC was removed in early November and he has not had any new signs of infection.   Currently taking lasix, spironolactone,lactulose and rifaxamin. Rifaxamin was recently started, he notes some headaches with this medication but does feel mentally more clear. He has some swelling of his LE that is worse at the end of the day, this is also improving. Fatigue is improving.     He works as a .  Her is on ST disability and is unclear if his company offers long term disability.  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      Review of Systems  Constitutional, HEENT, cardiovascular, pulmonary, GI, , musculoskeletal, neuro, skin, endocrine and psych systems are negative, except as otherwise noted.      Objective    /68 (BP Location: Left arm, Patient Position: Sitting, Cuff Size: Adult Large)   Pulse 94   Temp 97.7  F (36.5  C) (Tympanic)   Resp 16   Ht 1.66 m (5' 5.35\")   Wt 110.5 kg (243 lb 9.6 oz)   SpO2 100%   BMI 40.10 kg/m    Body mass index is 40.1 kg/m .  Physical Exam   GENERAL: alert and no distress  EYES: scleral icterus present bilaterally  CV:  no peripheral edema   PSYCH: mentation appears normal, affect normal/bright            Signed Electronically by: Berto Kenyon PA-C    "

## 2024-12-12 NOTE — ED TRIAGE NOTES
Patient presents to ED with CC of left leg swelling x 4 days and pitting edema. Patient believes its related to his liver cirrhosis.  +nausea +headache +fatigue

## 2024-12-12 NOTE — ED PROVIDER NOTES
[place] EMERGENCY DEPARTMENT ([facility])    12/11/24       ED PROVIDER NOTE       History     Chief Complaint   Patient presents with    Leg Pain     HPI  Stewart Hunt is a 39 year old male with history of alcohol use disorder and alcoholic cirrhosis with ascites and anasarca who presents to the ED for evaluation of left lower extremity edema. The patient states that he has had an increase in leg swelling over the past 4 days. He notes that his left leg is more swollen than the right. He is also having pain shoot down from the top of his leg to the bottom. He has had leg swelling before and was told it was related to his sodium. His eyes have been more yellow and his stomach is also bigger. He is having abdominal pain but this is not new. He has had nausea, vomiting and diarrhea. He denies any drinking.     Past Medical History  Past Medical History:   Diagnosis Date    Acute alcoholic hepatitis (H) 09/2024    Alcohol use disorder, severe, in early remission (H)     Alcoholic cirrhosis (H)     Hypertension 09/07/24    Morbid obesity (H)     Uncomplicated asthma     Since a kid i take now inhaler as needed     Past Surgical History:   Procedure Laterality Date    ABDOMEN SURGERY  2012    Gastric sleeve    COLONOSCOPY  09/2024    EYE SURGERY  15 yrs ago    [facility]    GASTRECTOMY LAPAROSCOPIC SLEEVE  2012    PICC INSERTION - DOUBLE LUMEN Left 10/16/2024    45-5cm, Basilic vein    WISDOM TOOTH EXTRACTION       albuterol (PROAIR HFA/PROVENTIL HFA/VENTOLIN HFA) 108 (90 Base) MCG/ACT inhaler  esomeprazole (NEXIUM) 40 MG DR capsule  famotidine (PEPCID) 20 MG tablet  folic acid (FOLVITE) 1 MG tablet  folic acid (FOLVITE) 1 MG tablet  furosemide (LASIX) 20 MG tablet  furosemide (LASIX) 20 MG tablet  lactulose (CEPHULAC) 10 GM packet  multivitamin w/minerals (THERA-VIT-M) tablet  rifaximin (XIFAXAN) 550 MG TABS tablet  simethicone (MYLICON) 125 MG chewable tablet  spironolactone (ALDACTONE) 50 MG  tablet  spironolactone (ALDACTONE) 50 MG tablet  thiamine (B-1) 100 MG tablet  thiamine (B-1) 100 MG tablet      Allergies   Allergen Reactions    Adhesive Tape Rash     Family History  Family History   Problem Relation Age of Onset    Colon Cancer Mother 42    Diabetes Father     Pancreatic Cancer Sister 15    Diabetes Maternal Grandmother     Colon Cancer Cousin 32    Liver Disease No family hx of      Social History   Social History     Tobacco Use    Smoking status: Never    Smokeless tobacco: Never   Vaping Use    Vaping status: Never Used   Substance Use Topics    Alcohol use: Not Currently     Comment: Last drink was 2am 9/7/2024    Drug use: Never      A medically appropriate review of systems was performed with pertinent positives and negatives noted in the HPI, and all other systems negative.    Physical Exam   BP: 112/53  Pulse: 90  Temp: 98.7  F (37.1  C)  Resp: 16  SpO2: 100 %  Physical Exam  Vitals and nursing note reviewed.   Constitutional:       General: He is not in acute distress.     Appearance: Normal appearance. He is obese. He is not ill-appearing, toxic-appearing or diaphoretic.   HENT:      Head: Atraumatic.      Mouth/Throat:      Mouth: Mucous membranes are moist.   Eyes:      General: Scleral icterus present.      Conjunctiva/sclera: Conjunctivae normal.   Cardiovascular:      Rate and Rhythm: Normal rate.      Heart sounds: Normal heart sounds.   Pulmonary:      Effort: Pulmonary effort is normal. No respiratory distress.      Breath sounds: Normal breath sounds. No stridor.   Abdominal:      General: Abdomen is protuberant. There is no distension.      Palpations: Abdomen is soft.      Tenderness: There is abdominal tenderness. There is no guarding or rebound.      Comments: Lower abdominal tenderness that is baseline and unchanged   Musculoskeletal:         General: No deformity or signs of injury.      Cervical back: Neck supple. No rigidity.      Right lower leg: Edema present.       "Left lower leg: Edema present.   Skin:     General: Skin is warm.      Coloration: Skin is jaundiced.   Neurological:      General: No focal deficit present.      Mental Status: He is alert and oriented to person, place, and time.   Psychiatric:         Mood and Affect: Mood normal.         Behavior: Behavior normal.           ED Course, Procedures, & Data      Procedures       {ED Course Selections (Optional):991392}  {ED Sepsis CMS Documentation (Optional):707089::\" \"}       No results found for any visits on 12/11/24.  Medications - No data to display  Labs Ordered and Resulted from Time of ED Arrival to Time of ED Departure - No data to display  No orders to display          {Critical Care Performed?:894915}    Assessment & Plan    Stewart Hunt is a 39 year old male with history of alcohol use disorder and alcoholic cirrhosis with ascites and anasarca who presents to the ED for evaluation of left lower extremity edema.     Ddx: ***      I have reviewed the nursing notes. I have reviewed the findings, diagnosis, plan and need for follow up with the patient.    New Prescriptions    No medications on file       Final diagnoses:   None   I, Shikah Goodwin, am serving as a trained medical scribe to document services personally performed by Shaina Freitas MD, based on the provider's statements to me.     Shaina CHASE MD, was physically present and have reviewed and verified the accuracy of this note documented by Shikha Goodwin.     Shaina Freitas MD  [facility] EMERGENCY DEPARTMENT  12/11/2024  " Ammonia 40 16 - 60 umol/L   Basic metabolic panel     Status: Normal   Result Value Ref Range    Sodium 136 135 - 145 mmol/L    Potassium 4.6 3.4 - 5.3 mmol/L    Chloride 104 98 - 107 mmol/L    Carbon Dioxide (CO2) 25 22 - 29 mmol/L    Anion Gap 7 7 - 15 mmol/L    Urea Nitrogen 12.0 6.0 - 20.0 mg/dL    Creatinine 0.81 0.67 - 1.17 mg/dL    GFR Estimate >90 >60 mL/min/1.73m2    Calcium 8.8 8.8 - 10.4 mg/dL    Glucose 98 70 - 99 mg/dL   INR     Status: Abnormal   Result Value Ref Range    INR 2.07 (H) 0.85 - 1.15   CBC with platelets and differential     Status: Abnormal   Result Value Ref Range    WBC Count 7.9 4.0 - 11.0 10e3/uL    RBC Count 2.77 (L) 4.40 - 5.90 10e6/uL    Hemoglobin 9.8 (L) 13.3 - 17.7 g/dL    Hematocrit 29.5 (L) 40.0 - 53.0 %     (H) 78 - 100 fL    MCH 35.4 (H) 26.5 - 33.0 pg    MCHC 33.2 31.5 - 36.5 g/dL    RDW 16.5 (H) 10.0 - 15.0 %    Platelet Count 138 (L) 150 - 450 10e3/uL    % Neutrophils 70 %    % Lymphocytes 15 %    % Monocytes 13 %    % Eosinophils 2 %    % Basophils 1 %    % Immature Granulocytes 1 %    NRBCs per 100 WBC 0 <1 /100    Absolute Neutrophils 5.5 1.6 - 8.3 10e3/uL    Absolute Lymphocytes 1.2 0.8 - 5.3 10e3/uL    Absolute Monocytes 1.0 0.0 - 1.3 10e3/uL    Absolute Eosinophils 0.1 0.0 - 0.7 10e3/uL    Absolute Basophils 0.0 0.0 - 0.2 10e3/uL    Absolute Immature Granulocytes 0.1 <=0.4 10e3/uL    Absolute NRBCs 0.0 10e3/uL   Extra Tube     Status: None    Narrative    The following orders were created for panel order Extra Tube.  Procedure                               Abnormality         Status                     ---------                               -----------         ------                     Extra Green Top (Lithium...[[id number]]                      Final result                 Please view results for these tests on the individual orders.   Extra Green Top (Lithium Heparin) Tube     Status: None   Result Value Ref Range    Hold Specimen JIC    CBC with platelets  differential     Status: Abnormal    Narrative    The following orders were created for panel order CBC with platelets differential.  Procedure                               Abnormality         Status                     ---------                               -----------         ------                     CBC with platelets and d...[[id number]]  Abnormal            Final result                 Please view results for these tests on the individual orders.     Medications - No data to display  Labs Ordered and Resulted from Time of ED Arrival to Time of ED Departure   HEPATIC FUNCTION PANEL - Abnormal       Result Value    Protein Total 6.5      Albumin 3.1 (*)     Bilirubin Total 8.1 (*)     Alkaline Phosphatase 221 (*)      (*)     ALT 63      Bilirubin Direct 5.11 (*)    INR - Abnormal    INR 2.07 (*)    CBC WITH PLATELETS AND DIFFERENTIAL - Abnormal    WBC Count 7.9      RBC Count 2.77 (*)     Hemoglobin 9.8 (*)     Hematocrit 29.5 (*)      (*)     MCH 35.4 (*)     MCHC 33.2      RDW 16.5 (*)     Platelet Count 138 (*)     % Neutrophils 70      % Lymphocytes 15      % Monocytes 13      % Eosinophils 2      % Basophils 1      % Immature Granulocytes 1      NRBCs per 100 WBC 0      Absolute Neutrophils 5.5      Absolute Lymphocytes 1.2      Absolute Monocytes 1.0      Absolute Eosinophils 0.1      Absolute Basophils 0.0      Absolute Immature Granulocytes 0.1      Absolute NRBCs 0.0     LIPASE - Normal    Lipase 57     AMMONIA - Normal    Ammonia 40     BASIC METABOLIC PANEL - Normal    Sodium 136      Potassium 4.6      Chloride 104      Carbon Dioxide (CO2) 25      Anion Gap 7      Urea Nitrogen 12.0      Creatinine 0.81      GFR Estimate >90      Calcium 8.8      Glucose 98       US Lower Extremity Venous Duplex Bilateral   Final Result   IMPRESSION:   1.  No deep venous thrombosis in the bilateral lower extremities.      2.  Left greater than right subcutaneous edema in the bilateral calf.                  Assessment & Plan    Stewart Hunt is a 39 year old male with history of alcohol use disorder and alcoholic cirrhosis with ascites and anasarca who presents to the ED for evaluation of left lower extremity edema.     Ddx: DVT, worsening alcoholic cirrhosis, electrolyte abnormality, CHF, volume overload    Patient's vitals normal.  No change in chronic abdominal pain.  Labs with stable hemoglobin, normal white count, stable platelets.  Liver enzymes and bilirubin stable from baseline.  Lipase normal.  Ammonia 40.  BMP normal.  INR 2.07.  DVT ultrasound negative for DVT in bilateral lower extremities.  There is left greater than right subcutaneous edema in both calves.  Discussed briefly with hepatology fellow.  He advised against altering patient's diuretic regimen.  Recommended the patient follow-up with his primary care provider to make any necessary adjustments to his diuretic regimen.  I relayed this information to the patient.  He agreed to follow-up with his primary care provider and discuss ongoing management of his edema with the potential for change in his diuretic regimen.  Detailed return precautions provided.      I have reviewed the nursing notes. I have reviewed the findings, diagnosis, plan and need for follow up with the patient.    Discharge Medication List as of 12/11/2024 11:41 PM          Final diagnoses:   Lower extremity edema   I, Shikha Goodwin, am serving as a trained medical scribe to document services personally performed by Shaina Freitas MD, based on the provider's statements to me.     IShaina MD, was physically present and have reviewed and verified the accuracy of this note documented by Shikha Goodwin.     Shaina Freitas MD  [facility] EMERGENCY DEPARTMENT  12/11/2024     Shaina Freitas MD  12/20/24 0644

## 2024-12-15 ENCOUNTER — HEALTH MAINTENANCE LETTER (OUTPATIENT)
Age: 39
End: 2024-12-15

## 2024-12-16 ENCOUNTER — LAB (OUTPATIENT)
Dept: LAB | Facility: CLINIC | Age: 39
End: 2024-12-16
Payer: COMMERCIAL

## 2024-12-16 ENCOUNTER — VIRTUAL VISIT (OUTPATIENT)
Dept: INFECTIOUS DISEASES | Facility: CLINIC | Age: 39
End: 2024-12-16
Attending: STUDENT IN AN ORGANIZED HEALTH CARE EDUCATION/TRAINING PROGRAM
Payer: COMMERCIAL

## 2024-12-16 VITALS — WEIGHT: 241 LBS | BODY MASS INDEX: 38.73 KG/M2 | HEIGHT: 66 IN

## 2024-12-16 DIAGNOSIS — D72.829 LEUKOCYTOSIS, UNSPECIFIED TYPE: Primary | ICD-10-CM

## 2024-12-16 DIAGNOSIS — K70.30 ALCOHOLIC CIRRHOSIS OF LIVER WITHOUT ASCITES (H): ICD-10-CM

## 2024-12-16 DIAGNOSIS — R79.82 ELEVATED C-REACTIVE PROTEIN (CRP): ICD-10-CM

## 2024-12-16 DIAGNOSIS — K70.10 ALCOHOLIC HEPATITIS WITHOUT ASCITES (H): ICD-10-CM

## 2024-12-16 DIAGNOSIS — K70.30 ALCOHOLIC CIRRHOSIS OF LIVER WITHOUT ASCITES (H): Primary | ICD-10-CM

## 2024-12-16 DIAGNOSIS — D72.829 LEUKOCYTOSIS, UNSPECIFIED TYPE: ICD-10-CM

## 2024-12-16 LAB
ALBUMIN SERPL BCG-MCNC: 2.9 G/DL (ref 3.5–5.2)
ALP SERPL-CCNC: 231 U/L (ref 40–150)
ALT SERPL W P-5'-P-CCNC: 55 U/L (ref 0–70)
ANION GAP SERPL CALCULATED.3IONS-SCNC: 10 MMOL/L (ref 7–15)
AST SERPL W P-5'-P-CCNC: 107 U/L (ref 0–45)
BASOPHILS # BLD AUTO: 0 10E3/UL (ref 0–0.2)
BASOPHILS NFR BLD AUTO: 0 %
BILIRUB DIRECT SERPL-MCNC: 4.26 MG/DL (ref 0–0.3)
BILIRUB SERPL-MCNC: 7 MG/DL
BUN SERPL-MCNC: 9.7 MG/DL (ref 6–20)
CALCIUM SERPL-MCNC: 8.6 MG/DL (ref 8.8–10.4)
CHLORIDE SERPL-SCNC: 105 MMOL/L (ref 98–107)
CREAT SERPL-MCNC: 0.78 MG/DL (ref 0.67–1.17)
CRP SERPL-MCNC: 12 MG/L
EGFRCR SERPLBLD CKD-EPI 2021: >90 ML/MIN/1.73M2
EOSINOPHIL # BLD AUTO: 0.2 10E3/UL (ref 0–0.7)
EOSINOPHIL NFR BLD AUTO: 3 %
ERYTHROCYTE [DISTWIDTH] IN BLOOD BY AUTOMATED COUNT: 16.2 % (ref 10–15)
ERYTHROCYTE [DISTWIDTH] IN BLOOD BY AUTOMATED COUNT: 16.4 % (ref 10–15)
GLUCOSE SERPL-MCNC: 131 MG/DL (ref 70–99)
HCO3 SERPL-SCNC: 21 MMOL/L (ref 22–29)
HCT VFR BLD AUTO: 29.6 % (ref 40–53)
HCT VFR BLD AUTO: 29.9 % (ref 40–53)
HGB BLD-MCNC: 10.2 G/DL (ref 13.3–17.7)
HGB BLD-MCNC: 10.3 G/DL (ref 13.3–17.7)
IMM GRANULOCYTES # BLD: 0 10E3/UL
IMM GRANULOCYTES NFR BLD: 1 %
INR PPP: 2.24 (ref 0.85–1.15)
LYMPHOCYTES # BLD AUTO: 1.5 10E3/UL (ref 0.8–5.3)
LYMPHOCYTES NFR BLD AUTO: 27 %
MCH RBC QN AUTO: 35.8 PG (ref 26.5–33)
MCH RBC QN AUTO: 36.5 PG (ref 26.5–33)
MCHC RBC AUTO-ENTMCNC: 34.1 G/DL (ref 31.5–36.5)
MCHC RBC AUTO-ENTMCNC: 34.8 G/DL (ref 31.5–36.5)
MCV RBC AUTO: 105 FL (ref 78–100)
MCV RBC AUTO: 105 FL (ref 78–100)
MONOCYTES # BLD AUTO: 0.5 10E3/UL (ref 0–1.3)
MONOCYTES NFR BLD AUTO: 10 %
NEUTROPHILS # BLD AUTO: 3.4 10E3/UL (ref 1.6–8.3)
NEUTROPHILS NFR BLD AUTO: 59 %
PLATELET # BLD AUTO: 124 10E3/UL (ref 150–450)
PLATELET # BLD AUTO: 125 10E3/UL (ref 150–450)
POTASSIUM SERPL-SCNC: 3.8 MMOL/L (ref 3.4–5.3)
PROT SERPL-MCNC: 6 G/DL (ref 6.4–8.3)
RBC # BLD AUTO: 2.82 10E6/UL (ref 4.4–5.9)
RBC # BLD AUTO: 2.85 10E6/UL (ref 4.4–5.9)
SODIUM SERPL-SCNC: 136 MMOL/L (ref 135–145)
WBC # BLD AUTO: 5.7 10E3/UL (ref 4–11)
WBC # BLD AUTO: 5.8 10E3/UL (ref 4–11)

## 2024-12-16 PROCEDURE — 80053 COMPREHEN METABOLIC PANEL: CPT

## 2024-12-16 PROCEDURE — 85025 COMPLETE CBC W/AUTO DIFF WBC: CPT

## 2024-12-16 PROCEDURE — 82248 BILIRUBIN DIRECT: CPT

## 2024-12-16 PROCEDURE — 86140 C-REACTIVE PROTEIN: CPT

## 2024-12-16 PROCEDURE — 36415 COLL VENOUS BLD VENIPUNCTURE: CPT

## 2024-12-16 PROCEDURE — 85610 PROTHROMBIN TIME: CPT

## 2024-12-16 PROCEDURE — 99215 OFFICE O/P EST HI 40 MIN: CPT | Mod: 95 | Performed by: STUDENT IN AN ORGANIZED HEALTH CARE EDUCATION/TRAINING PROGRAM

## 2024-12-16 ASSESSMENT — PAIN SCALES - GENERAL: PAINLEVEL_OUTOF10: SEVERE PAIN (6)

## 2024-12-16 NOTE — PATIENT INSTRUCTIONS
We reviewed PET-CT result. No signs of infection on the scan, as per report.   Follow up labs today. White count is normal on recent labs and also today. Ordered extended analysis on CBC (differentials) and CRP.   If plan for liver transplant in future, then reach out to the ID clinic for pre-transplant evaluation by the transplant ID doctor.   ID clinic follow up as needed.

## 2024-12-16 NOTE — PROGRESS NOTES
Virtual Visit Details    Type of service:  Video Visit     Video State Time: 8:00AM  Video End Time: 8:15AM    Originating Location (pt. Location): Other Car, in parking after lab visit , [place]    Distant Location (provider location):  On-site  Platform used for Video Visit: AmWell  =================================================      [facility] [address] [address]  Phone: [phone]  Fax: [phone]    Patient:  Stewart Hunt, Date of birth 1985  Date of Visit:  12/16/2024  Reason for visit: Follow up regarding persistent leukocytosis, elevated CRP with recent MSSA bacteremia without clear source      Assessment & Plan      ID Problem List:  MSSA bacteremia  Positive blood culture 10/8 (1/2 bottles), negative since 10/10/2024     Leukocytosis - resolved  Elevated CRP, down trending   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 However, also had febrile episode on 10/4 when also leukocytosis, no blood cultures then. Rather thought respiratory illness, negative work up. No antibiotics were received. Another febrile episode on 10/8, blood cultures grew MSSA (1/2 bottles), repeat blood cultures on evening of 10/8 onwards negative. TTE negative. Based on clinical presentation, duration of bacteremia is unclear, whether could have bacteremic on previous febrile episode on 10/4.  Lower back pain has been stable. Based on hx, no hardware in place, except staples from gastric sleeve surgery. No evidence of metastatic infection per exam. TTE 10/9 did not show vegetation. Repeat blood cultures remained negative. During hospitalization, continues to have fever spikes and leukocytosis  (range 12-14) which prompted CT-dental, CT-chest PE, CT-AP and did not show focal signs of infection. Did receive 7 days of empiric gram negative coverage as well. Completed 4 weeks of iv cefazolin.      Since last ID visit 11/11, overall, has been doing well clinically. PET-CT was obtained to assess for occult foci of infection with persistent leukocytosis, elevated CRP. PET-CT did not show focal areas that concern of infection. Labs were monitored including surveillance blood cultures - latter finalized negative. On recent labs leukocytes normalized, and CRP is trending down. Will follow today's labs, added-on CRP, differentials. No additional recommendations from ID perspective as of now and as needed follow up. In future, if were to decide for liver transplant then suggest pre-transplant assessment with our transplant ID group.     Recommendations:   On recent labs - leukocytes has normalized and PET-CT w/o focal signs of infection.   Follow up labs today, added on differential on CBC, and CRP  No additional work up or recommendations from ID perspective at this point  If plan for liver transplant on future evaluation by hepatology, then please reach out to ID clinic, for pre-transplant evaluation - additional work up, immunizations to optimize for transplant surgery.     40 minutes spent by me on the date of the encounter doing chart review, history and exam, documentation and further activities per the note.       History of Present Illness     Pertinent history obtain from: chart review, patient, and patient's partner/spouse  On today's follow up visit, Stewart denies having particular complaints that concerns for infection - no fever, chills, sweating. Reportedly, no immediate plan for transplant, since labs have been improving. Underwent PET-CT    Specialty Problems    None       Physical Exam   General: pleasant, not in acute distress  Otherwise deferred, due to virtual video visit    Data   Laboratory data  and imaging listed below was reviewed prior to this encounter.     Microbiology:    Culture   Date Value Ref Range Status   11/25/2024 No Growth  Final   11/19/2024 No Growth  Final   11/03/2024 No Growth  Final   10/12/2024 No Growth  Final   10/12/2024 No Growth  Final   10/10/2024 No Growth  Final   10/08/2024 No Growth  Final   10/08/2024 No Growth  Final   10/08/2024 Positive on the 1st day of incubation (A)  Final   10/08/2024 Staphylococcus aureus (AA)  Final     Comment:     1 of 2 bottles   10/03/2024 No Growth  Final   09/20/2024 No Growth  Final     Inflammatory Markers:   Recent Labs   Lab Test 11/02/24  2114   SED >140*     Urine Studies:    Recent Labs   Lab Test 11/02/24 2123 10/12/24  0524 10/08/24  2356 10/06/24  0911 10/03/24  0103   LEUKEST Negative Negative Negative Negative Negative   WBCU 6* 2 4 3 1     Hematology Studies:    Recent Labs   Lab Test 12/16/24 0725 12/11/24  2203 12/06/24  1333 11/25/24  0944 11/19/24  1142 11/12/24  1050 10/29/24  1130 10/21/24  1112   WBC 5.7  5.8 7.9 9.3 9.1 11.6* 11.9*   < > 14.1*  14.0*   ANEU  --   --   --   --   --   --   --  9.4*   AEOS  --   --   --   --   --   --   --  0.0   HGB 10.2*  10.3* 9.8* 9.9* 10.0* 9.8* 9.4*   < > 11.7*   *  105* 107* 106* 103* 104* 103*   < > 101*   *  124* 138* 144* 121* 118* 110*   < > 150    < > = values in this interval not displayed.      Metabolic Studies:   Recent Labs   Lab Test 12/16/24 0725 12/11/24 2057 12/06/24  1333 11/25/24  0944 11/19/24  1142    136 137 136 130*   POTASSIUM 3.8 4.6 4.7 3.8 4.5   CHLORIDE 105 104 107 104 101   CO2 21* 25 22 22 20*   BUN 9.7 12.0 12.5 12.7 13.2   CR 0.78 0.81 0.89 0.64* 0.65*   GFRESTIMATED >90 >90 >90 >90 >90     Hepatic Studies:   Recent Labs   Lab Test 12/16/24  0725 12/11/24  2057 12/06/24  1333 11/25/24  0944 11/19/24  1142 11/12/24  1050   BILITOTAL 7.0* 8.1* 8.6* 12.8* 15.9* 19.8*   ALKPHOS 231* 221* 252* 256* 292* 292*   ALBUMIN 2.9* 3.1* 3.0*  2.9* 2.9* 2.9*   * 126* 127* 165* 156* 165*   ALT 55 63 68 86* 80* 55     Hepatitis B Testing:   Recent Labs   Lab Test 10/16/24  1138   HEPBANG Nonreactive     Hepatitis C Testing:   Hepatitis C Antibody   Date Value Ref Range Status   10/16/2024 Nonreactive Nonreactive Final     Comment:     A nonreactive screening test result does not exclude the possibility of exposure to or infection with HCV. Nonreactive screening test results in individuals with prior exposure to HCV may be due to antibody levels below the limit of detection of this assay or lack of reactivity to the HCV antigens used in this assay. Patients with recent HCV infections (<3 months from time of exposure) may have false-negative HCV antibody results due to the time needed for seroconversion (average of 8 to 9 weeks).         12/11/2024 US b/l LE:   IMPRESSION:  1.  No deep venous thrombosis in the bilateral lower extremities.  2.  Left greater than right subcutaneous edema in the bilateral calf.    12/10/2024 PET-CT:   12/10/2024 CT-soft tissue neck w/ contrast:  12/10/2024 CT-CAP w/ contrast:  IMPRESSION:   1. No evidence of FDG-avid focal infectious process.  2. Hepatosplenomegaly and small perihepatic ascites.     10/14/2024 CT Dental w/o contrast:   IMPRESSION:  Periapical lucency adjacent to the right mandibular  central incisor, previously treated by root canal. Otherwise, no  evidence of periapical abscess.    10/14/2024 CT-chest PE w/ contrast:  IMPRESSION:   1. No central filling defect consistent with a pulmonary embolism.  2. Small irregular nodular and groundglass opacities throughout the  bilateral upper lobes, likely likely of infectious or inflammatory  radiology.   2. Please refer to recently performed CT abdomen and pelvis 10/12/2024  for additional findings recommendations below the diaphragm.    10/12/2024 CT-AP w/ contrast:  IMPRESSION:   1. No acute intra-abdominal pathology. Normal appendix.  2. Cirrhotic hepatic  morphology with evidence of portal hypertension,  including small volume ascites, splenomegaly, and upper abdominal  portosystemic collaterals.  3. Decreased hepatome      10/3/2024 TTE limited:   Interpretation Summary  No significant valvular abnormalities were noted. No vegetation or mass  identified.    9/20/2024 TTE:  Interpretation Summary  Global and regional left ventricular function is normal with an EF of 55-60%.  Right ventricular function, chamber size, wall motion, and thickness are normal.  Pulmonary artery systolic pressure is normal.  No significant valvular abnormalities present.  No pericardial effusion is present.  There is no prior study for direct comparison.

## 2024-12-16 NOTE — LETTER
12/16/2024       RE: Stewart Hunt  [address]  [facility] [address]     Dear Colleague,    Thank you for referring your patient, Stewart Hunt, to the [facility] INFECTIOUS DISEASE CLINIC [place] at [facility]. Please see a copy of my visit note below.    Virtual Visit Details    Type of service:  Video Visit     Video State Time: 8:00AM  Video End Time: 8:15AM    Originating Location (pt. Location): Other Car, in parking after lab visit , [place]    Distant Location (provider location):  On-site  Platform used for Video Visit: AmWell  =================================================      [facility] INFECTIOUS DISEASE CLINIC [place]  [address] [address]  Phone: [phone]  Fax: [phone]    Patient:  Stewart Hunt, Date of birth 1985  Date of Visit:  12/16/2024  Reason for visit: Follow up regarding persistent leukocytosis, elevated CRP with recent MSSA bacteremia without clear source      Assessment & Plan     ID Problem List:  MSSA bacteremia  Positive blood culture 10/8 (1/2 bottles), negative since 10/10/2024     Leukocytosis - resolved  Elevated CRP, down trending   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 However, also had febrile episode on 10/4 when also leukocytosis, no blood cultures then. Rather thought respiratory illness, negative work up. No antibiotics were received. Another febrile episode on 10/8, blood cultures grew MSSA (1/2 bottles), repeat blood cultures on evening of 10/8 onwards negative. TTE negative. Based on clinical presentation, duration of bacteremia is unclear, whether could have bacteremic on previous  febrile episode on 10/4.  Lower back pain has been stable. Based on hx, no hardware in place, except staples from gastric sleeve surgery. No evidence of metastatic infection per exam. TTE 10/9 did not show vegetation. Repeat blood cultures remained negative. During hospitalization, continues to have fever spikes and leukocytosis (range 12-14) which prompted CT-dental, CT-chest PE, CT-AP and did not show focal signs of infection. Did receive 7 days of empiric gram negative coverage as well. Completed 4 weeks of iv cefazolin.      Since last ID visit 11/11, overall, has been doing well clinically. PET-CT was obtained to assess for occult foci of infection with persistent leukocytosis, elevated CRP. PET-CT did not show focal areas that concern of infection. Labs were monitored including surveillance blood cultures - latter finalized negative. On recent labs leukocytes normalized, and CRP is trending down. Will follow today's labs, added-on CRP, differentials. No additional recommendations from ID perspective as of now and as needed follow up. In future, if were to decide for liver transplant then suggest pre-transplant assessment with our transplant ID group.     Recommendations:   On recent labs - leukocytes has normalized and PET-CT w/o focal signs of infection.   Follow up labs today, added on differential on CBC, and CRP  No additional work up or recommendations from ID perspective at this point  If plan for liver transplant on future evaluation by hepatology, then please reach out to ID clinic, for pre-transplant evaluation - additional work up, immunizations to optimize for transplant surgery.     40 minutes spent by me on the date of the encounter doing chart review, history and exam, documentation and further activities per the note.       History of Present Illness    Pertinent history obtain from: chart review, patient, and patient's partner/spouse  On today's follow up visit, Stewart denies having particular  complaints that concerns for infection - no fever, chills, sweating. Reportedly, no immediate plan for transplant, since labs have been improving. Underwent PET-CT    Specialty Problems    None       Physical Exam  General: pleasant, not in acute distress  Otherwise deferred, due to virtual video visit    Data  Laboratory data and imaging listed below was reviewed prior to this encounter.     Microbiology:    Culture   Date Value Ref Range Status   11/25/2024 No Growth  Final   11/19/2024 No Growth  Final   11/03/2024 No Growth  Final   10/12/2024 No Growth  Final   10/12/2024 No Growth  Final   10/10/2024 No Growth  Final   10/08/2024 No Growth  Final   10/08/2024 No Growth  Final   10/08/2024 Positive on the 1st day of incubation (A)  Final   10/08/2024 Staphylococcus aureus (AA)  Final     Comment:     1 of 2 bottles   10/03/2024 No Growth  Final   09/20/2024 No Growth  Final     Inflammatory Markers:   Recent Labs   Lab Test 11/02/24  2114   SED >140*     Urine Studies:    Recent Labs   Lab Test 11/02/24  2123 10/12/24  0524 10/08/24  2356 10/06/24  0911 10/03/24  0103   LEUKEST Negative Negative Negative Negative Negative   WBCU 6* 2 4 3 1     Hematology Studies:    Recent Labs   Lab Test 12/16/24  0725 12/11/24  2203 12/06/24  1333 11/25/24  0944 11/19/24  1142 11/12/24  1050 10/29/24  1130 10/21/24  1112   WBC 5.7  5.8 7.9 9.3 9.1 11.6* 11.9*   < > 14.1*  14.0*   ANEU  --   --   --   --   --   --   --  9.4*   AEOS  --   --   --   --   --   --   --  0.0   HGB 10.2*  10.3* 9.8* 9.9* 10.0* 9.8* 9.4*   < > 11.7*   *  105* 107* 106* 103* 104* 103*   < > 101*   *  124* 138* 144* 121* 118* 110*   < > 150    < > = values in this interval not displayed.      Metabolic Studies:   Recent Labs   Lab Test 12/16/24  0725 12/11/24 2057 12/06/24  1333 11/25/24  0944 11/19/24  1142    136 137 136 130*   POTASSIUM 3.8 4.6 4.7 3.8 4.5   CHLORIDE 105 104 107 104 101   CO2 21* 25 22 22 20*   BUN 9.7 12.0  12.5 12.7 13.2   CR 0.78 0.81 0.89 0.64* 0.65*   GFRESTIMATED >90 >90 >90 >90 >90     Hepatic Studies:   Recent Labs   Lab Test 12/16/24  0725 12/11/24 2057 12/06/24  1333 11/25/24  0944 11/19/24  1142 11/12/24  1050   BILITOTAL 7.0* 8.1* 8.6* 12.8* 15.9* 19.8*   ALKPHOS 231* 221* 252* 256* 292* 292*   ALBUMIN 2.9* 3.1* 3.0* 2.9* 2.9* 2.9*   * 126* 127* 165* 156* 165*   ALT 55 63 68 86* 80* 55     Hepatitis B Testing:   Recent Labs   Lab Test 10/16/24  1138   HEPBANG Nonreactive     Hepatitis C Testing:   Hepatitis C Antibody   Date Value Ref Range Status   10/16/2024 Nonreactive Nonreactive Final     Comment:     A nonreactive screening test result does not exclude the possibility of exposure to or infection with HCV. Nonreactive screening test results in individuals with prior exposure to HCV may be due to antibody levels below the limit of detection of this assay or lack of reactivity to the HCV antigens used in this assay. Patients with recent HCV infections (<3 months from time of exposure) may have false-negative HCV antibody results due to the time needed for seroconversion (average of 8 to 9 weeks).         12/11/2024 US b/l LE:   IMPRESSION:  1.  No deep venous thrombosis in the bilateral lower extremities.  2.  Left greater than right subcutaneous edema in the bilateral calf.    12/10/2024 PET-CT:   12/10/2024 CT-soft tissue neck w/ contrast:  12/10/2024 CT-CAP w/ contrast:  IMPRESSION:   1. No evidence of FDG-avid focal infectious process.  2. Hepatosplenomegaly and small perihepatic ascites.     10/14/2024 CT Dental w/o contrast:   IMPRESSION:  Periapical lucency adjacent to the right mandibular  central incisor, previously treated by root canal. Otherwise, no  evidence of periapical abscess.    10/14/2024 CT-chest PE w/ contrast:  IMPRESSION:   1. No central filling defect consistent with a pulmonary embolism.  2. Small irregular nodular and groundglass opacities throughout the  bilateral upper  lobes, likely likely of infectious or inflammatory  radiology.   2. Please refer to recently performed CT abdomen and pelvis 10/12/2024  for additional findings recommendations below the diaphragm.    10/12/2024 CT-AP w/ contrast:  IMPRESSION:   1. No acute intra-abdominal pathology. Normal appendix.  2. Cirrhotic hepatic morphology with evidence of portal hypertension,  including small volume ascites, splenomegaly, and upper abdominal  portosystemic collaterals.  3. Decreased hepatome      10/3/2024 TTE limited:   Interpretation Summary  No significant valvular abnormalities were noted. No vegetation or mass  identified.    9/20/2024 TTE:  Interpretation Summary  Global and regional left ventricular function is normal with an EF of 55-60%.  Right ventricular function, chamber size, wall motion, and thickness are normal.  Pulmonary artery systolic pressure is normal.  No significant valvular abnormalities present.  No pericardial effusion is present.  There is no prior study for direct comparison.               Again, thank you for allowing me to participate in the care of your patient.      Sincerely,    Shira Kolb MD

## 2024-12-16 NOTE — NURSING NOTE
Patient reviewed medications and allergies in [employer] during e-check in and said everything looked correct.      Current patient location: [address] [place] [id number]    Is the patient currently in the state of [place]? YES    Visit mode:VIDEO    If the visit is dropped, the patient can be reconnected by:VIDEO VISIT: Text to cell phone:   Telephone Information:   Mobile [phone]    and VIDEO VISIT: Send to e-mail at: [email or web address]    Will anyone else be joining the visit? PaulaCharlottePt's Wife  (If patient encounters technical issues they should call [phone])    Are changes needed to the allergy or medication list? Pt declined med review and Pt stated no med changes    Are refills needed on medications prescribed by this physician? NO    Rooming Documentation:  Questionnaire(s) completed    Reason for visit: MICHAEL CUADRAF

## 2024-12-17 ENCOUNTER — TELEPHONE (OUTPATIENT)
Dept: GASTROENTEROLOGY | Facility: CLINIC | Age: 39
End: 2024-12-17
Payer: COMMERCIAL

## 2024-12-17 NOTE — TELEPHONE ENCOUNTER
Patient confirmed scheduled appointment:     Date: 1/20/25  Time: 8:15 am  Appointment Type: Return Liver  Provider: Rebeka Gibbs PA-C  Location: [facility]  Testing/imaging: Lab  Additional Notes:

## 2024-12-30 ENCOUNTER — TRANSFERRED RECORDS (OUTPATIENT)
Dept: HEALTH INFORMATION MANAGEMENT | Facility: CLINIC | Age: 39
End: 2024-12-30
Payer: COMMERCIAL

## 2025-01-20 ENCOUNTER — MYC MEDICAL ADVICE (OUTPATIENT)
Dept: FAMILY MEDICINE | Facility: CLINIC | Age: 40
End: 2025-01-20

## 2025-01-21 ENCOUNTER — OFFICE VISIT (OUTPATIENT)
Dept: GASTROENTEROLOGY | Facility: CLINIC | Age: 40
End: 2025-01-21
Attending: PHYSICIAN ASSISTANT
Payer: COMMERCIAL

## 2025-01-21 ENCOUNTER — LAB (OUTPATIENT)
Dept: LAB | Facility: CLINIC | Age: 40
End: 2025-01-21
Attending: PHYSICIAN ASSISTANT
Payer: COMMERCIAL

## 2025-01-21 VITALS
DIASTOLIC BLOOD PRESSURE: 73 MMHG | OXYGEN SATURATION: 99 % | HEART RATE: 90 BPM | HEIGHT: 66 IN | SYSTOLIC BLOOD PRESSURE: 114 MMHG | WEIGHT: 237 LBS | BODY MASS INDEX: 38.09 KG/M2

## 2025-01-21 DIAGNOSIS — D53.9 MACROCYTIC ANEMIA: ICD-10-CM

## 2025-01-21 DIAGNOSIS — E80.6 HYPERBILIRUBINEMIA: ICD-10-CM

## 2025-01-21 DIAGNOSIS — F10.90 ALCOHOL USE DISORDER: ICD-10-CM

## 2025-01-21 DIAGNOSIS — D69.6 THROMBOCYTOPENIA: ICD-10-CM

## 2025-01-21 DIAGNOSIS — K70.30 ALCOHOLIC CIRRHOSIS OF LIVER WITHOUT ASCITES (H): Primary | ICD-10-CM

## 2025-01-21 DIAGNOSIS — K70.30 ALCOHOLIC CIRRHOSIS OF LIVER WITHOUT ASCITES (H): ICD-10-CM

## 2025-01-21 DIAGNOSIS — D68.9 COAGULOPATHY: ICD-10-CM

## 2025-01-21 DIAGNOSIS — R74.01 ELEVATED AST (SGOT): ICD-10-CM

## 2025-01-21 DIAGNOSIS — R60.0 LOWER EXTREMITY EDEMA: ICD-10-CM

## 2025-01-21 PROBLEM — B95.61 STAPHYLOCOCCUS AUREUS BACTEREMIA: Status: ACTIVE | Noted: 2024-10-21

## 2025-01-21 PROBLEM — R73.03 PREDIABETES: Status: ACTIVE | Noted: 2021-02-19

## 2025-01-21 PROBLEM — E78.2 MIXED HYPERLIPIDEMIA: Status: ACTIVE | Noted: 2021-02-19

## 2025-01-21 PROBLEM — E55.9 VITAMIN D DEFICIENCY: Status: ACTIVE | Noted: 2023-07-31

## 2025-01-21 PROBLEM — F10.10 ALCOHOL ABUSE: Status: ACTIVE | Noted: 2024-09-03

## 2025-01-21 PROBLEM — R19.4 ALTERED BOWEL HABITS: Status: ACTIVE | Noted: 2024-08-21

## 2025-01-21 PROBLEM — R10.13 EPIGASTRIC PAIN: Status: ACTIVE | Noted: 2024-08-06

## 2025-01-21 PROBLEM — R11.2 NAUSEA AND VOMITING, UNSPECIFIED VOMITING TYPE: Status: ACTIVE | Noted: 2024-08-21

## 2025-01-21 PROBLEM — N17.9 ACUTE KIDNEY INJURY: Status: ACTIVE | Noted: 2024-10-21

## 2025-01-21 PROBLEM — R10.31 RIGHT LOWER QUADRANT ABDOMINAL PAIN: Status: ACTIVE | Noted: 2024-08-06

## 2025-01-21 PROBLEM — R10.84 ABDOMINAL PAIN, GENERALIZED: Status: ACTIVE | Noted: 2024-08-21

## 2025-01-21 PROBLEM — R78.81 STAPHYLOCOCCUS AUREUS BACTEREMIA: Status: ACTIVE | Noted: 2024-10-21

## 2025-01-21 LAB
ALBUMIN SERPL BCG-MCNC: 3.8 G/DL (ref 3.5–5.2)
ALP SERPL-CCNC: 253 U/L (ref 40–150)
ALT SERPL W P-5'-P-CCNC: 59 U/L (ref 0–70)
ANION GAP SERPL CALCULATED.3IONS-SCNC: 10 MMOL/L (ref 7–15)
AST SERPL W P-5'-P-CCNC: 136 U/L (ref 0–45)
BILIRUB DIRECT SERPL-MCNC: 2.39 MG/DL (ref 0–0.3)
BILIRUB SERPL-MCNC: 4.1 MG/DL
BUN SERPL-MCNC: 10 MG/DL (ref 6–20)
CALCIUM SERPL-MCNC: 8.8 MG/DL (ref 8.8–10.4)
CHLORIDE SERPL-SCNC: 104 MMOL/L (ref 98–107)
CREAT SERPL-MCNC: 0.74 MG/DL (ref 0.67–1.17)
EGFRCR SERPLBLD CKD-EPI 2021: >90 ML/MIN/1.73M2
ERYTHROCYTE [DISTWIDTH] IN BLOOD BY AUTOMATED COUNT: 15.9 % (ref 10–15)
GLUCOSE SERPL-MCNC: 129 MG/DL (ref 70–99)
HCO3 SERPL-SCNC: 27 MMOL/L (ref 22–29)
HCT VFR BLD AUTO: 33.7 % (ref 40–53)
HGB BLD-MCNC: 11.7 G/DL (ref 13.3–17.7)
INR PPP: 1.61 (ref 0.85–1.15)
MCH RBC QN AUTO: 34.9 PG (ref 26.5–33)
MCHC RBC AUTO-ENTMCNC: 34.7 G/DL (ref 31.5–36.5)
MCV RBC AUTO: 101 FL (ref 78–100)
PLATELET # BLD AUTO: 94 10E3/UL (ref 150–450)
POTASSIUM SERPL-SCNC: 3.9 MMOL/L (ref 3.4–5.3)
PROT SERPL-MCNC: 7.1 G/DL (ref 6.4–8.3)
RBC # BLD AUTO: 3.35 10E6/UL (ref 4.4–5.9)
SODIUM SERPL-SCNC: 141 MMOL/L (ref 135–145)
WBC # BLD AUTO: 6.4 10E3/UL (ref 4–11)

## 2025-01-21 PROCEDURE — G0480 DRUG TEST DEF 1-7 CLASSES: HCPCS | Mod: 90 | Performed by: PATHOLOGY

## 2025-01-21 PROCEDURE — 82248 BILIRUBIN DIRECT: CPT | Performed by: PATHOLOGY

## 2025-01-21 PROCEDURE — 80053 COMPREHEN METABOLIC PANEL: CPT | Performed by: PATHOLOGY

## 2025-01-21 PROCEDURE — 99213 OFFICE O/P EST LOW 20 MIN: CPT | Performed by: PHYSICIAN ASSISTANT

## 2025-01-21 PROCEDURE — 85610 PROTHROMBIN TIME: CPT | Performed by: PATHOLOGY

## 2025-01-21 PROCEDURE — 99000 SPECIMEN HANDLING OFFICE-LAB: CPT | Performed by: PATHOLOGY

## 2025-01-21 PROCEDURE — 36415 COLL VENOUS BLD VENIPUNCTURE: CPT | Performed by: PATHOLOGY

## 2025-01-21 PROCEDURE — 99214 OFFICE O/P EST MOD 30 MIN: CPT | Performed by: PHYSICIAN ASSISTANT

## 2025-01-21 PROCEDURE — 85027 COMPLETE CBC AUTOMATED: CPT | Performed by: PATHOLOGY

## 2025-01-21 RX ORDER — FUROSEMIDE 20 MG/1
40 TABLET ORAL DAILY
COMMUNITY
Start: 2025-01-21

## 2025-01-21 RX ORDER — SPIRONOLACTONE 50 MG/1
100 TABLET, FILM COATED ORAL DAILY
COMMUNITY
Start: 2025-01-21

## 2025-01-21 ASSESSMENT — PAIN SCALES - GENERAL: PAINLEVEL_OUTOF10: NO PAIN (0)

## 2025-01-21 NOTE — PROGRESS NOTES
Hepatology Clinic Note  Steawrt Hunt   Date of Birth 1985    REASON FOR FOLLOW UP: severe ETOH hepatitis, cirrhosis   REFERRING PROVIDER: Dr. Dodd         Assessment/plan:     Stewart Hunt is a 38 YO M who presents in follow up for severe alcohol related hepatitis complicated with hepatic encephalopathy, likely superimposed on MET-ALD cirrhosis. Last seen by Dr. Dodd 11/25/24.     MELD 3.0 = 17 (previously 26).  Labs continues to improve steadily.    Recently resumed drinking alcohol for 1 evening on 12/31/24 (consumed 4-5 alcoholic beverages) but denies any more recent use.  His goal is absolute sobriety.  He has not completed chemical dependency treatment but is willing to look into this and complete some form of treatment.    ER visit 12/11/24 for lower extremity edema. DVT US negative for DVT in BLE's. Hepatology fellow advised against altering diuretic regimen. Recommended follow-up w/ PCP to make any necessary adjustments to diuretic regimen.     Patient tells me he has been taking double his prescribed dose of Lasix since the beginning of January.  He reports lower extremity edema has improved significantly.  Also taking spironolactone 50 mg daily.  No recent feelings of fluid in his abdomen.  No history of paracentesis.  Blood pressure looks good today on vitals.     Hx of HE: denies any recent confusion or changes in mentation.  Continues to take rifaximin twice daily and lactulose 20 g twice daily.  On average, passing 3-5 BMs per day.    Varices: Outside EGD and colonoscopy September 2024 through [facility]  HCC screening: CT C/A/P W contrast 12/10/24: Somewhat nodular contour of liver. No FDG avid or other  focal lesion. Mild hepatomegaly with spotty steatosis, similar to prior. No intrahepatic or extrahepatic biliary ductal dilatation. A tiny gallstone, otherwise gall bladder normal. Small perihepatic ascites. Findings related to portal hypertension such as; recanalized umbilical vein,  splenorenal shunts, prominent paracolic vessels.  Splenomegaly.   > AFP 3.6 8/21/2024    PLAN:  -Discussed today's lab work  -Will be due next for imaging of the liver for HCC screening June 2025  -Increase diuretic doses to 40 mg of Lasix daily and spironolactone 100 mg daily; monitor kidney function closely   -Strict low-sodium diet  -Continue on lactulose and rifaximin as prescribed; discussed goal of 3-5 BMs per day  -Avoid NSAID use  -Do not exceed 2000 g Tylenol in 24 hours  -Continue to abstain from all alcohol use; highly encourage patient to reach out to [facility] to inquire about outpatient chemical dependency program/treatment  -Was able to review documents from recent outside EGD/colonoscopy   > Will be due for repeat EGD Sept. 2025  -Repeat MELD labs in 1 month    RTC in 3 months with labs same day   > At next appointment consider referral to Addiction Medicine if he is not enrolled in a CD program; also consider DEXA scan    Rosa Armando PA-C  [facility] Hepatology   -----------------------------------------------------         HPI:     40 YO M who presents for follow-up regarding alcohol related cirrhosis and hepatitis.  Last seen by Dr. Dodd 11/25/2024.    Patient's wife was on the phone today during the visit.    Patient mentions he often feels cold.  No recent fever.  Typically has nausea and episodes of vomiting 1-2 times per week.  No blood in emesis.  Admits to mild left lower quadrant abdominal pain.  Has not noticed any swelling in his abdomen.  No history of paracentesis.  No recent episodes of confusion.  Patient states on average he is passing 3-5 BMs per day.  No bright red blood per rectum or melena.  Patient states stool is usually loose and urgent.  He does admit to recently having lower extremity edema.  He states he has been taking double his Lasix dose since the beginning of this month, which has seemed to help.  As mentioned above, he did go into the  "ER December 2024 for concerns regarding lower extremity edema.  Patient's wife states the yellowness to his eyes has improved.  Patient reports appetite is good.  Tends to avoid pork.  Admits to frequent nosebleeds.  Patient states for the past 1-2 weeks, has had some numbness in his pinky and ring finger on his left hand.    He admits to vaping use daily.  No current cigarette use or chewing tobacco use.  Denies illicit drug use.  States he recently had a \"slip up \"where he drinks 4-5 alcoholic beverages on 12/31/2024.  He has not had any alcohol since then.  Patient states cravings for alcohol are constant but are slowly getting better.  His goal is to maintain sobriety.    Patient verbalizes concerns regarding his job and his ongoing medical concerns.  Patient states his work is on him about disclosing information regarding his medical concerns and regarding the medications he takes.  They are wanting to know a specific date when he can return back to work.  Patient states he feels very pressured by this.  Patient feels as if he needs to continue working from home at this time because he often needs to be very close to a bathroom.  He is also concerned that his performance at work is not up to par.  He mentions that he thinks he will get fired in the near future.  He is looking for our department to provide a letter that he can give to his work.    PROCEDURES    EGD 9/10/2024:   - Normal esophagus.  - Z-line regular, 40 cm from the incisors.  - A sleeve gastrectomy was found.  - Portal hypertensive gastropathy.  - Normal examined duodenum.  - No specimens collected.  Impression:  - Perform a colonoscopy today.  - Repeat upper endoscopy in 1 year for surveillance.    COLONOSCOPY 9/10/2024:  Non-thrombosed external hemorrhoids and non-thrombosed internal hemorrhoids found on perianal  exam.  - The examined portion of the ileum was normal.  - Redundant colon.  - Congested mucosa in the entire examined colon.  - Two " 4 to 5 mm polyps in the transverse colon, removed with a cold snare. Resected and retrieved.  - The examination was otherwise normal on direct and retroflexion views.  Impression:  - Await pathology results.  - Repeat colonoscopy in 5 years for surveillance based on clinical status at that time.  - Labs today- could consider treatment with prednisolone pending values  continue to stay abstinent from all alcohol  my office will arrange a referral to [facility] hepatology program  Follow up GI clinic in 3-4 weeks    Final Dx Colon, transverse polyps x2, polypectomies:  Tubular adenoma  Fragment of unremarkable colonic mucosa     PMH:    has a past medical history of Acute alcoholic hepatitis (H) (09/2024), Alcohol use disorder, severe, in early remission (H), Alcoholic cirrhosis (H), Hypertension (09/07/24), Morbid obesity (H), and Uncomplicated asthma.     SMH:    has a past surgical history that includes Gastrectomy Laparoscopic Sleeve (2012); Picc Insertion - Double Lumen (Left, 10/16/2024); Manning Tooth Extraction; Abdomen surgery (2012); colonoscopy (09/2024); and Eye surgery (15 yrs ago).     Medications:   Current Outpatient Medications   Medication Sig Dispense Refill    albuterol (PROAIR HFA/PROVENTIL HFA/VENTOLIN HFA) 108 (90 Base) MCG/ACT inhaler Inhale 2-3 puffs into the lungs every 6 hours as needed for shortness of breath, wheezing or cough.      esomeprazole (NEXIUM) 40 MG DR capsule Take 40 mg by mouth 2 times daily.      famotidine (PEPCID) 20 MG tablet Take 20 mg by mouth daily.      folic acid (FOLVITE) 1 MG tablet Take 1 tablet (1 mg) by mouth daily. 90 tablet 3    furosemide (LASIX) 20 MG tablet Take 1 tablet (20 mg) by mouth daily. 90 tablet 3    lactulose (CEPHULAC) 10 GM packet Take 2 packets (20 g) by mouth 2 times daily. 360 packet 3    multivitamin w/minerals (THERA-VIT-M) tablet Take 1 tablet by mouth daily.      rifaximin (XIFAXAN) 550 MG TABS tablet Take 1 tablet (550 mg) by mouth 2 times  daily. 180 tablet 3    simethicone (MYLICON) 125 MG chewable tablet Take 125 mg by mouth 2 times daily.      spironolactone (ALDACTONE) 50 MG tablet Take 1 tablet (50 mg) by mouth daily. 90 tablet 3    thiamine (B-1) 100 MG tablet Take 1 tablet (100 mg) by mouth daily. 90 tablet 3     No current facility-administered medications for this visit.     Recent Labs   Lab Test 12/16/24  0725 12/11/24  2057 12/06/24  1333 11/25/24  0944 11/19/24  1142 11/12/24  1050 11/05/24  1325 11/03/24  0633 11/02/24  2114 10/29/24  1130   ALKPHOS 231* 221* 252* 256* 292* 292* 239* 209* 245* 217*   ALT 55 63 68 86* 80* 55 45 35 39 40   * 126* 127* 165* 156* 165* 167* 148* 158* 164*   BILITOTAL 7.0* 8.1* 8.6* 12.8* 15.9* 19.8* 26.3* 23.4* 25.2* 26.3*           Allergies:     Allergies   Allergen Reactions    Adhesive Tape Rash          Social History:     Social History     Socioeconomic History    Marital status: Single     Spouse name: Not on file    Number of children: Not on file    Years of education: Not on file    Highest education level: Not on file   Occupational History    Not on file   Tobacco Use    Smoking status: Never    Smokeless tobacco: Never   Vaping Use    Vaping status: Every Day   Substance and Sexual Activity    Alcohol use: Not Currently     Comment: Last drink was 2am 9/7/2024    Drug use: Never    Sexual activity: Not Currently     Partners: Female     Birth control/protection: None   Other Topics Concern    Parent/sibling w/ CABG, MI or angioplasty before 65F 55M? No   Social History Narrative    Not on file     Social Drivers of Health     Financial Resource Strain: Low Risk  (12/9/2024)    Financial Resource Strain     Within the past 12 months, have you or your family members you live with been unable to get utilities (heat, electricity) when it was really needed?: No   Food Insecurity: Low Risk  (12/9/2024)    Food Insecurity     Within the past 12 months, did you worry that your food would run out  "before you got money to buy more?: No     Within the past 12 months, did the food you bought just not last and you didn t have money to get more?: No   Transportation Needs: Low Risk  (12/9/2024)    Transportation Needs     Within the past 12 months, has lack of transportation kept you from medical appointments, getting your medicines, non-medical meetings or appointments, work, or from getting things that you need?: No   Physical Activity: Not on file   Stress: Not on file   Social Connections: Not on file   Interpersonal Safety: Low Risk  (10/5/2024)    Interpersonal Safety     Do you feel physically and emotionally safe where you currently live?: Yes     Within the past 12 months, have you been hit, slapped, kicked or otherwise physically hurt by someone?: No     Within the past 12 months, have you been humiliated or emotionally abused in other ways by your partner or ex-partner?: No   Recent Concern: Interpersonal Safety - High Risk (9/21/2024)    Interpersonal Safety     Do you feel physically and emotionally safe where you currently live?: No     Within the past 12 months, have you been hit, slapped, kicked or otherwise physically hurt by someone?: No     Within the past 12 months, have you been humiliated or emotionally abused in other ways by your partner or ex-partner?: No   Housing Stability: Low Risk  (12/9/2024)    Housing Stability     Do you have housing? : Yes     Are you worried about losing your housing?: No          Family History:     Family History   Problem Relation Age of Onset    Colon Cancer Mother 42    Diabetes Father     Pancreatic Cancer Sister 15    Diabetes Maternal Grandmother     Cirrhosis Paternal Grandfather         alcohol related    Colon Cancer Cousin 32    Liver Cancer No family hx of           Review of Systems:   Gen: See HPI          Physical Exam:    Vital signs:      BP: 114/73 Pulse: 90     SpO2: 99 %     Height: 166.4 cm (5' 5.5\") Weight: 107.5 kg (237 lb)  Estimated body " "mass index is 38.84 kg/m  as calculated from the following:    Height as of this encounter: 1.664 m (5' 5.5\").    Weight as of this encounter: 107.5 kg (237 lb).  Gen: A&Ox3, NAD  HEENT: Scleral icterus  CV: RRR, no overt murmurs  Lung: CTA, non-labored   Abd: soft, NT, obese, BS+, no fluid wave  Ext: no edema, intact pulses.   Skin: No rash or jaundice  Neuro: grossly intact, no asterixis   Psych: appropriate mood and affects         Data:   Reviewed in person and significant for:    Lab Results   Component Value Date     12/16/2024      Lab Results   Component Value Date    POTASSIUM 3.8 12/16/2024     Lab Results   Component Value Date    CHLORIDE 105 12/16/2024    CHLORIDE 102 09/27/2024     Lab Results   Component Value Date    CO2 21 12/16/2024     Lab Results   Component Value Date    BUN 9.7 12/16/2024     Lab Results   Component Value Date    CR 0.78 12/16/2024       Lab Results   Component Value Date    WBC 5.8 12/16/2024    WBC 5.7 12/16/2024     Lab Results   Component Value Date    HGB 10.3 12/16/2024    HGB 10.2 12/16/2024     Lab Results   Component Value Date    HCT 29.6 12/16/2024    HCT 29.9 12/16/2024     Lab Results   Component Value Date     12/16/2024     12/16/2024     Lab Results   Component Value Date     12/16/2024     12/16/2024       Lab Results   Component Value Date     12/16/2024     Lab Results   Component Value Date    ALT 55 12/16/2024     No results found for: \"BILICONJ\"   Lab Results   Component Value Date    BILITOTAL 7.0 12/16/2024       Lab Results   Component Value Date    ALBUMIN 2.9 12/16/2024     Lab Results   Component Value Date    PROTTOTAL 6.0 12/16/2024      Lab Results   Component Value Date    ALKPHOS 231 12/16/2024       Lab Results   Component Value Date    INR 2.24 12/16/2024    INR 3.1 09/27/2024       Imaging:      CT Chest/abdomen/pelvis 12/10/2024    Narrative & Impression   Combined Report of: PET and CT on 12/10/2024 " 9:09 AM:   1. PET of the neck, chest, abdomen, and pelvis.  2. PET CT Fusion for Attenuation Correction and Anatomical  Localization.  3. Diagnostic CT of the chest, abdomen and pelvis with intravenous  contrast obtained for diagnostic interpretation.  4. 3D MIP and PET-CT fused images were processed on an independent  workstation and archived to PACS and reviewed by a radiologist.     Technique:     1. PET: The patient received 14.32 mCi of F-18-FDG. The serum glucose  was 85 mg/dL prior to administration. Body weight was 110 kg. Images  were evaluated in the axial, sagittal, and coronal planes as well as  the rotational whole body MIP. Images were acquired from cranial  vertex to feet.     UPTAKE WAS MEASURED AT 60 MINUTES.      2. CT: Volumetric acquisition for clinical interpretation of the  chest, abdomen, and pelvis acquired at 3 mm sections. The chest,  abdomen, and pelvis were evaluated at 5 mm sections in bone, soft  tissue, and lung windows. High resolution images of the neck were  obtained with multiple oblique projection reformats.     Contrast and Medications:  IV contrast: 135 mL of Isovue 370 intravenously.  PO contrast: None.  Additional Medications: None.     3. 3D MIP and PET-CT fused images were processed on an independent  workstation and archived to PACS and reviewed by a radiologist.     INDICATION: Alcoholic cirrhosis of liver without ascites (H);  Leukocytosis, unspecified type; Elevated C-reactive protein (CRP);  MSSA bacteremia; MSSA bacteremia.     ADDITIONAL INFORMATION OBTAINED FROM EMR: Investigating fever and  bacteremia of unknown origin. History of alcoholic cirrhosis,  currently abstinent.  .     COMPARISON: CT AP from 10/12/2024.     FINDINGS:      BACKGROUND: Liver SUV max = 3.4, Aorta Blood SUV max = 2.7.      HEAD/NECK:     Pharyngeal mucosal spaces: Nasopharynx and palatine tonsils are within  normal limits. Tongue base is normal. Oral tongue and buccal mucosa of  the oral  cavity is normal. Oropharynx, hypopharynx and larynx are  within normal limits.     Sinonasal region: Paranasal sinuses are clear. No mass within the  nasal cavity.     Lymph nodes: No FDG avid or abnormally enlarged lymph nodes.     Salivary glands: The major salivary glands are within normal limits.      Thyroid gland: The thyroid gland is within normal limits.      Vascular structures: The major vasculature of the neck are patent.     Brain: No abnormal FDG avid lesion or abnormal enhancement.      Orbital cavities: No abnormal FDG avid lesion or abnormal enhancement.        CHEST:     Lymph nodes: No FDG avid or abnormally enlarged lymph nodes.     Lungs: The central tracheobronchial tree is clear. Atelectasis in the  right middle lobe. Few areas of scarring in the right lower lobe.  Elevated right hemidiaphragm. No acute consolidation. No abnormal FDG  uptake within the lung.  No pleural effusion or pneumothorax.      Heart and great vessels: Heart size is within normal limits. No  pericardial effusion. The thoracic aorta and main pulmonary artery are  within normal limits. The esophagus is unremarkable. Small lateral  hernia.     ABDOMEN AND PELVIS:     Liver: Somewhat nodular contour of the liver. No FDG avid or other  focal lesion. Mild hepatomegaly with spotty steatosis, similar to  prior CT. No intrahepatic or extrahepatic biliary ductal dilatation. A  tiny gallstone, otherwise gall bladder is normal. Small perihepatic  ascites. .     Findings related to portal hypertension such as; recanalized umbilical  vein, splenorenal shunts, prominent paracolic vessels.     Pancreas: The pancreas is prominent, but within normal limits. No  pancreatic ductal dilatation. No focal pathology or abnormal FDG  uptake.     Spleen: Splenomegaly (14.8 cm). No FDG avid lesion.     Adrenal glands: No FDG avid foci.     Kidneys: No FDG avid lesion. No hydronephrosis. The urinary bladder is  nondistended.      Reproductive organs  are within normal limits.     Gastrointestinal system: Normal caliber of the small and large bowel.  Postsurgical changes of stomach.     Lymph nodes: No FDG avid or abnormally enlarged lymph nodes.     Vascular structures: Normal caliber of the abdominal aorta.     Trace peritoneal fluid and subcutaneous edema in the abdominal wall.     EXTREMITIES:      No abnormal FDG uptake in the visualized extremities.     BONES AND SOFT TISSUES:      No abnormal FDG uptake in the skeleton. No abnormal lytic or blastic  osseous lesions.      SPINAL CANAL:      No evident canal compromise. No abnormal FDG avid lesion.                                                                      IMPRESSION:      1. No evidence of FDG-avid focal infectious process.  2. Hepatosplenomegaly and small perihepatic ascites.      I have personally reviewed the examination and initial interpretation  and I agree with the findings.     JAGUAR MARAVILLA MD            CT ABDOMEN PELVIS W CONTRAST, 10/12/2024 7:24 PM     TECHNIQUE:  Helical CT images from the lung bases through the  symphysis pubis were obtained with IV contrast. Contrast dose: 135ml  isovue 370     COMPARISON: 9/13/2024 MRI, 8/27/2024 CT     HISTORY: RLQ abd pain concerns for ascites and sbp     FINDINGS:     Abdomen and pelvis: Nodular hepatic capsular contour. Decreased size  of the liver, now measuring 17.8 cm in craniocaudal dimension at mid  clavicular line, previously 20.3 cm on 8/27/2024. Resolution of the  previously seen nodular hypoattenuating foci predominantly throughout  the left hepatic lobe. No appreciable new focal suspicious hepatic  lesion. Mild edematous wall thickening of the gallbladder. No intra or  extrahepatic biliary dilation. Normal pancreas. Enlarged spleen,  measuring 15.0 cm in craniocaudal dimension. Normal adrenal glands and  renal cortices. No hydronephrosis, hydroureter, or urinary tract  stone. Normal bladder, prostate, and seminal vesicles. Small  volume  ascites. No free air. No bowel obstruction or inflammation. Normal  appendix. Sleeve gastrectomy changes. The major abdominal vasculature  is patent. Recanalized umbilical vein. Paraesophageal varices. Left  splenorenal shunt. No abdominal or pelvic lymphadenopathy.     Lung bases/lower chest:  Asymmetric elevation of the right  hemidiaphragm with associated right basilar atelectasis. Mosaic  attenuation of the visualized lungs, likely related to hypoinflation.     Bones and soft tissues: No acute or aggressive osseous abnormality.                                                                      IMPRESSION:   1. No acute intra-abdominal pathology. Normal appendix.  2. Cirrhotic hepatic morphology with evidence of portal hypertension,  including small volume ascites, splenomegaly, and upper abdominal  portosystemic collaterals.  3. Decreased hepatomegaly and nodular hepatic steatosis since  8/27/2024.      RUSTY ASH, DO         US ABDOMEN COMPLETE  9/21/2024      CLINICAL HISTORY: eval for ascitic fluid volume (for paracentesis),  eval for biliary obstruction     COMPARISON: None         FINDINGS:  Liver demonstrates increased echogenicity and coarsened echotexture  and measures 20.6 cm in length. There is no intrahepatic or  extrahepatic biliary ductal dilatation. The common bile duct measures  4 mm. The main portal vein is patent with flow towards the liver and  measures 1.0 cm in diameter.     The gallbladder wall measures 4 mm in thickness. No stones or  pericholecystic fluid. There is a pedunculated 4 mm gallbladder polyp.  The spleen measures maximally 12.6 cm and is normal in appearance. The  visualized portions of the pancreas are normal in echogenicity.     The visualized upper abdominal aorta and inferior vena cava are  normal.       The kidneys are normal in position and echogenicity. The right kidney  measures 11.4 cm and the left kidney measures 14.2 cm. There is no  significant urinary  tract dilation. The urinary bladder is moderately  distended and normal in morphology. The bladder wall is normal.     Trace right upper quadrant free fluid.                                                                IMPRESSION:      1. Hepatosplenomegaly and hepatic cirrhosis  2. Trace right upper quadrant free fluid.  3. No evidence of biliary obstruction.     I have personally reviewed the examination and initial interpretation  and I agree with the findings.     BISHNU COLLINS MD

## 2025-01-21 NOTE — NURSING NOTE
"Chief Complaint   Patient presents with    RECHECK     alcoholic liver disease     /73 (BP Location: Right arm, Patient Position: Sitting)   Pulse 90   Ht 1.664 m (5' 5.5\")   Wt 107.5 kg (237 lb)   SpO2 99%   BMI 38.84 kg/m    Jeffrey Kirkland CMA on 1/21/2025 at 9:27 AM    "

## 2025-01-21 NOTE — TELEPHONE ENCOUNTER
Authorization form signed by pt and included in red folder, Berto Kenyon's inbox.    Brittany Gilliam on 1/21/2025 at 3:33 PM

## 2025-01-21 NOTE — LETTER
1/21/2025      Stewart Hunt  [address]  [facility] [address]      Dear Colleague,    Thank you for referring your patient, Stewart Hunt, to the [facility] [place]. Please see a copy of my visit note below.    Hepatology Clinic Note  Stewart Hunt   Date of Birth 1985    REASON FOR FOLLOW UP: severe ETOH hepatitis, cirrhosis   REFERRING PROVIDER: Dr. Dodd         Assessment/plan:     Stewart Hunt is a 38 YO M who presents in follow up for severe alcohol related hepatitis complicated with hepatic encephalopathy, likely superimposed on MET-ALD cirrhosis. Last seen by Dr. Dodd 11/25/24.     MELD 3.0 = 17 (previously 26).  Labs continues to improve steadily.    Recently resumed drinking alcohol for 1 evening on 12/31/24 (consumed 4-5 alcoholic beverages) but denies any more recent use.  His goal is absolute sobriety.  He has not completed chemical dependency treatment but is willing to look into this and complete some form of treatment.    ER visit 12/11/24 for lower extremity edema. DVT US negative for DVT in BLE's. Hepatology fellow advised against altering diuretic regimen. Recommended follow-up w/ PCP to make any necessary adjustments to diuretic regimen.     Patient tells me he has been taking double his prescribed dose of Lasix since the beginning of January.  He reports lower extremity edema has improved significantly.  Also taking spironolactone 50 mg daily.  No recent feelings of fluid in his abdomen.  No history of paracentesis.  Blood pressure looks good today on vitals.     Hx of HE: denies any recent confusion or changes in mentation.  Continues to take rifaximin twice daily and lactulose 20 g twice daily.  On average, passing 3-5 BMs per day.    Varices: Outside EGD and colonoscopy September 2024 through [facility]  HCC screening: CT C/A/P W contrast 12/10/24: Somewhat nodular contour of liver. No FDG avid or other  focal lesion. Mild hepatomegaly  with spotty steatosis, similar to prior. No intrahepatic or extrahepatic biliary ductal dilatation. A tiny gallstone, otherwise gall bladder normal. Small perihepatic ascites. Findings related to portal hypertension such as; recanalized umbilical vein, splenorenal shunts, prominent paracolic vessels.  Splenomegaly.   > AFP 3.6 8/21/2024    PLAN:  -Discussed today's lab work  -Will be due next for imaging of the liver for HCC screening June 2025  -Increase diuretic doses to 40 mg of Lasix daily and spironolactone 100 mg daily; monitor kidney function closely   -Strict low-sodium diet  -Continue on lactulose and rifaximin as prescribed; discussed goal of 3-5 BMs per day  -Avoid NSAID use  -Do not exceed 2000 g Tylenol in 24 hours  -Continue to abstain from all alcohol use; highly encourage patient to reach out to [facility] to inquire about outpatient chemical dependency program/treatment  -Was able to review documents from recent outside EGD/colonoscopy   > Will be due for repeat EGD Sept. 2025  -Repeat MELD labs in 1 month    RTC in 3 months with labs same day   > At next appointment consider referral to Addiction Medicine if he is not enrolled in a CD program; also consider DEXA scan    Rosa Armando PA-C  [facility] Hepat[facility]   -----------------------------------------------------         HPI:     38 YO M who presents for follow-up regarding alcohol related cirrhosis and hepatitis.  Last seen by Dr. Dodd 11/25/2024.    Patient's wife was on the phone today during the visit.    Patient mentions he often feels cold.  No recent fever.  Typically has nausea and episodes of vomiting 1-2 times per week.  No blood in emesis.  Admits to mild left lower quadrant abdominal pain.  Has not noticed any swelling in his abdomen.  No history of paracentesis.  No recent episodes of confusion.  Patient states on average he is passing 3-5 BMs per day.  No bright red blood per rectum or melena.  Patient  "states stool is usually loose and urgent.  He does admit to recently having lower extremity edema.  He states he has been taking double his Lasix dose since the beginning of this month, which has seemed to help.  As mentioned above, he did go into the ER December 2024 for concerns regarding lower extremity edema.  Patient's wife states the yellowness to his eyes has improved.  Patient reports appetite is good.  Tends to avoid pork.  Admits to frequent nosebleeds.  Patient states for the past 1-2 weeks, has had some numbness in his pinky and ring finger on his left hand.    He admits to vaping use daily.  No current cigarette use or chewing tobacco use.  Denies illicit drug use.  States he recently had a \"slip up \"where he drinks 4-5 alcoholic beverages on 12/31/2024.  He has not had any alcohol since then.  Patient states cravings for alcohol are constant but are slowly getting better.  His goal is to maintain sobriety.    Patient verbalizes concerns regarding his job and his ongoing medical concerns.  Patient states his work is on him about disclosing information regarding his medical concerns and regarding the medications he takes.  They are wanting to know a specific date when he can return back to work.  Patient states he feels very pressured by this.  Patient feels as if he needs to continue working from home at this time because he often needs to be very close to a bathroom.  He is also concerned that his performance at work is not up to par.  He mentions that he thinks he will get fired in the near future.  He is looking for our department to provide a letter that he can give to his work.    PROCEDURES    EGD 9/10/2024:   - Normal esophagus.  - Z-line regular, 40 cm from the incisors.  - A sleeve gastrectomy was found.  - Portal hypertensive gastropathy.  - Normal examined duodenum.  - No specimens collected.  Impression:  - Perform a colonoscopy today.  - Repeat upper endoscopy in 1 year for " surveillance.    COLONOSCOPY 9/10/2024:  Non-thrombosed external hemorrhoids and non-thrombosed internal hemorrhoids found on perianal  exam.  - The examined portion of the ileum was normal.  - Redundant colon.  - Congested mucosa in the entire examined colon.  - Two 4 to 5 mm polyps in the transverse colon, removed with a cold snare. Resected and retrieved.  - The examination was otherwise normal on direct and retroflexion views.  Impression:  - Await pathology results.  - Repeat colonoscopy in 5 years for surveillance based on clinical status at that time.  - Labs today- could consider treatment with prednisolone pending values  continue to stay abstinent from all alcohol  my office will arrange a referral to [facility] hepatology program  Follow up GI clinic in 3-4 weeks    Final Dx Colon, transverse polyps x2, polypectomies:  Tubular adenoma  Fragment of unremarkable colonic mucosa     PMH:    has a past medical history of Acute alcoholic hepatitis (H) (09/2024), Alcohol use disorder, severe, in early remission (H), Alcoholic cirrhosis (H), Hypertension (09/07/24), Morbid obesity (H), and Uncomplicated asthma.     SMH:    has a past surgical history that includes Gastrectomy Laparoscopic Sleeve (2012); Picc Insertion - Double Lumen (Left, 10/16/2024); [place] Tooth Extraction; Abdomen surgery (2012); colonoscopy (09/2024); and Eye surgery (15 yrs ago).     Medications:   Current Outpatient Medications   Medication Sig Dispense Refill     albuterol (PROAIR HFA/PROVENTIL HFA/VENTOLIN HFA) 108 (90 Base) MCG/ACT inhaler Inhale 2-3 puffs into the lungs every 6 hours as needed for shortness of breath, wheezing or cough.       esomeprazole (NEXIUM) 40 MG DR capsule Take 40 mg by mouth 2 times daily.       famotidine (PEPCID) 20 MG tablet Take 20 mg by mouth daily.       folic acid (FOLVITE) 1 MG tablet Take 1 tablet (1 mg) by mouth daily. 90 tablet 3     furosemide (LASIX) 20 MG tablet Take 1 tablet (20 mg) by mouth daily. 90  tablet 3     lactulose (CEPHULAC) 10 GM packet Take 2 packets (20 g) by mouth 2 times daily. 360 packet 3     multivitamin w/minerals (THERA-VIT-M) tablet Take 1 tablet by mouth daily.       rifaximin (XIFAXAN) 550 MG TABS tablet Take 1 tablet (550 mg) by mouth 2 times daily. 180 tablet 3     simethicone (MYLICON) 125 MG chewable tablet Take 125 mg by mouth 2 times daily.       spironolactone (ALDACTONE) 50 MG tablet Take 1 tablet (50 mg) by mouth daily. 90 tablet 3     thiamine (B-1) 100 MG tablet Take 1 tablet (100 mg) by mouth daily. 90 tablet 3     No current facility-administered medications for this visit.     Recent Labs   Lab Test 12/16/24  0725 12/11/24 2057 12/06/24  1333 11/25/24  0944 11/19/24  1142 11/12/24  1050 11/05/24  1325 11/03/24  0633 11/02/24  2114 10/29/24  1130   ALKPHOS 231* 221* 252* 256* 292* 292* 239* 209* 245* 217*   ALT 55 63 68 86* 80* 55 45 35 39 40   * 126* 127* 165* 156* 165* 167* 148* 158* 164*   BILITOTAL 7.0* 8.1* 8.6* 12.8* 15.9* 19.8* 26.3* 23.4* 25.2* 26.3*           Allergies:     Allergies   Allergen Reactions     Adhesive Tape Rash          Social History:     Social History     Socioeconomic History     Marital status: Single     Spouse name: Not on file     Number of children: Not on file     Years of education: Not on file     Highest education level: Not on file   Occupational History     Not on file   Tobacco Use     Smoking status: Never     Smokeless tobacco: Never   Vaping Use     Vaping status: Every Day   Substance and Sexual Activity     Alcohol use: Not Currently     Comment: Last drink was 2am 9/7/2024     Drug use: Never     Sexual activity: Not Currently     Partners: Female     Birth control/protection: None   Other Topics Concern     Parent/sibling w/ CABG, MI or angioplasty before 65F 55M? No   Social History Narrative     Not on file     Social Drivers of Health     Financial Resource Strain: Low Risk  (12/9/2024)    Financial Resource Strain       Within the past 12 months, have you or your family members you live with been unable to get utilities (heat, electricity) when it was really needed?: No   Food Insecurity: Low Risk  (12/9/2024)    Food Insecurity      Within the past 12 months, did you worry that your food would run out before you got money to buy more?: No      Within the past 12 months, did the food you bought just not last and you didn t have money to get more?: No   Transportation Needs: Low Risk  (12/9/2024)    Transportation Needs      Within the past 12 months, has lack of transportation kept you from medical appointments, getting your medicines, non-medical meetings or appointments, work, or from getting things that you need?: No   Physical Activity: Not on file   Stress: Not on file   Social Connections: Not on file   Interpersonal Safety: Low Risk  (10/5/2024)    Interpersonal Safety      Do you feel physically and emotionally safe where you currently live?: Yes      Within the past 12 months, have you been hit, slapped, kicked or otherwise physically hurt by someone?: No      Within the past 12 months, have you been humiliated or emotionally abused in other ways by your partner or ex-partner?: No   Recent Concern: Interpersonal Safety - High Risk (9/21/2024)    Interpersonal Safety      Do you feel physically and emotionally safe where you currently live?: No      Within the past 12 months, have you been hit, slapped, kicked or otherwise physically hurt by someone?: No      Within the past 12 months, have you been humiliated or emotionally abused in other ways by your partner or ex-partner?: No   Housing Stability: Low Risk  (12/9/2024)    Housing Stability      Do you have housing? : Yes      Are you worried about losing your housing?: No          Family History:     Family History   Problem Relation Age of Onset     Colon Cancer Mother 42     Diabetes Father      Pancreatic Cancer Sister 15     Diabetes Maternal Grandmother       "Cirrhosis Paternal Grandfather         alcohol related     Colon Cancer Cousin 32     Liver Cancer No family hx of           Review of Systems:   Gen: See HPI          Physical Exam:    Vital signs:      BP: 114/73 Pulse: 90     SpO2: 99 %     Height: 166.4 cm (5' 5.5\") Weight: 107.5 kg (237 lb)  Estimated body mass index is 38.84 kg/m  as calculated from the following:    Height as of this encounter: 1.664 m (5' 5.5\").    Weight as of this encounter: 107.5 kg (237 lb).  Gen: A&Ox3, NAD  HEENT: Scleral icterus  CV: RRR, no overt murmurs  Lung: CTA, non-labored   Abd: soft, NT, obese, BS+, no fluid wave  Ext: no edema, intact pulses.   Skin: No rash or jaundice  Neuro: grossly intact, no asterixis   Psych: appropriate mood and affects         Data:   Reviewed in person and significant for:    Lab Results   Component Value Date     12/16/2024      Lab Results   Component Value Date    POTASSIUM 3.8 12/16/2024     Lab Results   Component Value Date    CHLORIDE 105 12/16/2024    CHLORIDE 102 09/27/2024     Lab Results   Component Value Date    CO2 21 12/16/2024     Lab Results   Component Value Date    BUN 9.7 12/16/2024     Lab Results   Component Value Date    CR 0.78 12/16/2024       Lab Results   Component Value Date    WBC 5.8 12/16/2024    WBC 5.7 12/16/2024     Lab Results   Component Value Date    HGB 10.3 12/16/2024    HGB 10.2 12/16/2024     Lab Results   Component Value Date    HCT 29.6 12/16/2024    HCT 29.9 12/16/2024     Lab Results   Component Value Date     12/16/2024     12/16/2024     Lab Results   Component Value Date     12/16/2024     12/16/2024       Lab Results   Component Value Date     12/16/2024     Lab Results   Component Value Date    ALT 55 12/16/2024     No results found for: \"BILICONJ\"   Lab Results   Component Value Date    BILITOTAL 7.0 12/16/2024       Lab Results   Component Value Date    ALBUMIN 2.9 12/16/2024     Lab Results   Component Value " Date    PROTTOTAL 6.0 12/16/2024      Lab Results   Component Value Date    ALKPHOS 231 12/16/2024       Lab Results   Component Value Date    INR 2.24 12/16/2024    INR 3.1 09/27/2024       Imaging:      CT Chest/abdomen/pelvis 12/10/2024    Narrative & Impression   Combined Report of: PET and CT on 12/10/2024 9:09 AM:   1. PET of the neck, chest, abdomen, and pelvis.  2. PET CT Fusion for Attenuation Correction and Anatomical  Localization.  3. Diagnostic CT of the chest, abdomen and pelvis with intravenous  contrast obtained for diagnostic interpretation.  4. 3D MIP and PET-CT fused images were processed on an independent  workstation and archived to PACS and reviewed by a radiologist.     Technique:     1. PET: The patient received 14.32 mCi of F-18-FDG. The serum glucose  was 85 mg/dL prior to administration. Body weight was 110 kg. Images  were evaluated in the axial, sagittal, and coronal planes as well as  the rotational whole body MIP. Images were acquired from cranial  vertex to feet.     UPTAKE WAS MEASURED AT 60 MINUTES.      2. CT: Volumetric acquisition for clinical interpretation of the  chest, abdomen, and pelvis acquired at 3 mm sections. The chest,  abdomen, and pelvis were evaluated at 5 mm sections in bone, soft  tissue, and lung windows. High resolution images of the neck were  obtained with multiple oblique projection reformats.     Contrast and Medications:  IV contrast: 135 mL of Isovue 370 intravenously.  PO contrast: None.  Additional Medications: None.     3. 3D MIP and PET-CT fused images were processed on an independent  workstation and archived to PACS and reviewed by a radiologist.     INDICATION: Alcoholic cirrhosis of liver without ascites (H);  Leukocytosis, unspecified type; Elevated C-reactive protein (CRP);  MSSA bacteremia; MSSA bacteremia.     ADDITIONAL INFORMATION OBTAINED FROM EMR: Investigating fever and  bacteremia of unknown origin. History of alcoholic  cirrhosis,  currently abstinent.  .     COMPARISON: CT AP from 10/12/2024.     FINDINGS:      BACKGROUND: Liver SUV max = 3.4, Aorta Blood SUV max = 2.7.      HEAD/NECK:     Pharyngeal mucosal spaces: Nasopharynx and palatine tonsils are within  normal limits. Tongue base is normal. Oral tongue and buccal mucosa of  the oral cavity is normal. Oropharynx, hypopharynx and larynx are  within normal limits.     Sinonasal region: Paranasal sinuses are clear. No mass within the  nasal cavity.     Lymph nodes: No FDG avid or abnormally enlarged lymph nodes.     Salivary glands: The major salivary glands are within normal limits.      Thyroid gland: The thyroid gland is within normal limits.      Vascular structures: The major vasculature of the neck are patent.     Brain: No abnormal FDG avid lesion or abnormal enhancement.      Orbital cavities: No abnormal FDG avid lesion or abnormal enhancement.        CHEST:     Lymph nodes: No FDG avid or abnormally enlarged lymph nodes.     Lungs: The central tracheobronchial tree is clear. Atelectasis in the  right middle lobe. Few areas of scarring in the right lower lobe.  Elevated right hemidiaphragm. No acute consolidation. No abnormal FDG  uptake within the lung.  No pleural effusion or pneumothorax.      Heart and great vessels: Heart size is within normal limits. No  pericardial effusion. The thoracic aorta and main pulmonary artery are  within normal limits. The esophagus is unremarkable. Small lateral  hernia.     ABDOMEN AND PELVIS:     Liver: Somewhat nodular contour of the liver. No FDG avid or other  focal lesion. Mild hepatomegaly with spotty steatosis, similar to  prior CT. No intrahepatic or extrahepatic biliary ductal dilatation. A  tiny gallstone, otherwise gall bladder is normal. Small perihepatic  ascites. .     Findings related to portal hypertension such as; recanalized umbilical  vein, splenorenal shunts, prominent paracolic vessels.     Pancreas: The pancreas  is prominent, but within normal limits. No  pancreatic ductal dilatation. No focal pathology or abnormal FDG  uptake.     Spleen: Splenomegaly (14.8 cm). No FDG avid lesion.     Adrenal glands: No FDG avid foci.     Kidneys: No FDG avid lesion. No hydronephrosis. The urinary bladder is  nondistended.      Reproductive organs are within normal limits.     Gastrointestinal system: Normal caliber of the small and large bowel.  Postsurgical changes of stomach.     Lymph nodes: No FDG avid or abnormally enlarged lymph nodes.     Vascular structures: Normal caliber of the abdominal aorta.     Trace peritoneal fluid and subcutaneous edema in the abdominal wall.     EXTREMITIES:      No abnormal FDG uptake in the visualized extremities.     BONES AND SOFT TISSUES:      No abnormal FDG uptake in the skeleton. No abnormal lytic or blastic  osseous lesions.      SPINAL CANAL:      No evident canal compromise. No abnormal FDG avid lesion.                                                                      IMPRESSION:      1. No evidence of FDG-avid focal infectious process.  2. Hepatosplenomegaly and small perihepatic ascites.      I have personally reviewed the examination and initial interpretation  and I agree with the findings.     JAGUAR MARAVILLA MD            CT ABDOMEN PELVIS W CONTRAST, 10/12/2024 7:24 PM     TECHNIQUE:  Helical CT images from the lung bases through the  symphysis pubis were obtained with IV contrast. Contrast dose: 135ml  isovue 370     COMPARISON: 9/13/2024 MRI, 8/27/2024 CT     HISTORY: RLQ abd pain concerns for ascites and sbp     FINDINGS:     Abdomen and pelvis: Nodular hepatic capsular contour. Decreased size  of the liver, now measuring 17.8 cm in craniocaudal dimension at mid  clavicular line, previously 20.3 cm on 8/27/2024. Resolution of the  previously seen nodular hypoattenuating foci predominantly throughout  the left hepatic lobe. No appreciable new focal suspicious hepatic  lesion. Mild  edematous wall thickening of the gallbladder. No intra or  extrahepatic biliary dilation. Normal pancreas. Enlarged spleen,  measuring 15.0 cm in craniocaudal dimension. Normal adrenal glands and  renal cortices. No hydronephrosis, hydroureter, or urinary tract  stone. Normal bladder, prostate, and seminal vesicles. Small volume  ascites. No free air. No bowel obstruction or inflammation. Normal  appendix. Sleeve gastrectomy changes. The major abdominal vasculature  is patent. Recanalized umbilical vein. Paraesophageal varices. Left  splenorenal shunt. No abdominal or pelvic lymphadenopathy.     Lung bases/lower chest:  Asymmetric elevation of the right  hemidiaphragm with associated right basilar atelectasis. Mosaic  attenuation of the visualized lungs, likely related to hypoinflation.     Bones and soft tissues: No acute or aggressive osseous abnormality.                                                                      IMPRESSION:   1. No acute intra-abdominal pathology. Normal appendix.  2. Cirrhotic hepatic morphology with evidence of portal hypertension,  including small volume ascites, splenomegaly, and upper abdominal  portosystemic collaterals.  3. Decreased hepatomegaly and nodular hepatic steatosis since  8/27/2024.      RUSTY ASH,          US ABDOMEN COMPLETE  9/21/2024      CLINICAL HISTORY: eval for ascitic fluid volume (for paracentesis),  eval for biliary obstruction     COMPARISON: None         FINDINGS:  Liver demonstrates increased echogenicity and coarsened echotexture  and measures 20.6 cm in length. There is no intrahepatic or  extrahepatic biliary ductal dilatation. The common bile duct measures  4 mm. The main portal vein is patent with flow towards the liver and  measures 1.0 cm in diameter.     The gallbladder wall measures 4 mm in thickness. No stones or  pericholecystic fluid. There is a pedunculated 4 mm gallbladder polyp.  The spleen measures maximally 12.6 cm and is normal in  appearance. The  visualized portions of the pancreas are normal in echogenicity.     The visualized upper abdominal aorta and inferior vena cava are  normal.       The kidneys are normal in position and echogenicity. The right kidney  measures 11.4 cm and the left kidney measures 14.2 cm. There is no  significant urinary tract dilation. The urinary bladder is moderately  distended and normal in morphology. The bladder wall is normal.     Trace right upper quadrant free fluid.                                                                IMPRESSION:      1. Hepatosplenomegaly and hepatic cirrhosis  2. Trace right upper quadrant free fluid.  3. No evidence of biliary obstruction.     I have personally reviewed the examination and initial interpretation  and I agree with the findings.     BISHNU COLLINS MD       Again, thank you for allowing me to participate in the care of your patient.        Sincerely,        Rosa Armando PA-C    Electronically signed

## 2025-01-21 NOTE — TELEPHONE ENCOUNTER
Forms/Letter Request    Type of form/letter: Return to work Healthcare provider statement    Do we have the form/letter: Yes, red folder, Berto Kenyon's inbox.    Who is the form from? Patient    Where did/will the form come from? form was sent via [employer]    When is form/letter needed by:       Asap    How would you like the form/letter returned:      TBD    Patient Notified form requests are processed in 5-7 business days:Yes    Could we send this information to you in [employer] or would you prefer to receive a phone call?:   Patient would prefer a phone call   Okay to leave a detailed message?: Yes at Cell number on file:    Telephone Information:   Mobile [phone]     Brittany Gilliam on 1/21/2025 at 11:20 AM

## 2025-01-22 ENCOUNTER — TELEPHONE (OUTPATIENT)
Dept: GASTROENTEROLOGY | Facility: CLINIC | Age: 40
End: 2025-01-22
Payer: COMMERCIAL

## 2025-01-22 NOTE — TELEPHONE ENCOUNTER
[facility] Call Center    Phone Message    May a detailed message be left on voicemail: yes     Reason for Call: Other: Requesting letter for work . It was in regards to getting [employer] accomodation.       Action Taken: Other: hepa     Travel Screening: Not Applicable     Date of Service:

## 2025-01-22 NOTE — TELEPHONE ENCOUNTER
Letter sent to patient via [employer].    PAULINO WongN, RN, PHN  Hepatology Clinic  [facility]  [facility]

## 2025-01-23 LAB
LABORATORY REPORT: NORMAL
PETH INTERPRETATION: NORMAL
PLPETH BLD-MCNC: 1062 NG/ML
POPETH BLD-MCNC: 637 NG/ML

## 2025-01-27 DIAGNOSIS — K70.30 ALCOHOLIC CIRRHOSIS OF LIVER WITHOUT ASCITES (H): ICD-10-CM

## 2025-01-27 RX ORDER — SPIRONOLACTONE 50 MG/1
100 TABLET, FILM COATED ORAL DAILY
Qty: 60 TABLET | Refills: 5 | Status: SHIPPED | OUTPATIENT
Start: 2025-01-27

## 2025-01-27 RX ORDER — FUROSEMIDE 20 MG/1
40 TABLET ORAL DAILY
Qty: 60 TABLET | Refills: 5 | Status: SHIPPED | OUTPATIENT
Start: 2025-01-27

## 2025-02-19 ENCOUNTER — MYC REFILL (OUTPATIENT)
Dept: GASTROENTEROLOGY | Facility: CLINIC | Age: 40
End: 2025-02-19
Payer: COMMERCIAL

## 2025-02-19 DIAGNOSIS — K76.82 HEPATIC ENCEPHALOPATHY (H): ICD-10-CM

## 2025-02-24 ENCOUNTER — TELEPHONE (OUTPATIENT)
Dept: GASTROENTEROLOGY | Facility: CLINIC | Age: 40
End: 2025-02-24
Payer: COMMERCIAL

## 2025-02-24 ENCOUNTER — MYC MEDICAL ADVICE (OUTPATIENT)
Dept: GASTROENTEROLOGY | Facility: CLINIC | Age: 40
End: 2025-02-24
Payer: COMMERCIAL

## 2025-02-24 DIAGNOSIS — K76.82 HEPATIC ENCEPHALOPATHY (H): ICD-10-CM

## 2025-02-24 DIAGNOSIS — K70.30 ALCOHOLIC CIRRHOSIS OF LIVER WITHOUT ASCITES (H): ICD-10-CM

## 2025-02-24 RX ORDER — FUROSEMIDE 20 MG/1
40 TABLET ORAL DAILY
Qty: 180 TABLET | Refills: 3 | Status: SHIPPED | OUTPATIENT
Start: 2025-02-24

## 2025-02-24 RX ORDER — SPIRONOLACTONE 50 MG/1
100 TABLET, FILM COATED ORAL DAILY
Qty: 180 TABLET | Refills: 3 | Status: SHIPPED | OUTPATIENT
Start: 2025-02-24

## 2025-02-24 RX ORDER — LACTULOSE 10 G/10G
20 SOLUTION ORAL 2 TIMES DAILY
Qty: 360 PACKET | Refills: 3 | Status: SHIPPED | OUTPATIENT
Start: 2025-02-24

## 2025-02-24 NOTE — TELEPHONE ENCOUNTER
Medication sent.     PAULINO WongN, RN, PHN  Hepatology Clinic  [facility]  [facility]

## 2025-02-24 NOTE — TELEPHONE ENCOUNTER
Central Prior Authorization Team   Phone: [phone]    PA Initiation    Medication: Xifaxan - Take 1 tablet (550 mg) by mouth 2 times daily  Insurance Company: [employer], Phone [phone] Fax [phone]  Pharmacy Filling the Rx: [facility] [address] [place] [address] [address]  Filling Pharmacy Phone: [phone]  Filling Pharmacy Fax:    Start Date: 2/24/2025    [facility] KEY:  [id number]

## 2025-02-24 NOTE — TELEPHONE ENCOUNTER
Prior Authorization Retail Medication Request    Medication/Dose: Xifaxan - Take 1 tablet (550 mg) by mouth 2 times daily  Diagnosis and ICD code (if different than what is on RX):  K72.90  New/renewal/insurance change PA/secondary ins. PA:  Previously Tried and Failed:  lactulose alone  Rationale:  Xifaxan helps to lower the toxin build up in the blood while lactulose works by cleansing the toxin build up in the liver. Adjunctive therapy: http://onlinelibrary.zuniga.com/doi/10.1111/kristina.42217/full       Insurance   Primary:   Insurance ID:      Secondary (if applicable):  Insurance ID:      Pharmacy Information (if different than what is on RX)  Name:    Phone:    Fax:    Clinic Information  Preferred routing pool for dept communication:

## 2025-02-24 NOTE — TELEPHONE ENCOUNTER
[facility] Call Center    Phone Message    May a detailed message be left on voicemail: yes     Reason for Call: Medication Refill Request    Has the patient contacted the pharmacy for the refill? Yes   Name of medication being requested: rifaximin (XIFAXAN) 550 MG TABS tablet   Provider who prescribed the medication: Prabha Lee   Pharmacy: [facility] in [address] [place]  Date medication is needed: ASAP, please send 90 day supply    Action Taken: Other: Hepa     Travel Screening: Not Applicable     Date of Service:

## 2025-02-25 NOTE — TELEPHONE ENCOUNTER
Called and spoke with Nina at [facility] to check on the status of PA. The PA is with the clinical team, she states they have 3 business days to make a determination for urgent requests and she can see this is marked urgent.

## 2025-02-26 ENCOUNTER — TELEPHONE (OUTPATIENT)
Dept: GASTROENTEROLOGY | Facility: CLINIC | Age: 40
End: 2025-02-26
Payer: COMMERCIAL

## 2025-02-26 NOTE — TELEPHONE ENCOUNTER
Pharmacy has a paid claim for 60 tablets for 30 days for $0.00.  Insurance will not cover a 90 day supply at a retail pharmacy.  Patient should contact his insurance if he would like to get a 90 day supply to see if he will need to use mail order.

## 2025-02-26 NOTE — TELEPHONE ENCOUNTER
Prior Authorization Retail Medication Request    Medication/Dose:   Diagnosis and ICD code (if different than what is on RX):  Hepatic encephalopathy  New/renewal/insurance change PA/secondary ins. PA:   Previously Tried and Failed:  Lactulose alone  Rationale:  Xifaxan helps to lower the toxin build up in the blood while lactulose works by cleansing the toxin build up in the liver. Adjunctive therapy.      Insurance   Primary:   Insurance ID:      Secondary (if applicable):  Insurance ID:      Pharmacy Information (if different than what is on RX)  Name:    Phone:    Fax:    Clinic Information  Preferred routing pool for dept communication:

## 2025-02-26 NOTE — TELEPHONE ENCOUNTER
Prior Authorization Approval    Authorization Effective Date: 2/26/2025  Authorization Expiration Date: 2/26/2026  Medication: Xifaxan - Take 1 tablet (550 mg) by mouth 2 times daily  Reference #:     Insurance Company: [employer], Phone [phone] Fax [phone]  Which Pharmacy is filling the prescription (Not needed for infusion/clinic administered): [facility] [address] [place] [address]  Pharmacy Notified: Yes  Patient Notified: Instructed pharmacy to notify patient when script is ready to /ship.

## 2025-03-13 DIAGNOSIS — K76.82 HEPATIC ENCEPHALOPATHY (H): ICD-10-CM

## 2025-03-13 DIAGNOSIS — K70.30 ALCOHOLIC CIRRHOSIS OF LIVER WITHOUT ASCITES (H): ICD-10-CM

## 2025-03-13 RX ORDER — LACTULOSE 10 G/15ML
20 SOLUTION ORAL 2 TIMES DAILY
Qty: 1892 ML | Refills: 11 | Status: SHIPPED | OUTPATIENT
Start: 2025-03-13

## 2025-04-22 ENCOUNTER — LAB (OUTPATIENT)
Dept: LAB | Facility: CLINIC | Age: 40
End: 2025-04-22
Attending: PHYSICIAN ASSISTANT
Payer: COMMERCIAL

## 2025-04-22 ENCOUNTER — OFFICE VISIT (OUTPATIENT)
Dept: GASTROENTEROLOGY | Facility: CLINIC | Age: 40
End: 2025-04-22
Attending: PHYSICIAN ASSISTANT
Payer: COMMERCIAL

## 2025-04-22 VITALS
WEIGHT: 248.9 LBS | HEIGHT: 65 IN | RESPIRATION RATE: 18 BRPM | SYSTOLIC BLOOD PRESSURE: 118 MMHG | OXYGEN SATURATION: 100 % | HEART RATE: 73 BPM | BODY MASS INDEX: 41.47 KG/M2 | DIASTOLIC BLOOD PRESSURE: 70 MMHG

## 2025-04-22 DIAGNOSIS — D53.9 MACROCYTIC ANEMIA: ICD-10-CM

## 2025-04-22 DIAGNOSIS — D69.6 THROMBOCYTOPENIA: ICD-10-CM

## 2025-04-22 DIAGNOSIS — K70.30 ALCOHOLIC CIRRHOSIS OF LIVER WITHOUT ASCITES (H): Primary | ICD-10-CM

## 2025-04-22 DIAGNOSIS — K70.9 ALCOHOLIC LIVER DISEASE: ICD-10-CM

## 2025-04-22 DIAGNOSIS — D68.9 COAGULOPATHY: ICD-10-CM

## 2025-04-22 DIAGNOSIS — K70.30 ALCOHOLIC CIRRHOSIS OF LIVER WITHOUT ASCITES (H): ICD-10-CM

## 2025-04-22 DIAGNOSIS — F10.90 ALCOHOL USE DISORDER: ICD-10-CM

## 2025-04-22 PROBLEM — J45.21 MILD INTERMITTENT ASTHMA WITH EXACERBATION: Status: ACTIVE | Noted: 2025-04-22

## 2025-04-22 LAB
AFP SERPL-MCNC: 13.6 NG/ML
ALBUMIN SERPL BCG-MCNC: 3.6 G/DL (ref 3.5–5.2)
ALP SERPL-CCNC: 190 U/L (ref 40–150)
ALT SERPL W P-5'-P-CCNC: 61 U/L (ref 0–70)
ANION GAP SERPL CALCULATED.3IONS-SCNC: 8 MMOL/L (ref 7–15)
AST SERPL W P-5'-P-CCNC: 82 U/L (ref 0–45)
BILIRUB DIRECT SERPL-MCNC: 2.03 MG/DL (ref 0–0.3)
BILIRUB SERPL-MCNC: 3.6 MG/DL
BUN SERPL-MCNC: 16 MG/DL (ref 6–20)
CALCIUM SERPL-MCNC: 9.5 MG/DL (ref 8.8–10.4)
CHLORIDE SERPL-SCNC: 106 MMOL/L (ref 98–107)
CREAT SERPL-MCNC: 0.93 MG/DL (ref 0.67–1.17)
EGFRCR SERPLBLD CKD-EPI 2021: >90 ML/MIN/1.73M2
ERYTHROCYTE [DISTWIDTH] IN BLOOD BY AUTOMATED COUNT: 16.4 % (ref 10–15)
GLUCOSE SERPL-MCNC: 96 MG/DL (ref 70–99)
HCO3 SERPL-SCNC: 25 MMOL/L (ref 22–29)
HCT VFR BLD AUTO: 34.7 % (ref 40–53)
HGB BLD-MCNC: 12 G/DL (ref 13.3–17.7)
INR PPP: 1.77 (ref 0.85–1.15)
MCH RBC QN AUTO: 35.2 PG (ref 26.5–33)
MCHC RBC AUTO-ENTMCNC: 34.6 G/DL (ref 31.5–36.5)
MCV RBC AUTO: 102 FL (ref 78–100)
PLATELET # BLD AUTO: 113 10E3/UL (ref 150–450)
POTASSIUM SERPL-SCNC: 4.5 MMOL/L (ref 3.4–5.3)
PROT SERPL-MCNC: 6.5 G/DL (ref 6.4–8.3)
RBC # BLD AUTO: 3.41 10E6/UL (ref 4.4–5.9)
SODIUM SERPL-SCNC: 139 MMOL/L (ref 135–145)
WBC # BLD AUTO: 6.1 10E3/UL (ref 4–11)

## 2025-04-22 PROCEDURE — 99000 SPECIMEN HANDLING OFFICE-LAB: CPT | Performed by: PATHOLOGY

## 2025-04-22 PROCEDURE — 82105 ALPHA-FETOPROTEIN SERUM: CPT | Performed by: PHYSICIAN ASSISTANT

## 2025-04-22 PROCEDURE — 85610 PROTHROMBIN TIME: CPT | Performed by: PATHOLOGY

## 2025-04-22 PROCEDURE — 99213 OFFICE O/P EST LOW 20 MIN: CPT | Performed by: PHYSICIAN ASSISTANT

## 2025-04-22 PROCEDURE — 3078F DIAST BP <80 MM HG: CPT | Performed by: PHYSICIAN ASSISTANT

## 2025-04-22 PROCEDURE — 85027 COMPLETE CBC AUTOMATED: CPT | Performed by: PATHOLOGY

## 2025-04-22 PROCEDURE — G0480 DRUG TEST DEF 1-7 CLASSES: HCPCS | Mod: 90 | Performed by: PATHOLOGY

## 2025-04-22 PROCEDURE — 82248 BILIRUBIN DIRECT: CPT | Performed by: PATHOLOGY

## 2025-04-22 PROCEDURE — 3074F SYST BP LT 130 MM HG: CPT | Performed by: PHYSICIAN ASSISTANT

## 2025-04-22 PROCEDURE — 36415 COLL VENOUS BLD VENIPUNCTURE: CPT | Performed by: PATHOLOGY

## 2025-04-22 PROCEDURE — 1126F AMNT PAIN NOTED NONE PRSNT: CPT | Performed by: PHYSICIAN ASSISTANT

## 2025-04-22 PROCEDURE — 80053 COMPREHEN METABOLIC PANEL: CPT | Performed by: PATHOLOGY

## 2025-04-22 PROCEDURE — 99214 OFFICE O/P EST MOD 30 MIN: CPT | Performed by: PHYSICIAN ASSISTANT

## 2025-04-22 RX ORDER — CHLORDIAZEPOXIDE HYDROCHLORIDE 10 MG/1
10 CAPSULE, GELATIN COATED ORAL
COMMUNITY
Start: 2025-02-14

## 2025-04-22 ASSESSMENT — PAIN SCALES - GENERAL: PAINLEVEL_OUTOF10: NO PAIN (0)

## 2025-04-22 NOTE — PATIENT INSTRUCTIONS
Referral to Addiction Medicine     Schedule US June 2025    EGD due Sept 2025    Follow up in 3-4 months with labs same day

## 2025-04-22 NOTE — PROGRESS NOTES
Hepatology Clinic note  Stewart Hunt   Date of Birth 1985    REASON FOR FOLLOW UP: severe ETOH hepatitis, cirrhosis          Assessment/plan:      40 YO M who presents in follow up for severe alcohol related cirrhosis/hepatitis complicated by history of hepatic encephalopathy, likely superimposed on MET-ALD cirrhosis. Last seen 1/21/25.      MELD 3.0 remains stable at 17 (previously up to 26).      PETH 637 1/21/25. Last use of alcohol 2/22/25. His goal is absolute sobriety.  He has not completed chemical dependency treatment but is willing to complete this.  Agrees to referral to addiction medicine.     Most recent ER visit was at outside hospital on 2/14/25.  Patient states he went in due to not feeling well and having shakiness and nausea.  He was not admitted to the hospital at this time.    Hx HE: denies any recent episodes of severe confusion; does admit to intermittent short-term memory issues; continues to take rifaximin BID and lactulose 20 g BID.  Lately only passing 1 BM per day.  Advised pt to increase lactulose intake for goal of 2-3 BMs per day.     Varices: Outside EGD and colonoscopy Sept. 2024 through [facility] (results listed below)   > Due for repeat EGD September 2025  HCC screening: CT C/A/P W contrast 12/10/24: Somewhat nodular contour of liver. No FDG avid or other focal lesion. Mild hepatomegaly with spotty steatosis, similar to prior. No intrahepatic or extrahepatic biliary ductal dilatation. A tiny gallstone, otherwise gall bladder normal. Small perihepatic ascites. Findings related to portal hypertension such as; recanalized umbilical vein, splenorenal shunts, prominent paracolic vessels.  Splenomegaly.              > AFP in process from today     PLAN:  -Discussed recent lab work  -Will be due next for imaging of the liver for HCC screening June 2025; ordered  -Continue Lasix 40 mg daily & spironolactone 100 mg daily  -Do not feel as if patient warrants paracentesis at this time  based on physical exam  -Follow strict low-sodium diet (<2000 mg daily)  -Continue on lactulose and rifaximin as prescribed; discussed goal of 2-3 BMs per day   > Patient will titrate lactulose accordingly  -Avoid all NSAID use  -Do not exceed 2000 g Tylenol in 24 hours  -Discussed importance of abstaining from all alcohol use indefinitely; highly encouraged he complete some form of chemical dependency treatment/program  -Referral placed to addiction medicine; patient agreeable  - Follow with PCP routinely as recommended  -If patient continues to abstain from alcohol and enrolls in some sort of chemical dependency treatment/program, would consider referral to liver transplant evaluation clinic, especially if MELD remains >15    RTC in 3-4 months with labs same day              > At next appt consider ordering DEXA scan    Rosa Armando PA-C  [facility]   -----------------------------------------------------         HPI:     38 YO M who presents for follow-up regarding alcohol related cirrhosis and hepatitis.  Last visit 1/21/25.    Patient denies any recent significant changes to his medications.  He states he is no longer on lactulose powder.  He is now on liquid solution.    Patient states his work schedule was changed so he is now working nights.  Patient states this is going well.  He continues to work full-time.    Patient states he was kicked out of his home in February.  He is now residing with his father.  Patient admits that he was drinking alcohol up until February 22 of this year.  He is happy that he has been sober for about 2 months.  Patient feels good without consuming alcohol.  He has not yet completed any form of chemical dependency treatment or program but he is open to this.  Patient states he always tends to have cravings for alcohol but he just tries to remember that he cannot drink it.    He admits he went into the ER on 2/14/2025 for not feeling well.  Patient  states at that time he was experiencing shakiness and nausea.  They did not admit him to the hospital.    He continues to take his diuretics as prescribed.  Patient states he will get a refill of spironolactone on the 26th.  He is almost out of spironolactone.  Patient states he is now on lactulose liquid.  He has been taking 30 mL twice daily.  Also continues to take rifaximin.  Denies any recent episodes of severe confusion.  States he has intermittent episodes where he feels as if his short-term memory is poor.  Previously had been passing stool 3-4 times per day.  Over the last week, typically having 1 bowel movement per day.  Denies bright red blood per rectum or melena.  Denies lower extremity edema.  Has not had to have a paracentesis.  Appetite is good.  Finds it hard to follow a low-sodium diet.  Denies fevers or chills.  States nausea comes and goes.  The last time he had an episode of vomiting was last week.  Denies blood in emesis.  Patient states he recently has been consuming homemade meals from his stepmother.  Patient feels as if her cooking causes upper abdominal discomfort that includes achiness, nausea and bloating.  Has realized that this food triggers his symptoms so he has been avoiding the food now.  Symptoms have gotten better.  He continues to take Nexium and Pepcid.  Also takes [employer] simethicone twice daily. Has gained approximately 8 LBS over the past 2 months.    Denies current/former tobacco use.  Denies illicit drug use.     PROCEDURES     EGD 9/10/2024:   - Normal esophagus.  - Z-line regular, 40 cm from the incisors.  - A sleeve gastrectomy was found.  - Portal hypertensive gastropathy.  - Normal examined duodenum.  - No specimens collected.  Impression:  - Perform a colonoscopy today.  - Repeat upper endoscopy in 1 year for surveillance.     COLONOSCOPY 9/10/2024:  Non-thrombosed external hemorrhoids and non-thrombosed internal hemorrhoids found on perianal  exam.  - The examined  portion of the ileum was normal.  - Redundant colon.  - Congested mucosa in the entire examined colon.  - Two 4 to 5 mm polyps in the transverse colon, removed with a cold snare. Resected and retrieved.  - The examination was otherwise normal on direct and retroflexion views.  Impression:  - Await pathology results.  - Repeat colonoscopy in 5 yrs for surveillance based on clinical status at that time.  - Labs today- could consider treatment with prednisolone pending values  continue to stay abstinent from all alcohol  my office will arrange a referral to [facility] hepatology program  Follow up GI clinic in 3-4 weeks     Final Dx Colon, transverse polyps x2, polypectomies:  Tubular adenoma  Fragment of unremarkable colonic mucosa     PMH:    has a past medical history of Acute alcoholic hepatitis (H) (09/2024), Alcohol use disorder, severe, in early remission (H), Alcoholic cirrhosis (H), Hypertension (09/07/24), Morbid obesity (H), and Uncomplicated asthma.     SMH:    has a past surgical history that includes Gastrectomy Laparoscopic Sleeve (2012); Picc Insertion - Double Lumen (Left, 10/16/2024); [place] Tooth Extraction; Abdomen surgery (2012); colonoscopy (09/2024); and Eye surgery (15 yrs ago).     Medications:   Current Outpatient Medications   Medication Sig Dispense Refill    albuterol (PROAIR HFA/PROVENTIL HFA/VENTOLIN HFA) 108 (90 Base) MCG/ACT inhaler Inhale 2-3 puffs into the lungs every 6 hours as needed for shortness of breath, wheezing or cough.      chlordiazePOXIDE (LIBRIUM) 10 MG capsule Take 10 mg by mouth.      esomeprazole (NEXIUM) 40 MG DR capsule Take 40 mg by mouth 2 times daily.      famotidine (PEPCID) 20 MG tablet Take 20 mg by mouth daily.      folic acid (FOLVITE) 1 MG tablet Take 1 tablet (1 mg) by mouth daily. 90 tablet 3    furosemide (LASIX) 20 MG tablet Take 2 tablets (40 mg) by mouth daily. 180 tablet 3    lactulose (CHRONULAC) 10 GM/15ML solution Take 30 mLs (20 g) by mouth 2 times  "daily. 1892 mL 11    multivitamin w/minerals (THERA-VIT-M) tablet Take 1 tablet by mouth daily.      rifaximin (XIFAXAN) 550 MG TABS tablet Take 1 tablet (550 mg) by mouth 2 times daily. 180 tablet 3    simethicone (MYLICON) 125 MG chewable tablet Take 125 mg by mouth 2 times daily.      spironolactone (ALDACTONE) 50 MG tablet Take 2 tablets (100 mg) by mouth daily. 180 tablet 3    thiamine (B-1) 100 MG tablet Take 1 tablet (100 mg) by mouth daily. 90 tablet 3     No current facility-administered medications for this visit.     Previous work-up:   Lab Results   Component Value Date    HEPBANG Nonreactive 10/16/2024    HCVAB Nonreactive 10/16/2024    POPETH 637 01/21/2025    PLPETH 1062 01/21/2025      No results found for: \"SPECDES\", \"LDRESULTS\"    Recent Labs   Lab Test 01/21/25  0910 12/16/24  0725 12/11/24  2057 12/06/24  1333 11/25/24  0944 11/19/24  1142 11/12/24  1050 11/05/24  1325 11/03/24  0633 11/02/24  2114   ALKPHOS 253* 231* 221* 252* 256* 292* 292* 239* 209* 245*   ALT 59 55 63 68 86* 80* 55 45 35 39   * 107* 126* 127* 165* 156* 165* 167* 148* 158*   BILITOTAL 4.1* 7.0* 8.1* 8.6* 12.8* 15.9* 19.8* 26.3* 23.4* 25.2*           Allergies:     Allergies   Allergen Reactions    Adhesive Tape Rash          Social History:     Social History     Socioeconomic History    Marital status: Single     Spouse name: Not on file    Number of children: Not on file    Years of education: Not on file    Highest education level: Not on file   Occupational History    Not on file   Tobacco Use    Smoking status: Never    Smokeless tobacco: Never   Vaping Use    Vaping status: Every Day   Substance and Sexual Activity    Alcohol use: Not Currently     Comment: last use end Feb 2025    Drug use: Never    Sexual activity: Not Currently     Partners: Female     Birth control/protection: None   Other Topics Concern    Parent/sibling w/ CABG, MI or angioplasty before 65F 55M? No   Social History Narrative    Not on file " "    Social Drivers of Health     Financial Resource Strain: Low Risk  (12/9/2024)    Financial Resource Strain     Within the past 12 months, have you or your family members you live with been unable to get utilities (heat, electricity) when it was really needed?: No   Food Insecurity: Low Risk  (12/9/2024)    Food Insecurity     Within the past 12 months, did you worry that your food would run out before you got money to buy more?: No     Within the past 12 months, did the food you bought just not last and you didn t have money to get more?: No   Transportation Needs: Low Risk  (12/9/2024)    Transportation Needs     Within the past 12 months, has lack of transportation kept you from medical appointments, getting your medicines, non-medical meetings or appointments, work, or from getting things that you need?: No   Physical Activity: Not on file   Stress: Not on file   Social Connections: Not on file   Interpersonal Safety: Not At Risk (2/14/2025)    Received from [facility]    Abuse Indicators     Alleged/Suspected/Actual Abuse, Neglect, Violence, Exploitation: No   Housing Stability: Low Risk  (12/9/2024)    Housing Stability     Do you have housing? : Yes     Are you worried about losing your housing?: No          Family History:     Family History   Problem Relation Age of Onset    Colon Cancer Mother 42    Diabetes Father     Pancreatic Cancer Sister 15    Diabetes Maternal Grandmother     Cirrhosis Paternal Grandfather         alcohol related    Colon Cancer Cousin 32    Liver Cancer No family hx of           Review of Systems:   Gen: See HPI          Physical Exam:   Vital signs:      BP: 118/70 Pulse: 73   Resp: 18 SpO2: 100 %     Height: 165.1 cm (5' 5\") Weight: 112.9 kg (248 lb 14.4 oz)  Estimated body mass index is 41.42 kg/m  as calculated from the following:    Height as of this encounter: 1.651 m (5' 5\").    Weight as of this encounter: 112.9 kg (248 lb 14.4 oz).  Gen: A&Ox3, NAD  HEENT: " "Scleral icterus   CV: RRR, no overt murmurs  Lung: non-labored breathing   Abd: soft, obese, BS+, NT, ND  Ext: no edema, intact pulses.   Skin: No rash or jaundice  Neuro: grossly intact, no asterixis   Psych: appropriate mood and affects         Data:   Reviewed in person and significant for:    Lab Results   Component Value Date     01/21/2025      Lab Results   Component Value Date    POTASSIUM 3.9 01/21/2025     Lab Results   Component Value Date    CHLORIDE 104 01/21/2025    CHLORIDE 102 09/27/2024     Lab Results   Component Value Date    CO2 27 01/21/2025     Lab Results   Component Value Date    BUN 10.0 01/21/2025     Lab Results   Component Value Date    CR 0.74 01/21/2025       Lab Results   Component Value Date    WBC 6.1 04/22/2025     Lab Results   Component Value Date    HGB 12.0 04/22/2025     Lab Results   Component Value Date    HCT 34.7 04/22/2025     Lab Results   Component Value Date     04/22/2025     Lab Results   Component Value Date     04/22/2025       Lab Results   Component Value Date     01/21/2025     Lab Results   Component Value Date    ALT 59 01/21/2025     No results found for: \"BILICONJ\"   Lab Results   Component Value Date    BILITOTAL 4.1 01/21/2025       Lab Results   Component Value Date    ALBUMIN 3.8 01/21/2025     Lab Results   Component Value Date    PROTTOTAL 7.1 01/21/2025      Lab Results   Component Value Date    ALKPHOS 253 01/21/2025       Lab Results   Component Value Date    INR 1.77 04/22/2025    INR 3.1 09/27/2024       Imaging:       PET and CT on 12/10/2024     1. PET of the neck, chest, abdomen, and pelvis.  2. PET CT Fusion for Attenuation Correction and Anatomical  Localization.  3. Diagnostic CT of the chest, abdomen and pelvis with intravenous  contrast obtained for diagnostic interpretation.  4. 3D MIP and PET-CT fused images were processed on an independent  workstation and archived to PACS and reviewed by a radiologist.   "   Technique:     1. PET: The patient received 14.32 mCi of F-18-FDG. The serum glucose  was 85 mg/dL prior to administration. Body weight was 110 kg. Images  were evaluated in the axial, sagittal, and coronal planes as well as  the rotational whole body MIP. Images were acquired from cranial  vertex to feet.     UPTAKE WAS MEASURED AT 60 MINUTES.      2. CT: Volumetric acquisition for clinical interpretation of the  chest, abdomen, and pelvis acquired at 3 mm sections. The chest,  abdomen, and pelvis were evaluated at 5 mm sections in bone, soft  tissue, and lung windows. High resolution images of the neck were  obtained with multiple oblique projection reformats.     Contrast and Medications:  IV contrast: 135 mL of Isovue 370 intravenously.  PO contrast: None.  Additional Medications: None.     3. 3D MIP and PET-CT fused images were processed on an independent  workstation and archived to PACS and reviewed by a radiologist.     INDICATION: Alcoholic cirrhosis of liver without ascites (H);  Leukocytosis, unspecified type; Elevated C-reactive protein (CRP);  MSSA bacteremia; MSSA bacteremia.     ADDITIONAL INFORMATION OBTAINED FROM EMR: Investigating fever and  bacteremia of unknown origin. History of alcoholic cirrhosis,  currently abstinent.  .     COMPARISON: CT AP from 10/12/2024.     FINDINGS:      BACKGROUND: Liver SUV max = 3.4, Aorta Blood SUV max = 2.7.      HEAD/NECK:     Pharyngeal mucosal spaces: Nasopharynx and palatine tonsils are within  normal limits. Tongue base is normal. Oral tongue and buccal mucosa of  the oral cavity is normal. Oropharynx, hypopharynx and larynx are  within normal limits.     Sinonasal region: Paranasal sinuses are clear. No mass within the  nasal cavity.     Lymph nodes: No FDG avid or abnormally enlarged lymph nodes.     Salivary glands: The major salivary glands are within normal limits.      Thyroid gland: The thyroid gland is within normal limits.      Vascular structures:  The major vasculature of the neck are patent.     Brain: No abnormal FDG avid lesion or abnormal enhancement.      Orbital cavities: No abnormal FDG avid lesion or abnormal enhancement.        CHEST:     Lymph nodes: No FDG avid or abnormally enlarged lymph nodes.     Lungs: The central tracheobronchial tree is clear. Atelectasis in the  right middle lobe. Few areas of scarring in the right lower lobe.  Elevated right hemidiaphragm. No acute consolidation. No abnormal FDG  uptake within the lung.  No pleural effusion or pneumothorax.      Heart and great vessels: Heart size is within normal limits. No  pericardial effusion. The thoracic aorta and main pulmonary artery are  within normal limits. The esophagus is unremarkable. Small lateral  hernia.     ABDOMEN AND PELVIS:     Liver: Somewhat nodular contour of the liver. No FDG avid or other  focal lesion. Mild hepatomegaly with spotty steatosis, similar to  prior CT. No intrahepatic or extrahepatic biliary ductal dilatation. A  tiny gallstone, otherwise gall bladder is normal. Small perihepatic  ascites. .     Findings related to portal hypertension such as; recanalized umbilical  vein, splenorenal shunts, prominent paracolic vessels.     Pancreas: The pancreas is prominent, but within normal limits. No  pancreatic ductal dilatation. No focal pathology or abnormal FDG  uptake.     Spleen: Splenomegaly (14.8 cm). No FDG avid lesion.     Adrenal glands: No FDG avid foci.     Kidneys: No FDG avid lesion. No hydronephrosis. The urinary bladder is  nondistended.      Reproductive organs are within normal limits.     Gastrointestinal system: Normal caliber of the small and large bowel.  Postsurgical changes of stomach.     Lymph nodes: No FDG avid or abnormally enlarged lymph nodes.     Vascular structures: Normal caliber of the abdominal aorta.     Trace peritoneal fluid and subcutaneous edema in the abdominal wall.     EXTREMITIES:      No abnormal FDG uptake in the  visualized extremities.     BONES AND SOFT TISSUES:      No abnormal FDG uptake in the skeleton. No abnormal lytic or blastic  osseous lesions.      SPINAL CANAL:      No evident canal compromise. No abnormal FDG avid lesion.                                                                      IMPRESSION:      1. No evidence of FDG-avid focal infectious process.  2. Hepatosplenomegaly and small perihepatic ascites.      I have personally reviewed the examination and initial interpretation  and I agree with the findings.     JAGUAR MARAVILLA MD

## 2025-04-22 NOTE — NURSING NOTE
"Chief Complaint   Patient presents with    RECHECK     Follow up visit     Damaso Rodrigues, CMA CMA at 8:06 AM on 4/22/2025     /70   Pulse 73   Resp 18   Ht 1.651 m (5' 5\")   Wt 112.9 kg (248 lb 14.4 oz)   SpO2 100%   BMI 41.42 kg/m      "

## 2025-04-22 NOTE — LETTER
4/22/2025      Stewart Hunt  [address]  [facility] [address]      Dear Colleague,    Thank you for referring your patient, Stewart Hunt, to the [facility] [place]. Please see a copy of my visit note below.    Hepatology Clinic note  Stewart Hunt   Date of Birth 1985    REASON FOR FOLLOW UP: severe ETOH hepatitis, cirrhosis          Assessment/plan:      38 YO M who presents in follow up for severe alcohol related cirrhosis/hepatitis complicated by history of hepatic encephalopathy, likely superimposed on MET-ALD cirrhosis. Last seen 1/21/25.      MELD 3.0 remains stable at 17 (previously up to 26).      PETH 637 1/21/25. Last use of alcohol 2/22/25. His goal is absolute sobriety.  He has not completed chemical dependency treatment but is willing to complete this.  Agrees to referral to addiction medicine.     Most recent ER visit was at outside hospital on 2/14/25.  Patient states he went in due to not feeling well and having shakiness and nausea.  He was not admitted to the hospital at this time.    Hx HE: denies any recent episodes of severe confusion; does admit to intermittent short-term memory issues; continues to take rifaximin BID and lactulose 20 g BID.  Lately only passing 1 BM per day.  Advised pt to increase lactulose intake for goal of 2-3 BMs per day.     Varices: Outside EGD and colonoscopy Sept. 2024 through [facility] (results listed below)   > Due for repeat EGD September 2025  HCC screening: CT C/A/P W contrast 12/10/24: Somewhat nodular contour of liver. No FDG avid or other focal lesion. Mild hepatomegaly with spotty steatosis, similar to prior. No intrahepatic or extrahepatic biliary ductal dilatation. A tiny gallstone, otherwise gall bladder normal. Small perihepatic ascites. Findings related to portal hypertension such as; recanalized umbilical vein, splenorenal shunts, prominent paracolic vessels.  Splenomegaly.              > AFP in  process from today     PLAN:  -Discussed recent lab work  -Will be due next for imaging of the liver for HCC screening June 2025; ordered  -Continue Lasix 40 mg daily & spironolactone 100 mg daily  -Do not feel as if patient warrants paracentesis at this time based on physical exam  -Follow strict low-sodium diet (<2000 mg daily)  -Continue on lactulose and rifaximin as prescribed; discussed goal of 2-3 BMs per day   > Patient will titrate lactulose accordingly  -Avoid all NSAID use  -Do not exceed 2000 g Tylenol in 24 hours  -Discussed importance of abstaining from all alcohol use indefinitely; highly encouraged he complete some form of chemical dependency treatment/program  -Referral placed to addiction medicine; patient agreeable  - Follow with PCP routinely as recommended  -If patient continues to abstain from alcohol and enrolls in some sort of chemical dependency treatment/program, would consider referral to liver transplant evaluation clinic, especially if MELD remains >15    RTC in 3-4 months with labs same day              > At next appt consider ordering DEXA scan    Rosa Armando PA-C  [facility] Hepatology   -----------------------------------------------------         HPI:     40 YO M who presents for follow-up regarding alcohol related cirrhosis and hepatitis.  Last visit 1/21/25.    Patient denies any recent significant changes to his medications.  He states he is no longer on lactulose powder.  He is now on liquid solution.    Patient states his work schedule was changed so he is now working nights.  Patient states this is going well.  He continues to work full-time.    Patient states he was kicked out of his home in February.  He is now residing with his father.  Patient admits that he was drinking alcohol up until February 22 of this year.  He is happy that he has been sober for about 2 months.  Patient feels good without consuming alcohol.  He has not yet completed any form of  chemical dependency treatment or program but he is open to this.  Patient states he always tends to have cravings for alcohol but he just tries to remember that he cannot drink it.    He admits he went into the ER on 2/14/2025 for not feeling well.  Patient states at that time he was experiencing shakiness and nausea.  They did not admit him to the hospital.    He continues to take his diuretics as prescribed.  Patient states he will get a refill of spironolactone on the 26th.  He is almost out of spironolactone.  Patient states he is now on lactulose liquid.  He has been taking 30 mL twice daily.  Also continues to take rifaximin.  Denies any recent episodes of severe confusion.  States he has intermittent episodes where he feels as if his short-term memory is poor.  Previously had been passing stool 3-4 times per day.  Over the last week, typically having 1 bowel movement per day.  Denies bright red blood per rectum or melena.  Denies lower extremity edema.  Has not had to have a paracentesis.  Appetite is good.  Finds it hard to follow a low-sodium diet.  Denies fevers or chills.  States nausea comes and goes.  The last time he had an episode of vomiting was last week.  Denies blood in emesis.  Patient states he recently has been consuming homemade meals from his stepmother.  Patient feels as if her cooking causes upper abdominal discomfort that includes achiness, nausea and bloating.  Has realized that this food triggers his symptoms so he has been avoiding the food now.  Symptoms have gotten better.  He continues to take Nexium and Pepcid.  Also takes [employer] simethicone twice daily. Has gained approximately 8 LBS over the past 2 months.    Denies current/former tobacco use.  Denies illicit drug use.     PROCEDURES     EGD 9/10/2024:   - Normal esophagus.  - Z-line regular, 40 cm from the incisors.  - A sleeve gastrectomy was found.  - Portal hypertensive gastropathy.  - Normal examined duodenum.  - No specimens  collected.  Impression:  - Perform a colonoscopy today.  - Repeat upper endoscopy in 1 year for surveillance.     COLONOSCOPY 9/10/2024:  Non-thrombosed external hemorrhoids and non-thrombosed internal hemorrhoids found on perianal  exam.  - The examined portion of the ileum was normal.  - Redundant colon.  - Congested mucosa in the entire examined colon.  - Two 4 to 5 mm polyps in the transverse colon, removed with a cold snare. Resected and retrieved.  - The examination was otherwise normal on direct and retroflexion views.  Impression:  - Await pathology results.  - Repeat colonoscopy in 5 yrs for surveillance based on clinical status at that time.  - Labs today- could consider treatment with prednisolone pending values  continue to stay abstinent from all alcohol  my office will arrange a referral to [facility] hepatology program  Follow up GI clinic in 3-4 weeks     Final Dx Colon, transverse polyps x2, polypectomies:  Tubular adenoma  Fragment of unremarkable colonic mucosa     PMH:    has a past medical history of Acute alcoholic hepatitis (H) (09/2024), Alcohol use disorder, severe, in early remission (H), Alcoholic cirrhosis (H), Hypertension (09/07/24), Morbid obesity (H), and Uncomplicated asthma.     SMH:    has a past surgical history that includes Gastrectomy Laparoscopic Sleeve (2012); Picc Insertion - Double Lumen (Left, 10/16/2024); [place] Tooth Extraction; Abdomen surgery (2012); colonoscopy (09/2024); and Eye surgery (15 yrs ago).     Medications:   Current Outpatient Medications   Medication Sig Dispense Refill     albuterol (PROAIR HFA/PROVENTIL HFA/VENTOLIN HFA) 108 (90 Base) MCG/ACT inhaler Inhale 2-3 puffs into the lungs every 6 hours as needed for shortness of breath, wheezing or cough.       chlordiazePOXIDE (LIBRIUM) 10 MG capsule Take 10 mg by mouth.       esomeprazole (NEXIUM) 40 MG DR capsule Take 40 mg by mouth 2 times daily.       famotidine (PEPCID) 20 MG tablet Take 20 mg by mouth daily.   "     folic acid (FOLVITE) 1 MG tablet Take 1 tablet (1 mg) by mouth daily. 90 tablet 3     furosemide (LASIX) 20 MG tablet Take 2 tablets (40 mg) by mouth daily. 180 tablet 3     lactulose (CHRONULAC) 10 GM/15ML solution Take 30 mLs (20 g) by mouth 2 times daily. 1892 mL 11     multivitamin w/minerals (THERA-VIT-M) tablet Take 1 tablet by mouth daily.       rifaximin (XIFAXAN) 550 MG TABS tablet Take 1 tablet (550 mg) by mouth 2 times daily. 180 tablet 3     simethicone (MYLICON) 125 MG chewable tablet Take 125 mg by mouth 2 times daily.       spironolactone (ALDACTONE) 50 MG tablet Take 2 tablets (100 mg) by mouth daily. 180 tablet 3     thiamine (B-1) 100 MG tablet Take 1 tablet (100 mg) by mouth daily. 90 tablet 3     No current facility-administered medications for this visit.     Previous work-up:   Lab Results   Component Value Date    HEPBANG Nonreactive 10/16/2024    HCVAB Nonreactive 10/16/2024    POPETH 637 01/21/2025    PLPETH 1062 01/21/2025      No results found for: \"SPECDES\", \"LDRESULTS\"    Recent Labs   Lab Test 01/21/25  0910 12/16/24  0725 12/11/24  2057 12/06/24  1333 11/25/24  0944 11/19/24  1142 11/12/24  1050 11/05/24  1325 11/03/24  0633 11/02/24  2114   ALKPHOS 253* 231* 221* 252* 256* 292* 292* 239* 209* 245*   ALT 59 55 63 68 86* 80* 55 45 35 39   * 107* 126* 127* 165* 156* 165* 167* 148* 158*   BILITOTAL 4.1* 7.0* 8.1* 8.6* 12.8* 15.9* 19.8* 26.3* 23.4* 25.2*           Allergies:     Allergies   Allergen Reactions     Adhesive Tape Rash          Social History:     Social History     Socioeconomic History     Marital status: Single     Spouse name: Not on file     Number of children: Not on file     Years of education: Not on file     Highest education level: Not on file   Occupational History     Not on file   Tobacco Use     Smoking status: Never     Smokeless tobacco: Never   Vaping Use     Vaping status: Every Day   Substance and Sexual Activity     Alcohol use: Not Currently    "  Comment: last use end Feb 2025     Drug use: Never     Sexual activity: Not Currently     Partners: Female     Birth control/protection: None   Other Topics Concern     Parent/sibling w/ CABG, MI or angioplasty before 65F 55M? No   Social History Narrative     Not on file     Social Drivers of Health     Financial Resource Strain: Low Risk  (12/9/2024)    Financial Resource Strain      Within the past 12 months, have you or your family members you live with been unable to get utilities (heat, electricity) when it was really needed?: No   Food Insecurity: Low Risk  (12/9/2024)    Food Insecurity      Within the past 12 months, did you worry that your food would run out before you got money to buy more?: No      Within the past 12 months, did the food you bought just not last and you didn t have money to get more?: No   Transportation Needs: Low Risk  (12/9/2024)    Transportation Needs      Within the past 12 months, has lack of transportation kept you from medical appointments, getting your medicines, non-medical meetings or appointments, work, or from getting things that you need?: No   Physical Activity: Not on file   Stress: Not on file   Social Connections: Not on file   Interpersonal Safety: Not At Risk (2/14/2025)    Received from [facility]    Abuse Indicators      Alleged/Suspected/Actual Abuse, Neglect, Violence, Exploitation: No   Housing Stability: Low Risk  (12/9/2024)    Housing Stability      Do you have housing? : Yes      Are you worried about losing your housing?: No          Family History:     Family History   Problem Relation Age of Onset     Colon Cancer Mother 42     Diabetes Father      Pancreatic Cancer Sister 15     Diabetes Maternal Grandmother      Cirrhosis Paternal Grandfather         alcohol related     Colon Cancer Cousin 32     Liver Cancer No family hx of           Review of Systems:   Gen: See HPI          Physical Exam:   Vital signs:      BP: 118/70 Pulse: 73   Resp:  "18 SpO2: 100 %     Height: 165.1 cm (5' 5\") Weight: 112.9 kg (248 lb 14.4 oz)  Estimated body mass index is 41.42 kg/m  as calculated from the following:    Height as of this encounter: 1.651 m (5' 5\").    Weight as of this encounter: 112.9 kg (248 lb 14.4 oz).  Gen: A&Ox3, NAD  HEENT: Scleral icterus   CV: RRR, no overt murmurs  Lung: non-labored breathing   Abd: soft, obese, BS+, NT, ND  Ext: no edema, intact pulses.   Skin: No rash or jaundice  Neuro: grossly intact, no asterixis   Psych: appropriate mood and affects         Data:   Reviewed in person and significant for:    Lab Results   Component Value Date     01/21/2025      Lab Results   Component Value Date    POTASSIUM 3.9 01/21/2025     Lab Results   Component Value Date    CHLORIDE 104 01/21/2025    CHLORIDE 102 09/27/2024     Lab Results   Component Value Date    CO2 27 01/21/2025     Lab Results   Component Value Date    BUN 10.0 01/21/2025     Lab Results   Component Value Date    CR 0.74 01/21/2025       Lab Results   Component Value Date    WBC 6.1 04/22/2025     Lab Results   Component Value Date    HGB 12.0 04/22/2025     Lab Results   Component Value Date    HCT 34.7 04/22/2025     Lab Results   Component Value Date     04/22/2025     Lab Results   Component Value Date     04/22/2025       Lab Results   Component Value Date     01/21/2025     Lab Results   Component Value Date    ALT 59 01/21/2025     No results found for: \"BILICONJ\"   Lab Results   Component Value Date    BILITOTAL 4.1 01/21/2025       Lab Results   Component Value Date    ALBUMIN 3.8 01/21/2025     Lab Results   Component Value Date    PROTTOTAL 7.1 01/21/2025      Lab Results   Component Value Date    ALKPHOS 253 01/21/2025       Lab Results   Component Value Date    INR 1.77 04/22/2025    INR 3.1 09/27/2024       Imaging:       PET and CT on 12/10/2024     1. PET of the neck, chest, abdomen, and pelvis.  2. PET CT Fusion for Attenuation Correction " and Anatomical  Localization.  3. Diagnostic CT of the chest, abdomen and pelvis with intravenous  contrast obtained for diagnostic interpretation.  4. 3D MIP and PET-CT fused images were processed on an independent  workstation and archived to PACS and reviewed by a radiologist.     Technique:     1. PET: The patient received 14.32 mCi of F-18-FDG. The serum glucose  was 85 mg/dL prior to administration. Body weight was 110 kg. Images  were evaluated in the axial, sagittal, and coronal planes as well as  the rotational whole body MIP. Images were acquired from cranial  vertex to feet.     UPTAKE WAS MEASURED AT 60 MINUTES.      2. CT: Volumetric acquisition for clinical interpretation of the  chest, abdomen, and pelvis acquired at 3 mm sections. The chest,  abdomen, and pelvis were evaluated at 5 mm sections in bone, soft  tissue, and lung windows. High resolution images of the neck were  obtained with multiple oblique projection reformats.     Contrast and Medications:  IV contrast: 135 mL of Isovue 370 intravenously.  PO contrast: None.  Additional Medications: None.     3. 3D MIP and PET-CT fused images were processed on an independent  workstation and archived to PACS and reviewed by a radiologist.     INDICATION: Alcoholic cirrhosis of liver without ascites (H);  Leukocytosis, unspecified type; Elevated C-reactive protein (CRP);  MSSA bacteremia; MSSA bacteremia.     ADDITIONAL INFORMATION OBTAINED FROM EMR: Investigating fever and  bacteremia of unknown origin. History of alcoholic cirrhosis,  currently abstinent.  .     COMPARISON: CT AP from 10/12/2024.     FINDINGS:      BACKGROUND: Liver SUV max = 3.4, Aorta Blood SUV max = 2.7.      HEAD/NECK:     Pharyngeal mucosal spaces: Nasopharynx and palatine tonsils are within  normal limits. Tongue base is normal. Oral tongue and buccal mucosa of  the oral cavity is normal. Oropharynx, hypopharynx and larynx are  within normal limits.     Sinonasal region:  Paranasal sinuses are clear. No mass within the  nasal cavity.     Lymph nodes: No FDG avid or abnormally enlarged lymph nodes.     Salivary glands: The major salivary glands are within normal limits.      Thyroid gland: The thyroid gland is within normal limits.      Vascular structures: The major vasculature of the neck are patent.     Brain: No abnormal FDG avid lesion or abnormal enhancement.      Orbital cavities: No abnormal FDG avid lesion or abnormal enhancement.        CHEST:     Lymph nodes: No FDG avid or abnormally enlarged lymph nodes.     Lungs: The central tracheobronchial tree is clear. Atelectasis in the  right middle lobe. Few areas of scarring in the right lower lobe.  Elevated right hemidiaphragm. No acute consolidation. No abnormal FDG  uptake within the lung.  No pleural effusion or pneumothorax.      Heart and great vessels: Heart size is within normal limits. No  pericardial effusion. The thoracic aorta and main pulmonary artery are  within normal limits. The esophagus is unremarkable. Small lateral  hernia.     ABDOMEN AND PELVIS:     Liver: Somewhat nodular contour of the liver. No FDG avid or other  focal lesion. Mild hepatomegaly with spotty steatosis, similar to  prior CT. No intrahepatic or extrahepatic biliary ductal dilatation. A  tiny gallstone, otherwise gall bladder is normal. Small perihepatic  ascites. .     Findings related to portal hypertension such as; recanalized umbilical  vein, splenorenal shunts, prominent paracolic vessels.     Pancreas: The pancreas is prominent, but within normal limits. No  pancreatic ductal dilatation. No focal pathology or abnormal FDG  uptake.     Spleen: Splenomegaly (14.8 cm). No FDG avid lesion.     Adrenal glands: No FDG avid foci.     Kidneys: No FDG avid lesion. No hydronephrosis. The urinary bladder is  nondistended.      Reproductive organs are within normal limits.     Gastrointestinal system: Normal caliber of the small and large  bowel.  Postsurgical changes of stomach.     Lymph nodes: No FDG avid or abnormally enlarged lymph nodes.     Vascular structures: Normal caliber of the abdominal aorta.     Trace peritoneal fluid and subcutaneous edema in the abdominal wall.     EXTREMITIES:      No abnormal FDG uptake in the visualized extremities.     BONES AND SOFT TISSUES:      No abnormal FDG uptake in the skeleton. No abnormal lytic or blastic  osseous lesions.      SPINAL CANAL:      No evident canal compromise. No abnormal FDG avid lesion.                                                                      IMPRESSION:      1. No evidence of FDG-avid focal infectious process.  2. Hepatosplenomegaly and small perihepatic ascites.      I have personally reviewed the examination and initial interpretation  and I agree with the findings.     JAGUAR MARAVILLA MD       Again, thank you for allowing me to participate in the care of your patient.        Sincerely,        Rosa Armando PA-C    Electronically signed

## 2025-04-23 ENCOUNTER — TELEPHONE (OUTPATIENT)
Dept: GASTROENTEROLOGY | Facility: CLINIC | Age: 40
End: 2025-04-23
Payer: COMMERCIAL

## 2025-04-23 ENCOUNTER — PATIENT OUTREACH (OUTPATIENT)
Dept: CARE COORDINATION | Facility: CLINIC | Age: 40
End: 2025-04-23
Payer: COMMERCIAL

## 2025-04-23 NOTE — TELEPHONE ENCOUNTER
----- Message from Rosa Armando sent at 4/23/2025 10:18 AM CDT -----  Judith Herrera is out so I was wondering if you could help out with this.  Patient's AFP came back mildly elevated.  He is not technically due for HCC screening until June but with the elevated AFP coming back, I would like to do the ultrasound as soon as possible.  Could you call patient and let him know this?  He should reschedule his ultrasound.     Thanks!    Rosa  ----- Message -----  From: Lab, Background User  Sent: 4/22/2025   7:16 AM CDT  To: Rosa Armando PA-C

## 2025-04-24 LAB
LABORATORY REPORT: NORMAL
PETH INTERPRETATION: NORMAL
PLPETH BLD-MCNC: <10 NG/ML
POPETH BLD-MCNC: <10 NG/ML

## 2025-04-28 ENCOUNTER — HOSPITAL ENCOUNTER (OUTPATIENT)
Dept: ULTRASOUND IMAGING | Facility: CLINIC | Age: 40
Discharge: HOME | End: 2025-04-28
Attending: PHYSICIAN ASSISTANT | Admitting: PHYSICIAN ASSISTANT
Payer: COMMERCIAL

## 2025-04-28 DIAGNOSIS — K70.9 ALCOHOLIC LIVER DISEASE: ICD-10-CM

## 2025-04-28 PROCEDURE — 76705 ECHO EXAM OF ABDOMEN: CPT

## 2025-04-28 PROCEDURE — 76705 ECHO EXAM OF ABDOMEN: CPT | Mod: 26 | Performed by: RADIOLOGY

## 2025-05-12 ENCOUNTER — TELEPHONE (OUTPATIENT)
Dept: GASTROENTEROLOGY | Facility: CLINIC | Age: 40
End: 2025-05-12
Payer: COMMERCIAL

## 2025-05-12 ENCOUNTER — HOSPITAL ENCOUNTER (OUTPATIENT)
Facility: CLINIC | Age: 40
End: 2025-05-12
Attending: INTERNAL MEDICINE | Admitting: INTERNAL MEDICINE
Payer: COMMERCIAL

## 2025-05-12 NOTE — TELEPHONE ENCOUNTER
"Endoscopy Scheduling Screen    Caller: patient    Have you had any respiratory illness or flu-like symptoms in the last 10 days?  No    What is your communication preference for Instructions and/or Bowel Prep?   Keily    What insurance is in the chart?  Other:  [facility] [employer]    Ordering/Referring Provider: BERENICE COLORADO    (If ordering provider performs procedure, schedule with ordering provider unless otherwise instructed. )    BMI: Estimated body mass index is 41.42 kg/m  as calculated from the following:    Height as of 4/22/25: 1.651 m (5' 5\").    Weight as of 4/22/25: 112.9 kg (248 lb 14.4 oz).     Sedation Ordered  MAC/deep sedation.   BMI<= 45 45 < BMI <= 48 48 < BMI < = 50  BMI > 50   No Restrictions No [facility]  No [facility]  [facility] with exceptions Hospital Only OR Only       Do you have a history of malignant hyperthermia?  No    (Females) Are you currently pregnant?   No     Have you been diagnosed or told you have pulmonary hypertension?   No    Do you have an LVAD?  No    Have you been told you have moderate to severe sleep apnea?  No.    Have you been told you have COPD, asthma, or any other lung disease?  Yes     What breathing problems do you have?  Asthma     Do you use home oxygen?  No    Have your breathing problems required an ED visit or hospitalization in the last year?  Yes. (RN Review required for scheduling unless scheduling in Hospital.)    Has your doctor ordered any cardiac tests like echo, angiogram, stress test, ablation, or EKG, that you have not completed yet?  No    Do you  have a history of any heart conditions?  No     Have you ever had or are you waiting for an organ transplant?  No. Continue scheduling, no site restrictions.    Have you had a stroke or transient ischemic attack (TIA aka \"mini stroke\") in the last 2 years?   No.    Have you been diagnosed with or been told you have cirrhosis of the liver?   Yes. (RN Review required for scheduling unless scheduling in " "Hospital.)    Are you currently on dialysis?   No    Do you need assistance transferring?   No    BMI: Estimated body mass index is 41.42 kg/m  as calculated from the following:    Height as of 4/22/25: 1.651 m (5' 5\").    Weight as of 4/22/25: 112.9 kg (248 lb 14.4 oz).     Is patients BMI > 40 and scheduling location [facility]?  Yes (If MAC sedation is ordered, schedule PAC eval)    Do you take an injectable or oral medication for weight loss or diabetes (excluding insulin)?  No    Do you take the medication Naltrexone?  No    Do you take blood thinners?  No       Prep   Are you currently on dialysis or do you have chronic kidney disease?  No    Do you have a diagnosis of diabetes?  No    Do you have a diagnosis of cystic fibrosis (CF)?  No    On a regular basis do you go 3 -5 days between bowel movements?  No    BMI > 40?  No    Preferred Pharmacy:    [id number] - [address] - [address] [address]  Phone: [phone] Fax: [phone]      Final Scheduling Details     Procedure scheduled  Upper endoscopy (EGD)    Surgeon:  SIOBHAN     Date of procedure:  10/2/25     Pre-OP / PAC:   Yes - PAC clinic evaluation scheduled.    Location  [facility] - Per exclusion criteria.    Sedation   MAC/Deep Sedation - Per order.      Patient Reminders:   You will receive a call from a Nurse to review instructions and health history.  This assessment must be completed prior to your procedure.  Failure to complete the Nurse assessment may result in the procedure being cancelled.      On the day of your procedure, please designate an adult(s) who can drive you home stay with you for the next 24 hours. The medicines used in the exam will make you sleepy. You will not be able to drive.      You cannot take public transportation, ride share services, or non-medical taxi service without a responsible caregiver.  Medical transport services are allowed with the requirement that a responsible caregiver will " receive you at your destination.  We require that drivers and caregivers are confirmed prior to your procedure.

## 2025-05-29 ENCOUNTER — VIRTUAL VISIT (OUTPATIENT)
Dept: ADDICTION MEDICINE | Facility: CLINIC | Age: 40
End: 2025-05-29
Attending: PHYSICIAN ASSISTANT
Payer: COMMERCIAL

## 2025-05-29 DIAGNOSIS — K70.9 ALCOHOLIC LIVER DISEASE: ICD-10-CM

## 2025-05-29 DIAGNOSIS — F10.90 ALCOHOL USE DISORDER: Primary | ICD-10-CM

## 2025-05-29 PROCEDURE — 98002 SYNCH AUDIO-VIDEO NEW MOD 45: CPT | Performed by: FAMILY MEDICINE

## 2025-05-29 PROCEDURE — 1126F AMNT PAIN NOTED NONE PRSNT: CPT | Performed by: FAMILY MEDICINE

## 2025-05-29 PROCEDURE — G2211 COMPLEX E/M VISIT ADD ON: HCPCS | Performed by: FAMILY MEDICINE

## 2025-05-29 RX ORDER — ACAMPROSATE CALCIUM 333 MG/1
666 TABLET, DELAYED RELEASE ORAL 3 TIMES DAILY
Qty: 180 TABLET | Refills: 1 | Status: SHIPPED | OUTPATIENT
Start: 2025-05-29

## 2025-05-29 ASSESSMENT — PATIENT HEALTH QUESTIONNAIRE - PHQ9
SUM OF ALL RESPONSES TO PHQ QUESTIONS 1-9: 9
SUM OF ALL RESPONSES TO PHQ QUESTIONS 1-9: 9
10. IF YOU CHECKED OFF ANY PROBLEMS, HOW DIFFICULT HAVE THESE PROBLEMS MADE IT FOR YOU TO DO YOUR WORK, TAKE CARE OF THINGS AT HOME, OR GET ALONG WITH OTHER PEOPLE: NOT DIFFICULT AT ALL

## 2025-05-29 ASSESSMENT — PAIN SCALES - GENERAL: PAINLEVEL_OUTOF10: NO PAIN (0)

## 2025-05-29 NOTE — PATIENT INSTRUCTIONS
Patient Education   Addiction Medicine  What to Expect  Here's what to expect from our Addiction Medicine program.  About Addiction Medicine  Addiction Medicine clinics help you with substance use problems. You set your own goals. We try to help you reach your goals. Your care plan can include:  Medicine  Creating a recovery plan  Helping you find local resources  Helping with treatment options  Clinic phone number and addresses  Clinic Phone: [phone]  Mental Health and Addiction Clinic  [facility]  [address], [address]  [place] [address]  [facility]  [address], [address]  [place] [address]  Walk-in services  We offer walk-in care for patients at the Recovery Clinic. This is only for patients with Opioid Use Disorder (OUD). Anyone with OUD is welcome. Our providers will refer you to the Recovery Clinic if you're struggling to keep up with your medicines or appointments.  Recovery Clinic ([facility])  [address], [address]  [place] [address]  Phone: [phone]  The Recovery Clinic is open for walk-ins Monday to Friday 9 a.m. to 11:30 a.m. and 12:30 p.m. to 3 p.m.  How it works  Come to your visits every time. The treatment works better when you do.   You can have as many visits as you need. When you're better, we'll refer you back to being cared for by your family doctor.   If you need it, we'll send you to doctors, psychiatrists, therapists, and other providers. We focus on treating addiction. We don't treat other problems, like managing other medicines or non-addiction issues.  About visits  Urine drug testing  We'll often test your pee (urine) for drugs. This is the only way we can know for sure whether or not you're using drugs. It helps us treat you without judgement.   Suboxone (buprenorphine)  If you're taking buprenorphine, you'll have a lot of visits at first. If your problem is getting worse, or you're  "using substances, we may schedule you for extra visits.   Cancelling visits  If you can't come to your visit, please call us right away at [phone]. If you don't cancel at least 24 hours (1 full day) before your visit, that's \"late cancellation.\"  Being late to visits  If you come late, you may not be seen. This will count as a \"no-show.\"  Please call the clinic if you're running late. This will help us plan, but it doesn't mean you'll be seen.   Being late is:  More than 15 minutes late for a return visit.  More than 30 minutes late for your first visit.  If you cancel late or don't show up 2 times within 6 months, we may transfer you to another clinic.   Getting help between visits  If you need help between visits, you can call us Monday to Friday from 8 a.m. to 4:30 p.m. at [phone]. You can also send us a message on [employer].  Medicine refills  If you miss or cancel a visit, you can still ask for a refill. But we can only refill your medicines if you've made a new appointment.  Please call your pharmacy for medicine refills. If you have a question about your refill, call us at [phone].  It takes up to 2 business days to refill your drugs. Let us know 2 to 3 days before you run out. Don't call more than 1 week before you run out. That's too early.   Please make sure we have your right phone number.  If we have a problem with your refill, we'll call you. If we call you, please call us back right away. If you don't, you may not get your medicines quickly.   Call your pharmacy to find out if your medicines are ready.   Keep your medicines in a safe place. Keep them away from pets and children. If your medicines are lost or stolen, we usually don't replace them. We recommend you file a police report if your medicines are stolen. Your insurance may not pay for early refills, even if you have a prescription.  Forms  Please give us at least 3 business days to fill out any forms. Bring the forms to your " visits if you can. We may refer you to other members of your care team to complete the forms.   Emergency care   Call 911 or go to the nearest emergency room if your life or someone else's life is in danger.  Call 988 anytime for the Suicide and Crisis Lifeline.  If you need care when we're closed, call your family doctor to see if they can help. You can also go to urgent care or an emergency room. [facility] emergency rooms may be able to give you buprenorphine or other medicine refills.  Thank you for choosing us for your care.  For informational purposes only. Not to replace the advice of your health care provider. Copyright   2023 [facility]. All rights reserved. [employer] [id number] - REV 05/23.

## 2025-05-29 NOTE — NURSING NOTE
Current patient location: Patient declined to provide     Is the patient currently in the state of [place]? YES    Visit mode: VIDEO    If the visit is dropped, the patient can be reconnected by:VIDEO VISIT: Text to cell phone:   Telephone Information:   Mobile [phone]       Will anyone else be joining the visit? NO  (If patient encounters technical issues they should call [phone] :[id number])    Are changes needed to the allergy or medication list? Pt stated no changes to allergies and Pt stated no med changes    Are refills needed on medications prescribed by this physician? NO    Rooming Documentation:  Questionnaire(s) completed Attendance guidelines (MH&A only)    Reason for visit: Consult    [facility]    Care team has reviewed attendance agreement with patient. Patient advised that two failed appointments within 6 months may lead to termination of current episode of care.

## 2025-05-29 NOTE — PROGRESS NOTES
"Virtual Visit Details    Type of service:  Video Visit     Originating Location (pt. Location): {video visit patient location:173506::\"Home\"}  {PROVIDER LOCATION On-site should be selected for visits conducted from your clinic location or adjoining [facility] hospital, academic office, or other nearby [facility] building. Off-site should be selected for all other provider locations, including home:435[address]}  Distant Location (provider location):  {virtual location provider:123575}  Platform used for Video Visit: {Virtual Visit Platforms:098440::\"[employer]\"}  "

## 2025-05-29 NOTE — PROGRESS NOTES
"    SUBJECTIVE                                                      Stewart Hunt is a 39 year old male who presents for  initial visit for addiction consultation and management referred by VALDEZ Mathew with GI team due to concerns for Alcohol Use Disorder (AUD)    Visit performed Virtual, via video    Video-Visit Details    Type of service:  Video Visit    Video Start Time: 9:18 AM  Video End Time: {video visit start/end time:897825}    Originating Location (pt. Location): Home    Distant Location (provider location):  [facility] office     Platform used for Video Visit: [employer]      HPI:   Stewart Hunt is a 39 year old male with history of alcohol use and cirrhosis who presents for further evaluation of possible substance use disorder and management options.    - Has been able to avoid alcohol since end of February. Motivated by his health and his wife's concern for him, and he hopes to get back together with her if he can stay abstinent  -   - Moving between [place] (where his ex-wife and son life), [place] (where his daughter and dad live) and [place] (work)  - Returned to use in mid-February and also around [place]. Prior to July he was able to drink a full 1.75L and decreased slowly ***  - ***      Substance Use History:   \"Have you ever had any history with [...] use?\" And \"When was your last use?  ALCOHOL - Started age 16-17. Worsened in COVID, and started having health issues starting in 2024 with an ER visit July 2024. Suffered withdrawals at homes with nightmares, shakes, sweats. No seizure, no hospital stay. He avoids heavy alcohol use by himself but notes his drinking worsened around his wife because he feels safe around her when he drinks  CANNABIS - none, last time high school, never enjoyed it  PRESCRIPTION STIMULANTS (includes Ritalin, Adderall, Vyvanse) - none  COCAINE/CRACK - none  METH/AMPHETAMINES (includes ecstacy, MDMA/noah) - diet pills in high " "school  OPIATES - Prescribed after surgery, no memory of effects, no overuse  BENZODIAZEPINES (includes Ativan, Klonopin, Xanax) - prescribed librium, which he takes only occasionally, knocks him out, not reinforcing  KRATOM (mild opioid and stimulant effects) - none  KETAMINE - none  HALLUCINOGENS (includes DXM) - none  BEHAVIORAL (Gambling, Eating d/o, Compulsivity) - none  History of treatment - none  NICOTINE  Cigarettes: almost 20 years ago  Chew/snus: none  Vaping: tried briefly, didn't like it  Past NRT/medication use: none      Previous withdrawal treatment episodes (e.g. detox): none; completed this at home  Previous LIZ treatment programs: none  Medical complications from substance use: cirrhosis, bacteremia leading to recurrent hospital stays, most recent  IV Drug use?: none  Previous Medication for Addiction Tx: tried naltrexone but caused sadness and gave him \"weird thoughts\"  Longest period of full abstinence: 3 months - last alcohol use end of February.   Activities that have previously supported abstinence: Previously attended AA but didn't like the group he was attending, however he has seen another group  Current Recovery Activities: staying busy, spending time with family      Infectious disease screening  Hep C:    Hepatitis C Antibody   Date Value Ref Range Status   10/16/2024 Nonreactive Nonreactive Final     Comment:     A nonreactive screening test result does not exclude the possibility of exposure to or infection with HCV. Nonreactive screening test results in individuals with prior exposure to HCV may be due to antibody levels below the limit of detection of this assay or lack of reactivity to the HCV antigens used in this assay. Patients with recent HCV infections (<3 months from time of exposure) may have false-negative HCV antibody results due to the time needed for seroconversion (average of 8 to 9 weeks).       HIV:    HIV Antigen Antibody Combo   Date Value Ref Range Status "   10/16/2024 Nonreactive Nonreactive Final     Comment:     Negative HIV-1 p24 antigen and HIV-1/2 antibody screening test results usually indicate the absence of HIV-1 and HIV-2 infection. However, such negative results do not rule-out acute HIV infection.  If acute HIV-1 or HIV-2 infection is suspected, detection of HIV-1 or HIV-2 RNA  is recommended. This result is obtained using the Roche Elecsys HIV Duo method on the stacey e801 immunoassay analyzer.       Psychiatric History (per patient report and problem list review)  Past diagnoses - none  Current or past psychiatrist: none  Current or past therapist:  during high school when he was taking diet pills  Hospitalizations/TMS/ECT - none  Suicide Attempts - none  Medication trials - none      PHQ-9 scores:      5/29/2025     8:45 AM   PHQ   PHQ-9 Total Score 9    Q9: Thoughts of better off dead/self-harm past 2 weeks Not at all        Proxy-reported     GEMMA-7 scores:       No data to display                  SOCIAL HISTORY:  Housing status: lives by himself here in [place], travels to [place] and [place] intermittently and spends most of his time in those states  Employment status: Employed full time, virtual, which helps support his travel  Relationship status:   Children: 15yo son, 22yo daughter - visits her in [place]  Legal concerns related to use: none  Contact information up to date? yes        Medical History:    Patient Active Problem List    Diagnosis Date Noted    Mild intermittent asthma with exacerbation 04/22/2025     Priority: Medium    Unsteady gait 11/03/2024     Priority: Medium    Alcoholic hepatitis without ascites (H) 10/21/2024     Priority: Medium    Acute kidney injury 10/21/2024     Priority: Medium    Staphylococcus aureus bacteremia 10/21/2024     Priority: Medium    Alcohol abuse 09/03/2024     Priority: Medium    Abdominal pain, generalized 08/21/2024     Priority: Medium    Altered bowel habits 08/21/2024     Priority: Medium     Nausea and vomiting, unspecified vomiting type 08/21/2024     Priority: Medium    Epigastric pain 08/06/2024     Priority: Medium    Right lower quadrant abdominal pain 08/06/2024     Priority: Medium    Transaminitis 10/09/2023     Priority: Medium    Vitamin D deficiency 07/31/2023     Priority: Medium    Mixed hyperlipidemia 02/19/2021     Priority: Medium    Prediabetes 02/19/2021     Priority: Medium       Past Medical History:   Diagnosis Date    Acute alcoholic hepatitis (H) 09/2024    Alcohol use disorder, severe, in early remission (H)     Alcoholic cirrhosis (H)     Hypertension 09/07/24    Morbid obesity (H)     Uncomplicated asthma     Since a kid i take now inhaler as needed       Past Surgical History:   Procedure Laterality Date    ABDOMEN SURGERY  2012    Gastric sleeve    COLONOSCOPY  09/2024    EYE SURGERY  15 yrs ago    [facility]    GASTRECTOMY LAPAROSCOPIC SLEEVE  2012    PICC INSERTION - DOUBLE LUMEN Left 10/16/2024    45-5cm, Basilic vein    WISDOM TOOTH EXTRACTION           Family History   Problem Relation Age of Onset    Colon Cancer Mother 42    Diabetes Father     Pancreatic Cancer Sister 15    Diabetes Maternal Grandmother     Cirrhosis Paternal Grandfather         alcohol related    Colon Cancer Cousin 32    Liver Cancer No family hx of      ***    Current Outpatient Medications   Medication Sig Dispense Refill    albuterol (PROAIR HFA/PROVENTIL HFA/VENTOLIN HFA) 108 (90 Base) MCG/ACT inhaler Inhale 2-3 puffs into the lungs every 6 hours as needed for shortness of breath, wheezing or cough.      chlordiazePOXIDE (LIBRIUM) 10 MG capsule Take 10 mg by mouth.      esomeprazole (NEXIUM) 40 MG DR capsule Take 40 mg by mouth 2 times daily.      famotidine (PEPCID) 20 MG tablet Take 20 mg by mouth daily.      folic acid (FOLVITE) 1 MG tablet Take 1 tablet (1 mg) by mouth daily. 90 tablet 0    furosemide (LASIX) 20 MG tablet Take 2 tablets (40 mg) by mouth daily. 180 tablet 3    lactulose  "(CHRONULAC) 10 GM/15ML solution Take 30 mLs (20 g) by mouth 2 times daily. 1892 mL 11    multivitamin w/minerals (THERA-VIT-M) tablet Take 1 tablet by mouth daily.      rifaximin (XIFAXAN) 550 MG TABS tablet Take 1 tablet (550 mg) by mouth 2 times daily. 180 tablet 3    simethicone (MYLICON) 125 MG chewable tablet Take 125 mg by mouth 2 times daily.      spironolactone (ALDACTONE) 50 MG tablet Take 2 tablets (100 mg) by mouth daily. 180 tablet 3    thiamine (B-1) 100 MG tablet Take 1 tablet (100 mg) by mouth daily. 90 tablet 0         Allergies   Allergen Reactions    Adhesive Tape Rash           OBJECTIVE                                                      EXAM    There were no vitals taken for this visit.    { Exam Brief:319026::\"GENERAL: healthy, alert and no distress\",\"EYES: Eyes grossly normal to inspection, PERRL and conjunctivae and sclerae normal\",\"RESP: No respiratory distress\",\"MENTAL STATUS EXAM\"}      LAB  Recent UDS Labs (may not contain today's lab data)  No results found for: \"BUP\", \"BZO\", \"BAR\", \"LUKE\", \"MAMP\", \"AMP\", \"MDMA\", \"MTD\", \"PQJ444\", \"OXY\", \"PCP\", \"THC\", \"TEMP\", \"SGPOCT\"    {UDS choices:407138}    Hepatic Function  AST   Date Value Ref Range Status   04/22/2025 82 (H) 0 - 45 U/L Final     ALT   Date Value Ref Range Status   04/22/2025 61 0 - 70 U/L Final     Bilirubin Total   Date Value Ref Range Status   04/22/2025 3.6 (H) <=1.2 mg/dL Final     Albumin   Date Value Ref Range Status   04/22/2025 3.6 3.5 - 5.2 g/dL Final     INR   Date Value Ref Range Status   04/22/2025 1.77 (H) 0.85 - 1.15 Final     INR (External)   Date Value Ref Range Status   09/27/2024 3.1 (H) 0.9 - 1.1 INR Final       CBC  WBC Count   Date Value Ref Range Status   04/22/2025 6.1 4.0 - 11.0 10e3/uL Final     RBC Count   Date Value Ref Range Status   04/22/2025 3.41 (L) 4.40 - 5.90 10e6/uL Final     Hemoglobin   Date Value Ref Range Status   04/22/2025 12.0 (L) 13.3 - 17.7 g/dL Final     Hematocrit   Date Value Ref " "Range Status   2025 34.7 (L) 40.0 - 53.0 % Final     MCV   Date Value Ref Range Status   2025 102 (H) 78 - 100 fL Final     MCH   Date Value Ref Range Status   2025 35.2 (H) 26.5 - 33.0 pg Final     MCHC   Date Value Ref Range Status   2025 34.6 31.5 - 36.5 g/dL Final     Platelet Count   Date Value Ref Range Status   2025 113 (L) 150 - 450 10e3/uL Final     RDW   Date Value Ref Range Status   2025 16.4 (H) 10.0 - 15.0 % Final       Today's lab data  No results found for any visits on 25.        [facility] Board of Pharmacy Data Base Reviewed;    ***Consistent with patient reports and Epic records.           A/P                                                      ASSESSMENT/PLAN:  Stewart was seen today for consult.    Diagnoses and all orders for this visit:    Alcoholic liver disease  -     Adult Mental Health  Referral    Alcohol use disorder  -     Adult Mental Health  Referral        - Librium***      ENCOUNTER FOR LONG TERM USE OF HIGH RISK MEDICATION   High Risk Drug Monitoring?  YES   Drug being monitored: ***   Reason for drug: *** Use Disorder   What is being monitored?: Dosage, Cravings, Triggers and side effects        Continued Complex Management  The longitudinal plan of care for {LIZ choices:125621} was addressed during this visit. Due to the added complexity in care, I will continue to support Stewart in the subsequent management and with ongoing continuity of care.      Counseled the patient on the importance of having a recovery program in addition to medication to manage recovery.  Components include avoiding isolating, having willingness to change, avoiding triggers and managing cravings. Encouraged having some type of sober network and practicing honesty with trusted support person(s). Encouraged other services such as counseling, 12 step or other self-help organizations.      {Opioid warnin::\"Opioid warning reviewed.  " "Risk of overdose following a period of abstinence due to decrease tolerance was discussed including risk of death.  Strongly recommended abstain from alcohol, benzodiazepines, THC, opioids and other drugs of abuse.  Increased risk of return to opioid use after use of these substances discussed.  Increased risk of overdose/death with use of other substances particularly benzodiazepines/alcohol reviewed.\"}      RTC   ***      {[facility] 2021 Documentation (Optional):445761}  {2021 E&M time (Optional):879460}  {Provider  Link to [facility] Help Grid :700467}      Nick Cannon MD  [facility]  [phone]                    " see if this can help diminsh associations and cravings  - Taking Librium occasionally for significant anxiety concerns. This was prescribed in February, #30 tabs, and he still has a small supply.  - Would encourage non-BZD anxiety mgmt options if symptoms become more frequent or more challenging to manage  - Continues to f/up with GI for liver disease related to his chronic alcohol use. Most recent US showing cirrhosis and he has ongoing testing to determine severity and monitor how this evolves with alcohol cessation. Already seeing small improvements in MELD score (down from 17 -> 16, was as high as 26)      ENCOUNTER FOR LONG TERM USE OF HIGH RISK MEDICATION   High Risk Drug Monitoring?  YES   Drug being monitored: acamprosage   Reason for drug: Alcohol Use Disorder   What is being monitored?: Dosage, Cravings, Triggers and side effects        Continued Complex Management  The longitudinal plan of care for Alcohol Use Disorder (AUD) was addressed during this visit. Due to the added complexity in care, I will continue to support Stewart in the subsequent management and with ongoing continuity of care.      Counseled the patient on the importance of having a recovery program in addition to medication to manage recovery.  Components include avoiding isolating, having willingness to change, avoiding triggers and managing cravings. Encouraged having some type of sober network and practicing honesty with trusted support person(s). Encouraged other services such as counseling, 12 step or other self-help organizations.            RTC   2 months          Nick Cannon MD  [facility]  [phone]

## 2025-07-09 NOTE — CONFIDENTIAL NOTE
FUTURE VISIT INFORMATION      SURGERY INFORMATION:  Date: 09.29.2025   Location:  GI   Surgeon:  Bella Jean MD   Anesthesia Type:  MAC  Procedure: Esophagoscopy, gastroscopy, duodenoscopy (EGD), combined     RECORDS REQUESTED FROM:       Primary Care Provider: Rosa Armando PA-C    Pertinent Medical History: Mild intermittent asthma with exacerbation, Alcoholic hepatitis without ascites, Acute kidney injury, Mixed hyperlipidemia, Prediabetes, Staphylococcus aureus bacteremia, Transaminitis     Most recent EKG+ Tracing: 10.03.2024    Most recent ECHO: 10.03.2024

## 2025-07-14 ENCOUNTER — PATIENT OUTREACH (OUTPATIENT)
Dept: CARE COORDINATION | Facility: CLINIC | Age: 40
End: 2025-07-14
Payer: COMMERCIAL

## 2025-07-14 ENCOUNTER — TELEPHONE (OUTPATIENT)
Dept: GASTROENTEROLOGY | Facility: CLINIC | Age: 40
End: 2025-07-14
Payer: COMMERCIAL

## 2025-07-14 NOTE — TELEPHONE ENCOUNTER
Caller: No call needed    Reason for Reschedule/Cancellation (please be detailed, any staff messages or encounters to note?):   Patient transferring care to [place]    Did you cancel or rescheduled an EUS procedure? No.    Is screening questionnaire older than 3 months from the reschedule date.   If Yes, please complete screening questionnaire. No    Prior to reschedule please review:  Ordering Provider: BERENICE COLORADO   Sedation Determined: MAC  Does patient have any ASC Exclusions, please identify?: Cirrhosis    Notes on Cancelled Procedure:  Procedure: Upper Endoscopy [EGD]   Date: 10/2/2025  Location: [facility]; [address] [place] [address], [address]rd Floor, [place] [address]   Surgeon: Ted    Rescheduled: No, not needed. Referring provider aware.

## 2025-07-14 NOTE — TELEPHONE ENCOUNTER
"----- Message from Judith LUA sent at 7/14/2025  7:52 AM CDT -----  Regarding: FYI - pt transferring care to [place]  Hi,  FYI - patient moving to [place] and requested we fax hepatology referral to: [facility]. I have entered the referral and the referral portion of the call center will facilitate the request. Patient's appts with Rosa Armando PA-C have been canceled. Patient aware and approved this.     I see patient has a \"PAC EVAL\" appointment. Cc'ing the scope people to follow up on that.    Thanks!    KASSANDRA Wong, RN, PHN  Hepatology Clinic  [facility]  [facility]  "

## 2025-09-29 ENCOUNTER — PRE VISIT (OUTPATIENT)
Dept: SURGERY | Facility: CLINIC | Age: 40
End: 2025-09-29